# Patient Record
Sex: MALE | Race: BLACK OR AFRICAN AMERICAN | NOT HISPANIC OR LATINO | Employment: OTHER | URBAN - METROPOLITAN AREA
[De-identification: names, ages, dates, MRNs, and addresses within clinical notes are randomized per-mention and may not be internally consistent; named-entity substitution may affect disease eponyms.]

---

## 2017-04-19 ENCOUNTER — OFFICE VISIT (OUTPATIENT)
Dept: FAMILY MEDICINE | Facility: CLINIC | Age: 72
End: 2017-04-19

## 2017-04-19 VITALS
SYSTOLIC BLOOD PRESSURE: 134 MMHG | DIASTOLIC BLOOD PRESSURE: 88 MMHG | WEIGHT: 212.2 LBS | BODY MASS INDEX: 28.12 KG/M2 | HEIGHT: 73 IN | TEMPERATURE: 97.6 F | OXYGEN SATURATION: 97 % | HEART RATE: 80 BPM | RESPIRATION RATE: 20 BRPM

## 2017-04-19 DIAGNOSIS — Z86.73 HISTORY OF CVA (CEREBROVASCULAR ACCIDENT): ICD-10-CM

## 2017-04-19 DIAGNOSIS — E11.9 TYPE 2 DIABETES MELLITUS WITHOUT COMPLICATION, WITHOUT LONG-TERM CURRENT USE OF INSULIN (H): Primary | ICD-10-CM

## 2017-04-19 PROBLEM — I10 BENIGN ESSENTIAL HYPERTENSION: Status: ACTIVE | Noted: 2017-04-19

## 2017-04-19 LAB
ALBUMIN SERPL-MCNC: 4.7 MG/DL (ref 3.5–4.7)
ALP SERPL-CCNC: 90.5 U/L (ref 31.7–110.7)
ALT SERPL-CCNC: 15.6 U/L (ref 0–45)
AST SERPL-CCNC: 13.4 U/L (ref 0–55)
BILIRUB SERPL-MCNC: 0.4 MG/DL (ref 0.2–1.3)
BUN SERPL-MCNC: 22 MG/DL (ref 7–21)
CALCIUM SERPL-MCNC: 9.4 MG/DL (ref 8.5–10.1)
CHLORIDE SERPLBLD-SCNC: 104.3 MMOL/L (ref 98–110)
CHOLEST SERPL-MCNC: 96 MG/DL
CO2 SERPL-SCNC: 26.7 MMOL/L (ref 20–32)
CREAT SERPL-MCNC: 1.2 MG/DL (ref 0.7–1.3)
CREAT UR-MCNC: 86 MG/DL
GFR SERPL CREATININE-BSD FRML MDRD: 63.3 ML/MIN/1.7 M2
GLUCOSE SERPL-MCNC: 105.1 MG'DL (ref 70–99)
HBA1C MFR BLD: 5.7 % (ref 4.1–5.7)
HDLC SERPL-MCNC: 41 MG/DL
LDLC SERPL CALC-MCNC: 40 MG/DL
MICROALBUMIN UR-MCNC: <5 MG/L
MICROALBUMIN/CREAT UR: NORMAL MG/G CR (ref 0–17)
NONHDLC SERPL-MCNC: 55 MG/DL
POTASSIUM SERPL-SCNC: 3.9 MMOL/DL (ref 3.3–4.5)
PROT SERPL-MCNC: 7.8 G/DL (ref 6.8–8.8)
SODIUM SERPL-SCNC: 141.2 MMOL/L (ref 132.6–141.4)
TRIGL SERPL-MCNC: 73 MG/DL

## 2017-04-19 RX ORDER — LEVETIRACETAM 500 MG/1
500 TABLET ORAL 2 TIMES DAILY
COMMUNITY
End: 2017-07-25

## 2017-04-19 ASSESSMENT — ENCOUNTER SYMPTOMS
MYALGIAS: 0
SHORTNESS OF BREATH: 0
BLOOD IN STOOL: 0
COLOR CHANGE: 0
DIARRHEA: 0
DIFFICULTY URINATING: 0
POLYDIPSIA: 0
ABDOMINAL PAIN: 0
CHEST TIGHTNESS: 0
SLEEP DISTURBANCE: 0
WEAKNESS: 1
CONSTIPATION: 0
EYES NEGATIVE: 1
FATIGUE: 0
COUGH: 0
FEVER: 0
NUMBNESS: 0
PALPITATIONS: 0
ARTHRALGIAS: 0
DYSPHORIC MOOD: 0
ABDOMINAL DISTENTION: 0
VOMITING: 0
DYSURIA: 0
NERVOUS/ANXIOUS: 0

## 2017-04-19 NOTE — PATIENT INSTRUCTIONS
Here is the plan from today's visit    1. Type 2 diabetes mellitus without complication, without long-term current use of insulin (H)  Follow up in 1-2 weeks for a recheck  - Hemoglobin A1c (LabDAQ)  - Lipid panel reflex to direct LDL  - Albumin Random Urine Quantitative  - CBC with Diff Plt (LabDAQ); Future  - Comprehensive Metabolic Panel (LabDAQ)  - OPHTHALMOLOGY ADULT REFERRAL - INTERNAL    2. History of CVA (cerebrovascular accident)  Continue ASa      Please call or return to clinic if your symptoms don't go away.    Follow up plan  Please make a clinic appointment for follow up with me (LUCY KINCAID) in 2  weeks for recheck.    Thank you for coming to [facility] today.  Lab Testing:  **If you had lab testing today and your results are reassuring or normal they will be mailed to you or sent through "[employer] within 7 days.   **If the lab tests need quick action we will call you with the results.  The phone number we will call with results is # [phone] (home) . If this is not the best number please call our clinic and change the number.  Medication Refills:  If you need any refills please call your pharmacy and they will contact us.   If you need to  your refill at a new pharmacy, please contact the new pharmacy directly. The new pharmacy will help you get your medications transferred faster.   Scheduling:  If you have any concerns about today's visit or wish to schedule another appointment please call our office during normal business hours [phone] (8-5:00 M-F)  If a referral was made to a [facility] and you don't get a call from central scheduling please call [phone].  If a Mammogram was ordered for you at [facility] call [phone] to schedule or change your appointment.  If you had an XRay/CT/Ultrasound/MRI ordered the number is [phone] to schedule or change your radiology appointment.   Medical Concerns:  If you have urgent medical concerns  please call [phone] at any time of the day.      Ophthalmology referral  [phone]  This pt has been scheduled on 4/27 in the eye clinic.           Thanks!     Krzysztof

## 2017-04-19 NOTE — PROGRESS NOTES
HPI:       Juli Rodriguez is a 72 year old who presents for the following  Patient presents with:  Establish Care: new patient  RECHECK: f/u stroke in 2013. numbness in both hands and legs.     New patient to this practice who       A Welsh  was used for  this visit.      Problem, Medication and Allergy Lists were   reviewed and are current.     Patient Active Problem List    Diagnosis Date Noted     Type 2 diabetes mellitus without complication, without long-term current use of insulin (H) 04/19/2017     Priority: Medium     History of CVA (cerebrovascular accident) 04/19/2017     Priority: Medium     Benign essential hypertension 04/19/2017     Priority: Medium   ,     Current Outpatient Prescriptions   Medication Sig Dispense Refill     GABAPENTIN PO Take 600 mg by mouth 3 times daily       levETIRAcetam (KEPPRA) 500 MG tablet Take 500 mg by mouth 2 times daily       vitamin B complex with vitamin C (VITAMIN  B COMPLEX) TABS tablet Take 1 tablet by mouth daily       aspirin 81 MG tablet Take by mouth daily       LISINOPRIL PO Take 20 mg by mouth       CARVEDILOL PO Take 12.5 mg by mouth 2 times daily (with meals)       METFORMIN HCL PO Take 500 mg by mouth 2 times daily (with meals)       ATORVASTATIN CALCIUM PO Take 20 mg by mouth       PANTOPRAZOLE SODIUM PO Take 40 mg by mouth every morning (before breakfast)     ,     Allergies   Allergen Reactions     Eggs [Chicken-Derived Products (Egg)]      Patient is   a new patient to this clinic and so  I reviewed/updated the Past Medical History, the Family History and the Social History. ,   Past Medical History:   Diagnosis Date     Cerebral infarction (H) 08/2013    In [facility]    and   Family History        Negative family history of: DIABETES, Coronary Artery Disease, Hypertension, Hyperlipidemia, CEREBROVASCULAR DISEASE, Breast Cancer, Colon Cancer, Prostate Cancer, Other Cancer, Depression, Anxiety Disorder, MENTAL ILLNESS, Substance Abuse,  "Anesthesia Reaction, Asthma, OSTEOPOROSIS, Genetic Disorder, Thyroid Disease, Obesity               Review of Systems:   Review of Systems   Constitutional: Negative for fatigue and fever.   HENT: Negative.    Eyes: Negative.  Negative for visual disturbance.   Respiratory: Negative for cough, chest tightness and shortness of breath.    Cardiovascular: Negative for chest pain and palpitations.   Gastrointestinal: Negative for abdominal distention, abdominal pain, blood in stool, constipation, diarrhea and vomiting.   Endocrine: Negative for polydipsia and polyuria.   Genitourinary: Negative for difficulty urinating and dysuria.   Musculoskeletal: Negative for arthralgias and myalgias.   Skin: Negative for color change and rash.   Neurological: Positive for weakness (right sided). Negative for numbness.   Psychiatric/Behavioral: Negative for dysphoric mood and sleep disturbance. The patient is not nervous/anxious.              Physical Exam:   Patient Vitals for the past 24 hrs:   BP Temp Temp src Pulse Resp SpO2 Height Weight   04/19/17 1433 134/88 - - - - - - -   04/19/17 1427 (!) 160/97 97.6  F (36.4  C) Oral 80 20 97 % 6' 0.83\" (185 cm) 212 lb 3.2 oz (96.3 kg)     Body mass index is 28.12 kg/(m^2).  Vitals were reviewed and were normal     Physical Exam   Constitutional: He is oriented to person, place, and time. He appears well-developed. No distress.   HENT:   Head: Normocephalic.   Eyes: Conjunctivae are normal. No scleral icterus.   Neck: Normal range of motion. No thyromegaly present.   Cardiovascular: Normal rate, regular rhythm, normal heart sounds and intact distal pulses.    No murmur heard.  Pulmonary/Chest: Effort normal and breath sounds normal. No respiratory distress. He has no wheezes.   Abdominal: Soft. Bowel sounds are normal. He exhibits no distension. There is no splenomegaly or hepatomegaly. There is no tenderness.   Musculoskeletal: He exhibits no edema.   Lymphadenopathy:     He has no " cervical adenopathy.   Neurological: He is alert and oriented to person, place, and time. He has normal reflexes. No cranial nerve deficit or sensory deficit. GCS eye subscore is 4. GCS verbal subscore is 5. GCS motor subscore is 6.   Decreased R sided leg strength,   Mildly decreased R arm coordination.    Skin: Skin is warm and dry. He is not diaphoretic.   Psychiatric: He has a normal mood and affect. His behavior is normal. Judgment and thought content normal.   Vitals reviewed.        Results:      Results from the last 24 hours  Results for orders placed or performed in visit on 04/19/17 (from the past 24 hour(s))   Hemoglobin A1c (LabDAQ)   Result Value Ref Range    Hemoglobin A1C 5.7 4.1 - 5.7 %   Lipid panel reflex to direct LDL   Result Value Ref Range    Cholesterol 96 <200 mg/dL    Triglycerides 73 <150 mg/dL    HDL Cholesterol 41 >39 mg/dL    LDL Cholesterol Calculated 40 <100 mg/dL    Non HDL Cholesterol 55 <130 mg/dL   Comprehensive Metabolic Panel (LabDAQ)   Result Value Ref Range    Albumin 4.7 3.5 - 4.7 mg/dL    Alkaline Phosphatase 90.5 31.7 - 110.7 U/L    ALT 15.6 0.0 - 45.0 U/L    AST 13.4 0.0 - 55.0 U/L    Bilirubin Total 0.4 0.2 - 1.3 mg/dL    Urea Nitrogen 22.0 (H) 7.0 - 21.0 mg/dL    Calcium 9.4 8.5 - 10.1 mg/dL    Chloride 104.3 98.0 - 110.0 mmol/L    Carbon Dioxide 26.7 20.0 - 32.0 mmol/L    Creatinine 1.2 0.7 - 1.3 mg/dL    Glucose 105.1 (H) 70.0 - 99.0 mg'dL    Potassium 3.9 3.3 - 4.5 mmol/dL    Sodium 141.2 132.6 - 141.4 mmol/L    Protein Total 7.8 6.8 - 8.8 g/dL    GFR Estimate 63.3 >60.0 mL/min/1.7 m2    GFR Estimate If Black 76.5 >60.0 mL/min/1.7 m2   Albumin Random Urine Quantitative   Result Value Ref Range    Creatinine Urine 86 mg/dL    Albumin Urine mg/L <5 mg/L    Albumin Urine mg/g Cr Unable to calculate due to low value 0 - 17 mg/g Cr     Assessment and Plan     Patient Instructions   Here is the plan from today's visit    1. Type 2 diabetes mellitus without complication,  without long-term current use of insulin (H)  Follow up in 1-2 weeks for a recheck  - Hemoglobin A1c (LabDAQ)  - Lipid panel reflex to direct LDL  - Albumin Random Urine Quantitative  - CBC with Diff Plt (LabDAQ); Future  - Comprehensive Metabolic Panel (LabDAQ)  - OPHTHALMOLOGY ADULT REFERRAL - INTERNAL    2. History of CVA (cerebrovascular accident)  Continue ASA    Follow up in 3-4 weeks    There are no discontinued medications.  Options for treatment and follow-up care were reviewed with the patient. Juli Rodriguez  engaged in the decision making process and verbalized understanding of the options discussed and agreed with the final plan.    Ernie Arreola MD

## 2017-04-19 NOTE — MR AVS SNAPSHOT
After Visit Summary   4/19/2017    Juli Rodriguez    MRN: [id number]           Patient Information     Date Of Birth          1945        Visit Information        Provider Department      4/19/2017 2:20 PM Ernie Arreola MD [facility]        Today's Diagnoses     Type 2 diabetes mellitus without complication, without long-term current use of insulin (H)    -  1    History of CVA (cerebrovascular accident)          Care Instructions    Here is the plan from today's visit    1. Type 2 diabetes mellitus without complication, without long-term current use of insulin (H)  Follow up in 1-2 weeks for a recheck  - Hemoglobin A1c (LabDAQ)  - Lipid panel reflex to direct LDL  - Albumin Random Urine Quantitative  - CBC with Diff Plt (LabDAQ); Future  - Comprehensive Metabolic Panel (LabDAQ)  - OPHTHALMOLOGY ADULT REFERRAL - INTERNAL    2. History of CVA (cerebrovascular accident)  Continue ASa      Please call or return to clinic if your symptoms don't go away.    Follow up plan  Please make a clinic appointment for follow up with me (ERNIE ARREOLA) in 2  weeks for recheck.    Thank you for coming to [facility] today.  Lab Testing:  **If you had lab testing today and your results are reassuring or normal they will be mailed to you or sent through [employer] within 7 days.   **If the lab tests need quick action we will call you with the results.  The phone number we will call with results is # [phone] (home) . If this is not the best number please call our clinic and change the number.  Medication Refills:  If you need any refills please call your pharmacy and they will contact us.   If you need to  your refill at a new pharmacy, please contact the new pharmacy directly. The new pharmacy will help you get your medications transferred faster.   Scheduling:  If you have any concerns about today's visit or wish to schedule another appointment please call our office during  normal business hours [phone] (8-5:00 M-F)  If a referral was made to a [facility] and you don't get a call from central scheduling please call [phone].  If a Mammogram was ordered for you at [facility] call [phone] to schedule or change your appointment.  If you had an XRay/CT/Ultrasound/MRI ordered the number is [phone] to schedule or change your radiology appointment.   Medical Concerns:  If you have urgent medical concerns please call [phone] at any time of the day.          Follow-ups after your visit        Additional Services     OPHTHALMOLOGY ADULT REFERRAL - INTERNAL       Your provider has referred you to: [facility]: [facility] [place] [phone]   [email or web address]    Please be aware that coverage of these services is subject to the terms and limitations of your health insurance plan.  Call member services at your health plan with any benefit or coverage questions.      Please bring the following with you to your appointment:    (1) Any X-Rays, CTs or MRIs which have been performed.  Contact the facility where they were done to arrange for  prior to your scheduled appointment.    (2) List of current medications  (3) This referral request   (4) Any documents/labs given to you for this referral                  Follow-up notes from your care team     Return in about 1 week (around 4/26/2017).      Future tests that were ordered for you today     Open Future Orders        Priority Expected Expires Ordered    CBC with Diff Plt (LabDAQ) Routine  4/19/2018 4/19/2017            Who to contact     Please call your clinic at [phone] to:    Ask questions about your health    Make or cancel appointments    Discuss your medicines    Learn about your test results    Speak to your doctor   If you have compliments or concerns about an experience at your clinic, or if you wish to file a complaint, please contact  "[facility] Patient Relations at [phone] or email us at [email or web address]         Additional Information About Your Visit        iversityhart Information     [employer] is an electronic gateway that provides easy, online access to your medical records. With [employer], you can request a clinic appointment, read your test results, renew a prescription or communicate with your care team.     To sign up for [employer] visit the website at [email or web address]   You will be asked to enter the access code listed below, as well as some personal information. Please follow the directions to create your username and password.     Your access code is: [email or web address]  Expires: 2017  3:02 PM     Your access code will  in 90 days. If you need help or a new code, please contact your [facility] or call [phone] for assistance.        Care EveryWhere ID     This is your Care EveryWhere ID. This could be used by other organizations to access your [place] medical records  [id number]        Your Vitals Were     Pulse Temperature Respirations Height Pulse Oximetry BMI (Body Mass Index)    80 97.6  F (36.4  C) (Oral) 20 6' 0.83\" (185 cm) 97% 28.12 kg/m2       Blood Pressure from Last 3 Encounters:   17 134/88    Weight from Last 3 Encounters:   17 212 lb 3.2 oz (96.3 kg)              We Performed the Following     Albumin Random Urine Quantitative     Comprehensive Metabolic Panel (LabDAQ)     Hemoglobin A1c (LabDAQ)     Lipid panel reflex to direct LDL     OPHTHALMOLOGY ADULT REFERRAL - INTERNAL        Primary Care Provider Office Phone # Fax #    Ernie Arreola [phone]       [facility] [address] [address]        Thank you!     Thank you for choosing [facility]  for your care. Our goal is always to provide you with excellent care. Hearing back from our patients is " one way we can continue to improve our services. Please take a few minutes to complete the written survey that you may receive in the mail after your visit with us. Thank you!             Your Updated Medication List - Protect others around you: Learn how to safely use, store and throw away your medicines at www.disposemymeds.org.          This list is accurate as of: 4/19/17  3:02 PM.  Always use your most recent med list.                   Brand Name Dispense Instructions for use    aspirin 81 MG tablet      Take by mouth daily       ATORVASTATIN CALCIUM PO      Take 20 mg by mouth       CARVEDILOL PO      Take 12.5 mg by mouth 2 times daily (with meals)       GABAPENTIN PO      Take 600 mg by mouth 3 times daily       levETIRAcetam 500 MG tablet    KEPPRA     Take 500 mg by mouth 2 times daily       LISINOPRIL PO      Take 20 mg by mouth       METFORMIN HCL PO      Take 500 mg by mouth 2 times daily (with meals)       PANTOPRAZOLE SODIUM PO      Take 40 mg by mouth every morning (before breakfast)       vitamin B complex with vitamin C Tabs tablet      Take 1 tablet by mouth daily

## 2017-04-19 NOTE — LETTER
April 23, 2017      Juli Rodriguez  [address]   [place] [address]        Dear Juli,    Thank you for getting your care at [facility]. Please see below for your test results.  Your results are reassuring -continue your current medications.    Resulted Orders   Hemoglobin A1c (LabDAQ)   Result Value Ref Range    Hemoglobin A1C 5.7 4.1 - 5.7 %   Lipid panel reflex to direct LDL   Result Value Ref Range    Cholesterol 96 <200 mg/dL    Triglycerides 73 <150 mg/dL    HDL Cholesterol 41 >39 mg/dL    LDL Cholesterol Calculated 40 <100 mg/dL      Comment:      Desirable:       <100 mg/dl    Non HDL Cholesterol 55 <130 mg/dL   Albumin Random Urine Quantitative   Result Value Ref Range    Creatinine Urine 86 mg/dL    Albumin Urine mg/L <5 mg/L    Albumin Urine mg/g Cr Unable to calculate due to low value 0 - 17 mg/g Cr   Comprehensive Metabolic Panel (LabDAQ)   Result Value Ref Range    Albumin 4.7 3.5 - 4.7 mg/dL    Alkaline Phosphatase 90.5 31.7 - 110.7 U/L    ALT 15.6 0.0 - 45.0 U/L    AST 13.4 0.0 - 55.0 U/L    Bilirubin Total 0.4 0.2 - 1.3 mg/dL    Urea Nitrogen 22.0 (H) 7.0 - 21.0 mg/dL    Calcium 9.4 8.5 - 10.1 mg/dL    Chloride 104.3 98.0 - 110.0 mmol/L    Carbon Dioxide 26.7 20.0 - 32.0 mmol/L    Creatinine 1.2 0.7 - 1.3 mg/dL    Glucose 105.1 (H) 70.0 - 99.0 mg'dL    Potassium 3.9 3.3 - 4.5 mmol/dL    Sodium 141.2 132.6 - 141.4 mmol/L    Protein Total 7.8 6.8 - 8.8 g/dL    GFR Estimate 63.3 >60.0 mL/min/1.7 m2    GFR Estimate If Black 76.5 >60.0 mL/min/1.7 m2       If you have any concerns about these results please call and leave a message for me or send a MyChart message to the clinic.    Sincerely,    Ernie Arreola MD

## 2017-04-24 NOTE — PROGRESS NOTES
Letter with results sent to patient on 4/23/2017  See letters section for details. Ernie Arreola MD

## 2017-04-27 ENCOUNTER — OFFICE VISIT (OUTPATIENT)
Dept: OPHTHALMOLOGY | Facility: CLINIC | Age: 72
End: 2017-04-27

## 2017-04-27 DIAGNOSIS — E11.9 TYPE 2 DIABETES MELLITUS WITHOUT COMPLICATION, WITHOUT LONG-TERM CURRENT USE OF INSULIN (H): Primary | ICD-10-CM

## 2017-04-27 DIAGNOSIS — H52.4 PRESBYOPIA: ICD-10-CM

## 2017-04-27 ASSESSMENT — CONF VISUAL FIELD
OD_NORMAL: 1
OS_NORMAL: 1
METHOD: COUNTING FINGERS

## 2017-04-27 ASSESSMENT — VISUAL ACUITY
OS_SC: 20/20
METHOD: SNELLEN - LINEAR
OD_SC: 20/20
OD_SC+: -1

## 2017-04-27 ASSESSMENT — TONOMETRY
IOP_METHOD: ICARE
OD_IOP_MMHG: 16
OS_IOP_MMHG: 17

## 2017-04-27 ASSESSMENT — CUP TO DISC RATIO
OD_RATIO: 0.40
OS_RATIO: 0.40

## 2017-04-27 ASSESSMENT — EXTERNAL EXAM - RIGHT EYE: OD_EXAM: NORMAL, NO PTOSIS

## 2017-04-27 ASSESSMENT — REFRACTION_MANIFEST
OD_AXIS: 140
OS_ADD: +1.75
OS_CYLINDER: SPHERE
OD_CYLINDER: +0.25
OD_SPHERE: -0.25
OD_ADD: +1.75
OS_SPHERE: +0.50

## 2017-04-27 ASSESSMENT — EXTERNAL EXAM - LEFT EYE: OS_EXAM: NORMAL

## 2017-04-27 ASSESSMENT — SLIT LAMP EXAM - LIDS
COMMENTS: NORMAL
COMMENTS: NORMAL

## 2017-04-27 NOTE — NURSING NOTE
Chief Complaints and History of Present Illnesses   Patient presents with     Diabetic Eye Exam     HPI    Affected eye(s):  Both   Symptoms:     No blurred vision   No floaters   No flashes         Do you have eye pain now?:  No      Comments:    DMII last BGL: 122 2 days ago  Lab Results       Component                Value               Date                       A1C                      5.7                 04/19/2017              Patient had a stroke on the Right side, doesn't have sight in the RE 2015 per pt.  No additional concerns today per pt      Amy Pulido April 27, 2017 8:55 AM

## 2017-04-27 NOTE — MR AVS SNAPSHOT
After Visit Summary   2017    Juli Rodriguez    MRN: [id number]           Patient Information     Date Of Birth          1945        Visit Information        Provider Department      2017 8:30 AM Leilani Gresham OD; JULIANNE SANDERSON [facility]        Today's Diagnoses     Type 2 diabetes mellitus without complication, without long-term current use of insulin (H)    -  1    Presbyopia           Follow-ups after your visit        Who to contact     Please call your clinic at [phone] to:    Ask questions about your health    Make or cancel appointments    Discuss your medicines    Learn about your test results    Speak to your doctor   If you have compliments or concerns about an experience at your clinic, or if you wish to file a complaint, please contact [facility] Patient Relations at [phone] or email us at [email or web address]         Additional Information About Your Visit        MyChart Information     [employer] is an electronic gateway that provides easy, online access to your medical records. With [employer], you can request a clinic appointment, read your test results, renew a prescription or communicate with your care team.     To sign up for [employer] visit the website at [email or web address]   You will be asked to enter the access code listed below, as well as some personal information. Please follow the directions to create your username and password.     Your access code is: [email or web address]  Expires: 2017  3:02 PM     Your access code will  in 90 days. If you need help or a new code, please contact your [facility] or call [phone] for assistance.        Care EveryWhere ID     This is your Care EveryWhere ID. This could be used by other organizations to access your [place] medical records  [id number]         Blood Pressure from Last 3 Encounters:    04/19/17 134/88    Weight from Last 3 Encounters:   04/19/17 96.3 kg (212 lb 3.2 oz)              We Performed the Following     REFRACTION [[id number]]        Primary Care Provider Office Phone # Fax #    Ernie Arreola [phone]       [facility] [address] [address]        Thank you!     Thank you for choosing [facility]  for your care. Our goal is always to provide you with excellent care. Hearing back from our patients is one way we can continue to improve our services. Please take a few minutes to complete the written survey that you may receive in the mail after your visit with us. Thank you!             Your Updated Medication List - Protect others around you: Learn how to safely use, store and throw away your medicines at www.disposemymeds.org.          This list is accurate as of: 4/27/17  9:48 AM.  Always use your most recent med list.                   Brand Name Dispense Instructions for use    aspirin 81 MG tablet      Take by mouth daily       ATORVASTATIN CALCIUM PO      Take 20 mg by mouth       CARVEDILOL PO      Take 12.5 mg by mouth 2 times daily (with meals)       GABAPENTIN PO      Take 600 mg by mouth 3 times daily       levETIRAcetam 500 MG tablet    KEPPRA     Take 500 mg by mouth 2 times daily       LISINOPRIL PO      Take 20 mg by mouth       METFORMIN HCL PO      Take 500 mg by mouth 2 times daily (with meals)       PANTOPRAZOLE SODIUM PO      Take 40 mg by mouth every morning (before breakfast)       vitamin B complex with vitamin C Tabs tablet      Take 1 tablet by mouth daily

## 2017-04-27 NOTE — LETTER
April 27, 2017       DIABETIC EYE EXAMINATION REPORT:    Ernie Arreola MD  [facility]  [address] [address]   Ernie Arreola       Name: Juli Rodriguez   MRN: [id number]   Date of Service: 4/27/2017       Dear Dr. Arreola,    I had the pleasure of seeing your patient Mr. Rodriguez for his annual diabetic eye exam. He has a history of cataract surgery in both eyes, but does not recall any history of diabetic eye complications.     VISUAL ACUITY:    Visual Acuity (Snellen - Linear)      Right Left   Dist sc 20/20 -1 20/20              Detailed Retinal Examination:  250.00 NIDDM without Eye Complications - No diabetic retinopathy either eye    Follow up recommendations: in 1 year for reevaluation.    Thank you again for the kind referral.  If I can provide you with any additional information or be of further assistance in the future, please feel free to contact me at any time.    Sincerely,       [employer]

## 2017-04-27 NOTE — PROGRESS NOTES
A/P  1.) Type 2 DM without ophthalmic manifestation  -Last A1c 5.7 this month  -Reviewed effects of DM on the eyes and importance of good blood sugar control  -Monitor 1 year    2.) H/o stroke with residual right sided weakness  -No ophthalmic side effects noted today  -Pt states ocular symptoms resolved    RTC 1 year diabetic eye exam    I have confirmed the patient's CC, HPI and reviewed Past Medical History, Past Surgical History, Social History, Family History, Problem List, Medication List and agree with Tech note.     Leilani Gresham, MICHELLE DAYO

## 2017-05-17 ENCOUNTER — OFFICE VISIT (OUTPATIENT)
Dept: FAMILY MEDICINE | Facility: CLINIC | Age: 72
End: 2017-05-17

## 2017-05-17 ENCOUNTER — DOCUMENTATION ONLY (OUTPATIENT)
Dept: VASCULAR SURGERY | Facility: CLINIC | Age: 72
End: 2017-05-17

## 2017-05-17 VITALS
SYSTOLIC BLOOD PRESSURE: 138 MMHG | DIASTOLIC BLOOD PRESSURE: 88 MMHG | OXYGEN SATURATION: 100 % | TEMPERATURE: 97.7 F | BODY MASS INDEX: 28.49 KG/M2 | RESPIRATION RATE: 18 BRPM | WEIGHT: 215 LBS | HEART RATE: 77 BPM

## 2017-05-17 DIAGNOSIS — Z11.59 NEED FOR HEPATITIS C SCREENING TEST: ICD-10-CM

## 2017-05-17 DIAGNOSIS — Z00.00 PREVENTATIVE HEALTH CARE: ICD-10-CM

## 2017-05-17 DIAGNOSIS — Z86.73 HISTORY OF CVA (CEREBROVASCULAR ACCIDENT): ICD-10-CM

## 2017-05-17 DIAGNOSIS — E11.9 TYPE 2 DIABETES MELLITUS WITHOUT COMPLICATION, WITHOUT LONG-TERM CURRENT USE OF INSULIN (H): ICD-10-CM

## 2017-05-17 DIAGNOSIS — Z13.6 SCREENING FOR AAA (ABDOMINAL AORTIC ANEURYSM): ICD-10-CM

## 2017-05-17 DIAGNOSIS — J30.1 SEASONAL ALLERGIC RHINITIS DUE TO POLLEN: Primary | ICD-10-CM

## 2017-05-17 DIAGNOSIS — Z12.11 COLON CANCER SCREENING: ICD-10-CM

## 2017-05-17 LAB — TSH SERPL DL<=0.005 MIU/L-ACNC: 2.69 MU/L (ref 0.4–4)

## 2017-05-17 RX ORDER — FLUTICASONE PROPIONATE 50 MCG
1-2 SPRAY, SUSPENSION (ML) NASAL DAILY
Qty: 16 G | Refills: 6 | Status: SHIPPED | OUTPATIENT
Start: 2017-05-17 | End: 2017-10-05

## 2017-05-17 RX ORDER — LORATADINE 10 MG/1
10 TABLET ORAL DAILY
Qty: 90 TABLET | Refills: 1 | Status: SHIPPED | OUTPATIENT
Start: 2017-05-17 | End: 2017-07-25

## 2017-05-17 NOTE — PROGRESS NOTES
HPI:       Juli Rodriguez is a 72 year old who presents for the following  Patient presents with:  Allergies      Allergies?     Onset: ongoing    Description:   Nasal congestion:  YES   Sneezing:  YES   Red, itchy eyes:  YES     Progression of Symptoms:      same    Accompanying Signs & Symptoms:  Cough: No   Wheezing: No   Rash: No   Sinus/facial pain: No    History:   Is it seasonal:   in the fall and in the spring   History of asthma: No   Has allergy testing been done: No     What makes it worse?:             change in seasons, Details    What makes it better?             Nothing:       Therapies Tried and outcome: Nothing    Tried zyrtec was partially effective, and eye drops   Also requests diabetic shoes    Patient has great difficulty getting up from a chair and requires assistance once he is up he is able to walk.    Discussed immunizations and FIT with patient and he agreed to them.  A [employer]Dash  was used for  this visit.      Problem, Medication and Allergy Lists were reviewed and are current.  Patient is an established patient of this clinic.         Review of Systems:   Review of Systems   Constitutional: Negative for fatigue and fever.   HENT: Negative.    Eyes: Negative.  Negative for visual disturbance.   Respiratory: Negative for cough, chest tightness and shortness of breath.    Cardiovascular: Negative for chest pain and palpitations.   Gastrointestinal: Negative for abdominal distention, abdominal pain, blood in stool, constipation, diarrhea and vomiting.   Endocrine: Negative for polydipsia and polyuria.   Genitourinary: Negative for difficulty urinating and dysuria.   Musculoskeletal: Negative for arthralgias and myalgias.   Skin: Negative for color change and rash.   Neurological: Positive for weakness (right sided). Negative for numbness.   Psychiatric/Behavioral: Negative for dysphoric mood and sleep disturbance. The patient is not nervous/anxious.              Physical Exam:    Patient Vitals for the past 24 hrs:   BP Temp Temp src Pulse Resp SpO2 Weight   05/17/17 0945 (!) 150/101 97.7  F (36.5  C) Oral 77 18 100 % 215 lb (97.5 kg)     Body mass index is 28.49 kg/(m^2).  Vitals were reviewed and were normal     Physical Exam   Constitutional: He is oriented to person, place, and time. He appears well-developed. No distress.   HENT:   Head: Normocephalic.   Nose: Mucosal edema (and inflammation) and rhinorrhea present. No sinus tenderness or septal deviation. Right sinus exhibits no maxillary sinus tenderness and no frontal sinus tenderness. Left sinus exhibits maxillary sinus tenderness. Left sinus exhibits no frontal sinus tenderness.   Eyes: EOM are normal. Pupils are equal, round, and reactive to light. Right conjunctiva is injected (mild). Left conjunctiva is injected (mild). No scleral icterus.   Neck: Normal range of motion. No thyromegaly present.   Cardiovascular: Normal rate, regular rhythm, normal heart sounds and intact distal pulses.    No murmur heard.  Pulmonary/Chest: Effort normal and breath sounds normal. No respiratory distress. He has no wheezes.   Abdominal: Soft. Bowel sounds are normal. He exhibits no distension. There is no splenomegaly or hepatomegaly. There is no tenderness.   Musculoskeletal: He exhibits no edema.   Lymphadenopathy:     He has no cervical adenopathy.   Neurological: He is alert and oriented to person, place, and time. He has normal reflexes. No cranial nerve deficit or sensory deficit. GCS eye subscore is 4. GCS verbal subscore is 5. GCS motor subscore is 6.   Decreased R sided leg strength,   Mildly decreased R arm coordination.    Skin: Skin is warm and dry. He is not diaphoretic.   Psychiatric: He has a normal mood and affect. His behavior is normal. Judgment and thought content normal.   Vitals reviewed.        Results:      Results from the last 24 hours  Results for orders placed or performed in visit on 05/17/17 (from the past 24  hour(s))   Abdominal Aortic Aneurysm Screening/Tracking    Narrative    Pt has never smoked, no family history of AAA; does not meet criteria for screening       Assessment and Plan     Patient Instructions   Here is the plan from today's visit    1. Seasonal allergic rhinitis due to pollen  Take the medicine and follow up if needed  - loratadine (CLARITIN) 10 MG tablet; Take 1 tablet (10 mg) by mouth daily  Dispense: 90 tablet; Refill: 1  - fluticasone (FLONASE) 50 MCG/ACT spray; Spray 1-2 sprays into both nostrils daily  Dispense: 16 g; Refill: 6    2. Screening for AAA (abdominal aortic aneurysm)  Negative screening  - Abdominal Aortic Aneurysm Screening/Tracking    3. Type 2 diabetes mellitus without complication, without long-term current use of insulin (H)  Ordered shoes  - C FOOT EXAM  NO CHARGE  - TSH with free T4 reflex  - order for DME; Diabetic shoes, has some neuropathy  Dispense: 1 each; Refill: 1    4. Colon cancer screening  Mail the test back please  - Fecal colorectal cancer screen FITT; Future    5. Need for hepatitis C screening test     - Hepatitis C Screen Reflex to HCV RNA Quant and Genotype    6. History of CVA (cerebrovascular accident)  And Right sided weakness     - order for DME; Power lift chair  Dispense: 1 each; Refill: 1      Please call or return to clinic if your symptoms don't go away.    Follow up plan  Please make a clinic appointment for follow up with me (ERNIE ARREOLA) in 2  months for diabetes recheck.      There are no discontinued medications.  Options for treatment and follow-up care were reviewed with the patient. Juli Rodriguez  engaged in the decision making process and verbalized understanding of the options discussed and agreed with the final plan.    Ernie Arreola MD

## 2017-05-17 NOTE — MR AVS SNAPSHOT
After Visit Summary   5/17/2017    Juli Rodriguez    MRN: [id number]           Patient Information     Date Of Birth          1945        Visit Information        Provider Department      5/17/2017 9:40 AM Ernie Arreola MD [facility]        Today's Diagnoses     Seasonal allergic rhinitis due to pollen    -  1    Screening for AAA (abdominal aortic aneurysm)        Type 2 diabetes mellitus without complication, without long-term current use of insulin (H)        Colon cancer screening        Need for hepatitis C screening test        History of CVA (cerebrovascular accident)          Care Instructions    Here is the plan from today's visit    1. Seasonal allergic rhinitis due to pollen  Take the medicine and follow up if needed  - loratadine (CLARITIN) 10 MG tablet; Take 1 tablet (10 mg) by mouth daily  Dispense: 90 tablet; Refill: 1  - fluticasone (FLONASE) 50 MCG/ACT spray; Spray 1-2 sprays into both nostrils daily  Dispense: 16 g; Refill: 6    2. Screening for AAA (abdominal aortic aneurysm)  Negative screning  - Abdominal Aortic Aneurysm Screening/Tracking    3. Type 2 diabetes mellitus without complication, without long-term current use of insulin (H)  Ordered shoes  - C FOOT EXAM  NO CHARGE  - TSH with free T4 reflex  - order for DME; Diabetic shoes, has some neuropathy  Dispense: 1 each; Refill: 1    4. Colon cancer screening  Mail the test back  - Fecal colorectal cancer screen FITT; Future    5. Need for hepatitis C screening test     - Hepatitis C Screen Reflex to HCV RNA Quant and Genotype    6. History of CVA (cerebrovascular accident)     - order for DME; Power lift chair  Dispense: 1 each; Refill: 1      Please call or return to clinic if your symptoms don't go away.    Follow up plan  Please make a clinic appointment for follow up with me (ERNIE ARREOLA) in 2  months for diabetes recheck.    Thank you for coming to [facility] today.  Lab Testing:  **If  you had lab testing today and your results are reassuring or normal they will be mailed to you or sent through [employer] within 7 days.   **If the lab tests need quick action we will call you with the results.  The phone number we will call with results is # [phone] (home) . If this is not the best number please call our clinic and change the number.  Medication Refills:  If you need any refills please call your pharmacy and they will contact us.   If you need to  your refill at a new pharmacy, please contact the new pharmacy directly. The new pharmacy will help you get your medications transferred faster.   Scheduling:  If you have any concerns about today's visit or wish to schedule another appointment please call our office during normal business hours [phone] (8-5:00 M-F)  If a referral was made to a [facility] and you don't get a call from central scheduling please call [phone].  If a Mammogram was ordered for you at [facility] call [phone] to schedule or change your appointment.  If you had an XRay/CT/Ultrasound/MRI ordered the number is [phone] to schedule or change your radiology appointment.   Medical Concerns:  If you have urgent medical concerns please call [phone] at any time of the day.          Follow-ups after your visit        Future tests that were ordered for you today     Open Future Orders        Priority Expected Expires Ordered    Fecal colorectal cancer screen FITT Routine 6/7/2017 8/9/2017 5/17/2017            Who to contact     Please call your clinic at [phone] to:    Ask questions about your health    Make or cancel appointments    Discuss your medicines    Learn about your test results    Speak to your doctor   If you have compliments or concerns about an experience at your clinic, or if you wish to file a complaint, please contact [facility] Patient Relations at [phone] or email us at  [email or web address]         Additional Information About Your Visit        [employer] Information     [employer] is an electronic gateway that provides easy, online access to your medical records. With [employer], you can request a clinic appointment, read your test results, renew a prescription or communicate with your care team.     To sign up for [employer] visit the website at [email or web address]   You will be asked to enter the access code listed below, as well as some personal information. Please follow the directions to create your username and password.     Your access code is: [email or web address]  Expires: 2017  3:02 PM     Your access code will  in 90 days. If you need help or a new code, please contact your [facility] or call [phone] for assistance.        Care EveryWhere ID     This is your Care EveryWhere ID. This could be used by other organizations to access your [facility] medical records  [id number]        Your Vitals Were     Pulse Temperature Respirations Pulse Oximetry BMI (Body Mass Index)       77 97.7  F (36.5  C) (Oral) 18 100% 28.49 kg/m2        Blood Pressure from Last 3 Encounters:   17 138/88   17 134/88    Weight from Last 3 Encounters:   17 215 lb (97.5 kg)   17 212 lb 3.2 oz (96.3 kg)              We Performed the Following     Abdominal Aortic Aneurysm Screening/Tracking     C FOOT EXAM  NO CHARGE     Hepatitis C Screen Reflex to HCV RNA Quant and Genotype     TSH with free T4 reflex          Today's Medication Changes          These changes are accurate as of: 17 10:28 AM.  If you have any questions, ask your nurse or doctor.               Start taking these medicines.        Dose/Directions    fluticasone 50 MCG/ACT spray   Commonly known as:  FLONASE   Used for:  Seasonal allergic rhinitis due to pollen   Started by:  Ernie Arreola MD        Dose:  1-2 spray   Spray 1-2 sprays into both nostrils daily    Quantity:  16 g   Refills:  6       loratadine 10 MG tablet   Commonly known as:  CLARITIN   Used for:  Seasonal allergic rhinitis due to pollen   Started by:  Ernie Arreola MD        Dose:  10 mg   Take 1 tablet (10 mg) by mouth daily   Quantity:  90 tablet   Refills:  1       * order for DME   Used for:  Type 2 diabetes mellitus without complication, without long-term current use of insulin (H)   Started by:  Ernie Arreola MD        Diabetic shoes, has some neuropathy   Quantity:  1 each   Refills:  1       * order for DME   Used for:  History of CVA (cerebrovascular accident)   Started by:  Ernie Arreola MD        Power lift chair   Quantity:  1 each   Refills:  1       * Notice:  This list has 2 medication(s) that are the same as other medications prescribed for you. Read the directions carefully, and ask your doctor or other care provider to review them with you.         Where to get your medicines      These medications were sent to [facility]/pharmacy #[id number] - [place], [place] - [address], [place] [address]     Phone:  [phone]     fluticasone 50 MCG/ACT spray    loratadine 10 MG tablet         Some of these will need a paper prescription and others can be bought over the counter.  Ask your nurse if you have questions.     Bring a paper prescription for each of these medications     order for DME    order for DME                Primary Care Provider Office Phone # Fax #    Ernie Arreola [phone]       [facility] [address] [address]        Thank you!     Thank you for choosing \[facility]\"" [facility]  for your care. Our goal is always to provide you with excellent care. Hearing back from our patients is one way we can continue to improve our services. Please take a few minutes to complete the written survey that you may receive in the mail after your visit with us. Thank you!             Your Updated  Medication List - Protect others around you: Learn how to safely use, store and throw away your medicines at www.disposemymeds.org.          This list is accurate as of: 5/17/17 10:28 AM.  Always use your most recent med list.                   Brand Name Dispense Instructions for use    aspirin 81 MG tablet      Take by mouth daily       ATORVASTATIN CALCIUM PO      Take 20 mg by mouth       CARVEDILOL PO      Take 12.5 mg by mouth 2 times daily (with meals)       fluticasone 50 MCG/ACT spray    FLONASE    16 g    Spray 1-2 sprays into both nostrils daily       GABAPENTIN PO      Take 600 mg by mouth 3 times daily       levETIRAcetam 500 MG tablet    KEPPRA     Take 500 mg by mouth 2 times daily       LISINOPRIL PO      Take 20 mg by mouth       loratadine 10 MG tablet    CLARITIN    90 tablet    Take 1 tablet (10 mg) by mouth daily       METFORMIN HCL PO      Take 500 mg by mouth 2 times daily (with meals)       * order for DME     1 each    Diabetic shoes, has some neuropathy       * order for DME     1 each    Power lift chair       PANTOPRAZOLE SODIUM PO      Take 40 mg by mouth every morning (before breakfast)       vitamin B complex with vitamin C Tabs tablet      Take 1 tablet by mouth daily       * Notice:  This list has 2 medication(s) that are the same as other medications prescribed for you. Read the directions carefully, and ask your doctor or other care provider to review them with you.

## 2017-05-17 NOTE — PATIENT INSTRUCTIONS
Here is the plan from today's visit    1. Seasonal allergic rhinitis due to pollen  Take the medicine and follow up if needed  - loratadine (CLARITIN) 10 MG tablet; Take 1 tablet (10 mg) by mouth daily  Dispense: 90 tablet; Refill: 1  - fluticasone (FLONASE) 50 MCG/ACT spray; Spray 1-2 sprays into both nostrils daily  Dispense: 16 g; Refill: 6    2. Screening for AAA (abdominal aortic aneurysm)  Negative screning  - Abdominal Aortic Aneurysm Screening/Tracking    3. Type 2 diabetes mellitus without complication, without long-term current use of insulin (H)  Ordered shoes  - C FOOT EXAM  NO CHARGE  - TSH with free T4 reflex  - order for DME; Diabetic shoes, has some neuropathy  Dispense: 1 each; Refill: 1    4. Colon cancer screening  Mail the test back  - Fecal colorectal cancer screen FITT; Future    5. Need for hepatitis C screening test     - Hepatitis C Screen Reflex to HCV RNA Quant and Genotype    6. History of CVA (cerebrovascular accident)     - order for DME; Power lift chair  Dispense: 1 each; Refill: 1      Please call or return to clinic if your symptoms don't go away.    Follow up plan  Please make a clinic appointment for follow up with me (LUCY KINCAID) in 2  months for diabetes recheck.    Thank you for coming to [facility] today.  Lab Testing:  **If you had lab testing today and your results are reassuring or normal they will be mailed to you or sent through [email or web address] within 7 days.   **If the lab tests need quick action we will call you with the results.  The phone number we will call with results is # [phone] (home) . If this is not the best number please call our clinic and change the number.  Medication Refills:  If you need any refills please call your pharmacy and they will contact us.   If you need to  your refill at a new pharmacy, please contact the new pharmacy directly. The new pharmacy will help you get your medications transferred faster.   Scheduling:  If you have any  concerns about today's visit or wish to schedule another appointment please call our office during normal business hours [phone] (8-5:00 M-F)  If a referral was made to a [facility] and you don't get a call from central scheduling please call [phone].  If a Mammogram was ordered for you at [facility] call [phone] to schedule or change your appointment.  If you had an XRay/CT/Ultrasound/MRI ordered the number is [phone] to schedule or change your radiology appointment.   Medical Concerns:  If you have urgent medical concerns please call [phone] at any time of the day.

## 2017-05-17 NOTE — LETTER
May 18, 2017      Juli Rodriguez  [address]   [facility] [address]        Dear Juli,    Thank you for getting your care at [facility]. Please see below for your test results.  Your hepatitis C result is negative and your thyroid test is normal  Resulted Orders   Hepatitis C Screen Reflex to HCV RNA Quant and Genotype   Result Value Ref Range    Hepatitis C Antibody  NR     Nonreactive   Assay performance characteristics have not been established for newborns,   infants, and children     TSH with free T4 reflex   Result Value Ref Range    TSH 2.69 0.40 - 4.00 mU/L       If you have any concerns about these results please call and leave a message for me or send a [employer] message to the clinic.    Sincerely,    Ernie Arreola MD

## 2017-05-18 LAB — HCV AB SERPL QL IA: NORMAL

## 2017-05-18 NOTE — PROGRESS NOTES
Letter with results sent to patient on 5/18/2017  See letters section for details. Ernie Arreola MD

## 2017-05-22 ENCOUNTER — APPOINTMENT (OUTPATIENT)
Dept: LAB | Facility: CLINIC | Age: 72
End: 2017-05-22
Attending: PATHOLOGY
Payer: COMMERCIAL

## 2017-05-23 ENCOUNTER — HOSPITAL ENCOUNTER (OUTPATIENT)
Facility: CLINIC | Age: 72
Setting detail: SPECIMEN
Discharge: HOME | End: 2017-05-23
Admitting: FAMILY MEDICINE
Payer: COMMERCIAL

## 2017-05-23 PROCEDURE — 82274 ASSAY TEST FOR BLOOD FECAL: CPT | Performed by: FAMILY MEDICINE

## 2017-05-24 DIAGNOSIS — Z12.11 COLON CANCER SCREENING: ICD-10-CM

## 2017-05-24 LAB — HEMOCCULT STL QL IA: NEGATIVE

## 2017-05-25 NOTE — PROGRESS NOTES
Letter with results sent to patient on 5/24/2017  See letters section for details. Ernie Arreola MD

## 2017-06-02 ENCOUNTER — MEDICAL CORRESPONDENCE (OUTPATIENT)
Dept: HEALTH INFORMATION MANAGEMENT | Facility: CLINIC | Age: 72
End: 2017-06-02

## 2017-06-07 PROBLEM — R53.1 RIGHT SIDED WEAKNESS: Status: ACTIVE | Noted: 2017-06-07

## 2017-06-07 PROBLEM — R53.1 RIGHT SIDED WEAKNESS: Status: RESOLVED | Noted: 2017-06-07 | Resolved: 2017-06-07

## 2017-06-07 ASSESSMENT — ENCOUNTER SYMPTOMS
POLYDIPSIA: 0
ABDOMINAL PAIN: 0
FEVER: 0
VOMITING: 0
WEAKNESS: 1
EYES NEGATIVE: 1
COLOR CHANGE: 0
FATIGUE: 0
ABDOMINAL DISTENTION: 0
DIFFICULTY URINATING: 0
ARTHRALGIAS: 0
BLOOD IN STOOL: 0
PALPITATIONS: 0
SLEEP DISTURBANCE: 0
SHORTNESS OF BREATH: 0
NERVOUS/ANXIOUS: 0
CHEST TIGHTNESS: 0
COUGH: 0
DIARRHEA: 0
DYSURIA: 0
DYSPHORIC MOOD: 0
MYALGIAS: 0
NUMBNESS: 0
CONSTIPATION: 0

## 2017-06-09 ENCOUNTER — DOCUMENTATION ONLY (OUTPATIENT)
Dept: FAMILY MEDICINE | Facility: CLINIC | Age: 72
End: 2017-06-09

## 2017-06-14 ENCOUNTER — TELEPHONE (OUTPATIENT)
Dept: FAMILY MEDICINE | Facility: CLINIC | Age: 72
End: 2017-06-14

## 2017-06-14 DIAGNOSIS — I10 BENIGN ESSENTIAL HYPERTENSION: Primary | ICD-10-CM

## 2017-06-14 RX ORDER — CARVEDILOL 3.12 MG/1
12.5 TABLET ORAL 2 TIMES DAILY WITH MEALS
Qty: 60 TABLET | Refills: 3 | Status: SHIPPED | OUTPATIENT
Start: 2017-06-14 | End: 2017-07-25

## 2017-06-14 NOTE — TELEPHONE ENCOUNTER
Date of last visit at clinic: 5/17/2017    Please complete refill and CLOSE ENCOUNTER.  Closing the encounter signifies the refill is complete.

## 2017-06-14 NOTE — TELEPHONE ENCOUNTER
[facility] phone call message- patient requesting a refill:    Full Medication Name: Carvedilol    Dose: 12.5mg tablets take two by mouth daily    Pharmacy confirmed as   [id number] - [place], [place] - [address] [place] [address]  Phone: [phone] Fax: [phone]  : Yes    Additional Comments: Please refill he is out completly     OK to leave a message on voice mail? Yes    Primary language: Citizen of the [place]      needed? Yes    Call taken on June 14, 2017 at 11:45 AM by Ruth Almanza

## 2017-07-25 ENCOUNTER — OFFICE VISIT (OUTPATIENT)
Dept: FAMILY MEDICINE | Facility: CLINIC | Age: 72
End: 2017-07-25

## 2017-07-25 VITALS
OXYGEN SATURATION: 99 % | BODY MASS INDEX: 27.67 KG/M2 | SYSTOLIC BLOOD PRESSURE: 118 MMHG | HEART RATE: 73 BPM | RESPIRATION RATE: 18 BRPM | WEIGHT: 208.8 LBS | DIASTOLIC BLOOD PRESSURE: 81 MMHG | TEMPERATURE: 98 F

## 2017-07-25 DIAGNOSIS — E11.9 TYPE 2 DIABETES MELLITUS WITHOUT COMPLICATION, WITHOUT LONG-TERM CURRENT USE OF INSULIN (H): ICD-10-CM

## 2017-07-25 DIAGNOSIS — Z86.73 HISTORY OF CVA (CEREBROVASCULAR ACCIDENT): ICD-10-CM

## 2017-07-25 DIAGNOSIS — R56.9 SEIZURES (H): ICD-10-CM

## 2017-07-25 DIAGNOSIS — K21.9 GASTROESOPHAGEAL REFLUX DISEASE WITHOUT ESOPHAGITIS: ICD-10-CM

## 2017-07-25 DIAGNOSIS — I10 BENIGN ESSENTIAL HYPERTENSION: Primary | ICD-10-CM

## 2017-07-25 DIAGNOSIS — J30.1 CHRONIC SEASONAL ALLERGIC RHINITIS DUE TO POLLEN: ICD-10-CM

## 2017-07-25 LAB
BUN SERPL-MCNC: 25.1 MG/DL (ref 7–21)
CALCIUM SERPL-MCNC: 9.3 MG/DL (ref 8.5–10.1)
CHLORIDE SERPLBLD-SCNC: 103.9 MMOL/L (ref 98–110)
CO2 SERPL-SCNC: 26.9 MMOL/L (ref 20–32)
CREAT SERPL-MCNC: 1.2 MG/DL (ref 0.7–1.3)
GFR SERPL CREATININE-BSD FRML MDRD: 63.3 ML/MIN/1.7 M2
GLUCOSE SERPL-MCNC: 107.4 MG'DL (ref 70–99)
HBA1C MFR BLD: 5.7 % (ref 4.1–5.7)
POTASSIUM SERPL-SCNC: 3.9 MMOL/DL (ref 3.3–4.5)
SODIUM SERPL-SCNC: 140.9 MMOL/L (ref 132.6–141.4)

## 2017-07-25 RX ORDER — PANTOPRAZOLE SODIUM 20 MG/1
40 TABLET, DELAYED RELEASE ORAL
Qty: 30 TABLET | Refills: 1 | Status: SHIPPED | OUTPATIENT
Start: 2017-07-25 | End: 2017-10-05

## 2017-07-25 RX ORDER — CARVEDILOL 3.12 MG/1
12.5 TABLET ORAL 2 TIMES DAILY WITH MEALS
Qty: 60 TABLET | Refills: 3 | Status: SHIPPED | OUTPATIENT
Start: 2017-07-25 | End: 2017-07-25

## 2017-07-25 RX ORDER — CARVEDILOL 6.25 MG/1
6.25 TABLET ORAL 2 TIMES DAILY WITH MEALS
Qty: 60 TABLET | Refills: 3 | Status: SHIPPED | OUTPATIENT
Start: 2017-07-25 | End: 2017-09-26

## 2017-07-25 RX ORDER — LISINOPRIL 20 MG/1
20 TABLET ORAL DAILY
Qty: 60 TABLET | Refills: 3 | Status: SHIPPED | OUTPATIENT
Start: 2017-07-25 | End: 2018-07-18

## 2017-07-25 RX ORDER — LORATADINE 10 MG/1
10 TABLET ORAL DAILY
Qty: 90 TABLET | Refills: 1 | Status: SHIPPED | OUTPATIENT
Start: 2017-07-25 | End: 2018-07-19

## 2017-07-25 RX ORDER — LISINOPRIL 2.5 MG/1
20 TABLET ORAL DAILY
Qty: 30 TABLET | Refills: 3 | Status: SHIPPED | OUTPATIENT
Start: 2017-07-25 | End: 2017-07-25

## 2017-07-25 RX ORDER — GABAPENTIN 300 MG/1
600 CAPSULE ORAL 3 TIMES DAILY
Qty: 90 CAPSULE | Refills: 3 | Status: SHIPPED | OUTPATIENT
Start: 2017-07-25 | End: 2017-09-05

## 2017-07-25 RX ORDER — ATORVASTATIN CALCIUM 10 MG/1
20 TABLET, FILM COATED ORAL DAILY
Qty: 30 TABLET | Refills: 1 | Status: SHIPPED | OUTPATIENT
Start: 2017-07-25 | End: 2017-09-26

## 2017-07-25 RX ORDER — LEVETIRACETAM 500 MG/1
500 TABLET ORAL 2 TIMES DAILY
Qty: 60 TABLET | Refills: 3 | Status: SHIPPED | OUTPATIENT
Start: 2017-07-25 | End: 2019-03-29

## 2017-07-25 ASSESSMENT — ENCOUNTER SYMPTOMS
DYSPHORIC MOOD: 0
CONSTIPATION: 0
BLOOD IN STOOL: 0
EYES NEGATIVE: 1
NERVOUS/ANXIOUS: 0
ABDOMINAL PAIN: 0
DIARRHEA: 0
SLEEP DISTURBANCE: 0
CHEST TIGHTNESS: 0
MYALGIAS: 0
ARTHRALGIAS: 0
ABDOMINAL DISTENTION: 0
NUMBNESS: 0
FATIGUE: 0
COUGH: 0
PALPITATIONS: 0
DIFFICULTY URINATING: 0
SHORTNESS OF BREATH: 0
WEAKNESS: 1
DYSURIA: 0
COLOR CHANGE: 0
POLYDIPSIA: 0
FEVER: 0
VOMITING: 0

## 2017-07-25 NOTE — PATIENT INSTRUCTIONS
Here is the plan from today's visit    1. Benign essential hypertension  Decrease carvedilol as below   - Basic Metabolic Panel ([facility])  - lisinopril (PRINIVIL/ZESTRIL) 20 MG tablet; Take 1 tablet (20 mg) by mouth daily  Dispense: 60 tablet; Refill: 3  - carvedilol (COREG) 6.25 MG tablet; Take 1 tablet (6.25 mg) by mouth 2 times daily (with meals)  Dispense: 60 tablet; Refill: 3    2. Chronic seasonal allergic rhinitis due to pollen    - loratadine (CLARITIN) 10 MG tablet; Take 1 tablet (10 mg) by mouth daily  Dispense: 90 tablet; Refill: 1    3. Type 2 diabetes mellitus without complication, without long-term current use of insulin (H)    - metFORMIN (GLUCOPHAGE) 500 MG tablet; Take 1 tablet (500 mg) by mouth 2 times daily (with meals)  Dispense: 60 tablet; Refill: 3  - atorvastatin (LIPITOR) 10 MG tablet; Take 2 tablets (20 mg) by mouth daily  Dispense: 30 tablet; Refill: 1  - Hemoglobin A1c ([facility])    4. History of CVA (cerebrovascular accident)    - aspirin 81 MG tablet; Take 1 tablet (81 mg) by mouth daily  Dispense: 30 tablet; Refill: 3  - vitamin B complex with vitamin C (VITAMIN  B COMPLEX) TABS tablet; Take 1 tablet by mouth daily  Dispense: 200 tablet; Refill: 3  - gabapentin (NEURONTIN) 300 MG capsule; Take 2 capsules (600 mg) by mouth 3 times daily  Dispense: 90 capsule; Refill: 3    5. Seizures (H)    - levETIRAcetam (KEPPRA) 500 MG tablet; Take 1 tablet (500 mg) by mouth 2 times daily  Dispense: 60 tablet; Refill: 3    6. Gastroesophageal reflux disease without esophagitis  continue  - pantoprazole (PROTONIX) 20 MG EC tablet; Take 2 tablets (40 mg) by mouth every morning (before breakfast)  Dispense: 30 tablet; Refill: 1      Please call or return to clinic if your symptoms don't go away.    Follow up plan  Please make a clinic appointment for follow up with me (LUCY KINCAID) in 2-4 weeks   for  HTN recheck.    Thank you for coming to [facility] today.  Lab Testing:  **If you had lab  testing today and your results are reassuring or normal they will be mailed to you or sent through [employer] within 7 days.   **If the lab tests need quick action we will call you with the results.  The phone number we will call with results is # [phone] (home) . If this is not the best number please call our clinic and change the number.  Medication Refills:  If you need any refills please call your pharmacy and they will contact us.   If you need to  your refill at a new pharmacy, please contact the new pharmacy directly. The new pharmacy will help you get your medications transferred faster.   Scheduling:  If you have any concerns about today's visit or wish to schedule another appointment please call our office during normal business hours [phone] (8-5:00 M-F)  If a referral was made to a [facility] and you don't get a call from central scheduling please call [phone].  If a Mammogram was ordered for you at [facility] call [phone] to schedule or change your appointment.  If you had an XRay/CT/Ultrasound/MRI ordered the number is [phone] to schedule or change your radiology appointment.   Medical Concerns:  If you have urgent medical concerns please call [phone] at any time of the day.

## 2017-07-25 NOTE — PROGRESS NOTES
HPI:       Juli Rodriguez is a 72 year old who presents for the following  No chief complaint on file.        Diabetes Follow-up      Patient is checking blood sugars: not at all         -Last A1C was   Lab Results   Component Value Date    A1C 5.7 04/19/2017        Diabetic concerns: None    Chest Pain or exercise related calf pain (claudication):no     Symptoms of hypoglycemia (low blood sugar): none     Paresthesias (numbness or burning in feet) or sores: No     Diabetic eye exam within the last year?: Yes     Hyperlipidemia Follow-Up      Recommended Level of Therapy:Moderate Intensity  (atorvastatin 10-20mg, rosuvastatin 5-10mg, simvastatin 20-40mg, pravastatin 40-80mg, lovastatin 40 mg)           Rate your low fat/cholesterol diet?: good    Taking statin?  Yes, no muscle aches from statin    Other lipid medications/supplements?:  none     Hypertension Follow-up      Outpatient blood pressures are not being checked.    Low Salt Diet: no added salt    Daily NSAID Use?no     Did patient take their HTN pills today?  Yes            Adherence and Exercise  Medication side effects: yes: lightheadedness  How often is a medication missed? rarely  Exercise:walking  2-3 days/week for an average of 30-45 minutes     A [employer]  was used for  this visit.      Problem, Medication and Allergy Lists were reviewed and are current.  Patient is an established patient of this clinic.         Review of Systems:   Review of Systems   Constitutional: Negative for fatigue and fever.   HENT: Negative.    Eyes: Negative.  Negative for visual disturbance.   Respiratory: Negative for cough, chest tightness and shortness of breath.    Cardiovascular: Negative for chest pain and palpitations.   Gastrointestinal: Negative for abdominal distention, abdominal pain, blood in stool, constipation, diarrhea and vomiting.   Endocrine: Negative for polydipsia and polyuria.   Genitourinary: Negative for difficulty urinating and  dysuria.   Musculoskeletal: Negative for arthralgias and myalgias.   Skin: Negative for color change and rash.   Neurological: Positive for weakness (right sided). Negative for numbness.   Psychiatric/Behavioral: Negative for dysphoric mood and sleep disturbance. The patient is not nervous/anxious.              Physical Exam:   No data found.    There is no height or weight on file to calculate BMI.  Vitals were reviewed and were normal     Physical Exam   Constitutional: He is oriented to person, place, and time. He appears well-developed. No distress.   HENT:   Head: Normocephalic.   Eyes: Conjunctivae are normal. No scleral icterus.   Neck: Normal range of motion. No thyromegaly present.   Cardiovascular: Normal rate, regular rhythm, normal heart sounds and intact distal pulses.    No murmur heard.  Pulmonary/Chest: Effort normal and breath sounds normal. No respiratory distress. He has no wheezes.   Abdominal: Soft. Bowel sounds are normal. He exhibits no distension. There is no splenomegaly or hepatomegaly. There is no tenderness.   Musculoskeletal: He exhibits no edema.   Lymphadenopathy:     He has no cervical adenopathy.   Neurological: He is alert and oriented to person, place, and time. He has normal reflexes. No cranial nerve deficit or sensory deficit. GCS eye subscore is 4. GCS verbal subscore is 5. GCS motor subscore is 6.   Decreased R sided leg strength,   Mildly decreased R arm coordination.    Skin: Skin is warm and dry. He is not diaphoretic.   Psychiatric: He has a normal mood and affect. His behavior is normal. Judgment and thought content normal.   Vitals reviewed.        Results:      Results from the last 24 hours  Results for orders placed or performed in visit on 07/25/17 (from the past 24 hour(s))   Basic Metabolic Panel ([facility])   Result Value Ref Range    Urea Nitrogen 25.1 (H) 7.0 - 21.0 mg/dL    Calcium 9.3 8.5 - 10.1 mg/dL    Chloride 103.9 98.0 - 110.0 mmol/L    Carbon Dioxide 26.9  20.0 - 32.0 mmol/L    Creatinine 1.2 0.7 - 1.3 mg/dL    Glucose 107.4 (H) 70.0 - 99.0 mg'dL    Potassium 3.9 3.3 - 4.5 mmol/dL    Sodium 140.9 132.6 - 141.4 mmol/L    GFR Estimate 63.3 >60.0 mL/min/1.7 m2    GFR Estimate If Black 76.5 >60.0 mL/min/1.7 m2     Assessment and Plan     1. Benign essential hypertension  Decrease carvedilol as below as patient's BP is low for his age and he is having some lightheadedness  - Basic Metabolic Panel ([facility])  - lisinopril (PRINIVIL/ZESTRIL) 20 MG tablet; Take 1 tablet (20 mg) by mouth daily  Dispense: 60 tablet; Refill: 3  - carvedilol (COREG) 6.25 MG tablet; Take 1 tablet (6.25 mg) by mouth 2 times daily (with meals)  Dispense: 60 tablet; Refill: 3    2. Chronic seasonal allergic rhinitis due to pollen    - loratadine (CLARITIN) 10 MG tablet; Take 1 tablet (10 mg) by mouth daily  Dispense: 90 tablet; Refill: 1    3. Type 2 diabetes mellitus without complication, without long-term current use of insulin (H)    - metFORMIN (GLUCOPHAGE) 500 MG tablet; Take 1 tablet (500 mg) by mouth 2 times daily (with meals)  Dispense: 60 tablet; Refill: 3  - atorvastatin (LIPITOR) 10 MG tablet; Take 2 tablets (20 mg) by mouth daily  Dispense: 30 tablet; Refill: 1  - Hemoglobin A1c ([facility])    4. History of CVA (cerebrovascular accident)    - aspirin 81 MG tablet; Take 1 tablet (81 mg) by mouth daily  Dispense: 30 tablet; Refill: 3  - vitamin B complex with vitamin C (VITAMIN  B COMPLEX) TABS tablet; Take 1 tablet by mouth daily  Dispense: 200 tablet; Refill: 3  - gabapentin (NEURONTIN) 300 MG capsule; Take 2 capsules (600 mg) by mouth 3 times daily  Dispense: 90 capsule; Refill: 3    5. Seizures (H) had one during the time of his stoke has not had any mor     - levETIRAcetam (KEPPRA) 500 MG tablet; Take 1 tablet (500 mg) by mouth 2 times daily  Dispense: 60 tablet; Refill: 3    6. Gastroesophageal reflux disease without esophagitis  continue  - pantoprazole (PROTONIX) 20 MG EC tablet;  Take 2 tablets (40 mg) by mouth every morning (before breakfast)  Dispense: 30 tablet; Refill: 1      Please call or return to clinic if your symptoms don't go away.    Follow up plan  Please make a clinic appointment for follow up with me (ERNIE ARREOLA) in 2-4 weeks   for  HTN recheck.  Medications Discontinued During This Encounter   Medication Reason     order for DME      order for DME      LISINOPRIL PO Reorder     aspirin 81 MG tablet Reorder     vitamin B complex with vitamin C (VITAMIN  B COMPLEX) TABS tablet Reorder     loratadine (CLARITIN) 10 MG tablet Reorder     METFORMIN HCL PO Reorder     ATORVASTATIN CALCIUM PO Reorder     PANTOPRAZOLE SODIUM PO Reorder     GABAPENTIN PO Reorder     levETIRAcetam (KEPPRA) 500 MG tablet Reorder     carvedilol (COREG) 3.125 MG tablet Reorder     Options for treatment and follow-up care were reviewed with the patient. Juli Rodriguez  engaged in the decision making process and verbalized understanding of the options discussed and agreed with the final plan.    Ernie Arreola MD

## 2017-07-25 NOTE — MR AVS SNAPSHOT
After Visit Summary   7/25/2017    Juli Rodriguez    MRN: [id number]           Patient Information     Date Of Birth          1945        Visit Information        Provider Department      7/25/2017 3:20 PM Ernie Arreola MD [facility]        Today's Diagnoses     Benign essential hypertension    -  1    Chronic seasonal allergic rhinitis due to pollen        Type 2 diabetes mellitus without complication, without long-term current use of insulin (H)        History of CVA (cerebrovascular accident)        Seizures (H)        Gastroesophageal reflux disease without esophagitis          Care Instructions    Here is the plan from today's visit    1. Benign essential hypertension  Decrease carvedilol as below   - Basic Metabolic Panel ([facility])  - lisinopril (PRINIVIL/ZESTRIL) 20 MG tablet; Take 1 tablet (20 mg) by mouth daily  Dispense: 60 tablet; Refill: 3  - carvedilol (COREG) 6.25 MG tablet; Take 1 tablet (6.25 mg) by mouth 2 times daily (with meals)  Dispense: 60 tablet; Refill: 3    2. Chronic seasonal allergic rhinitis due to pollen    - loratadine (CLARITIN) 10 MG tablet; Take 1 tablet (10 mg) by mouth daily  Dispense: 90 tablet; Refill: 1    3. Type 2 diabetes mellitus without complication, without long-term current use of insulin (H)    - metFORMIN (GLUCOPHAGE) 500 MG tablet; Take 1 tablet (500 mg) by mouth 2 times daily (with meals)  Dispense: 60 tablet; Refill: 3  - atorvastatin (LIPITOR) 10 MG tablet; Take 2 tablets (20 mg) by mouth daily  Dispense: 30 tablet; Refill: 1  - Hemoglobin A1c ([facility])    4. History of CVA (cerebrovascular accident)    - aspirin 81 MG tablet; Take 1 tablet (81 mg) by mouth daily  Dispense: 30 tablet; Refill: 3  - vitamin B complex with vitamin C (VITAMIN  B COMPLEX) TABS tablet; Take 1 tablet by mouth daily  Dispense: 200 tablet; Refill: 3  - gabapentin (NEURONTIN) 300 MG capsule; Take 2 capsules (600 mg) by mouth 3 times daily  Dispense:  90 capsule; Refill: 3    5. Seizures (H)    - levETIRAcetam (KEPPRA) 500 MG tablet; Take 1 tablet (500 mg) by mouth 2 times daily  Dispense: 60 tablet; Refill: 3    6. Gastroesophageal reflux disease without esophagitis  continue  - pantoprazole (PROTONIX) 20 MG EC tablet; Take 2 tablets (40 mg) by mouth every morning (before breakfast)  Dispense: 30 tablet; Refill: 1      Please call or return to clinic if your symptoms don't go away.    Follow up plan  Please make a clinic appointment for follow up with me (LUCY KINCAID) in 2-4 weeks   for  HTN recheck.    Thank you for coming to [facility] today.  Lab Testing:  **If you had lab testing today and your results are reassuring or normal they will be mailed to you or sent through [employer] within 7 days.   **If the lab tests need quick action we will call you with the results.  The phone number we will call with results is # [phone] (home) . If this is not the best number please call our clinic and change the number.  Medication Refills:  If you need any refills please call your pharmacy and they will contact us.   If you need to  your refill at a new pharmacy, please contact the new pharmacy directly. The new pharmacy will help you get your medications transferred faster.   Scheduling:  If you have any concerns about today's visit or wish to schedule another appointment please call our office during normal business hours [phone] (8-5:00 M-F)  If a referral was made to a [facility] and you don't get a call from central scheduling please call [phone].  If a Mammogram was ordered for you at [facility] call [phone] to schedule or change your appointment.  If you had an XRay/CT/Ultrasound/MRI ordered the number is [phone] to schedule or change your radiology appointment.   Medical Concerns:  If you have urgent medical concerns please call [phone] at any time of the day.            Follow-ups after  your visit        Who to contact     Please call your clinic at [phone] to:    Ask questions about your health    Make or cancel appointments    Discuss your medicines    Learn about your test results    Speak to your doctor   If you have compliments or concerns about an experience at your clinic, or if you wish to file a complaint, please contact [facility] Patient Relations at [phone] or email us at [email or web address]         Additional Information About Your Visit        [employer] Information     [employer] is an electronic gateway that provides easy, online access to your medical records. With [employer] you can request a clinic appointment, read your test results, renew a prescription or communicate with your care team.     To sign up for [employer] visit the website at [email or web address]   You will be asked to enter the access code listed below, as well as some personal information. Please follow the directions to create your username and password.     Your access code is: [email or web address]  Expires: 10/23/2017  4:36 PM     Your access code will  in 90 days. If you need help or a new code, please contact your [facility] or call [phone] for assistance.        Care EveryWhere ID     This is your Care EveryWhere ID. This could be used by other organizations to access your [place] medical records  [id number]        Your Vitals Were     Pulse Temperature Respirations Pulse Oximetry BMI (Body Mass Index)       73 98  F (36.7  C) (Oral) 18 99% 27.67 kg/m2        Blood Pressure from Last 3 Encounters:   17 118/81   17 138/88   17 134/88    Weight from Last 3 Encounters:   17 208 lb 12.8 oz (94.7 kg)   17 215 lb (97.5 kg)   17 212 lb 3.2 oz (96.3 kg)              We Performed the Following     Basic Metabolic Panel ([facility])     Hemoglobin A1c ([facility])          Today's Medication Changes           These changes are accurate as of: 7/25/17  4:40 PM.  If you have any questions, ask your nurse or doctor.               Start taking these medicines.        Dose/Directions    carvedilol 6.25 MG tablet   Commonly known as:  COREG   Indication:  High Blood Pressure of Unknown Cause   Used for:  Benign essential hypertension   Started by:  Ernie Arreola MD        Dose:  6.25 mg   Take 1 tablet (6.25 mg) by mouth 2 times daily (with meals)   Quantity:  60 tablet   Refills:  3       lisinopril 20 MG tablet   Commonly known as:  PRINIVIL/ZESTRIL   Indication:  High Blood Pressure   Used for:  Benign essential hypertension   Started by:  Ernie Arreola MD        Dose:  20 mg   Take 1 tablet (20 mg) by mouth daily   Quantity:  60 tablet   Refills:  3         These medicines have changed or have updated prescriptions.        Dose/Directions    aspirin 81 MG tablet   This may have changed:  how much to take   Used for:  History of CVA (cerebrovascular accident)   Changed by:  Ernie Arreola MD        Dose:  81 mg   Take 1 tablet (81 mg) by mouth daily   Quantity:  30 tablet   Refills:  3       atorvastatin 10 MG tablet   Commonly known as:  LIPITOR   This may have changed:  when to take this   Used for:  Type 2 diabetes mellitus without complication, without long-term current use of insulin (H)   Changed by:  Ernie Arreola MD        Dose:  20 mg   Take 2 tablets (20 mg) by mouth daily   Quantity:  30 tablet   Refills:  1       gabapentin 300 MG capsule   Commonly known as:  NEURONTIN   Indication:  Neuropathic Pain   This may have changed:  medication strength   Used for:  History of CVA (cerebrovascular accident)   Changed by:  Ernie Arreola MD        Dose:  600 mg   Take 2 capsules (600 mg) by mouth 3 times daily   Quantity:  90 capsule   Refills:  3         Stop taking these medicines if you haven't already. Please contact your care team if you have questions.     order for DME   Stopped by:  Ernie Arreola MD                 Where to get your medicines      These medications were sent to [id number] - [place], [place] - [address]  [address], [place] [address]     Phone:  [phone]     aspirin 81 MG tablet    atorvastatin 10 MG tablet    carvedilol 6.25 MG tablet    gabapentin 300 MG capsule    levETIRAcetam 500 MG tablet    lisinopril 20 MG tablet    loratadine 10 MG tablet    metFORMIN 500 MG tablet    pantoprazole 20 MG EC tablet    vitamin B complex with vitamin C Tabs tablet                Primary Care Provider Office Phone # Fax #    Ernie Arreola [phone]       [facility] [address]  [facility] [address]        Equal Access to Services     MITCH WALTER : Hadii faina Acosta, wamarcia galeana, qaidata kaalmada edy, yamilex cancino. So [facility] [phone].    ATENCIÓN: Si habla español, tiene a flowers disposición servicios gratuitos de asistencia lingüística. [facility] [phone].    We comply with applicable federal civil rights laws and [place] laws. We do not discriminate on the basis of race, color, national origin, age, disability sex, sexual orientation or gender identity.            Thank you!     Thank you for choosing [facility]  for your care. Our goal is always to provide you with excellent care. Hearing back from our patients is one way we can continue to improve our services. Please take a few minutes to complete the written survey that you may receive in the mail after your visit with us. Thank you!             Your Updated Medication List - Protect others around you: Learn how to safely use, store and throw away your medicines at www.disposemymeds.org.          This list is accurate as of: 7/25/17  4:40 PM.  Always use your most recent med list.                   Brand Name Dispense Instructions for use Diagnosis    aspirin 81 MG tablet     30 tablet    Take 1 tablet (81  mg) by mouth daily    History of CVA (cerebrovascular accident)       atorvastatin 10 MG tablet    LIPITOR    30 tablet    Take 2 tablets (20 mg) by mouth daily    Type 2 diabetes mellitus without complication, without long-term current use of insulin (H)       carvedilol 6.25 MG tablet    COREG    60 tablet    Take 1 tablet (6.25 mg) by mouth 2 times daily (with meals)    Benign essential hypertension       fluticasone 50 MCG/ACT spray    FLONASE    16 g    Spray 1-2 sprays into both nostrils daily    Seasonal allergic rhinitis due to pollen       gabapentin 300 MG capsule    NEURONTIN    90 capsule    Take 2 capsules (600 mg) by mouth 3 times daily    History of CVA (cerebrovascular accident)       levETIRAcetam 500 MG tablet    KEPPRA    60 tablet    Take 1 tablet (500 mg) by mouth 2 times daily    Seizures (H)       lisinopril 20 MG tablet    PRINIVIL/ZESTRIL    60 tablet    Take 1 tablet (20 mg) by mouth daily    Benign essential hypertension       loratadine 10 MG tablet    CLARITIN    90 tablet    Take 1 tablet (10 mg) by mouth daily    Chronic seasonal allergic rhinitis due to pollen       metFORMIN 500 MG tablet    GLUCOPHAGE    60 tablet    Take 1 tablet (500 mg) by mouth 2 times daily (with meals)    Type 2 diabetes mellitus without complication, without long-term current use of insulin (H)       pantoprazole 20 MG EC tablet    PROTONIX    30 tablet    Take 2 tablets (40 mg) by mouth every morning (before breakfast)    Gastroesophageal reflux disease without esophagitis       vitamin B complex with vitamin C Tabs tablet     200 tablet    Take 1 tablet by mouth daily    History of CVA (cerebrovascular accident)

## 2017-07-25 NOTE — LETTER
July 25, 2017      Juli Rodriguez  [address]   [place] [address]        Dear Juli,    Thank you for getting your care at [facility]. Please see below for your test results.  They are normal and reassuring  Resulted Orders   Basic Metabolic Panel ([facility])   Result Value Ref Range    Urea Nitrogen 25.1 (H) 7.0 - 21.0 mg/dL    Calcium 9.3 8.5 - 10.1 mg/dL    Chloride 103.9 98.0 - 110.0 mmol/L    Carbon Dioxide 26.9 20.0 - 32.0 mmol/L    Creatinine 1.2 0.7 - 1.3 mg/dL    Glucose 107.4 (H) 70.0 - 99.0 mg'dL    Potassium 3.9 3.3 - 4.5 mmol/dL    Sodium 140.9 132.6 - 141.4 mmol/L    GFR Estimate 63.3 >60.0 mL/min/1.7 m2    GFR Estimate If Black 76.5 >60.0 mL/min/1.7 m2   Hemoglobin A1c ([facility])   Result Value Ref Range    Hemoglobin A1C 5.7 4.1 - 5.7 %       If you have any concerns about these results please call and leave a message for me or send a [employer] Pict message to the clinic.    Sincerely,    Ernie Arreola MD

## 2017-07-26 NOTE — PROGRESS NOTES
Letter with results sent to patient on 7/25/2017  See letters section for details. Ernie Arreola MD

## 2017-09-05 ENCOUNTER — OFFICE VISIT (OUTPATIENT)
Dept: FAMILY MEDICINE | Facility: CLINIC | Age: 72
End: 2017-09-05

## 2017-09-05 VITALS
TEMPERATURE: 97.8 F | RESPIRATION RATE: 18 BRPM | DIASTOLIC BLOOD PRESSURE: 78 MMHG | HEART RATE: 78 BPM | WEIGHT: 216 LBS | BODY MASS INDEX: 28.63 KG/M2 | OXYGEN SATURATION: 98 % | SYSTOLIC BLOOD PRESSURE: 120 MMHG

## 2017-09-05 DIAGNOSIS — I10 BENIGN ESSENTIAL HYPERTENSION: Primary | ICD-10-CM

## 2017-09-05 DIAGNOSIS — Z86.73 HISTORY OF CVA (CEREBROVASCULAR ACCIDENT): ICD-10-CM

## 2017-09-05 LAB
BUN SERPL-MCNC: 19.1 MG/DL (ref 7–21)
CALCIUM SERPL-MCNC: 9.1 MG/DL (ref 8.5–10.1)
CHLORIDE SERPLBLD-SCNC: 102.1 MMOL/L (ref 98–110)
CO2 SERPL-SCNC: 24 MMOL/L (ref 20–32)
CREAT SERPL-MCNC: 1.3 MG/DL (ref 0.7–1.3)
GFR SERPL CREATININE-BSD FRML MDRD: 57.7 ML/MIN/1.7 M2
GLUCOSE SERPL-MCNC: 183.8 MG'DL (ref 70–99)
POTASSIUM SERPL-SCNC: 4 MMOL/DL (ref 3.3–4.5)
SODIUM SERPL-SCNC: 135.8 MMOL/L (ref 132.6–141.4)

## 2017-09-05 RX ORDER — GABAPENTIN 300 MG/1
600 CAPSULE ORAL 3 TIMES DAILY
Qty: 180 CAPSULE | Refills: 3 | Status: SHIPPED | OUTPATIENT
Start: 2017-09-05 | End: 2019-03-29

## 2017-09-05 ASSESSMENT — ENCOUNTER SYMPTOMS
PALPITATIONS: 0
BLOOD IN STOOL: 0
DYSPHORIC MOOD: 0
ABDOMINAL DISTENTION: 0
EYES NEGATIVE: 1
DIARRHEA: 0
ABDOMINAL PAIN: 0
NUMBNESS: 1
VOMITING: 0
SLEEP DISTURBANCE: 0
POLYDIPSIA: 0
COLOR CHANGE: 0
FEVER: 0
WEAKNESS: 1
COUGH: 0
DYSURIA: 0
SHORTNESS OF BREATH: 0
MYALGIAS: 0
ARTHRALGIAS: 0
FATIGUE: 0
CHEST TIGHTNESS: 0
DIFFICULTY URINATING: 0
CONSTIPATION: 0
NERVOUS/ANXIOUS: 0

## 2017-09-05 NOTE — MR AVS SNAPSHOT
After Visit Summary   9/5/2017    Juli Rodriguez    MRN: [id number]           Patient Information     Date Of Birth          1945        Visit Information        Provider Department      9/5/2017 2:40 PM Ernie Arreola MD [facility]        Today's Diagnoses     Benign essential hypertension    -  1    History of CVA (cerebrovascular accident)          Care Instructions    Here is the plan from today's visit    1. Benign essential hypertension  Continue current medicaitons  - Basic Metabolic Panel (LabDAQ)    2. History of CVA (cerebrovascular accident)  Increase gabapentin for nerve pain   - gabapentin (NEURONTIN) 300 MG capsule; Take 2 capsules (600 mg) by mouth 3 times daily  Dispense: 180 capsule; Refill: 3      Please call or return to clinic if your symptoms don't go away.    Follow up plan  Please make a clinic appointment for follow up with me (ERNIE ARREOLA) in 1-2 months DM .    Thank you for coming to [facility] today.  Lab Testing:  **If you had lab testing today and your results are reassuring or normal they will be mailed to you or sent through [employer] within 7 days.   **If the lab tests need quick action we will call you with the results.  The phone number we will call with results is # [phone] (home) . If this is not the best number please call our clinic and change the number.  Medication Refills:  If you need any refills please call your pharmacy and they will contact us.   If you need to  your refill at a new pharmacy, please contact the new pharmacy directly. The new pharmacy will help you get your medications transferred faster.   Scheduling:  If you have any concerns about today's visit or wish to schedule another appointment please call our office during normal business hours [phone] (8-5:00 M-F)  If a referral was made to a [facility] and you don't get a call from central scheduling please call  [phone].  If a Mammogram was ordered for you at [facility] call [phone] to schedule or change your appointment.  If you had an XRay/CT/Ultrasound/MRI ordered the number is [phone] to schedule or change your radiology appointment.   Medical Concerns:  If you have urgent medical concerns please call [phone] at any time of the day.            Follow-ups after your visit        Follow-up notes from your care team     Return in about 2 months (around 2017), or if symptoms worsen or fail to improve, for Diabetes Routine Check.      Who to contact     Please call your clinic at [phone] to:    Ask questions about your health    Make or cancel appointments    Discuss your medicines    Learn about your test results    Speak to your doctor   If you have compliments or concerns about an experience at your clinic, or if you wish to file a complaint, please contact [facility] Patient Relations at [phone] or email us at [email or web address]         Additional Information About Your Visit        [employer] Information     [employer] is an electronic gateway that provides easy, online access to your medical records. With [employer], you can request a clinic appointment, read your test results, renew a prescription or communicate with your care team.     To sign up for [employer] visit the website at [email or web address]   You will be asked to enter the access code listed below, as well as some personal information. Please follow the directions to create your username and password.     Your access code is: [email or web address]  Expires: 10/23/2017  4:36 PM     Your access code will  in 90 days. If you need help or a new code, please contact your [facility] or call [phone] for assistance.        Care EveryWhere ID     This is your Care EveryWhere ID. This could be used by other organizations to access your [facility]  records  [id number]        Your Vitals Were     Pulse Temperature Respirations Pulse Oximetry BMI (Body Mass Index)       78 97.8  F (36.6  C) (Oral) 18 98% 28.63 kg/m2        Blood Pressure from Last 3 Encounters:   09/05/17 120/78   07/25/17 118/81   05/17/17 138/88    Weight from Last 3 Encounters:   09/05/17 216 lb (98 kg)   07/25/17 208 lb 12.8 oz (94.7 kg)   05/17/17 215 lb (97.5 kg)              We Performed the Following     Basic Metabolic Panel (LabDAQ)          Where to get your medicines      These medications were sent to [id number] - [place], [place] - [address]  [address], [place] [address]     Phone:  [phone]     gabapentin 300 MG capsule          Primary Care Provider Office Phone # Fax #    Ernie Arreola [phone]       [address] [address]        Equal Access to Services     ROQUE WALTER : Hadii faina ku hadasho Soomaali, waaxda luqadaha, qaybta kaalmada adeegyada, yamilex deutsch . So [facility] [phone].    ATENCIÓN: Si habla español, tiene a flowers disposición servicios gratuitos de asistencia lingüística. [facility] [phone].    We comply with applicable federal civil rights laws and [place] laws. We do not discriminate on the basis of race, color, national origin, age, disability sex, sexual orientation or gender identity.            Thank you!     Thank you for choosing [facility]  for your care. Our goal is always to provide you with excellent care. Hearing back from our patients is one way we can continue to improve our services. Please take a few minutes to complete the written survey that you may receive in the mail after your visit with us. Thank you!             Your Updated Medication List - Protect others around you: Learn how to safely use, store and throw away your medicines at www.disposemymeds.org.          This list is accurate as of: 9/5/17  3:37 PM.   Always use your most recent med list.                   Brand Name Dispense Instructions for use Diagnosis    aspirin 81 MG tablet     30 tablet    Take 1 tablet (81 mg) by mouth daily    History of CVA (cerebrovascular accident)       atorvastatin 10 MG tablet    LIPITOR    30 tablet    Take 2 tablets (20 mg) by mouth daily    Type 2 diabetes mellitus without complication, without long-term current use of insulin (H)       carvedilol 6.25 MG tablet    COREG    60 tablet    Take 1 tablet (6.25 mg) by mouth 2 times daily (with meals)    Benign essential hypertension       fluticasone 50 MCG/ACT spray    FLONASE    16 g    Spray 1-2 sprays into both nostrils daily    Seasonal allergic rhinitis due to pollen       gabapentin 300 MG capsule    NEURONTIN    180 capsule    Take 2 capsules (600 mg) by mouth 3 times daily    History of CVA (cerebrovascular accident)       levETIRAcetam 500 MG tablet    KEPPRA    60 tablet    Take 1 tablet (500 mg) by mouth 2 times daily    Seizures (H)       lisinopril 20 MG tablet    PRINIVIL/ZESTRIL    60 tablet    Take 1 tablet (20 mg) by mouth daily    Benign essential hypertension       loratadine 10 MG tablet    CLARITIN    90 tablet    Take 1 tablet (10 mg) by mouth daily    Chronic seasonal allergic rhinitis due to pollen       metFORMIN 500 MG tablet    GLUCOPHAGE    60 tablet    Take 1 tablet (500 mg) by mouth 2 times daily (with meals)    Type 2 diabetes mellitus without complication, without long-term current use of insulin (H)       pantoprazole 20 MG EC tablet    PROTONIX    30 tablet    Take 2 tablets (40 mg) by mouth every morning (before breakfast)    Gastroesophageal reflux disease without esophagitis       vitamin B complex with vitamin C Tabs tablet     200 tablet    Take 1 tablet by mouth daily    History of CVA (cerebrovascular accident)

## 2017-09-05 NOTE — PROGRESS NOTES
HPI:       Juli Rodriguez is a 72 year old who presents for the following  Patient presents with:  Diabetes    Cerebrovascular Follow-up      Patient history: ischemic cerebrovascular incident    Residual symptoms: None, Weakness in the arm on the right and Weakness in the leg on the right    Worsened or new symptoms since last visit: No     Daily aspirin use: Yes  Hypertension controlled  YES           Hypertension Follow-up      Outpatient blood pressures are being checked at home.  Results are 140/90.    Chest Pain? :No     Low Salt Diet: low salt  Daily NSAID Use? YES Aspirin         Did patient take their HTN pills today/last night as usual?  Yes        A Archive  was used for  this visit.    Diabetes Follow-up      Patient is checking blood sugars: rarely.  Results range from 135 to 150         -Last A1C was   Lab Results   Component Value Date    A1C 5.7 07/25/2017    A1C 5.7 04/19/2017        Diabetic concerns: None    Chest Pain or exercise related calf pain (claudication):no     Symptoms of hypoglycemia (low blood sugar): none     Paresthesias (numbness or burning in feet) or sores: No     Diabetic eye exam within the last year?: Yes      Adherence and Exercise  Medication side effects: no  How often is a medication missed? Less than 1 time per week  Exercise:goes to the gym  And swimming sauna,  2-3 days/week for an average of 45-60 minutes     Problem, Medication and Allergy Lists were reviewed and are current.  Patient is an established patient of this clinic.         Review of Systems:   Review of Systems   Constitutional: Negative for fatigue and fever.   HENT: Negative.    Eyes: Negative.  Negative for visual disturbance.   Respiratory: Negative for cough, chest tightness and shortness of breath.    Cardiovascular: Negative for chest pain and palpitations.   Gastrointestinal: Negative for abdominal distention, abdominal pain, blood in stool, constipation, diarrhea and vomiting.    Endocrine: Negative for polydipsia and polyuria.   Genitourinary: Negative for difficulty urinating and dysuria.   Musculoskeletal: Negative for arthralgias and myalgias.   Skin: Negative for color change and rash.   Neurological: Positive for weakness (right sided) and numbness.   Psychiatric/Behavioral: Negative for dysphoric mood and sleep disturbance. The patient is not nervous/anxious.              Physical Exam:   Patient Vitals for the past 24 hrs:   BP Temp Temp src Pulse Resp SpO2 Weight   09/05/17 1458 120/78 97.8  F (36.6  C) Oral 78 18 98 % 216 lb (98 kg)     Body mass index is 28.63 kg/(m^2).  Vitals were reviewed and were normal     Physical Exam   Constitutional: He is oriented to person, place, and time. He appears well-developed. No distress.   HENT:   Head: Normocephalic.   Eyes: Conjunctivae are normal. No scleral icterus.   Neck: Normal range of motion. No thyromegaly present.   Cardiovascular: Normal rate, regular rhythm, normal heart sounds and intact distal pulses.    No murmur heard.  Pulmonary/Chest: Effort normal and breath sounds normal. No respiratory distress. He has no wheezes.   Abdominal: Soft. Bowel sounds are normal. He exhibits no distension. There is no splenomegaly or hepatomegaly. There is no tenderness.   Musculoskeletal: He exhibits no edema.   Lymphadenopathy:     He has no cervical adenopathy.   Neurological: He is alert and oriented to person, place, and time. He has normal reflexes. No cranial nerve deficit or sensory deficit. GCS eye subscore is 4. GCS verbal subscore is 5. GCS motor subscore is 6.   Decreased R sided leg strength,   Mildly decreased R arm coordination.    Skin: Skin is warm and dry. He is not diaphoretic.   Psychiatric: He has a normal mood and affect. His behavior is normal. Judgment and thought content normal.   Vitals reviewed.        Results:      Results from the last 24 hours  Results for orders placed or performed in visit on 09/05/17 (from the  past 24 hour(s))   Basic Metabolic Panel (LabDAQ)   Result Value Ref Range    Urea Nitrogen 19.1 7.0 - 21.0 mg/dL    Calcium 9.1 8.5 - 10.1 mg/dL    Chloride 102.1 98.0 - 110.0 mmol/L    Carbon Dioxide 24.0 20.0 - 32.0 mmol/L    Creatinine 1.3 0.7 - 1.3 mg/dL    Glucose 183.8 (H) 70.0 - 99.0 mg'dL    Potassium 4.0 3.3 - 4.5 mmol/dL    Sodium 135.8 132.6 - 141.4 mmol/L    GFR Estimate 57.7 (L) >60.0 mL/min/1.7 m2    GFR Estimate If Black 69.8 >60.0 mL/min/1.7 m2     Assessment and Plan     Patient Instructions   Here is the plan from today's visit    1. Benign essential hypertension  Continue current medicaitons  - Basic Metabolic Panel (LabDAQ)    2. History of CVA (cerebrovascular accident)  Increase gabapentin for nerve pain   - gabapentin (NEURONTIN) 300 MG capsule; Take 2 capsules (600 mg) by mouth 3 times daily  Dispense: 180 capsule; Refill: 3      Please call or return to clinic if your symptoms don't go away.    Follow up plan  Please make a clinic appointment for follow up with me (ERNIE ARREOLA) in 1-2 months DM .      There are no discontinued medications.  Options for treatment and follow-up care were reviewed with the patient. Juli Rodriguez  engaged in the decision making process and verbalized understanding of the options discussed and agreed with the final plan.    Ernie Arreola MD

## 2017-09-05 NOTE — PATIENT INSTRUCTIONS
Here is the plan from today's visit    1. Benign essential hypertension  Continue current medicaitons  - Basic Metabolic Panel (LabDAQ)    2. History of CVA (cerebrovascular accident)  Increase gabapentin for nerve pain   - gabapentin (NEURONTIN) 300 MG capsule; Take 2 capsules (600 mg) by mouth 3 times daily  Dispense: 180 capsule; Refill: 3      Please call or return to clinic if your symptoms don't go away.    Follow up plan  Please make a clinic appointment for follow up with me (LUCY KINCAID) in 1-2 months DM .    Thank you for coming to [facility] today.  Lab Testing:  **If you had lab testing today and your results are reassuring or normal they will be mailed to you or sent through [employer] within 7 days.   **If the lab tests need quick action we will call you with the results.  The phone number we will call with results is # [phone] (home) . If this is not the best number please call our clinic and change the number.  Medication Refills:  If you need any refills please call your pharmacy and they will contact us.   If you need to  your refill at a new pharmacy, please contact the new pharmacy directly. The new pharmacy will help you get your medications transferred faster.   Scheduling:  If you have any concerns about today's visit or wish to schedule another appointment please call our office during normal business hours [phone] (8-5:00 M-F)  If a referral was made to a [facility] and you don't get a call from central scheduling please call [phone].  If a Mammogram was ordered for you at [facility] call [phone] to schedule or change your appointment.  If you had an XRay/CT/Ultrasound/MRI ordered the number is [phone] to schedule or change your radiology appointment.   Medical Concerns:  If you have urgent medical concerns please call [phone] at any time of the day.

## 2017-09-26 DIAGNOSIS — E11.9 TYPE 2 DIABETES MELLITUS WITHOUT COMPLICATION, WITHOUT LONG-TERM CURRENT USE OF INSULIN (H): ICD-10-CM

## 2017-09-26 DIAGNOSIS — I10 BENIGN ESSENTIAL HYPERTENSION: ICD-10-CM

## 2017-09-26 NOTE — TELEPHONE ENCOUNTER
Refill request received via fax from patient's pharmacy. Refilled per standing protocol. Sent to patient's preferred pharmacy.    Date of last office visit: 9/5/17  Last A1c value: 5.7    Ariela Warren RN

## 2017-09-27 RX ORDER — ATORVASTATIN CALCIUM 10 MG/1
20 TABLET, FILM COATED ORAL DAILY
Qty: 30 TABLET | Refills: 1 | Status: SHIPPED | OUTPATIENT
Start: 2017-09-27 | End: 2017-10-10

## 2017-09-27 RX ORDER — CARVEDILOL 6.25 MG/1
6.25 TABLET ORAL 2 TIMES DAILY WITH MEALS
Qty: 60 TABLET | Refills: 3 | Status: SHIPPED | OUTPATIENT
Start: 2017-09-27 | End: 2018-02-16

## 2017-10-05 DIAGNOSIS — J30.1 SEASONAL ALLERGIC RHINITIS DUE TO POLLEN: ICD-10-CM

## 2017-10-05 DIAGNOSIS — K21.9 GASTROESOPHAGEAL REFLUX DISEASE WITHOUT ESOPHAGITIS: ICD-10-CM

## 2017-10-05 DIAGNOSIS — J30.1 CHRONIC SEASONAL ALLERGIC RHINITIS DUE TO POLLEN: Primary | ICD-10-CM

## 2017-10-05 RX ORDER — FLUTICASONE PROPIONATE 50 MCG
1-2 SPRAY, SUSPENSION (ML) NASAL DAILY
Qty: 16 G | Refills: 6 | Status: SHIPPED | OUTPATIENT
Start: 2017-10-05 | End: 2017-10-10

## 2017-10-05 RX ORDER — PANTOPRAZOLE SODIUM 20 MG/1
40 TABLET, DELAYED RELEASE ORAL
Qty: 30 TABLET | Refills: 1 | Status: SHIPPED | OUTPATIENT
Start: 2017-10-05 | End: 2018-02-27

## 2017-10-05 NOTE — TELEPHONE ENCOUNTER
Request for medication refill:    Date of last visit at clinic: 9-5-17    Please complete refill if appropriate and CLOSE ENCOUNTER.    Closing the encounter signifies the refill is complete.    If refill has been denied, please complete the smart phrase .smirefuse and route it to the [facility] RN TRIAGE pool to inform the patient and the pharmacy.    Laila Good MA

## 2017-10-10 DIAGNOSIS — E11.9 TYPE 2 DIABETES MELLITUS WITHOUT COMPLICATION, WITHOUT LONG-TERM CURRENT USE OF INSULIN (H): ICD-10-CM

## 2017-10-10 DIAGNOSIS — J30.1 CHRONIC SEASONAL ALLERGIC RHINITIS DUE TO POLLEN: ICD-10-CM

## 2017-10-10 RX ORDER — FLUTICASONE PROPIONATE 50 MCG
1-2 SPRAY, SUSPENSION (ML) NASAL DAILY
Qty: 16 G | Refills: 6 | Status: SHIPPED | OUTPATIENT
Start: 2017-10-10 | End: 2018-02-08

## 2017-10-10 RX ORDER — ATORVASTATIN CALCIUM 10 MG/1
20 TABLET, FILM COATED ORAL DAILY
Qty: 30 TABLET | Refills: 1 | Status: SHIPPED | OUTPATIENT
Start: 2017-10-10 | End: 2018-01-29

## 2017-10-10 NOTE — TELEPHONE ENCOUNTER
Request for medication refill:    Date of last visit at clinic: 9-5-17    Please complete refill if appropriate and CLOSE ENCOUNTER.    Closing the encounter signifies the refill is complete.    If refill has been denied, please complete the smart phrase .smirefuse and route it to the [facility] RN TRIAGE pool to inform the patient and the pharmacy.    Concetta Rebolledo, CMA

## 2017-10-10 NOTE — TELEPHONE ENCOUNTER
Request for medication refill:   Fluticasone prop 50mcg spray    Date of last visit at clinic: 09/05/2017    Please complete refill if appropriate and CLOSE ENCOUNTER.    Closing the encounter signifies the refill is complete.    If refill has been denied, please complete the smart phrase .smirefuse and route it to the [facility] RN TRIAGE pool to inform the patient and the pharmacy.    Yessica Naylor, CMA

## 2017-11-09 ENCOUNTER — DOCUMENTATION ONLY (OUTPATIENT)
Dept: FAMILY MEDICINE | Facility: CLINIC | Age: 72
End: 2017-11-09

## 2017-11-09 NOTE — PROGRESS NOTES
"When opening a documentation only encounter, be sure to enter in \"Chief Complaint\" Forms and in \" Comments\" Title of form, description if needed.    Form received via: Patient Drop Off  Form now resides in: Provider Ready    Form sent out via: Patient Pick-up  Patient informed: No  Output date: November 9, 2017    Please close the encounter.    "

## 2017-12-01 ENCOUNTER — TRANSFERRED RECORDS (OUTPATIENT)
Dept: HEALTH INFORMATION MANAGEMENT | Facility: CLINIC | Age: 72
End: 2017-12-01

## 2018-01-22 ENCOUNTER — TELEPHONE (OUTPATIENT)
Dept: FAMILY MEDICINE | Facility: CLINIC | Age: 73
End: 2018-01-22

## 2018-01-22 NOTE — TELEPHONE ENCOUNTER
Prior Authorization Approval    Authorization Effective Date: 12/23/2017  Authorization Expiration Date: 1/21/2021  Medication: pantoprazole (PROTONIX) 20 MG EC tablet-Initiated-Approved-  Approved Dose/Quantity:   Reference #:     Insurance Company: [employer] [phone] Fax [phone]  Expected CoPay: $1.00     CoPay Card Available:      Foundation Assistance Needed:    Which Pharmacy is filling the prescription (Not needed for infusion/clinic administered): [id number] - [place], [place] - [address]  Pharmacy Notified: Yes  Patient Notified: Yes

## 2018-01-22 NOTE — TELEPHONE ENCOUNTER
Central Prior Authorization Team   Phone: [phone]    PA Initiation    Medication: pantoprazole (PROTONIX) 20 MG EC tablet-Initiated-  Insurance Company: [facility] - Phone [phone] Fax [phone]  Pharmacy Filling the Rx: [id number] - [place], [place] - [address]  Filling Pharmacy Phone: [phone]  Filling Pharmacy Fax:    Start Date: 1/22/2018

## 2018-01-29 DIAGNOSIS — E11.9 TYPE 2 DIABETES MELLITUS WITHOUT COMPLICATION, WITHOUT LONG-TERM CURRENT USE OF INSULIN (H): ICD-10-CM

## 2018-01-29 RX ORDER — ATORVASTATIN CALCIUM 10 MG/1
20 TABLET, FILM COATED ORAL DAILY
Qty: 90 TABLET | Refills: 3 | Status: SHIPPED | OUTPATIENT
Start: 2018-01-29 | End: 2018-02-26

## 2018-01-29 NOTE — TELEPHONE ENCOUNTER
Request for medication refill:    Date of last visit at clinic: 09/05/2017    Please complete refill if appropriate and CLOSE ENCOUNTER.    Closing the encounter signifies the refill is complete.    If refill has been denied, please complete the smart phrase .smirefuse and route it to the [facility] RN TRIAGE pool to inform the patient and the pharmacy.    Herb Pleitez, CMA

## 2018-02-01 ENCOUNTER — MEDICAL CORRESPONDENCE (OUTPATIENT)
Dept: HEALTH INFORMATION MANAGEMENT | Facility: CLINIC | Age: 73
End: 2018-02-01

## 2018-02-01 ENCOUNTER — TRANSFERRED RECORDS (OUTPATIENT)
Dept: HEALTH INFORMATION MANAGEMENT | Facility: CLINIC | Age: 73
End: 2018-02-01

## 2018-02-08 DIAGNOSIS — J30.1 CHRONIC SEASONAL ALLERGIC RHINITIS DUE TO POLLEN: ICD-10-CM

## 2018-02-08 NOTE — TELEPHONE ENCOUNTER
Request for medication refill:    Date of last visit at clinic: 09/05/2017    Please complete refill if appropriate and CLOSE ENCOUNTER.    Closing the encounter signifies the refill is complete.    If refill has been denied, please complete the smart phrase .smirefuse and route it to the [facility] RN TRIAGE pool to inform the patient and the pharmacy.    Kiana Ricketts MA

## 2018-02-09 RX ORDER — FLUTICASONE PROPIONATE 50 MCG
1-2 SPRAY, SUSPENSION (ML) NASAL DAILY
Qty: 16 G | Refills: 6 | Status: SHIPPED | OUTPATIENT
Start: 2018-02-09 | End: 2018-05-01

## 2018-02-16 DIAGNOSIS — I10 BENIGN ESSENTIAL HYPERTENSION: ICD-10-CM

## 2018-02-16 NOTE — TELEPHONE ENCOUNTER
Request for medication refill:    Date of last visit at clinic: 9-5-17    Please complete refill if appropriate and CLOSE ENCOUNTER.    Closing the encounter signifies the refill is complete.    If refill has been denied, please complete the smart phrase .smirefuse and route it to the [facility] RN TRIAGE pool to inform the patient and the pharmacy.    Concetta Rbeolledo, CMA

## 2018-02-17 RX ORDER — CARVEDILOL 6.25 MG/1
6.25 TABLET ORAL 2 TIMES DAILY WITH MEALS
Qty: 60 TABLET | Refills: 3 | Status: SHIPPED | OUTPATIENT
Start: 2018-02-17 | End: 2018-06-11

## 2018-02-26 ENCOUNTER — OFFICE VISIT (OUTPATIENT)
Dept: FAMILY MEDICINE | Facility: CLINIC | Age: 73
End: 2018-02-26
Payer: COMMERCIAL

## 2018-02-26 VITALS
DIASTOLIC BLOOD PRESSURE: 79 MMHG | OXYGEN SATURATION: 96 % | SYSTOLIC BLOOD PRESSURE: 123 MMHG | HEART RATE: 76 BPM | TEMPERATURE: 97.7 F | BODY MASS INDEX: 28.23 KG/M2 | WEIGHT: 213 LBS

## 2018-02-26 DIAGNOSIS — Z86.73 HISTORY OF CVA (CEREBROVASCULAR ACCIDENT): Primary | ICD-10-CM

## 2018-02-26 DIAGNOSIS — I10 BENIGN ESSENTIAL HYPERTENSION: ICD-10-CM

## 2018-02-26 DIAGNOSIS — E11.9 TYPE 2 DIABETES MELLITUS WITHOUT COMPLICATION, WITHOUT LONG-TERM CURRENT USE OF INSULIN (H): ICD-10-CM

## 2018-02-26 DIAGNOSIS — E78.49 OTHER HYPERLIPIDEMIA: ICD-10-CM

## 2018-02-26 DIAGNOSIS — M79.2 NEUROPATHIC PAIN: ICD-10-CM

## 2018-02-26 LAB
BUN SERPL-MCNC: 19.8 MG/DL (ref 7–21)
CALCIUM SERPL-MCNC: 9.1 MG/DL (ref 8.5–10.1)
CHLORIDE SERPLBLD-SCNC: 99.1 MMOL/L (ref 98–110)
CO2 SERPL-SCNC: 27.1 MMOL/L (ref 20–32)
CREAT SERPL-MCNC: 1.2 MG/DL (ref 0.7–1.3)
GFR SERPL CREATININE-BSD FRML MDRD: 63.1 ML/MIN/1.7 M2
GLUCOSE SERPL-MCNC: 169.4 MG'DL (ref 70–99)
HBA1C MFR BLD: 6.3 % (ref 4.1–5.7)
POTASSIUM SERPL-SCNC: 3.9 MMOL/DL (ref 3.3–4.5)
SODIUM SERPL-SCNC: 132.1 MMOL/L (ref 132.6–141.4)

## 2018-02-26 RX ORDER — ATORVASTATIN CALCIUM 40 MG/1
40 TABLET, FILM COATED ORAL DAILY
Qty: 90 TABLET | Refills: 3 | Status: SHIPPED | OUTPATIENT
Start: 2018-02-26 | End: 2019-01-16

## 2018-02-26 RX ORDER — CAPSAICIN 0.025 %
1 CREAM (GRAM) TOPICAL 3 TIMES DAILY
Qty: 45 G | Refills: 3 | Status: SHIPPED | OUTPATIENT
Start: 2018-02-26 | End: 2019-07-12

## 2018-02-26 RX ORDER — SENNOSIDES 8.6 MG
2 TABLET ORAL DAILY
COMMUNITY
End: 2019-07-12

## 2018-02-26 ASSESSMENT — ENCOUNTER SYMPTOMS
EYES NEGATIVE: 1
FATIGUE: 0
CONSTIPATION: 0
DYSPHORIC MOOD: 0
MYALGIAS: 0
WEAKNESS: 1
POLYDIPSIA: 0
NUMBNESS: 1
NERVOUS/ANXIOUS: 0
SLEEP DISTURBANCE: 0
BLOOD IN STOOL: 0
COLOR CHANGE: 0
DIARRHEA: 0
DYSURIA: 0
FEVER: 0
PALPITATIONS: 0
ABDOMINAL PAIN: 0
VOMITING: 0
DIFFICULTY URINATING: 0
ABDOMINAL DISTENTION: 0
ARTHRALGIAS: 0
CHEST TIGHTNESS: 0
COUGH: 0
SHORTNESS OF BREATH: 0

## 2018-02-26 NOTE — MR AVS SNAPSHOT
After Visit Summary   2/26/2018    Juli Rodriguez    MRN: [id number]           Patient Information     Date Of Birth          1945        Visit Information        Provider Department      2/26/2018 10:00 AM Ernie Arreola MD [facility]        Today's Diagnoses     History of CVA (cerebrovascular accident)    -  1    Type 2 diabetes mellitus without complication, without long-term current use of insulin (H)        Other hyperlipidemia        Benign essential hypertension        Neuropathic pain          Care Instructions    Here is the plan from today's visit    1. History of CVA (cerebrovascular accident)  Continue current medications  - aspirin 81 MG tablet; Take 1 tablet (81 mg) by mouth daily  Dispense: 90 tablet; Refill: 3    2. Type 2 diabetes mellitus without complication, without long-term current use of insulin (H)  Continue the metformin    - order for DME; Equipment being ordered: Diabetic Shoes  Dispense: 1 each; Refill: 1    3. Other hyperlipidemia  Increase as before  - atorvastatin (LIPITOR) 40 MG tablet; Take 1 tablet (40 mg) by mouth daily  Dispense: 90 tablet; Refill: 3    4. Benign essential hypertension  Continue carvedilol    5. Neuropathic pain  Try this for your R foot.  - capsaicin (ZOSTRIX) 0.025 % CREA cream; Apply 1 g topically 3 times daily Wash hands with soap and water after applications.  Dispense: 45 g; Refill: 3      Please call or return to clinic if your symptoms don't go away.    Follow up plan  Please make a clinic appointment for follow up with me (ERNIE ARREOLA) in 3  months for recheck of diabetes.    Thank you for coming to [facility] today.  Lab Testing:  **If you had lab testing today and your results are reassuring or normal they will be mailed to you or sent through [employer] within 7 days.   **If the lab tests need quick action we will call you with the results.  The phone number we will call with results is # [phone]  (home) . If this is not the best number please call our clinic and change the number.  Medication Refills:  If you need any refills please call your pharmacy and they will contact us.   If you need to  your refill at a new pharmacy, please contact the new pharmacy directly. The new pharmacy will help you get your medications transferred faster.   Scheduling:  If you have any concerns about today's visit or wish to schedule another appointment please call our office during normal business hours [phone] (8-5:00 M-F)  If a referral was made to a [facility] and you don't get a call from central scheduling please call [phone].  If a Mammogram was ordered for you at [facility] call [phone] to schedule or change your appointment.  If you had an XRay/CT/Ultrasound/MRI ordered the number is [phone] to schedule or change your radiology appointment.   Medical Concerns:  If you have urgent medical concerns please call [phone] at any time of the day.    Ernie Arreola MD            Follow-ups after your visit        Follow-up notes from your care team     Return in about 4 weeks (around 3/26/2018) for Diabetes Routine Check, Hypertension, High Cholesterol.      Who to contact     Please call your clinic at [phone] to:    Ask questions about your health    Make or cancel appointments    Discuss your medicines    Learn about your test results    Speak to your doctor            Additional Information About Your Visit        MyChart Information     [employer] is an electronic gateway that provides easy, online access to your medical records. With [employer], you can request a clinic appointment, read your test results, renew a prescription or communicate with your care team.     To sign up for [employer] visit the website at [email or web address]   You will be asked to enter the access code listed below, as well as some personal information. Please follow the  directions to create your username and password.     Your access code is: [email or web address]  Expires: 2018 10:37 AM     Your access code will  in 90 days. If you need help or a new code, please contact your [facility] or call [phone] for assistance.        Care EveryWhere ID     This is your Care EveryWhere ID. This could be used by other organizations to access your [facility] medical records  [id number]        Your Vitals Were     Pulse Temperature Pulse Oximetry BMI (Body Mass Index)          76 97.7  F (36.5  C) (Oral) 96% 28.23 kg/m2         Blood Pressure from Last 3 Encounters:   18 123/79   17 120/78   17 118/81    Weight from Last 3 Encounters:   18 213 lb (96.6 kg)   17 216 lb (98 kg)   17 208 lb 12.8 oz (94.7 kg)              We Performed the Following     Basic Metabolic Panel ([facility])     Hemoglobin A1c ([facility])          Today's Medication Changes          These changes are accurate as of 18 10:38 AM.  If you have any questions, ask your nurse or doctor.               Start taking these medicines.        Dose/Directions    capsaicin 0.025 % Crea cream   Commonly known as:  ZOSTRIX   Used for:  Neuropathic pain   Started by:  Ernie Arreola MD        Dose:  1 g   Apply 1 g topically 3 times daily Wash hands with soap and water after applications.   Quantity:  45 g   Refills:  3       order for DME   Used for:  Type 2 diabetes mellitus without complication, without long-term current use of insulin (H)   Started by:  Ernie Arreola MD        Equipment being ordered: Diabetic Shoes   Quantity:  1 each   Refills:  1         These medicines have changed or have updated prescriptions.        Dose/Directions    atorvastatin 40 MG tablet   Commonly known as:  LIPITOR   This may have changed:    - medication strength  - how much to take   Used for:  Type 2 diabetes mellitus without complication, without long-term current use  of insulin (H)   Changed by:  Ernie Arreola MD        Dose:  40 mg   Take 1 tablet (40 mg) by mouth daily   Quantity:  90 tablet   Refills:  3            Where to get your medicines      These medications were sent to [id number] - [place], [place] - [address]  [address], [place] [address]     Phone:  [phone]     aspirin 81 MG tablet    atorvastatin 40 MG tablet    capsaicin 0.025 % Crea cream         Some of these will need a paper prescription and others can be bought over the counter.  Ask your nurse if you have questions.     Bring a paper prescription for each of these medications     order for DME                Primary Care Provider Office Phone # Fax #    Ernie Arreola -[phone]       [address] [address]        Equal Access to Services     MITCH WALTER AH: Bonifacio gordono Sochris, waaxda luqadaha, qaybta kaalmada adeegyada, yamilex deutsch . So [facility] [phone].    ATENCIÓN: Si habla español, tiene a flowers disposición servicios gratuitos de asistencia lingüística. Ll[facility] [phone].    We comply with applicable federal civil rights laws and [place] laws. We do not discriminate on the basis of race, color, national origin, age, disability, sex, sexual orientation, or gender identity.            Thank you!     Thank you for choosing [facility]  for your care. Our goal is always to provide you with excellent care. Hearing back from our patients is one way we can continue to improve our services. Please take a few minutes to complete the written survey that you may receive in the mail after your visit with us. Thank you!             Your Updated Medication List - Protect others around you: Learn how to safely use, store and throw away your medicines at www.disposemymeds.org.          This list is accurate as of 2/26/18 10:38 AM.  Always use your most recent med list.                    Brand Name Dispense Instructions for use Diagnosis    aspirin 81 MG tablet     90 tablet    Take 1 tablet (81 mg) by mouth daily    History of CVA (cerebrovascular accident)       atorvastatin 40 MG tablet    LIPITOR    90 tablet    Take 1 tablet (40 mg) by mouth daily    Type 2 diabetes mellitus without complication, without long-term current use of insulin (H)       capsaicin 0.025 % Crea cream    ZOSTRIX    45 g    Apply 1 g topically 3 times daily Wash hands with soap and water after applications.    Neuropathic pain       carvedilol 6.25 MG tablet    COREG    60 tablet    Take 1 tablet (6.25 mg) by mouth 2 times daily (with meals)    Benign essential hypertension       fluticasone 50 MCG/ACT spray    FLONASE    16 g    Spray 1-2 sprays into both nostrils daily    Chronic seasonal allergic rhinitis due to pollen       * GABAPENTIN PO      Take 800 mg by mouth 3 times daily        * gabapentin 300 MG capsule    NEURONTIN    180 capsule    Take 2 capsules (600 mg) by mouth 3 times daily    History of CVA (cerebrovascular accident)       levETIRAcetam 500 MG tablet    KEPPRA    60 tablet    Take 1 tablet (500 mg) by mouth 2 times daily    Seizures (H)       lisinopril 20 MG tablet    PRINIVIL/ZESTRIL    60 tablet    Take 1 tablet (20 mg) by mouth daily    Benign essential hypertension       loratadine 10 MG tablet    CLARITIN    90 tablet    Take 1 tablet (10 mg) by mouth daily    Chronic seasonal allergic rhinitis due to pollen       metFORMIN 500 MG tablet    GLUCOPHAGE    180 tablet    Take 1 tablet (500 mg) by mouth 2 times daily (with meals)    Type 2 diabetes mellitus without complication, without long-term current use of insulin (H)       order for DME     1 each    Equipment being ordered: Diabetic Shoes    Type 2 diabetes mellitus without complication, without long-term current use of insulin (H)       pantoprazole 20 MG EC tablet    PROTONIX    30 tablet    Take 2 tablets (40 mg) by mouth  every morning (before breakfast)    Gastroesophageal reflux disease without esophagitis       sennosides 8.6 MG tablet    SENOKOT     Take 2 tablets by mouth daily        vitamin B complex with vitamin C Tabs tablet     200 tablet    Take 1 tablet by mouth daily    History of CVA (cerebrovascular accident)       * Notice:  This list has 2 medication(s) that are the same as other medications prescribed for you. Read the directions carefully, and ask your doctor or other care provider to review them with you.

## 2018-02-26 NOTE — PATIENT INSTRUCTIONS
Here is the plan from today's visit    1. History of CVA (cerebrovascular accident)  Continue current medications  - aspirin 81 MG tablet; Take 1 tablet (81 mg) by mouth daily  Dispense: 90 tablet; Refill: 3    2. Type 2 diabetes mellitus without complication, without long-term current use of insulin (H)  Continue the metformin    - order for DME; Equipment being ordered: Diabetic Shoes  Dispense: 1 each; Refill: 1    3. Other hyperlipidemia  Increase as before  - atorvastatin (LIPITOR) 40 MG tablet; Take 1 tablet (40 mg) by mouth daily  Dispense: 90 tablet; Refill: 3    4. Benign essential hypertension  Continue carvedilol    5. Neuropathic pain  Try this for your R foot.  - capsaicin (ZOSTRIX) 0.025 % CREA cream; Apply 1 g topically 3 times daily Wash hands with soap and water after applications.  Dispense: 45 g; Refill: 3      Please call or return to clinic if your symptoms don't go away.    Follow up plan  Please make a clinic appointment for follow up with me (LUCY KINCAID) in 3  months for recheck of diabetes.    Thank you for coming to [facility] today.  Lab Testing:  **If you had lab testing today and your results are reassuring or normal they will be mailed to you or sent through [employer] within 7 days.   **If the lab tests need quick action we will call you with the results.  The phone number we will call with results is # [phone] (home) . If this is not the best number please call our clinic and change the number.  Medication Refills:  If you need any refills please call your pharmacy and they will contact us.   If you need to  your refill at a new pharmacy, please contact the new pharmacy directly. The new pharmacy will help you get your medications transferred faster.   Scheduling:  If you have any concerns about today's visit or wish to schedule another appointment please call our office during normal business hours [phone] (8-5:00 M-F)  If a referral was made to a [facility]  [place] [facility] and you don't get a call from central scheduling please call [phone].  If a Mammogram was ordered for you at [facility] call [phone] to schedule or change your appointment.  If you had an XRay/CT/Ultrasound/MRI ordered the number is [phone] to schedule or change your radiology appointment.   Medical Concerns:  If you have urgent medical concerns please call [phone] at any time of the day.    Ernie Arreola MD

## 2018-02-26 NOTE — PROGRESS NOTES
HPI:       Juli Rodriguez is a 73 year old who presents for the following  Patient presents with:  RECHECK: f/u DM  Refill Request: Medications        Diabetes Follow-up      Patient is checking blood sugars: rarely.  Results range from 89-150s        -Last A1C was   Lab Results   Component Value Date    A1C 6.3 02/26/2018    A1C 5.7 07/25/2017    A1C 5.7 04/19/2017         Diabetic concerns: None    Chest Pain or exercise related calf pain (claudication):no- though has post stroke buringin     Symptoms of hypoglycemia (low blood sugar): none     Paresthesias (numbness or burning in feet) or sores: No     Diabetic eye exam within the last year?: No is planning to call      Hyperlipidemia Follow-Up      Recommended Level of Therapy:High Intensity Statin (atorvastatin 40*-80 mg or rosuvastatin 20-40mg)           Rate your low fat/cholesterol diet?: good    Taking statin?  Yes, no muscle aches from statin    Other lipid medications/supplements?:  none            Hypertension Follow-up  -    Outpatient blood pressures are being checked at home.  Results are 70s/ 120s-30s.    Low Salt Diet: no added salt    Daily NSAID Use?no     Did patient take their HTN pills today?  Yes      Cerebrovascular Follow-up      Patient history: ischemic cerebrovascular incident- 2013    Residual symptoms: None, Weakness in the arm on the right and Weakness in the leg on the right    Worsened or new symptoms since last visit: No     Daily aspirin use: Yes    Hypertension controlled  YES      Adherence and Exercise  Medication side effects: no  How often is a medication missed? About 1-3 times per week  Exercise:walking  2-3 days/week for an average of 30-45 minutes   A [employer]  was used for  this visit.      Goes adult  3x a week.   Problem, Medication and Allergy Lists were reviewed and are current.  Patient is an established patient of this clinic.         Review of Systems:   Review of Systems   Constitutional:  Negative for fatigue and fever.   HENT: Negative.    Eyes: Negative.  Negative for visual disturbance.   Respiratory: Negative for cough, chest tightness and shortness of breath.    Cardiovascular: Negative for chest pain and palpitations.   Gastrointestinal: Negative for abdominal distention, abdominal pain, blood in stool, constipation, diarrhea and vomiting.   Endocrine: Negative for polydipsia and polyuria.   Genitourinary: Negative for difficulty urinating and dysuria.   Musculoskeletal: Negative for arthralgias and myalgias.   Skin: Negative for color change and rash.   Neurological: Positive for weakness (right sided) and numbness.   Psychiatric/Behavioral: Negative for dysphoric mood and sleep disturbance. The patient is not nervous/anxious.              Physical Exam:   Patient Vitals for the past 24 hrs:   BP Temp Temp src Pulse SpO2 Weight   02/26/18 0955 123/79 97.7  F (36.5  C) Oral 76 96 % 213 lb (96.6 kg)     Body mass index is 28.23 kg/(m^2).  Vitals were reviewed and were normal     Physical Exam   Constitutional: He is oriented to person, place, and time. He appears well-developed. No distress.   HENT:   Head: Normocephalic.   Eyes: Conjunctivae are normal. No scleral icterus.   Neck: Normal range of motion. No thyromegaly present.   Cardiovascular: Normal rate, regular rhythm, normal heart sounds and intact distal pulses.    No murmur heard.  Pulmonary/Chest: Effort normal and breath sounds normal. No respiratory distress. He has no wheezes.   Abdominal: Soft. Bowel sounds are normal. He exhibits no distension. There is no splenomegaly or hepatomegaly. There is no tenderness.   Musculoskeletal: He exhibits no edema.   Lymphadenopathy:     He has no cervical adenopathy.   Neurological: He is alert and oriented to person, place, and time. He has normal reflexes. No cranial nerve deficit or sensory deficit. GCS eye subscore is 4. GCS verbal subscore is 5. GCS motor subscore is 6.   Decreased R sided  leg strength,   Mildly decreased R arm coordination.    Skin: Skin is warm and dry. He is not diaphoretic.   Psychiatric: He has a normal mood and affect. His behavior is normal. Judgment and thought content normal.   Vitals reviewed.        Results:      Results from the last 24 hours  Results for orders placed or performed in visit on 02/26/18 (from the past 24 hour(s))   Basic Metabolic Panel ([facility])   Result Value Ref Range    Urea Nitrogen 19.8 7.0 - 21.0 mg/dL    Calcium 9.1 8.5 - 10.1 mg/dL    Chloride 99.1 98.0 - 110.0 mmol/L    Carbon Dioxide 27.1 20.0 - 32.0 mmol/L    Creatinine 1.2 0.7 - 1.3 mg/dL    Glucose 169.4 (H) 70.0 - 99.0 mg'dL    Potassium 3.9 3.3 - 4.5 mmol/dL    Sodium 132.1 (L) 132.6 - 141.4 mmol/L    GFR Estimate 63.1 >60.0 mL/min/1.7 m2    GFR Estimate If Black 76.3 >60.0 mL/min/1.7 m2   Hemoglobin A1c ([facility])   Result Value Ref Range    Hemoglobin A1C 6.3 (H) 4.1 - 5.7 %     Assessment and Plan       1. History of CVA (cerebrovascular accident)  Continue current medications  - aspirin 81 MG tablet; Take 1 tablet (81 mg) by mouth daily  Dispense: 90 tablet; Refill: 3      2. Type 2 diabetes mellitus without complication, without long-term current use of insulin (H)  Continue the metformin  - order for DME; Equipment being ordered: Diabetic Shoes  Dispense: 1 each; Refill: 1    3. Other hyperlipidemia  Increase as before  - atorvastatin (LIPITOR) 40 MG tablet; Take 1 tablet (40 mg) by mouth daily  Dispense: 90 tablet; Refill: 3    4. Benign essential hypertension  Continue carvedilol    5. Neuropathic pain  Try this for your R foot.  - capsaicin (ZOSTRIX) 0.025 % CREA cream; Apply 1 g topically 3 times daily Wash hands with soap and water after applications.  Dispense: 45 g; Refill: 3      Please call or return to clinic if your symptoms don't go away.    Follow up plan  Please make a clinic appointment for follow up with me (LUCY KINCAID) in 3  months for recheck of  diabetes.    .    Ernie Arreola MD      There are no discontinued medications.  Options for treatment and follow-up care were reviewed with the patient. Juli Rodriguez  engaged in the decision making process and verbalized understanding of the options discussed and agreed with the final plan.    Ernie Arreola MD

## 2018-02-27 ENCOUNTER — TELEPHONE (OUTPATIENT)
Dept: FAMILY MEDICINE | Facility: CLINIC | Age: 73
End: 2018-02-27

## 2018-02-27 DIAGNOSIS — K21.9 GASTROESOPHAGEAL REFLUX DISEASE WITHOUT ESOPHAGITIS: ICD-10-CM

## 2018-02-27 RX ORDER — PANTOPRAZOLE SODIUM 20 MG/1
40 TABLET, DELAYED RELEASE ORAL
Qty: 30 TABLET | Refills: 1 | Status: SHIPPED | OUTPATIENT
Start: 2018-02-27 | End: 2018-04-24

## 2018-02-27 NOTE — TELEPHONE ENCOUNTER
Rx written yesterday 2/26 at office visit. Was local printed. [facility] didn't fax. Message routed to PCP to determine if rx was given to pt at visit    Carolann Estrada RN

## 2018-02-27 NOTE — TELEPHONE ENCOUNTER
Request for medication refill:    Date of last visit at clinic: 02/26/2018    Please complete refill if appropriate and CLOSE ENCOUNTER.    Closing the encounter signifies the refill is complete.    If refill has been denied, please complete the smart phrase .smirefuse and route it to the [facility] RN TRIAGE pool to inform the patient and the pharmacy.    Herb Pleitez, CMA

## 2018-02-27 NOTE — TELEPHONE ENCOUNTER
[facility] phone call message- patient requesting to speak with RN:    PCP: Ernie Arreola    Additional Comments: Catarino states that he faxed paperwork over for DM shoes for patient last week, he has not received anything back yet. Please contact Catarino at direct number listed in message.    Is a call back needed? Yes    Patient informed that it may take up to 2 business days to hear back from PCP:No    OK to leave a message on voice mail? Yes    Primary language: Samoan      needed? Yes    Call taken on February 27, 2018 at 1:43 PM by Nicole Becerra

## 2018-02-28 NOTE — TELEPHONE ENCOUNTER
[employer] called to check on faxed documentation. The faxed was received and in the providers folder for completion.

## 2018-03-01 ENCOUNTER — DOCUMENTATION ONLY (OUTPATIENT)
Dept: FAMILY MEDICINE | Facility: CLINIC | Age: 73
End: 2018-03-01

## 2018-03-01 NOTE — PROGRESS NOTES
"When opening a documentation only encounter, be sure to enter in \"Chief Complaint\" Forms and in \" Comments\" Title of form, description if needed.    Juli is a 73 year old  male  Form received via: Fax  Form now resides in: Provider Ready    Herb Pleitez CMA                Form has been completed by provider.     Form sent out via: Fax to [phone]  Patient informed: No, Reason:confirmed by fax confirmation  Output date: March 1, 2018    Herb Pleitez CMA      **Please close the encounter**      "

## 2018-03-05 NOTE — TELEPHONE ENCOUNTER
"3/5/18 Ad from [employer] is calling with a question regarding \"type of insert\". \"Does  want heat moldable or, custom fabric inserts\"? Please contact Ad at [phone]. Message forwarded to triage.    Nicole Becerra  Care Coordinator    "

## 2018-03-08 NOTE — TELEPHONE ENCOUNTER
3/8/18 Catarino from [employer] is calling once again to find out from PCP what type of insert is needed (see 3/5 msg below) so that he can order for patient. Please address.

## 2018-03-16 ENCOUNTER — OFFICE VISIT (OUTPATIENT)
Dept: FAMILY MEDICINE | Facility: CLINIC | Age: 73
End: 2018-03-16
Payer: COMMERCIAL

## 2018-03-16 VITALS
RESPIRATION RATE: 16 BRPM | DIASTOLIC BLOOD PRESSURE: 88 MMHG | TEMPERATURE: 98.2 F | HEART RATE: 78 BPM | WEIGHT: 219 LBS | SYSTOLIC BLOOD PRESSURE: 134 MMHG | BODY MASS INDEX: 29.02 KG/M2 | OXYGEN SATURATION: 100 %

## 2018-03-16 DIAGNOSIS — E11.9 TYPE 2 DIABETES MELLITUS WITHOUT COMPLICATION, WITHOUT LONG-TERM CURRENT USE OF INSULIN (H): ICD-10-CM

## 2018-03-16 DIAGNOSIS — I10 BENIGN ESSENTIAL HYPERTENSION: Primary | ICD-10-CM

## 2018-03-16 ASSESSMENT — ENCOUNTER SYMPTOMS
CONSTIPATION: 0
FEVER: 0
VOMITING: 0
DYSURIA: 0
ARTHRALGIAS: 0
SHORTNESS OF BREATH: 0
NERVOUS/ANXIOUS: 0
COLOR CHANGE: 0
PALPITATIONS: 0
DIFFICULTY URINATING: 0
COUGH: 0
MYALGIAS: 0
FATIGUE: 0
BLOOD IN STOOL: 0
ABDOMINAL DISTENTION: 0
EYES NEGATIVE: 1
DYSPHORIC MOOD: 0
DIARRHEA: 0
CHEST TIGHTNESS: 0
POLYDIPSIA: 0
NUMBNESS: 1
WEAKNESS: 1
ABDOMINAL PAIN: 0
SLEEP DISTURBANCE: 0

## 2018-03-16 NOTE — MR AVS SNAPSHOT
After Visit Summary   3/16/2018    Juli Rodriguez    MRN: [id number]           Patient Information     Date Of Birth          1945        Visit Information        Provider Department      3/16/2018 8:40 AM Ernie Arreola MD [facility]        Today's Diagnoses     Benign essential hypertension    -  1    Type 2 diabetes mellitus without complication, without long-term current use of insulin (H)          Care Instructions    Here is the plan from today's visit    1. Benign essential hypertension  Continue current Lisinopril    2. Type 2 diabetes mellitus without complication, without long-term current use of insulin (H)  Continue metformin  Go to Y 3 times a week.    Please call or return to clinic if your symptoms don't go away.    Follow up plan  Please make a clinic appointment for follow up with me (ERNIE ARREOLA) in 2  months for DM recheck. .    Thank you for coming to [facility] today.  Lab Testing:  **If you had lab testing today and your results are reassuring or normal they will be mailed to you or sent through [employer] within 7 days.   **If the lab tests need quick action we will call you with the results.  The phone number we will call with results is # [phone] (home) . If this is not the best number please call our clinic and change the number.  Medication Refills:  If you need any refills please call your pharmacy and they will contact us.   If you need to  your refill at a new pharmacy, please contact the new pharmacy directly. The new pharmacy will help you get your medications transferred faster.   Scheduling:  If you have any concerns about today's visit or wish to schedule another appointment please call our office during normal business hours [phone] (8-5:00 M-F)  If a referral was made to a [facility] and you don't get a call from central scheduling please call [phone].  If a Mammogram was ordered for you  at [facility] call [phone] to schedule or change your appointment.  If you had an XRay/CT/Ultrasound/MRI ordered the number is [phone] to schedule or change your radiology appointment.   Medical Concerns:  If you have urgent medical concerns please call [phone] at any time of the day.    Ernie Arreola MD            Follow-ups after your visit        Who to contact     Please call your clinic at [phone] to:    Ask questions about your health    Make or cancel appointments    Discuss your medicines    Learn about your test results    Speak to your doctor            Additional Information About Your Visit        [employer] Information     [employer] is an electronic gateway that provides easy, online access to your medical records. With [employer], you can request a clinic appointment, read your test results, renew a prescription or communicate with your care team.     To sign up for [employer] visit the website at [email or web address]   You will be asked to enter the access code listed below, as well as some personal information. Please follow the directions to create your username and password.     Your access code is: [email or web address]  Expires: 2018 11:37 AM     Your access code will  in 90 days. If you need help or a new code, please contact your [facility] or call [phone] for assistance.        Care EveryWhere ID     This is your Care EveryWhere ID. This could be used by other organizations to access your [place] medical records  [id number]        Your Vitals Were     Pulse Temperature Respirations Pulse Oximetry BMI (Body Mass Index)       78 98.2  F (36.8  C) (Oral) 16 100% 29.02 kg/m2        Blood Pressure from Last 3 Encounters:   18 134/88   18 123/79   17 120/78    Weight from Last 3 Encounters:   18 219 lb (99.3 kg)   18 213 lb (96.6 kg)   17 216 lb (98 kg)              Today, you had the following      No orders found for display       Primary Care Provider Office Phone # Fax #    Ernie Arreola [phone]       [address] [address]        Equal Access to Services     MITCH WALTER : Bonifacio rain heidio Sochris, wacandidoda luqadaha, qaybta kaalmada edy, yamilex martin renitadarci roy la nena cancino. So [facility] [phone].    ATENCIÓN: Si habla español, tiene a flowers disposición servicios gratuitos de asistencia lingüística. Llame al [phone].    We comply with applicable federal civil rights laws and [place] laws. We do not discriminate on the basis of race, color, national origin, age, disability, sex, sexual orientation, or gender identity.            Thank you!     Thank you for choosing [facility]  for your care. Our goal is always to provide you with excellent care. Hearing back from our patients is one way we can continue to improve our services. Please take a few minutes to complete the written survey that you may receive in the mail after your visit with us. Thank you!             Your Updated Medication List - Protect others around you: Learn how to safely use, store and throw away your medicines at www.disposemymeds.org.          This list is accurate as of 3/16/18  9:05 AM.  Always use your most recent med list.                   Brand Name Dispense Instructions for use Diagnosis    aspirin 81 MG tablet     90 tablet    Take 1 tablet (81 mg) by mouth daily    History of CVA (cerebrovascular accident)       atorvastatin 40 MG tablet    LIPITOR    90 tablet    Take 1 tablet (40 mg) by mouth daily    Type 2 diabetes mellitus without complication, without long-term current use of insulin (H)       capsaicin 0.025 % Crea cream    ZOSTRIX    45 g    Apply 1 g topically 3 times daily Wash hands with soap and water after applications.    Neuropathic pain       carvedilol 6.25 MG tablet    COREG    60 tablet    Take 1 tablet (6.25 mg) by mouth  2 times daily (with meals)    Benign essential hypertension       fluticasone 50 MCG/ACT spray    FLONASE    16 g    Spray 1-2 sprays into both nostrils daily    Chronic seasonal allergic rhinitis due to pollen       * GABAPENTIN PO      Take 800 mg by mouth 3 times daily        * gabapentin 300 MG capsule    NEURONTIN    180 capsule    Take 2 capsules (600 mg) by mouth 3 times daily    History of CVA (cerebrovascular accident)       levETIRAcetam 500 MG tablet    KEPPRA    60 tablet    Take 1 tablet (500 mg) by mouth 2 times daily    Seizures (H)       lisinopril 20 MG tablet    PRINIVIL/ZESTRIL    60 tablet    Take 1 tablet (20 mg) by mouth daily    Benign essential hypertension       loratadine 10 MG tablet    CLARITIN    90 tablet    Take 1 tablet (10 mg) by mouth daily    Chronic seasonal allergic rhinitis due to pollen       metFORMIN 500 MG tablet    GLUCOPHAGE    180 tablet    Take 1 tablet (500 mg) by mouth 2 times daily (with meals)    Type 2 diabetes mellitus without complication, without long-term current use of insulin (H)       order for DME     1 each    Equipment being ordered: Diabetic Shoes    Type 2 diabetes mellitus without complication, without long-term current use of insulin (H)       pantoprazole 20 MG EC tablet    PROTONIX    30 tablet    Take 2 tablets (40 mg) by mouth every morning (before breakfast)    Gastroesophageal reflux disease without esophagitis       sennosides 8.6 MG tablet    SENOKOT     Take 2 tablets by mouth daily        vitamin B complex with vitamin C Tabs tablet     200 tablet    Take 1 tablet by mouth daily    History of CVA (cerebrovascular accident)       * Notice:  This list has 2 medication(s) that are the same as other medications prescribed for you. Read the directions carefully, and ask your doctor or other care provider to review them with you.

## 2018-03-16 NOTE — PATIENT INSTRUCTIONS
Here is the plan from today's visit    1. Benign essential hypertension  Continue current Lisinopril    2. Type 2 diabetes mellitus without complication, without long-term current use of insulin (H)  Continue metformin  Go to Y 3 times a week.    Please call or return to clinic if your symptoms don't go away.    Follow up plan  Please make a clinic appointment for follow up with me (ERNIE ARREOLA) in 2  months for DM recheck. .    Thank you for coming to [facility] today.  Lab Testing:  **If you had lab testing today and your results are reassuring or normal they will be mailed to you or sent through [employer] within 7 days.   **If the lab tests need quick action we will call you with the results.  The phone number we will call with results is # [phone] (home) . If this is not the best number please call our clinic and change the number.  Medication Refills:  If you need any refills please call your pharmacy and they will contact us.   If you need to  your refill at a new pharmacy, please contact the new pharmacy directly. The new pharmacy will help you get your medications transferred faster.   Scheduling:  If you have any concerns about today's visit or wish to schedule another appointment please call our office during normal business hours [phone] (8-5:00 M-F)  If a referral was made to a [facility] and you don't get a call from central scheduling please call [phone].  If a Mammogram was ordered for you at [facility] call [phone] to schedule or change your appointment.  If you had an XRay/CT/Ultrasound/MRI ordered the number is [phone] to schedule or change your radiology appointment.   Medical Concerns:  If you have urgent medical concerns please call [phone] at any time of the day.    Ernie Arreola MD

## 2018-03-16 NOTE — PROGRESS NOTES
HPI:       Juli Rodriguez is a 73 year old who presents for the following  Patient presents with:  RECHECK: dm, bp, kidney      Diabetes Follow-up    Patient is checking blood sugars: once daily.  Results are as follows:         am - high         -Last A1C was   Lab Results   Component Value Date    A1C 6.3 02/26/2018    A1C 5.7 07/25/2017    A1C 5.7 04/19/2017        Diabetic concerns: None, just one reading that was above 200    Chest Pain or exercise related calf pain (claudication):no chest pain and Claudication yes due to stroke     Symptoms of hypoglycemia (low blood sugar): none     Paresthesias (numbness or burning in feet) or sores: Yes on right side where stroke occurred     Diabetic eye exam within the last year?: Yes      Adherence and Exercise  Medication side effects: no  How often is a medication missed? Twice in a month  Exercise:walking. Steam room. Patient goes to the . Lifting biking  A dooyoo  was used for  this visit.      Problem, Medication and Allergy Lists were reviewed and are current.  Patient is an established patient of this clinic.         Review of Systems:   Review of Systems   Constitutional: Negative for fatigue and fever.   HENT: Negative.    Eyes: Negative.  Negative for visual disturbance.   Respiratory: Negative for cough, chest tightness and shortness of breath.    Cardiovascular: Negative for chest pain and palpitations.   Gastrointestinal: Negative for abdominal distention, abdominal pain, blood in stool, constipation, diarrhea and vomiting.   Endocrine: Negative for polydipsia and polyuria.   Genitourinary: Negative for difficulty urinating and dysuria.   Musculoskeletal: Negative for arthralgias and myalgias.   Skin: Negative for color change and rash.   Neurological: Positive for weakness (right sided) and numbness.   Psychiatric/Behavioral: Negative for dysphoric mood and sleep disturbance. The patient is not nervous/anxious.              Physical Exam:    Patient Vitals for the past 24 hrs:   BP Temp Temp src Pulse Resp SpO2 Weight   03/16/18 0817 134/90 - - - - - -   03/16/18 0811 (!) 146/93 98.2  F (36.8  C) Oral 78 16 100 % 219 lb (99.3 kg)     Body mass index is 29.02 kg/(m^2).  Vitals were reviewed and were normal     Physical Exam   Constitutional: He is oriented to person, place, and time. He appears well-developed. No distress.   HENT:   Head: Normocephalic.   Eyes: Conjunctivae are normal. No scleral icterus.   Neck: Normal range of motion. No thyromegaly present.   Cardiovascular: Normal rate, regular rhythm, normal heart sounds and intact distal pulses.    No murmur heard.  Pulmonary/Chest: Effort normal and breath sounds normal. No respiratory distress. He has no wheezes.   Abdominal: Soft. Bowel sounds are normal. He exhibits no distension. There is no splenomegaly or hepatomegaly. There is no tenderness.   Musculoskeletal: He exhibits no edema.   Lymphadenopathy:     He has no cervical adenopathy.   Neurological: He is alert and oriented to person, place, and time. He has normal reflexes. No cranial nerve deficit or sensory deficit. GCS eye subscore is 4. GCS verbal subscore is 5. GCS motor subscore is 6.   Decreased R sided leg strength,   Mildly decreased R arm coordination.    Skin: Skin is warm and dry. He is not diaphoretic.   Psychiatric: He has a normal mood and affect. His behavior is normal. Judgment and thought content normal.   Vitals reviewed.        Results:     Results from last visit:  Office Visit on 02/26/2018   Component Date Value Ref Range Status     Urea Nitrogen 02/26/2018 19.8  7.0 - 21.0 mg/dL Final     Calcium 02/26/2018 9.1  8.5 - 10.1 mg/dL Final     Chloride 02/26/2018 99.1  98.0 - 110.0 mmol/L Final     Carbon Dioxide 02/26/2018 27.1  20.0 - 32.0 mmol/L Final     Creatinine 02/26/2018 1.2  0.7 - 1.3 mg/dL Final     Glucose 02/26/2018 169.4* 70.0 - 99.0 mg'dL Final     Potassium 02/26/2018 3.9  3.3 - 4.5 mmol/dL Final      Sodium 02/26/2018 132.1* 132.6 - 141.4 mmol/L Final     GFR Estimate 02/26/2018 63.1  >60.0 mL/min/1.7 m2 Final     GFR Estimate If Black 02/26/2018 76.3  >60.0 mL/min/1.7 m2 Final     Hemoglobin A1C 02/26/2018 6.3* 4.1 - 5.7 % Final     Assessment and Plan     1. Benign essential hypertension  Continue current Lisinopril    2. Type 2 diabetes mellitus without complication, without long-term current use of insulin (H)  Continue metformin  Go to Y 3 times a week.    Please call or return to clinic if your symptoms don't go away.    Follow up plan  Please make a clinic appointment for follow up with me (ERNIE ARREOLA) in 2  months for DM recheck. .      There are no discontinued medications.  Options for treatment and follow-up care were reviewed with the patient. Juli Rodriguez  engaged in the decision making process and verbalized understanding of the options discussed and agreed with the final plan.    Ernie Arreola MD

## 2018-04-06 DIAGNOSIS — E11.9 TYPE 2 DIABETES MELLITUS WITHOUT COMPLICATION, WITHOUT LONG-TERM CURRENT USE OF INSULIN (H): ICD-10-CM

## 2018-04-06 NOTE — TELEPHONE ENCOUNTER
Refill request received via fax from patient's pharmacy. Refilled per standing protocol. Sent to patient's preferred pharmacy.    Date of last office visit: 3/16/18  Last A1c value: 6.3    Medication refilled as previously written.    Carolann Estrada RN

## 2018-04-24 DIAGNOSIS — K21.9 GASTROESOPHAGEAL REFLUX DISEASE WITHOUT ESOPHAGITIS: ICD-10-CM

## 2018-04-24 NOTE — TELEPHONE ENCOUNTER
[facility] phone call message- patient requesting a refill:    Full Medication Name: protonix    Dose: 20mg tablets  Pharmacy confirmed as   [id number] - [place], [place] - [address] [place] [address]  Phone: [phone] Fax: [phone]  : Yes    Additional Comments: please refill medication    OK to leave a message on voice mail? Yes    Primary language: Citizen of [place]      needed? Yes    Call taken on April 24, 2018 at 11:43 AM by Ruth Almanza

## 2018-04-24 NOTE — TELEPHONE ENCOUNTER
Request for medication refill:    Date of last visit at clinic: 03/16/2018    Please complete refill if appropriate and CLOSE ENCOUNTER.    Closing the encounter signifies the refill is complete.    If refill has been denied, please complete the smart phrase .smirefuse and route it to the [facility] RN TRIAGE pool to inform the patient and the pharmacy.    Martha Beavers, CMA

## 2018-04-25 RX ORDER — PANTOPRAZOLE SODIUM 20 MG/1
40 TABLET, DELAYED RELEASE ORAL
Qty: 30 TABLET | Refills: 1 | Status: SHIPPED | OUTPATIENT
Start: 2018-04-25 | End: 2018-06-07

## 2018-05-01 DIAGNOSIS — J30.1 CHRONIC SEASONAL ALLERGIC RHINITIS DUE TO POLLEN: ICD-10-CM

## 2018-05-01 RX ORDER — FLUTICASONE PROPIONATE 50 MCG
1-2 SPRAY, SUSPENSION (ML) NASAL DAILY
Qty: 16 G | Refills: 6 | Status: SHIPPED | OUTPATIENT
Start: 2018-05-01 | End: 2018-07-23

## 2018-05-01 NOTE — TELEPHONE ENCOUNTER
Request for medication refill:    Date of last visit at clinic: [id number]    Please complete refill if appropriate and CLOSE ENCOUNTER.    Closing the encounter signifies the refill is complete.    If refill has been denied, please complete the smart phrase .smirefuse and route it to the [facility] RN TRIAGE pool to inform the patient and the pharmacy.    Diane Thurman, CMA

## 2018-06-07 DIAGNOSIS — K21.9 GASTROESOPHAGEAL REFLUX DISEASE WITHOUT ESOPHAGITIS: ICD-10-CM

## 2018-06-07 RX ORDER — PANTOPRAZOLE SODIUM 20 MG/1
40 TABLET, DELAYED RELEASE ORAL
Qty: 30 TABLET | Refills: 1 | Status: SHIPPED | OUTPATIENT
Start: 2018-06-07 | End: 2018-07-31

## 2018-06-07 NOTE — TELEPHONE ENCOUNTER

## 2018-06-11 DIAGNOSIS — I10 BENIGN ESSENTIAL HYPERTENSION: ICD-10-CM

## 2018-06-11 RX ORDER — CARVEDILOL 6.25 MG/1
6.25 TABLET ORAL 2 TIMES DAILY WITH MEALS
Qty: 60 TABLET | Refills: 3 | Status: SHIPPED | OUTPATIENT
Start: 2018-06-11 | End: 2018-08-01

## 2018-06-11 NOTE — TELEPHONE ENCOUNTER

## 2018-06-15 ENCOUNTER — OFFICE VISIT (OUTPATIENT)
Dept: FAMILY MEDICINE | Facility: CLINIC | Age: 73
End: 2018-06-15
Payer: COMMERCIAL

## 2018-06-15 VITALS
OXYGEN SATURATION: 98 % | HEART RATE: 75 BPM | SYSTOLIC BLOOD PRESSURE: 136 MMHG | TEMPERATURE: 98.1 F | DIASTOLIC BLOOD PRESSURE: 85 MMHG | WEIGHT: 213 LBS | BODY MASS INDEX: 28.23 KG/M2 | RESPIRATION RATE: 16 BRPM

## 2018-06-15 DIAGNOSIS — M17.5 OTHER SECONDARY OSTEOARTHRITIS OF RIGHT KNEE: Primary | ICD-10-CM

## 2018-06-15 ASSESSMENT — ENCOUNTER SYMPTOMS
FATIGUE: 0
AGITATION: 0
FEVER: 0
WEAKNESS: 1
DIAPHORESIS: 0

## 2018-06-15 NOTE — PROGRESS NOTES
"      HPI:       Juli Rodriguez is a 73 year old who presents for the following  Patient presents with:  Knee Pain: R. knee pain x's 5 wks. \"kneecap\" area per pt. No falls or injurys. Pt had a stroke many yrs ago and his knee felt numb. Now recenlty has feeling. Some numbness. Pt had imaging performed at [facility]    Right knee pain -  Pain and numbness in the right knee. Pain and numbness are worse in the morning upon waking up and can take 2-3 hours until patient can move. Patient states the pain he feels in the knee can be severe as he rates it a 9 out of 10, but the pain is intermitent. Currently not taking anything for the pain. Patient previously had imaging and was told he had osteoarthritis. Patient has used Physical therapy and injections previously, injections did not work well.  Patient had previously suffered a stoke and recently regained sensation of the right leg. Since the sensation returned approximately 5 weeks ago, he can now tell there is numbness in the right knee. Weakness is still present in the right leg.    Problem, Medication and Allergy Lists were reviewed and are current.  Patient is an established patient of this clinic.         Review of Systems:   Review of Systems   Constitutional: Negative for diaphoresis, fatigue and fever.   Cardiovascular: Negative for leg swelling.   Neurological: Positive for weakness.        History of right sided stroke   Psychiatric/Behavioral: Negative for agitation.             Physical Exam:   Patient Vitals for the past 24 hrs:   BP Temp Temp src Pulse Resp SpO2 Weight   06/15/18 1445 136/85 - - - - - -   06/15/18 1443 144/87 98.1  F (36.7  C) Oral 75 16 98 % 213 lb (96.6 kg)     Body mass index is 28.23 kg/(m^2).  Vitals were reviewed and were normal        Physical Exam   Constitutional: He appears well-developed and well-nourished. No distress.   HENT:   Head: Normocephalic.   Eyes: Conjunctivae are normal.   Musculoskeletal:        Right " knee: Medial joint line and lateral joint line tenderness noted.   Skin: Skin is warm and dry. He is not diaphoretic.   Psychiatric: He has a normal mood and affect. His behavior is normal.     Right Knee Exam   Swelling: None  Effusion: No    Tenderness   The patient is experiencing tenderness in the medial joint line, lateral joint line.    Range of Motion   Normal right knee ROM    Tests   Lachman:  Anterior - Negative      Drawer:       Posterior - Negative  Varus:  Negative  Valgus: Negative    Comments:  Right quadriceps muscle strength is 3/5        He walked with a cane due to RIGHT leg weakness. His active ROM was from about 0-125.   Main finding was decreased quad strength as described above.    Results:     No results found for this or any previous visit (from the past 24 hour(s)).  Assessment and Plan         1. Right Knee Pain likely secondary to Osteoarthritis and weakness due to past CVA.     Patient has history of osteoarthritis in Right knee with mild joint space narrowing. Discussed with patient treatment options of Knee sleeve, PT, or injections. Patient has opted for a knee sleeve and PT for rehabilitation. Discussed but deferred a repeat cortisone injection as last one was not helpful and his morning pain is likely due to weakness and stiffness. Mr Rodriguez agreed and will return as needed for follow up should there be no improvement.     Has follow up with Dr. Arreola on July 18.     There are no discontinued medications.  Options for treatment and follow-up care were reviewed with the patient. Juli Rodriguez  engaged in the decision making process and verbalized understanding of the options discussed and agreed with the final plan.    Abebe Rueda MD    This note is scribed by Maico Anderson, medical student on behalf of ABEBE RUEDA.

## 2018-06-15 NOTE — MR AVS SNAPSHOT
After Visit Summary   6/15/2018    Juli Rodriguez    MRN: [id number]           Patient Information     Date Of Birth          1945        Visit Information        Provider Department      6/15/2018 2:40 PM Abebe Rueda MD [facility]        Today's Diagnoses     Other secondary osteoarthritis of right knee    -  1      Care Instructions      1. Right Knee Pain  Patient has history of osteoarthritis in Right knee Discussed with patient Knee sleeve, PT, or injections. Patient has opted for a knee sleeve and PT for rehabilitation.    Discussed but deferred a repeat cortisone injection as last one was not helpful and his morning pain is likely due to weakness and stiffness.   Mr Rodriguez agreed and will return as needed for follow up should there be no improvement.           Follow-ups after your visit        Additional Services     [facility] PT, HAND, AND CHIROPRACTIC REFERRAL       **This order will print in the [facility] Scheduling Office**    Physical Therapy, Hand Therapy and Chiropractic Care are available through:    *[facility]  *[facility]  *[facility]    Call one number to schedule at any of the above locations: [phone].    Your provider has referred you to: Physical Therapy at [facility] or [facility]    Indication/Reason for Referral: Knee Pain  Onset of Illness: May 2018  Therapy Orders: Evaluate and Treat           Additional Comments for the Therapist or Chiropractor: Patient had right sided stroke and residual weakness.    Please be aware that coverage of these services is subject to the terms and limitations of your health insurance plan.  Call member services at your health plan with any benefit or coverage questions.      Please bring the following to your appointment:    *Your personal calendar for scheduling future appointments  *Comfortable clothing                  Your next 10 appointments already scheduled      Jul 18, 2018  2:20 PM CDT   Return Visit with Ernie Arreola MD   [facility] ([facility])    [address] [place],  [address]  [place] [address]   [phone]              Who to contact     Please call your clinic at [phone] to:    Ask questions about your health    Make or cancel appointments    Discuss your medicines    Learn about your test results    Speak to your doctor            Additional Information About Your Visit        Care EveryWhere ID     This is your Care EveryWhere ID. This could be used by other organizations to access your [facility] medical records  [id number]        Your Vitals Were     Pulse Temperature Respirations Pulse Oximetry BMI (Body Mass Index)       75 98.1  F (36.7  C) (Oral) 16 98% 28.23 kg/m2        Blood Pressure from Last 3 Encounters:   06/15/18 136/85   03/16/18 134/88   02/26/18 123/79    Weight from Last 3 Encounters:   06/15/18 213 lb (96.6 kg)   03/16/18 219 lb (99.3 kg)   02/26/18 213 lb (96.6 kg)              We Performed the Following     [facility] PT, HAND, AND CHIROPRACTIC REFERRAL        Primary Care Provider Office Phone # Fax #    Ernie Arreola [phone]       [address]  [facility] [address]        Equal Access to Services     MITCH WALTER : Hadii faina rain hadasho Soomaali, waaxda luqadaha, qaybta kaalmada adeegyada, yamilex davey hayanabel deutsch . So [facility] [phone].    ATENCIÓN: Si habla español, tiene a flowers disposición servicios gratuitos de asistencia lingüística. Llame al [phone].    We comply with applicable federal civil rights laws and [place] laws. We do not discriminate on the basis of race, color, national origin, age, disability, sex, sexual orientation, or gender identity.            Thank you!     Thank you for choosing [facility]  for your care. Our goal is always to provide you with excellent care. Hearing back from our patients is one way  we can continue to improve our services. Please take a few minutes to complete the written survey that you may receive in the mail after your visit with us. Thank you!             Your Updated Medication List - Protect others around you: Learn how to safely use, store and throw away your medicines at www.disposemymeds.org.          This list is accurate as of 6/15/18  3:53 PM.  Always use your most recent med list.                   Brand Name Dispense Instructions for use Diagnosis    aspirin 81 MG tablet     90 tablet    Take 1 tablet (81 mg) by mouth daily    History of CVA (cerebrovascular accident)       atorvastatin 40 MG tablet    LIPITOR    90 tablet    Take 1 tablet (40 mg) by mouth daily    Type 2 diabetes mellitus without complication, without long-term current use of insulin (H)       capsaicin 0.025 % Crea cream    ZOSTRIX    45 g    Apply 1 g topically 3 times daily Wash hands with soap and water after applications.    Neuropathic pain       carvedilol 6.25 MG tablet    COREG    60 tablet    Take 1 tablet (6.25 mg) by mouth 2 times daily (with meals)    Benign essential hypertension       fluticasone 50 MCG/ACT spray    FLONASE    16 g    Spray 1-2 sprays into both nostrils daily    Chronic seasonal allergic rhinitis due to pollen       * GABAPENTIN PO      Take 800 mg by mouth 3 times daily        * gabapentin 300 MG capsule    NEURONTIN    180 capsule    Take 2 capsules (600 mg) by mouth 3 times daily    History of CVA (cerebrovascular accident)       levETIRAcetam 500 MG tablet    KEPPRA    60 tablet    Take 1 tablet (500 mg) by mouth 2 times daily    Seizures (H)       lisinopril 20 MG tablet    PRINIVIL/ZESTRIL    60 tablet    Take 1 tablet (20 mg) by mouth daily    Benign essential hypertension       loratadine 10 MG tablet    CLARITIN    90 tablet    Take 1 tablet (10 mg) by mouth daily    Chronic seasonal allergic rhinitis due to pollen       LYRICA PO      Take 100 mg by mouth 3 times daily         metFORMIN 500 MG tablet    GLUCOPHAGE    180 tablet    Take 1 tablet (500 mg) by mouth 2 times daily (with meals)    Type 2 diabetes mellitus without complication, without long-term current use of insulin (H)       order for DME     1 each    Equipment being ordered: Diabetic Shoes    Type 2 diabetes mellitus without complication, without long-term current use of insulin (H)       pantoprazole 20 MG EC tablet    PROTONIX    30 tablet    Take 2 tablets (40 mg) by mouth every morning (before breakfast)    Gastroesophageal reflux disease without esophagitis       sennosides 8.6 MG tablet    SENOKOT     Take 2 tablets by mouth daily        vitamin B complex with vitamin C Tabs tablet     200 tablet    Take 1 tablet by mouth daily    History of CVA (cerebrovascular accident)       * Notice:  This list has 2 medication(s) that are the same as other medications prescribed for you. Read the directions carefully, and ask your doctor or other care provider to review them with you.

## 2018-06-15 NOTE — PATIENT INSTRUCTIONS
1. Right Knee Pain  Patient has history of osteoarthritis in Right knee Discussed with patient Knee sleeve, PT, or injections. Patient has opted for a knee sleeve and PT for rehabilitation.    Discussed but deferred a repeat cortisone injection as last one was not helpful and his morning pain is likely due to weakness and stiffness.   Mr Rodriguez agreed and will return as needed for follow up should there be no improvement.

## 2018-06-15 NOTE — NURSING NOTE
Due to patient being non-English speaking/uses sign language, an  was used for this visit. Only for face-to-face interpretation by an external agency, date and length of interpretation can be found on the scanned worksheet.     name: Buthc Romeo  Agency: [employer]  Language: Iranian   Telephone number: [phone]  Type of interpretation: Face-to-face, spoken      Concetta Rebolledo CMA

## 2018-06-19 ENCOUNTER — DOCUMENTATION ONLY (OUTPATIENT)
Dept: FAMILY MEDICINE | Facility: CLINIC | Age: 73
End: 2018-06-19

## 2018-07-04 NOTE — PROGRESS NOTES
Visit to the client's home for annual health risk assessment and PCA assessment.  An  was present.    Current situation/living environment  clt lives in apt.  At todays visit he was seated in the chair with his walker close by.  Living spaces was clean and neat.    Activities of daily living (ADL)/instrumental activities of daily living (IADL) and functional issues  Client needs help with the following ADL's: dressing, grooming, bathing, eating, transfers, walking and toileting  Client needs help with the following IADL's: shopping, cooking, housekeeping, laundry, managing finances/bills and transportation  Client states he is unable to perform the above due to R sided weakness      Health concerns for today  At todays visit clt c/o R leg pain.  He has had x rays and MRI.  He reports his pain increasing with walking.  He is taking pain meds as needed and participating in PT.  He has glasses and recently had eye exam.  In the past year he has not been in the hospital or ER  Has patient fallen 2 or more times in the last year? No  Has patient fallen with injury in the last year? No    Cognition/mental health  Pleasant and cheerful during our visit.  He is able to express his needs.    STARS/Med Adherence  Client is non-compliant with the following quality measures: none noted  Comments: education efforts    Client's Plan of Care consists of:  Adult day care (4 days per week), Life line, Personal care assistance (PCA) (4 hours per day), Specialized supplies and equipment (lift chair, a/c unit, grab bars, cane, walker, raised toilet, hh shower) and Transportation    Mo Aguilar RN PHN  [facility] Managed care coordinator  [phone]

## 2018-07-18 ENCOUNTER — OFFICE VISIT (OUTPATIENT)
Dept: FAMILY MEDICINE | Facility: CLINIC | Age: 73
End: 2018-07-18
Payer: COMMERCIAL

## 2018-07-18 VITALS
OXYGEN SATURATION: 97 % | RESPIRATION RATE: 16 BRPM | HEART RATE: 67 BPM | DIASTOLIC BLOOD PRESSURE: 71 MMHG | SYSTOLIC BLOOD PRESSURE: 108 MMHG | TEMPERATURE: 97.5 F

## 2018-07-18 DIAGNOSIS — E11.9 TYPE 2 DIABETES MELLITUS WITHOUT COMPLICATION, WITHOUT LONG-TERM CURRENT USE OF INSULIN (H): Primary | ICD-10-CM

## 2018-07-18 DIAGNOSIS — I10 BENIGN ESSENTIAL HYPERTENSION: ICD-10-CM

## 2018-07-18 DIAGNOSIS — M70.61 GREATER TROCHANTERIC BURSITIS OF RIGHT HIP: ICD-10-CM

## 2018-07-18 LAB
BUN SERPL-MCNC: 18.1 MG/DL (ref 7–21)
CALCIUM SERPL-MCNC: 9 MG/DL (ref 8.5–10.1)
CHLORIDE SERPLBLD-SCNC: 102.6 MMOL/L (ref 98–110)
CHOLEST SERPL-MCNC: 92.5 MG/DL (ref 0–200)
CHOLEST/HDLC SERPL: 2.8 {RATIO} (ref 0–5)
CO2 SERPL-SCNC: 31.2 MMOL/L (ref 20–32)
CREAT SERPL-MCNC: 1.2 MG/DL (ref 0.7–1.3)
GFR SERPL CREATININE-BSD FRML MDRD: 63.1 ML/MIN/1.7 M2
GLUCOSE SERPL-MCNC: 157.5 MG'DL (ref 70–99)
HBA1C MFR BLD: 5.7 % (ref 4.1–5.7)
HDLC SERPL-MCNC: 32.9 MG/DL
LDLC SERPL CALC-MCNC: 43 MG/DL (ref 0–129)
POTASSIUM SERPL-SCNC: 4.1 MMOL/DL (ref 3.3–4.5)
SODIUM SERPL-SCNC: 133.6 MMOL/L (ref 132.6–141.4)
TRIGL SERPL-MCNC: 83.9 MG/DL (ref 0–150)
VLDL CHOLESTEROL: 16.8 MG/DL (ref 7–32)

## 2018-07-18 RX ORDER — LISINOPRIL 10 MG/1
10 TABLET ORAL DAILY
Qty: 90 TABLET | Refills: 3 | Status: SHIPPED | OUTPATIENT
Start: 2018-07-18 | End: 2019-03-29

## 2018-07-18 RX ORDER — ACETAMINOPHEN 500 MG
1000 TABLET ORAL 3 TIMES DAILY PRN
Qty: 100 TABLET | Refills: 0 | Status: SHIPPED | OUTPATIENT
Start: 2018-07-18 | End: 2018-08-14

## 2018-07-18 ASSESSMENT — ENCOUNTER SYMPTOMS
BLOOD IN STOOL: 0
NERVOUS/ANXIOUS: 0
DIFFICULTY URINATING: 0
DYSPHORIC MOOD: 0
ABDOMINAL PAIN: 0
COUGH: 0
MYALGIAS: 1
FATIGUE: 0
SLEEP DISTURBANCE: 0
ARTHRALGIAS: 1
POLYDIPSIA: 0
COLOR CHANGE: 0
ABDOMINAL DISTENTION: 0
CONSTIPATION: 0
PALPITATIONS: 0
DIARRHEA: 0
FEVER: 0
CHEST TIGHTNESS: 0
VOMITING: 0
NUMBNESS: 0
SHORTNESS OF BREATH: 0
DYSURIA: 0
EYES NEGATIVE: 1

## 2018-07-18 NOTE — NURSING NOTE
Per pt the neurologist changed a medication but patient is unsure what it is and what it was changed too, otherwise no changes to his medication since last seen.       Due to patient being non-English speaking/uses sign language, an  was used for this visit. Only for face-to-face interpretation by an external agency, date and length of interpretation can be found on the scanned worksheet.     name: Sidney  Agency: [employer]  Language: Luxembourger   Telephone number: [phone]  Type of interpretation: Face-to-face, spoken   JAE Hernandez 3:13 PM July 18, 2018

## 2018-07-18 NOTE — PATIENT INSTRUCTIONS
Here is the plan from today's visit    1. Type 2 diabetes mellitus without complication, without long-term current use of insulin (H)  Continue metformin  - Lipid Cascade ([facility])  - Hemoglobin A1c ([facility])  - Basic Metabolic Panel ([facility])    2. Benign essential hypertension  Reduce to 10 mg a day  - lisinopril (PRINIVIL/ZESTRIL) 10 MG tablet; Take 1 tablet (10 mg) by mouth daily  Dispense: 90 tablet; Refill: 3    3. Greater trochanteric bursitis of right hip  Ice two times daily   - acetaminophen (TYLENOL) 500 MG tablet; Take 2 tablets (1,000 mg) by mouth 3 times daily as needed for mild pain  Dispense: 100 tablet; Refill: 0      Please call or return to clinic if your symptoms don't go away.    Follow up plan  Please make a clinic appointment for follow up with me (LUCY KINCAID) in 2-4  weeks for injection of hip.    Thank you for coming to [facility] today.  Lab Testing:  **If you had lab testing today and your results are reassuring or normal they will be mailed to you or sent through [employer] within 7 days.   **If the lab tests need quick action we will call you with the results.  The phone number we will call with results is # [phone] (home) . If this is not the best number please call our clinic and change the number.  Medication Refills:  If you need any refills please call your pharmacy and they will contact us.   If you need to  your refill at a new pharmacy, please contact the new pharmacy directly. The new pharmacy will help you get your medications transferred faster.   Scheduling:  If you have any concerns about today's visit or wish to schedule another appointment please call our office during normal business hours [phone] (8-5:00 M-F)  If a referral was made to a [facility] and you don't get a call from central scheduling please call [phone].  If a Mammogram was ordered for you at [facility] call [phone] to schedule or change your  appointment.  If you had an XRay/CT/Ultrasound/MRI ordered the number is [phone] to schedule or change your radiology appointment.   Medical Concerns:  If you have urgent medical concerns please call [phone] at any time of the day.    Ernie Arreola MD

## 2018-07-18 NOTE — MR AVS SNAPSHOT
After Visit Summary   7/18/2018    Juli Rodriguez    MRN: [id number]           Patient Information     Date Of Birth          1945        Visit Information        Provider Department      7/18/2018 2:20 PM Ernie Arreola MD [facility]        Today's Diagnoses     Type 2 diabetes mellitus without complication, without long-term current use of insulin (H)    -  1    Benign essential hypertension        Greater trochanteric bursitis of right hip          Care Instructions    Here is the plan from today's visit    1. Type 2 diabetes mellitus without complication, without long-term current use of insulin (H)  Continue metformin  - Lipid Cascade ([facility])  - Hemoglobin A1c ([facility])  - Basic Metabolic Panel ([facility])    2. Benign essential hypertension  Reduce to 10 mg a day  - lisinopril (PRINIVIL/ZESTRIL) 10 MG tablet; Take 1 tablet (10 mg) by mouth daily  Dispense: 90 tablet; Refill: 3    3. Greater trochanteric bursitis of right hip  Ice two times daily   - acetaminophen (TYLENOL) 500 MG tablet; Take 2 tablets (1,000 mg) by mouth 3 times daily as needed for mild pain  Dispense: 100 tablet; Refill: 0      Please call or return to clinic if your symptoms don't go away.    Follow up plan  Please make a clinic appointment for follow up with me (ERNIE ARREOLA) in 2-4  weeks for injection of hip.    Thank you for coming to [facility] today.  Lab Testing:  **If you had lab testing today and your results are reassuring or normal they will be mailed to you or sent through [employer] within 7 days.   **If the lab tests need quick action we will call you with the results.  The phone number we will call with results is # [phone] (home) . If this is not the best number please call our clinic and change the number.  Medication Refills:  If you need any refills please call your pharmacy and they will contact us.   If you need to  your refill at a new pharmacy, please contact  the new pharmacy directly. The new pharmacy will help you get your medications transferred faster.   Scheduling:  If you have any concerns about today's visit or wish to schedule another appointment please call our office during normal business hours [phone] (8-5:00 M-F)  If a referral was made to a [facility] and you don't get a call from central scheduling please call [phone].  If a Mammogram was ordered for you at [facility] call [phone] to schedule or change your appointment.  If you had an XRay/CT/Ultrasound/MRI ordered the number is [phone] to schedule or change your radiology appointment.   Medical Concerns:  If you have urgent medical concerns please call [phone] at any time of the day.    Ernie Arreola MD            Follow-ups after your visit        Follow-up notes from your care team     Return in about 2 weeks (around 8/1/2018) for GT hip injection.      Who to contact     Please call your clinic at [phone] to:    Ask questions about your health    Make or cancel appointments    Discuss your medicines    Learn about your test results    Speak to your doctor            Additional Information About Your Visit        Care EveryWhere ID     This is your Care EveryWhere ID. This could be used by other organizations to access your [facility] medical records  [id number]        Your Vitals Were     Pulse Temperature Respirations Pulse Oximetry          67 97.5  F (36.4  C) (Oral) 16 97%         Blood Pressure from Last 3 Encounters:   07/18/18 108/71   06/15/18 136/85   03/16/18 134/88    Weight from Last 3 Encounters:   06/15/18 213 lb (96.6 kg)   03/16/18 219 lb (99.3 kg)   02/26/18 213 lb (96.6 kg)              We Performed the Following     Basic Metabolic Panel ([facility])     Hemoglobin A1c ([facility])     Lipid [place] ([facility])          Today's Medication Changes          These changes are accurate as of 7/18/18  4:40 PM.  If you have any  questions, ask your nurse or doctor.               Start taking these medicines.        Dose/Directions    acetaminophen 500 MG tablet   Commonly known as:  TYLENOL   Used for:  Greater trochanteric bursitis of right hip   Started by:  Ernie Arreola MD        Dose:  1000 mg   Take 2 tablets (1,000 mg) by mouth 3 times daily as needed for mild pain   Quantity:  100 tablet   Refills:  0         These medicines have changed or have updated prescriptions.        Dose/Directions    lisinopril 10 MG tablet   Commonly known as:  PRINIVIL/ZESTRIL   Indication:  High Blood Pressure Disorder   This may have changed:    - medication strength  - how much to take   Used for:  Benign essential hypertension   Changed by:  Ernie Arreola MD        Dose:  10 mg   Take 1 tablet (10 mg) by mouth daily   Quantity:  90 tablet   Refills:  3            Where to get your medicines      These medications were sent to [facility]/pharmacy #[id number] - [place], [place] - [address], [place] [address]     Phone:  [phone]     acetaminophen 500 MG tablet    lisinopril 10 MG tablet                Primary Care Provider Office Phone # Fax #    Ernie Arreola [phone]       [address] [address]        Equal Access to Services     [facility] AH: Hadii faina rain hadasho Socarlyali, waaxda luqadaha, qaybta kaalmada adeegyada, yamilex deutsch . So [facility] [phone].    ATENCIÓN: Si habla español, tiene a flowers disposición servicios gratuitos de asistencia lingüística. [facility] [phone].    We comply with applicable federal civil rights laws and [place] laws. We do not discriminate on the basis of race, color, national origin, age, disability, sex, sexual orientation, or gender identity.            Thank you!     Thank you for choosing [facility]  for your care. Our goal is always to provide you with excellent care. Hearing back from  our patients is one way we can continue to improve our services. Please take a few minutes to complete the written survey that you may receive in the mail after your visit with us. Thank you!             Your Updated Medication List - Protect others around you: Learn how to safely use, store and throw away your medicines at www.disposemymeds.org.          This list is accurate as of 7/18/18  4:40 PM.  Always use your most recent med list.                   Brand Name Dispense Instructions for use Diagnosis    acetaminophen 500 MG tablet    TYLENOL    100 tablet    Take 2 tablets (1,000 mg) by mouth 3 times daily as needed for mild pain    Greater trochanteric bursitis of right hip       aspirin 81 MG tablet     90 tablet    Take 1 tablet (81 mg) by mouth daily    History of CVA (cerebrovascular accident)       atorvastatin 40 MG tablet    LIPITOR    90 tablet    Take 1 tablet (40 mg) by mouth daily    Type 2 diabetes mellitus without complication, without long-term current use of insulin (H)       capsaicin 0.025 % Crea cream    ZOSTRIX    45 g    Apply 1 g topically 3 times daily Wash hands with soap and water after applications.    Neuropathic pain       carvedilol 6.25 MG tablet    COREG    60 tablet    Take 1 tablet (6.25 mg) by mouth 2 times daily (with meals)    Benign essential hypertension       fluticasone 50 MCG/ACT spray    FLONASE    16 g    Spray 1-2 sprays into both nostrils daily    Chronic seasonal allergic rhinitis due to pollen       * GABAPENTIN PO      Take 800 mg by mouth 3 times daily        * gabapentin 300 MG capsule    NEURONTIN    180 capsule    Take 2 capsules (600 mg) by mouth 3 times daily    History of CVA (cerebrovascular accident)       levETIRAcetam 500 MG tablet    KEPPRA    60 tablet    Take 1 tablet (500 mg) by mouth 2 times daily    Seizures (H)       lisinopril 10 MG tablet    PRINIVIL/ZESTRIL    90 tablet    Take 1 tablet (10 mg) by mouth daily    Benign essential hypertension        loratadine 10 MG tablet    CLARITIN    90 tablet    Take 1 tablet (10 mg) by mouth daily    Chronic seasonal allergic rhinitis due to pollen       LYRICA PO      Take 100 mg by mouth 3 times daily        metFORMIN 500 MG tablet    GLUCOPHAGE    180 tablet    Take 1 tablet (500 mg) by mouth 2 times daily (with meals)    Type 2 diabetes mellitus without complication, without long-term current use of insulin (H)       order for DME     1 each    Equipment being ordered: Diabetic Shoes    Type 2 diabetes mellitus without complication, without long-term current use of insulin (H)       pantoprazole 20 MG EC tablet    PROTONIX    30 tablet    Take 2 tablets (40 mg) by mouth every morning (before breakfast)    Gastroesophageal reflux disease without esophagitis       sennosides 8.6 MG tablet    SENOKOT     Take 2 tablets by mouth daily        vitamin B complex with vitamin C Tabs tablet     200 tablet    Take 1 tablet by mouth daily    History of CVA (cerebrovascular accident)       * Notice:  This list has 2 medication(s) that are the same as other medications prescribed for you. Read the directions carefully, and ask your doctor or other care provider to review them with you.

## 2018-07-18 NOTE — PROGRESS NOTES
HPI       Juli Rodriguez is a 73 year old  who presents for   Chief Complaint   Patient presents with     Hip Pain     right hip pain ongoing but worse from falling at clinic today     Knee Pain     right knee pain is from todays fall at clinic     Diabetes     recheck     Refill Request     lancets and test strips. Glucometer not working properly would like a new one, Claritin D as well.       Diabetes Follow-up    Patient is checking blood sugars: once daily.  Results are as follows:         am - before breakfast usually per pt         -Last A1C was   Lab Results   Component Value Date    A1C 5.7 07/18/2018    A1C 6.3 02/26/2018    A1C 5.7 07/25/2017    A1C 5.7 04/19/2017        Diabetic concerns: None    Chest Pain or exercise related calf pain (claudication):no and Claudication sometimes and chest pain      Symptoms of hypoglycemia (low blood sugar): none     Paresthesias (numbness or burning in feet) or sores: Yes bilateral     Diabetic eye exam within the last year?: Yes      Adherence and Exercise  Medication side effects: no  How often is a medication missed? Never  Exercise:walking  2-3 days/week for an average of 3 hours         Bilateral Hip/knee pain  - right hip pain has increased since last visit  - he fell at the clinic today which made it worse, tripped over the rug  - hip and knee pain 8/10  - numbness and tingling of both legs  Unable to sleep on his R side    A ForeSee  was used for  this visit.        Problem, Medication and Allergy Lists were reviewed and updated if needed..    Patient is an established patient of this clinic..         Review of Systems:   Review of Systems   Constitutional: Negative for fatigue and fever.   HENT: Negative.    Eyes: Negative.  Negative for visual disturbance.   Respiratory: Negative for cough, chest tightness and shortness of breath.    Cardiovascular: Negative for chest pain and palpitations.   Gastrointestinal: Negative for abdominal  distention, abdominal pain, blood in stool, constipation, diarrhea and vomiting.   Endocrine: Negative for polydipsia and polyuria.   Genitourinary: Negative for difficulty urinating and dysuria.   Musculoskeletal: Positive for arthralgias and myalgias.   Skin: Negative for color change and rash.   Neurological: Negative for numbness.   Psychiatric/Behavioral: Negative for dysphoric mood and sleep disturbance. The patient is not nervous/anxious.             Physical Exam:     Vitals:    07/18/18 1513   BP: 108/71   BP Location: Right arm   Patient Position: Sitting   Cuff Size: Adult Large   Pulse: 67   Resp: 16   Temp: 97.5  F (36.4  C)   TempSrc: Oral   SpO2: 97%     There is no height or weight on file to calculate BMI.  Vitals were reviewed and were normal     Physical Exam   Constitutional: He is oriented to person, place, and time. He appears well-developed. No distress.   HENT:   Head: Normocephalic.   Eyes: Conjunctivae are normal. No scleral icterus.   Neck: Normal range of motion. No thyromegaly present.   Cardiovascular: Normal rate, regular rhythm, normal heart sounds and intact distal pulses.    No murmur heard.  Pulmonary/Chest: Effort normal and breath sounds normal. No respiratory distress. He has no wheezes.   Abdominal: Soft. Bowel sounds are normal. He exhibits no distension. There is no splenomegaly or hepatomegaly. There is no tenderness.   Musculoskeletal: He exhibits no edema.        Legs:  Lymphadenopathy:     He has no cervical adenopathy.   Neurological: He is alert and oriented to person, place, and time. He has normal reflexes. No cranial nerve deficit or sensory deficit. GCS eye subscore is 4. GCS verbal subscore is 5. GCS motor subscore is 6.   Decreased R sided leg strength,   Mildly decreased R arm coordination.    Skin: Skin is warm and dry. He is not diaphoretic.   Psychiatric: He has a normal mood and affect. His behavior is normal. Judgment and thought content normal.   Vitals  reviewed.        Results:      Results from this visit  Results for orders placed or performed in visit on 07/18/18   Lipid Cascade ([facility])   Result Value Ref Range    Cholesterol 92.5 0.0 - 200.0 mg/dL    Cholesterol/HDL Ratio 2.8 0.0 - 5.0    HDL Cholesterol 32.9 (L) >40.0 mg/dL    LDL Cholesterol Calculated 43 0 - 129 mg/dL    Triglycerides 83.9 0.0 - 150.0 mg/dL    VLDL Cholesterol 16.8 7.0 - 32.0 mg/dL   Hemoglobin A1c ([facility])   Result Value Ref Range    Hemoglobin A1C 5.7 4.1 - 5.7 %   Basic Metabolic Panel ([facility])   Result Value Ref Range    Urea Nitrogen 18.1 7.0 - 21.0 mg/dL    Calcium 9.0 8.5 - 10.1 mg/dL    Chloride 102.6 98.0 - 110.0 mmol/L    Carbon Dioxide 31.2 20.0 - 32.0 mmol/L    Creatinine 1.2 0.7 - 1.3 mg/dL    Glucose 157.5 (H) 70.0 - 99.0 mg'dL    Potassium 4.1 3.3 - 4.5 mmol/dL    Sodium 133.6 132.6 - 141.4 mmol/L    GFR Estimate 63.1 >60.0 mL/min/1.7 m2    GFR Estimate If Black 76.3 >60.0 mL/min/1.7 m2       Assessment and Plan        1. Type 2 diabetes mellitus without complication, without long-term current use of insulin (H)  Continue metformin  - Lipid Cascade ([facility])  - Hemoglobin A1c ([facility])  - Basic Metabolic Panel ([facility])    2. Benign essential hypertension  Reduce to 10 mg a day  - lisinopril (PRINIVIL/ZESTRIL) 10 MG tablet; Take 1 tablet (10 mg) by mouth daily  Dispense: 90 tablet; Refill: 3    3. Greater trochanteric bursitis of right hip  Ice two times daily   - acetaminophen (TYLENOL) 500 MG tablet; Take 2 tablets (1,000 mg) by mouth 3 times daily as needed for mild pain  Dispense: 100 tablet; Refill: 0      Please call or return to clinic if your symptoms don't go away.    Follow up plan  Please make a clinic appointment for follow up with me (LUCY KINCAID) in 2-4  weeks for injection of hip.         There are no discontinued medications.    Options for treatment and follow-up care were reviewed with the patient. Juli Rodriguez  engaged in the decision making  process and verbalized understanding of the options discussed and agreed with the final plan.    Ernie Arreola MD

## 2018-07-19 DIAGNOSIS — J30.1 CHRONIC SEASONAL ALLERGIC RHINITIS DUE TO POLLEN: ICD-10-CM

## 2018-07-19 NOTE — TELEPHONE ENCOUNTER

## 2018-07-20 RX ORDER — LORATADINE 10 MG/1
10 TABLET ORAL DAILY
Qty: 90 TABLET | Refills: 1 | Status: SHIPPED | OUTPATIENT
Start: 2018-07-20 | End: 2019-01-16

## 2018-07-23 DIAGNOSIS — J30.1 CHRONIC SEASONAL ALLERGIC RHINITIS DUE TO POLLEN: ICD-10-CM

## 2018-07-23 RX ORDER — FLUTICASONE PROPIONATE 50 MCG
1-2 SPRAY, SUSPENSION (ML) NASAL DAILY
Qty: 16 G | Refills: 6 | Status: SHIPPED | OUTPATIENT
Start: 2018-07-23 | End: 2019-03-29

## 2018-07-23 NOTE — TELEPHONE ENCOUNTER

## 2018-07-31 DIAGNOSIS — K21.9 GASTROESOPHAGEAL REFLUX DISEASE WITHOUT ESOPHAGITIS: ICD-10-CM

## 2018-07-31 RX ORDER — PANTOPRAZOLE SODIUM 20 MG/1
40 TABLET, DELAYED RELEASE ORAL
Qty: 30 TABLET | Refills: 1 | Status: SHIPPED | OUTPATIENT
Start: 2018-07-31 | End: 2018-08-27

## 2018-07-31 NOTE — TELEPHONE ENCOUNTER

## 2018-08-01 DIAGNOSIS — I10 BENIGN ESSENTIAL HYPERTENSION: ICD-10-CM

## 2018-08-01 RX ORDER — CARVEDILOL 6.25 MG/1
6.25 TABLET ORAL 2 TIMES DAILY WITH MEALS
Qty: 60 TABLET | Refills: 3 | Status: SHIPPED | OUTPATIENT
Start: 2018-08-01 | End: 2018-11-05

## 2018-08-01 NOTE — TELEPHONE ENCOUNTER

## 2018-08-02 ENCOUNTER — OFFICE VISIT (OUTPATIENT)
Dept: FAMILY MEDICINE | Facility: CLINIC | Age: 73
End: 2018-08-02
Payer: COMMERCIAL

## 2018-08-02 ENCOUNTER — RADIANT APPOINTMENT (OUTPATIENT)
Dept: GENERAL RADIOLOGY | Facility: CLINIC | Age: 73
End: 2018-08-02
Attending: FAMILY MEDICINE
Payer: COMMERCIAL

## 2018-08-02 VITALS
OXYGEN SATURATION: 99 % | BODY MASS INDEX: 27.94 KG/M2 | HEART RATE: 70 BPM | RESPIRATION RATE: 16 BRPM | DIASTOLIC BLOOD PRESSURE: 80 MMHG | TEMPERATURE: 98 F | SYSTOLIC BLOOD PRESSURE: 120 MMHG | WEIGHT: 210.8 LBS

## 2018-08-02 DIAGNOSIS — M25.551 HIP PAIN, RIGHT: Primary | ICD-10-CM

## 2018-08-02 DIAGNOSIS — M25.551 HIP PAIN, RIGHT: ICD-10-CM

## 2018-08-02 NOTE — NURSING NOTE
Due to patient being non-English speaking/uses sign language, an  was used for this visit. Only for face-to-face interpretation by an external agency, date and length of interpretation can be found on the scanned worksheet.     name: Butch Bsutos  Agency: [employer]  Language: Nigerian   Telephone number: [phone]  Type of interpretation: Face-to-face, spoken     Calli Hays CMA 8/2/18

## 2018-08-02 NOTE — MR AVS SNAPSHOT
After Visit Summary   8/2/2018    Juli Rodriguez    MRN: [id number]           Patient Information     Date Of Birth          1945        Visit Information        Provider Department      8/2/2018 1:20 PM Jhonathan Nelson MD \[facility]\"" [facility]        Today's Diagnoses     Hip pain, right    -  1      Care Instructions    Here is the plan from today's visit    1. Hip pain, right - likely right knee/hip contusion.   Patient recently fell in the front lobby of [facility] on his right side as he tripped over the rug however did not suffer any major injuries as he was able to walk right after the following.  This was approximately 1-1/2 weeks ago and is now presenting to clinic as his pain has not improved.  He has been treating it with temporary relief from acetaminophen as advised by her previous physician.  -No need for physical therapy at this time  -Continue Tylenol 1000 mg 3 times daily as needed  -Ice or heat area of pain as needed 10 minutes on 10 minutes of  - X-ray Pelvis w/ unilateral hip right; Future -no evidence of underlying fracture of or prosthesis.      Please call or return to clinic if your symptoms don't go away.    Follow up plan  Please make a clinic appointment for follow up with your primary physician Ernie Arreola MD in 2 weeks.    Thank you for coming to [facility] today.  Lab Testing:  **If you had lab testing today and your results are reassuring or normal they will be mailed to you or sent through [employer] within 7 days.   **If the lab tests need quick action we will call you with the results.  The phone number we will call with results is # [phone] (home) . If this is not the best number please call our clinic and change the number.  Medication Refills:  If you need any refills please call your pharmacy and they will contact us.   If you need to  your refill at a new pharmacy, please contact the new pharmacy directly. The new pharmacy  will help you get your medications transferred faster.   Scheduling:  If you have any concerns about today's visit or wish to schedule another appointment please call our office during normal business hours [phone] (8-5:00 M-F)  If a referral was made to a [facility] and you don't get a call from central scheduling please call [phone].  If a Mammogram was ordered for you at [facility] call [phone] to schedule or change your appointment.  If you had an XRay/CT/Ultrasound/MRI ordered the number is [phone] to schedule or change your radiology appointment.   Medical Concerns:  If you have urgent medical concerns please call [phone] at any time of the day.    Jhonathan Nelson MD            Follow-ups after your visit        Follow-up notes from your care team     Return in about 2 weeks (around 8/16/2018).      Who to contact     Please call your clinic at [phone] to:    Ask questions about your health    Make or cancel appointments    Discuss your medicines    Learn about your test results    Speak to your doctor            Additional Information About Your Visit        Care EveryWhere ID     This is your Care EveryWhere ID. This could be used by other organizations to access your [place] [facility] medical records  [id number]        Your Vitals Were     Pulse Temperature Respirations Pulse Oximetry BMI (Body Mass Index)       70 98  F (36.7  C) (Oral) 16 99% 27.94 kg/m2        Blood Pressure from Last 3 Encounters:   08/02/18 120/80   07/18/18 108/71   06/15/18 136/85    Weight from Last 3 Encounters:   08/02/18 210 lb 12.8 oz (95.6 kg)   06/15/18 213 lb (96.6 kg)   03/16/18 219 lb (99.3 kg)              We Performed the Following      : Sign Language or Oral - 53-67 minutes        Primary Care Provider Office Phone # Fax #    Ernie Arreola [phone] [address] [address]        Equal Access to Services      MITCH WALTER : Hadii aad ku shannan Acosta, waaxda luqadaha, qaybta kaalmada edy, yamilex petar maria teresamarielle brannonflaviojohn deutsch . So [facility] [phone].    ATENCIÓN: Si habla delores, tiene a flowers disposición servicios gratuitos de asistencia lingüística. Llame al [phone].    We comply with applicable federal civil rights laws and [place] laws. We do not discriminate on the basis of race, color, national origin, age, disability, sex, sexual orientation, or gender identity.            Thank you!     Thank you for choosing [facility]  for your care. Our goal is always to provide you with excellent care. Hearing back from our patients is one way we can continue to improve our services. Please take a few minutes to complete the written survey that you may receive in the mail after your visit with us. Thank you!             Your Updated Medication List - Protect others around you: Learn how to safely use, store and throw away your medicines at www.disposemymeds.org.          This list is accurate as of 8/2/18 11:59 PM.  Always use your most recent med list.                   Brand Name Dispense Instructions for use Diagnosis    acetaminophen 500 MG tablet    TYLENOL    100 tablet    Take 2 tablets (1,000 mg) by mouth 3 times daily as needed for mild pain    Greater trochanteric bursitis of right hip       aspirin 81 MG tablet     90 tablet    Take 1 tablet (81 mg) by mouth daily    History of CVA (cerebrovascular accident)       atorvastatin 40 MG tablet    LIPITOR    90 tablet    Take 1 tablet (40 mg) by mouth daily    Type 2 diabetes mellitus without complication, without long-term current use of insulin (H)       capsaicin 0.025 % Crea cream    ZOSTRIX    45 g    Apply 1 g topically 3 times daily Wash hands with soap and water after applications.    Neuropathic pain       carvedilol 6.25 MG tablet    COREG    60 tablet    Take 1 tablet (6.25 mg) by mouth 2 times daily (with meals)     Benign essential hypertension       fluticasone 50 MCG/ACT spray    FLONASE    16 g    Spray 1-2 sprays into both nostrils daily    Chronic seasonal allergic rhinitis due to pollen       * GABAPENTIN PO      Take 800 mg by mouth 3 times daily        * gabapentin 300 MG capsule    NEURONTIN    180 capsule    Take 2 capsules (600 mg) by mouth 3 times daily    History of CVA (cerebrovascular accident)       levETIRAcetam 500 MG tablet    KEPPRA    60 tablet    Take 1 tablet (500 mg) by mouth 2 times daily    Seizures (H)       lisinopril 10 MG tablet    PRINIVIL/ZESTRIL    90 tablet    Take 1 tablet (10 mg) by mouth daily    Benign essential hypertension       loratadine 10 MG tablet    CLARITIN    90 tablet    Take 1 tablet (10 mg) by mouth daily    Chronic seasonal allergic rhinitis due to pollen       LYRICA PO      Take 100 mg by mouth 3 times daily        metFORMIN 500 MG tablet    GLUCOPHAGE    180 tablet    Take 1 tablet (500 mg) by mouth 2 times daily (with meals)    Type 2 diabetes mellitus without complication, without long-term current use of insulin (H)       order for DME     1 each    Equipment being ordered: Diabetic Shoes    Type 2 diabetes mellitus without complication, without long-term current use of insulin (H)       pantoprazole 20 MG EC tablet    PROTONIX    30 tablet    Take 2 tablets (40 mg) by mouth every morning (before breakfast)    Gastroesophageal reflux disease without esophagitis       sennosides 8.6 MG tablet    SENOKOT     Take 2 tablets by mouth daily        vitamin B complex with vitamin C Tabs tablet     200 tablet    Take 1 tablet by mouth daily    History of CVA (cerebrovascular accident)       * Notice:  This list has 2 medication(s) that are the same as other medications prescribed for you. Read the directions carefully, and ask your doctor or other care provider to review them with you.

## 2018-08-02 NOTE — PATIENT INSTRUCTIONS
Here is the plan from today's visit    1. Hip pain, right - likely right knee/hip contusion.   Patient recently fell in the front lobby of [facility] on his right side as he tripped over the rug however did not suffer any major injuries as he was able to walk right after the following.  This was approximately 1-1/2 weeks ago and is now presenting to clinic as his pain has not improved.  He has been treating it with temporary relief from acetaminophen as advised by her previous physician.  -No need for physical therapy at this time  -Continue Tylenol 1000 mg 3 times daily as needed  -Ice or heat area of pain as needed 10 minutes on 10 minutes of  - X-ray Pelvis w/ unilateral hip right; Future -no evidence of underlying fracture of or prosthesis.      Please call or return to clinic if your symptoms don't go away.    Follow up plan  Please make a clinic appointment for follow up with your primary physician Ernie Arreola MD in 2 weeks.    Thank you for coming to [facility] today.  Lab Testing:  **If you had lab testing today and your results are reassuring or normal they will be mailed to you or sent through [employer] within 7 days.   **If the lab tests need quick action we will call you with the results.  The phone number we will call with results is # [phone] (home) . If this is not the best number please call our clinic and change the number.  Medication Refills:  If you need any refills please call your pharmacy and they will contact us.   If you need to  your refill at a new pharmacy, please contact the new pharmacy directly. The new pharmacy will help you get your medications transferred faster.   Scheduling:  If you have any concerns about today's visit or wish to schedule another appointment please call our office during normal business hours [phone] (8-5:00 M-F)  If a referral was made to a [facility] and you don't get a call from central scheduling please call  [phone].  If a Mammogram was ordered for you at [facility] call [phone] to schedule or change your appointment.  If you had an XRay/CT/Ultrasound/MRI ordered the number is [phone] to schedule or change your radiology appointment.   Medical Concerns:  If you have urgent medical concerns please call [phone] at any time of the day.    Jhonathan Nelson MD

## 2018-08-02 NOTE — PROGRESS NOTES
Preceptor Attestation:   Patient seen, evaluated and discussed with the resident. I personally reviewed the imaging and agree with the interpretation documented by the resident. I have verified the content of the note, which accurately reflects my assessment of the patient and the plan of care.   Supervising Physician:  Eduardo Davidson MD

## 2018-08-02 NOTE — PROGRESS NOTES
HPI       Juli Rodriguez is a 73 year old  who presents for   Chief Complaint   Patient presents with     Musculoskeletal Problem     R hip pain; fell about a week and half ago at our front entrance      Right hip/knee/ankle Pain  History of right total hip replacement       Onset: 1.5 weeks ago    Injury?  Yes Details: fell in lobby of [facility] while running late to a clinic appointment.     Description:   Location(s): right hip/knee/ankle  Character: Sharp    Intensity: 8/10    Progression of Symptoms: same    Accompanying Signs & Symptoms:  Other symptoms: None, has residual numbness on Right upper and lower extremity from previous stroke.     History:   Previous similar pain: no      Worsened by    Overuse?: no  Morning Stiffness?:no    Alleviating factors:  Improved by: acetaminophen  Therapies Tried and outcome: Nothing      A [facility]  was used for  this visit.    +++++++      Problem, Medication and Allergy Lists were      Patient Active Problem List    Diagnosis Date Noted     Other hyperlipidemia 02/26/2018     Priority: Medium     Seizures (H) 07/25/2017     Priority: Medium     Had one seizure around the time of his stroke -2014        Gastroesophageal reflux disease without esophagitis 07/25/2017     Priority: Medium     Right sided weakness 06/07/2017     Priority: Medium     Type 2 diabetes mellitus without complication, without long-term current use of insulin (H) 04/19/2017     Priority: Medium     History of CVA (cerebrovascular accident) 04/19/2017     Priority: Medium     Benign essential hypertension 04/19/2017     Priority: Medium   ,     Current Outpatient Prescriptions   Medication Sig Dispense Refill     acetaminophen (TYLENOL) 500 MG tablet Take 2 tablets (1,000 mg) by mouth 3 times daily as needed for mild pain 100 tablet 0     aspirin 81 MG tablet Take 1 tablet (81 mg) by mouth daily 90 tablet 3     atorvastatin (LIPITOR) 40 MG tablet Take 1 tablet (40 mg) by mouth  daily 90 tablet 3     capsaicin (ZOSTRIX) 0.025 % CREA cream Apply 1 g topically 3 times daily Wash hands with soap and water after applications. 45 g 3     carvedilol (COREG) 6.25 MG tablet Take 1 tablet (6.25 mg) by mouth 2 times daily (with meals) 60 tablet 3     fluticasone (FLONASE) 50 MCG/ACT spray Spray 1-2 sprays into both nostrils daily 16 g 6     gabapentin (NEURONTIN) 300 MG capsule Take 2 capsules (600 mg) by mouth 3 times daily (Patient not taking: Reported on 6/15/2018) 180 capsule 3     GABAPENTIN PO Take 800 mg by mouth 3 times daily       levETIRAcetam (KEPPRA) 500 MG tablet Take 1 tablet (500 mg) by mouth 2 times daily 60 tablet 3     lisinopril (PRINIVIL/ZESTRIL) 10 MG tablet Take 1 tablet (10 mg) by mouth daily 90 tablet 3     loratadine (CLARITIN) 10 MG tablet Take 1 tablet (10 mg) by mouth daily 90 tablet 1     metFORMIN (GLUCOPHAGE) 500 MG tablet Take 1 tablet (500 mg) by mouth 2 times daily (with meals) 180 tablet 1     order for DME Equipment being ordered: Diabetic Shoes 1 each 1     pantoprazole (PROTONIX) 20 MG EC tablet Take 2 tablets (40 mg) by mouth every morning (before breakfast) 30 tablet 1     Pregabalin (LYRICA PO) Take 100 mg by mouth 3 times daily       sennosides (SENOKOT) 8.6 MG tablet Take 2 tablets by mouth daily        vitamin B complex with vitamin C (VITAMIN  B COMPLEX) TABS tablet Take 1 tablet by mouth daily 200 tablet 3   ,     Allergies   Allergen Reactions     Eggs [Chicken-Derived Products (Egg)]    .    Patient is   an established patient of this clinic.  Past Medical History:   Diagnosis Date     Cerebral infarction (H) 08/2013    In [facility]     Stroke (H)      Family History   Problem Relation Age of Onset     Diabetes No family hx of      Coronary Artery Disease No family hx of      Hypertension No family hx of      Hyperlipidemia No family hx of      Cerebrovascular Disease No family hx of      Breast Cancer No family hx of      Colon Cancer No family hx of       Prostate Cancer No family hx of      Other Cancer No family hx of      Depression No family hx of      Anxiety Disorder No family hx of      Mental Illness No family hx of      Substance Abuse No family hx of      Anesthesia Reaction No family hx of      Asthma No family hx of      Osteoperosis No family hx of      Genetic Disorder No family hx of      Thyroid Disease No family hx of      Obesity No family hx of      Glaucoma No family hx of      Macular Degeneration No family hx of      Social History     Social History     Marital status:      Spouse name: N/A     Number of children: N/A     Years of education: N/A     Social History Main Topics     Smoking status: Never Smoker     Smokeless tobacco: Never Used     Alcohol use No     Drug use: No     Sexual activity: Not Asked     Other Topics Concern     None     Social History Narrative   .         Review of Systems:   Review of Systems  Skin: negative except as above  Respiratory: negative except as above  Cardiovascular: negative except as above  Gastrointestinal: negative except as above  Genitourinary: negative except as above  Musculoskeletal: negative except as above  Neurologic: negative except as above  Psychiatric: negative except as above  Hematologic/Lymphatic/Immunologic: negative except as above  Endocrine: negative except as above         Physical Exam:     Vitals:    08/02/18 1325   BP: 120/80   Pulse: 70   Resp: 16   Temp: 98  F (36.7  C)   TempSrc: Oral   SpO2: 99%   Weight: 210 lb 12.8 oz (95.6 kg)     Body mass index is 27.94 kg/(m^2).  Vitals were reviewed and were normal     Physical Exam  Constitutional: Oriented to person, place, and time. Appears well-developed and well-nourished.   Cardiovascular: Normal rate, regular rhythm, normal heart sounds and intact distal pulses.    Pulmonary/Chest: Effort normal and breath sounds normal. No respiratory distress. No wheezes.   Abdominal: Soft. Exhibits no distension. There is no  tenderness.   Musculoskeletal: Normal range of motion. TTP over lateral hip. Limited active and passive ROM. Ambulation unchanged.   Neurological: Alert and oriented to person, place, and time.   Skin: Skin is warm and dry.   Psychiatric: Has a normal mood and affect. Behavior is normal.       Results:   HIP (right) xray:   -No obvious evidence of femur fracture. Intact THR prosthesis.     Assessment and Plan        1. Hip pain, right (likely right knee/hip contusion from fall)  Patient recently fell in the front lobby of [facility] on his right side as he tripped over the rug however did not suffer any major injuries as he was able to walk right after the following.  This was approximately 1-1/2 weeks ago and is now presenting to clinic as his pain has not improved.  He has been treating it with temporary relief from acetaminophen as advised by her previous physician.  -No need for physical therapy at this time  -Continue Tylenol 1000 mg 3 times daily as needed  -Ice or heat area of pain as needed 10 minutes on 10 minutes of  - X-ray Pelvis w/ unilateral hip right; Future -no evidence of underlying fracture of or prosthesis.      Please call or return to clinic if your symptoms don't go away.    Follow up plan  Please make a clinic appointment for follow up with your primary physician Ernie Arreola MD in 2 weeks.    Thank you for coming to [facility] today.     There are no discontinued medications.    Options for treatment and follow-up care were reviewed with the patient. Juli Rodriguez  engaged in the decision making process and verbalized understanding of the options discussed and agreed with the final plan.    Jhonathan Nelson MD

## 2018-08-14 DIAGNOSIS — M70.61 GREATER TROCHANTERIC BURSITIS OF RIGHT HIP: ICD-10-CM

## 2018-08-14 RX ORDER — ACETAMINOPHEN 500 MG
1000 TABLET ORAL 3 TIMES DAILY PRN
Qty: 100 TABLET | Refills: 0 | Status: SHIPPED | OUTPATIENT
Start: 2018-08-14 | End: 2019-03-29

## 2018-08-14 NOTE — TELEPHONE ENCOUNTER

## 2018-08-27 DIAGNOSIS — K21.9 GASTROESOPHAGEAL REFLUX DISEASE WITHOUT ESOPHAGITIS: ICD-10-CM

## 2018-08-27 RX ORDER — PANTOPRAZOLE SODIUM 20 MG/1
40 TABLET, DELAYED RELEASE ORAL
Qty: 30 TABLET | Refills: 1 | Status: SHIPPED | OUTPATIENT
Start: 2018-08-27 | End: 2018-09-24

## 2018-08-27 NOTE — TELEPHONE ENCOUNTER

## 2018-09-24 DIAGNOSIS — K21.9 GASTROESOPHAGEAL REFLUX DISEASE WITHOUT ESOPHAGITIS: ICD-10-CM

## 2018-09-24 RX ORDER — PANTOPRAZOLE SODIUM 20 MG/1
40 TABLET, DELAYED RELEASE ORAL
Qty: 30 TABLET | Refills: 1 | Status: SHIPPED | OUTPATIENT
Start: 2018-09-24 | End: 2018-10-22

## 2018-09-24 NOTE — TELEPHONE ENCOUNTER

## 2018-10-05 DIAGNOSIS — E11.9 TYPE 2 DIABETES MELLITUS WITHOUT COMPLICATION, WITHOUT LONG-TERM CURRENT USE OF INSULIN (H): ICD-10-CM

## 2018-10-05 NOTE — TELEPHONE ENCOUNTER

## 2018-10-19 DIAGNOSIS — Z86.73 HISTORY OF CVA (CEREBROVASCULAR ACCIDENT): ICD-10-CM

## 2018-10-19 NOTE — TELEPHONE ENCOUNTER

## 2018-10-22 DIAGNOSIS — K21.9 GASTROESOPHAGEAL REFLUX DISEASE WITHOUT ESOPHAGITIS: ICD-10-CM

## 2018-10-22 RX ORDER — PANTOPRAZOLE SODIUM 20 MG/1
40 TABLET, DELAYED RELEASE ORAL
Qty: 30 TABLET | Refills: 1 | Status: SHIPPED | OUTPATIENT
Start: 2018-10-22 | End: 2018-10-23

## 2018-10-22 NOTE — TELEPHONE ENCOUNTER

## 2018-10-23 DIAGNOSIS — K21.9 GASTROESOPHAGEAL REFLUX DISEASE WITHOUT ESOPHAGITIS: ICD-10-CM

## 2018-10-23 RX ORDER — PANTOPRAZOLE SODIUM 20 MG/1
40 TABLET, DELAYED RELEASE ORAL
Qty: 30 TABLET | Refills: 1 | Status: SHIPPED | OUTPATIENT
Start: 2018-10-23 | End: 2018-11-23

## 2018-10-23 NOTE — TELEPHONE ENCOUNTER
"Request for medication refill: Pt requesting 90 day supply    Providers if patient needs an appointment and you are willing to give a one month supply please refill for one month and  send a letter/MyChart using \".SMILLIMITEDREFILL\" .smillimited and route chart to \"P SMI \" (Giving one month refill in non controlled medications is strongly recommended before denial)    If refill has been denied, meaning absolutely no refills without visit, please complete the smart phrase \".smirxrefuse\" and route it to the \"P SMI MED REFILLS\"  pool to inform the patient and the pharmacy.    Calli Hays CMA        "

## 2018-11-05 DIAGNOSIS — I10 BENIGN ESSENTIAL HYPERTENSION: ICD-10-CM

## 2018-11-05 RX ORDER — CARVEDILOL 6.25 MG/1
6.25 TABLET ORAL 2 TIMES DAILY WITH MEALS
Qty: 60 TABLET | Refills: 3 | Status: SHIPPED | OUTPATIENT
Start: 2018-11-05 | End: 2018-11-30

## 2018-11-05 NOTE — TELEPHONE ENCOUNTER

## 2018-11-12 DIAGNOSIS — Z86.73 HISTORY OF CVA (CEREBROVASCULAR ACCIDENT): ICD-10-CM

## 2018-11-12 NOTE — TELEPHONE ENCOUNTER

## 2018-11-23 DIAGNOSIS — K21.9 GASTROESOPHAGEAL REFLUX DISEASE WITHOUT ESOPHAGITIS: ICD-10-CM

## 2018-11-23 NOTE — TELEPHONE ENCOUNTER

## 2018-11-24 RX ORDER — PANTOPRAZOLE SODIUM 20 MG/1
40 TABLET, DELAYED RELEASE ORAL
Qty: 30 TABLET | Refills: 1 | Status: SHIPPED | OUTPATIENT
Start: 2018-11-24 | End: 2019-01-12

## 2018-11-30 ENCOUNTER — OFFICE VISIT (OUTPATIENT)
Dept: FAMILY MEDICINE | Facility: CLINIC | Age: 73
End: 2018-11-30
Payer: COMMERCIAL

## 2018-11-30 VITALS
SYSTOLIC BLOOD PRESSURE: 142 MMHG | WEIGHT: 219 LBS | DIASTOLIC BLOOD PRESSURE: 91 MMHG | HEART RATE: 83 BPM | TEMPERATURE: 97.9 F | BODY MASS INDEX: 29.02 KG/M2 | OXYGEN SATURATION: 98 %

## 2018-11-30 DIAGNOSIS — M79.2 NEUROPATHIC PAIN: ICD-10-CM

## 2018-11-30 DIAGNOSIS — I10 BENIGN ESSENTIAL HYPERTENSION: ICD-10-CM

## 2018-11-30 DIAGNOSIS — M25.551 HIP PAIN, RIGHT: ICD-10-CM

## 2018-11-30 DIAGNOSIS — E11.9 TYPE 2 DIABETES MELLITUS WITHOUT COMPLICATION, WITHOUT LONG-TERM CURRENT USE OF INSULIN (H): ICD-10-CM

## 2018-11-30 DIAGNOSIS — Z13.9 SCREENING FOR CONDITION: Primary | ICD-10-CM

## 2018-11-30 LAB — HBA1C MFR BLD: 6.5 % (ref 4.1–5.7)

## 2018-11-30 RX ORDER — GABAPENTIN 400 MG/1
800 CAPSULE ORAL 3 TIMES DAILY
Qty: 90 CAPSULE | Refills: 3 | Status: SHIPPED | OUTPATIENT
Start: 2018-11-30 | End: 2018-12-10

## 2018-11-30 RX ORDER — CARVEDILOL 12.5 MG/1
12.5 TABLET ORAL 2 TIMES DAILY WITH MEALS
Qty: 60 TABLET | Refills: 3 | Status: SHIPPED | OUTPATIENT
Start: 2018-11-30 | End: 2019-03-19

## 2018-11-30 ASSESSMENT — ENCOUNTER SYMPTOMS
SHORTNESS OF BREATH: 0
NUMBNESS: 0
COLOR CHANGE: 0
CONSTIPATION: 0
NERVOUS/ANXIOUS: 0
PALPITATIONS: 0
EYES NEGATIVE: 1
CHEST TIGHTNESS: 0
DYSURIA: 0
DYSPHORIC MOOD: 0
BLOOD IN STOOL: 0
ABDOMINAL DISTENTION: 0
ABDOMINAL PAIN: 0
FEVER: 0
ARTHRALGIAS: 1
DIFFICULTY URINATING: 0
POLYDIPSIA: 0
COUGH: 0
FATIGUE: 0
MYALGIAS: 1
DIARRHEA: 0
VOMITING: 0
SLEEP DISTURBANCE: 0

## 2018-11-30 NOTE — NURSING NOTE
Due to patient being non-English speaking/uses sign language, an  was used for this visit. Only for face-to-face interpretation by an external agency, date and length of interpretation can be found on the scanned worksheet.     name: Isra Romeo  Agency: [employer]  Language: [place]   Telephone number: [phone]  Type of interpretation: Face-to-face, spoken

## 2018-11-30 NOTE — PROGRESS NOTES
HPI       Juli Rodriguez is a 73 year old  who presents for   Chief Complaint   Patient presents with     Diabetes     f/u     Pain     right side ongoing     Right sided hip pain  Fell 12 weeks fell in the clinic   2 months ago had an xray  Has a history of hip replacement on that same side.  He reports that the pain is not improving and is impeding his activities.    Diabetes Follow-up      Patient is checking blood sugars: not at all         -Last A1C was   Lab Results   Component Value Date    A1C 6.5 11/30/2018    A1C 5.7 07/18/2018    A1C 6.3 02/26/2018    A1C 5.7 07/25/2017    A1C 5.7 04/19/2017        Diabetic concerns: None and other - Does not check     Chest Pain or exercise related calf pain (claudication):no     Symptoms of hypoglycemia (low blood sugar): none     Paresthesias (numbness or burning in feet) or sores: Yes numbness right side     Diabetic eye exam within the last year?: Yes      Adherence and Exercise  Medication side effects: no  How often is a medication missed? Never  Exercise:biking 2-3 days/week for an average of 45-60 minutes     A [employer]  was used for  this visit.    +++++++      Adherence and Exercise  Medication side effects: no  How often is a medication missed? Never  Exercise:No exercise None     Problem, Medication and Allergy Lists were reviewed and updated if needed..    Patient is an established patient of this clinic..         Review of Systems:   Review of Systems   Constitutional: Negative for fatigue and fever.   HENT: Negative.    Eyes: Negative.  Negative for visual disturbance.   Respiratory: Negative for cough, chest tightness and shortness of breath.    Cardiovascular: Negative for chest pain and palpitations.   Gastrointestinal: Negative for abdominal distention, abdominal pain, blood in stool, constipation, diarrhea and vomiting.   Endocrine: Negative for polydipsia and polyuria.   Genitourinary: Negative for difficulty urinating and dysuria.    Musculoskeletal: Positive for arthralgias and myalgias.   Skin: Negative for color change and rash.   Neurological: Negative for numbness.   Psychiatric/Behavioral: Negative for dysphoric mood and sleep disturbance. The patient is not nervous/anxious.             Physical Exam:     Vitals:    11/30/18 1518   BP: 145/88   Pulse: 83   Temp: 97.9  F (36.6  C)   TempSrc: Oral   SpO2: 98%   Weight: 219 lb (99.3 kg)     Body mass index is 29.02 kg/(m^2).  Vitals were reviewed and were normal     Physical Exam   Constitutional: He is oriented to person, place, and time. He appears well-developed. No distress.   HENT:   Head: Normocephalic.   Eyes: Conjunctivae are normal. No scleral icterus.   Neck: Normal range of motion. No thyromegaly present.   Cardiovascular: Normal rate, regular rhythm and normal heart sounds.    No murmur heard.  Pulmonary/Chest: Effort normal and breath sounds normal. No respiratory distress. He has no wheezes.   Abdominal: Soft. Bowel sounds are normal. He exhibits no distension. There is no splenomegaly or hepatomegaly. There is no tenderness.   Musculoskeletal: He exhibits no edema.        Right hip: He exhibits decreased strength. He exhibits normal range of motion, no tenderness, no bony tenderness, no swelling, no crepitus and no deformity.   Lymphadenopathy:     He has no cervical adenopathy.   Neurological: He is alert and oriented to person, place, and time.   Patient has antalgic gait and uses a cane for pain on his left side.   Skin: Skin is warm and dry. He is not diaphoretic.   Psychiatric: He has a normal mood and affect. His behavior is normal. Judgment and thought content normal.   Vitals reviewed.        Results:      Results from this visit  Results for orders placed or performed in visit on 11/30/18   Hemoglobin A1c ([facility])   Result Value Ref Range    Hemoglobin A1C 6.5 (H) 4.1 - 5.7 %       Assessment and Plan        Patient Instructions   Here is the plan from today's  visit    1. Screening for condition  Send back please  - Fecal colorectal cancer screen FITT; Future    2. Type 2 diabetes mellitus without complication, without long-term current use of insulin (H)  Monitor regular  - blood glucose monitoring (NO BRAND SPECIFIED) meter device kit; Use to test blood sugar 2-3 times daily or as directed.  Dispense: 1 kit; Refill: 0    3. Hip pain, right  Follow up with Dr Steve Carnes and with PT  - ORTHOPEDICS ADULT REFERRAL - EXTERNAL  - PHYSICAL THERAPY REFERRAL - EXTERNAL; Future    4. Benign essential hypertension  Increase to 12.5 mg by doubling the 6.25 pills.  - carvedilol (COREG) 12.5 MG tablet; Take 1 tablet (12.5 mg) by mouth 2 times daily (with meals)  Dispense: 60 tablet; Refill: 3      Please call or return to clinic if your symptoms don't go away.    Follow up plan  Please make a clinic appointment for follow up with me (ERNIE ARREOLA) in one month  For DM and HTN.       There are no discontinued medications.    Options for treatment and follow-up care were reviewed with the patient. Juli Rodriguez  engaged in the decision making process and verbalized understanding of the options discussed and agreed with the final plan.    Ernie Arreola MD

## 2018-11-30 NOTE — MR AVS SNAPSHOT
After Visit Summary   11/30/2018    Juli Rodriguez    MRN: [id number]           Patient Information     Date Of Birth          1945        Visit Information        Provider Department      11/30/2018 2:20 PM Ernie Arreola MD [facility]        Today's Diagnoses     Screening for condition    -  1    Type 2 diabetes mellitus without complication, without long-term current use of insulin (H)        Hip pain, right        Benign essential hypertension          Care Instructions    Here is the plan from today's visit    1. Screening for condition  Send back please  - Fecal colorectal cancer screen FITT; Future    2. Type 2 diabetes mellitus without complication, without long-term current use of insulin (H)  Monitor regular  - blood glucose monitoring (NO BRAND SPECIFIED) meter device kit; Use to test blood sugar 2-3 times daily or as directed.  Dispense: 1 kit; Refill: 0    3. Hip pain, right  Follow up with Dr Steve Carnes and with PT  - ORTHOPEDICS ADULT REFERRAL - EXTERNAL  - PHYSICAL THERAPY REFERRAL - EXTERNAL; Future    4. Benign essential hypertension  Increase to 12.5 mg   - carvedilol (COREG) 12.5 MG tablet; Take 1 tablet (12.5 mg) by mouth 2 times daily (with meals)  Dispense: 60 tablet; Refill: 3      Please call or return to clinic if your symptoms don't go away.    Follow up plan  Please make a clinic appointment for follow up with me (ERNIE ARREOLA) in one month  For DM and HTN.    Thank you for coming to [facility] today.  Lab Testing:  **If you had lab testing today and your results are reassuring or normal they will be mailed to you or sent through [employer] within 7 days.   **If the lab tests need quick action we will call you with the results.  The phone number we will call with results is # [phone] (home) . If this is not the best number please call our clinic and change the number.  Medication Refills:  If you need any refills please call your pharmacy  and they will contact us.   If you need to  your refill at a new pharmacy, please contact the new pharmacy directly. The new pharmacy will help you get your medications transferred faster.   Scheduling:  If you have any concerns about today's visit or wish to schedule another appointment please call our office during normal business hours [phone] (8-5:00 M-F)  If a referral was made to a [facility] and you don't get a call from central scheduling please call [phone].  If a Mammogram was ordered for you at [facility] call [phone] to schedule or change your appointment.  If you had an XRay/CT/Ultrasound/MRI ordered the number is [phone] to schedule or change your radiology appointment.   Medical Concerns:  If you have urgent medical concerns please call [phone] at any time of the day.    Ernie Arreola MD            Follow-ups after your visit        Additional Services     ORTHOPEDICS ADULT REFERRAL - EXTERNAL       Dr Stvee Carnes   [address] [place] [address]    Patient requests assistance from the Care Coordinator to schedule this appointment    Referral to Dr. Steve Carnes MD Orthopedics    At this location [facility]            PHYSICAL THERAPY REFERRAL - EXTERNAL       [facility]  [address]  Patient requests assistance from the Care Coordinator to schedule this appointment       At this location [facility]  Evaluate and treat.                  Future tests that were ordered for you today     Open Future Orders        Priority Expected Expires Ordered    PHYSICAL THERAPY REFERRAL - EXTERNAL Routine  11/30/2019 11/30/2018    Fecal colorectal cancer screen FITT Routine 12/21/2018 2/22/2019 11/30/2018            Who to contact     Please call your clinic at [phone] to:    Ask questions about your health    Make or cancel appointments    Discuss your medicines    Learn about your test results    Speak to your doctor             Additional Information About Your Visit        [employer] Information     [employer] is an electronic gateway that provides easy, online access to your medical records. With [employer], you can request a clinic appointment, read your test results, renew a prescription or communicate with your care team.     To sign up for [employer] visit the website at [email or web address]   You will be asked to enter the access code listed below, as well as some personal information. Please follow the directions to create your username and password.     Your access code is: [email or web address]  Expires: 2019  3:59 PM     Your access code will  in 90 days. If you need help or a new code, please contact your [facility] or call [phone] for assistance.        Care EveryWhere ID     This is your Care EveryWhere ID. This could be used by other organizations to access your [place] medical records  [id number]        Your Vitals Were     Pulse Temperature Pulse Oximetry BMI (Body Mass Index)          83 97.9  F (36.6  C) (Oral) 98% 29.02 kg/m2         Blood Pressure from Last 3 Encounters:   18 (!) 142/91   18 120/80   18 108/71    Weight from Last 3 Encounters:   18 219 lb (99.3 kg)   18 210 lb 12.8 oz (95.6 kg)   06/15/18 213 lb (96.6 kg)              We Performed the Following     Hemoglobin A1c ([facility])     ORTHOPEDICS ADULT REFERRAL - EXTERNAL          Today's Medication Changes          These changes are accurate as of 18  3:59 PM.  If you have any questions, ask your nurse or doctor.               Start taking these medicines.        Dose/Directions    blood glucose monitoring meter device kit   Commonly known as:  NO BRAND SPECIFIED   Used for:  Type 2 diabetes mellitus without complication, without long-term current use of insulin (H)   Started by:  Ernie Arreola MD        Use to test blood sugar 2-3 times daily or as directed.   Quantity:  1 kit    Refills:  0         These medicines have changed or have updated prescriptions.        Dose/Directions    carvedilol 12.5 MG tablet   Commonly known as:  COREG   Indication:  High Blood Pressure of Unknown Cause   This may have changed:    - medication strength  - how much to take   Used for:  Benign essential hypertension   Changed by:  Ernie Arreola MD        Dose:  12.5 mg   Take 1 tablet (12.5 mg) by mouth 2 times daily (with meals)   Quantity:  60 tablet   Refills:  3            Where to get your medicines      These medications were sent to [facility]/pharmacy #[id number] - [place], [place] - [address]  [address], [place] [address]     Phone:  [phone]     blood glucose monitoring meter device kit    carvedilol 12.5 MG tablet                Primary Care Provider Office Phone # Fax #    Ernie Arreola [phone]       [address] [address]        Equal Access to Services     [facility]: Hadii faina rain hadasho Soomaali, waaxda luqadaha, qaybta kaalmada adeegyada, yamilex deutsch . So [facility] [phone].    ATENCIÓN: Si habla español, tiene a flowers disposición servicios gratuitos de asistencia lingüística. Castro al [phone].    We comply with applicable federal civil rights laws and [place] laws. We do not discriminate on the basis of race, color, national origin, age, disability, sex, sexual orientation, or gender identity.            Thank you!     Thank you for choosing [facility]  for your care. Our goal is always to provide you with excellent care. Hearing back from our patients is one way we can continue to improve our services. Please take a few minutes to complete the written survey that you may receive in the mail after your visit with us. Thank you!             Your Updated Medication List - Protect others around you: Learn how to safely use, store and throw away your medicines at  www.disposemymeds.org.          This list is accurate as of 11/30/18  3:59 PM.  Always use your most recent med list.                   Brand Name Dispense Instructions for use Diagnosis    acetaminophen 500 MG tablet    TYLENOL    100 tablet    Take 2 tablets (1,000 mg) by mouth 3 times daily as needed for mild pain    Greater trochanteric bursitis of right hip       aspirin 81 MG tablet    ASA    90 tablet    Take 1 tablet (81 mg) by mouth daily    History of CVA (cerebrovascular accident)       atorvastatin 40 MG tablet    LIPITOR    90 tablet    Take 1 tablet (40 mg) by mouth daily    Type 2 diabetes mellitus without complication, without long-term current use of insulin (H)       blood glucose monitoring meter device kit    NO BRAND SPECIFIED    1 kit    Use to test blood sugar 2-3 times daily or as directed.    Type 2 diabetes mellitus without complication, without long-term current use of insulin (H)       capsaicin 0.025 % external cream    ZOSTRIX    45 g    Apply 1 g topically 3 times daily Wash hands with soap and water after applications.    Neuropathic pain       carvedilol 12.5 MG tablet    COREG    60 tablet    Take 1 tablet (12.5 mg) by mouth 2 times daily (with meals)    Benign essential hypertension       fluticasone 50 MCG/ACT nasal spray    FLONASE    16 g    Spray 1-2 sprays into both nostrils daily    Chronic seasonal allergic rhinitis due to pollen       * GABAPENTIN PO      Take 800 mg by mouth 3 times daily        * gabapentin 300 MG capsule    NEURONTIN    180 capsule    Take 2 capsules (600 mg) by mouth 3 times daily    History of CVA (cerebrovascular accident)       levETIRAcetam 500 MG tablet    KEPPRA    60 tablet    Take 1 tablet (500 mg) by mouth 2 times daily    Seizures (H)       lisinopril 10 MG tablet    PRINIVIL/ZESTRIL    90 tablet    Take 1 tablet (10 mg) by mouth daily    Benign essential hypertension       loratadine 10 MG tablet    CLARITIN    90 tablet    Take 1 tablet (10  mg) by mouth daily    Chronic seasonal allergic rhinitis due to pollen       LYRICA PO      Take 100 mg by mouth 3 times daily        metFORMIN 500 MG tablet    GLUCOPHAGE    180 tablet    Take 1 tablet (500 mg) by mouth 2 times daily (with meals)    Type 2 diabetes mellitus without complication, without long-term current use of insulin (H)       order for DME     1 each    Equipment being ordered: Diabetic Shoes    Type 2 diabetes mellitus without complication, without long-term current use of insulin (H)       pantoprazole 20 MG EC tablet    PROTONIX    30 tablet    Take 2 tablets (40 mg) by mouth every morning (before breakfast)    Gastroesophageal reflux disease without esophagitis       sennosides 8.6 MG tablet    SENOKOT     Take 2 tablets by mouth daily        vitamin B complex with vitamin C tablet     200 tablet    Take 1 tablet by mouth daily    History of CVA (cerebrovascular accident)       * Notice:  This list has 2 medication(s) that are the same as other medications prescribed for you. Read the directions carefully, and ask your doctor or other care provider to review them with you.

## 2018-11-30 NOTE — PATIENT INSTRUCTIONS
Here is the plan from today's visit    1. Screening for condition  Send back please  - Fecal colorectal cancer screen FITT; Future    2. Type 2 diabetes mellitus without complication, without long-term current use of insulin (H)  Monitor regular  - blood glucose monitoring (NO BRAND SPECIFIED) meter device kit; Use to test blood sugar 2-3 times daily or as directed.  Dispense: 1 kit; Refill: 0    3. Hip pain, right  Follow up with Dr Steve Carnes and with PT  - ORTHOPEDICS ADULT REFERRAL - EXTERNAL  - PHYSICAL THERAPY REFERRAL - EXTERNAL; Future    4. Benign essential hypertension  Increase to 12.5 mg   - carvedilol (COREG) 12.5 MG tablet; Take 1 tablet (12.5 mg) by mouth 2 times daily (with meals)  Dispense: 60 tablet; Refill: 3      Please call or return to clinic if your symptoms don't go away.    Follow up plan  Please make a clinic appointment for follow up with me (LUCY KINCAID) in one month  For DM and HTN.    Thank you for coming to [facility] today.  Lab Testing:  **If you had lab testing today and your results are reassuring or normal they will be mailed to you or sent through [employer] within 7 days.   **If the lab tests need quick action we will call you with the results.  The phone number we will call with results is # [phone] (home) . If this is not the best number please call our clinic and change the number.  Medication Refills:  If you need any refills please call your pharmacy and they will contact us.   If you need to  your refill at a new pharmacy, please contact the new pharmacy directly. The new pharmacy will help you get your medications transferred faster.   Scheduling:  If you have any concerns about today's visit or wish to schedule another appointment please call our office during normal business hours [phone] (8-5:00 M-F)  If a referral was made to a [facility] and you don't get a call from central scheduling please call [phone].  If a  Mammogram was ordered for you at [facility] call [phone] to schedule or change your appointment.  If you had an XRay/CT/Ultrasound/MRI ordered the number is [phone] to schedule or change your radiology appointment.   Medical Concerns:  If you have urgent medical concerns please call [phone] at any time of the day.    Ernie Arreola MD

## 2018-12-03 ENCOUNTER — TELEPHONE (OUTPATIENT)
Dept: FAMILY MEDICINE | Facility: CLINIC | Age: 73
End: 2018-12-03

## 2018-12-03 DIAGNOSIS — E11.9 TYPE 2 DIABETES MELLITUS WITHOUT COMPLICATION, WITHOUT LONG-TERM CURRENT USE OF INSULIN (H): Primary | ICD-10-CM

## 2018-12-03 NOTE — TELEPHONE ENCOUNTER
"Request for medication refill:  Requesting one touch verio test strips, not on current med list    Providers if patient needs an appointment and you are willing to give a one month supply please refill for one month and  send a letter/MyChart using \".SMILLIMITEDREFILL\" .smillimited and route chart to \"P [facility] \" (Giving one month refill in non controlled medications is strongly recommended before denial)    If refill has been denied, meaning absolutely no refills without visit, please complete the smart phrase \".smirxrefuse\" and route it to the \"P SMI MED REFILLS\"  pool to inform the patient and the pharmacy.    Luci Goodwin, [facility]        "

## 2018-12-07 DIAGNOSIS — Z13.9 SCREENING FOR CONDITION: ICD-10-CM

## 2018-12-07 PROCEDURE — 82274 ASSAY TEST FOR BLOOD FECAL: CPT | Performed by: FAMILY MEDICINE

## 2018-12-10 DIAGNOSIS — M79.2 NEUROPATHIC PAIN: ICD-10-CM

## 2018-12-10 LAB — HEMOCCULT STL QL IA: NEGATIVE

## 2018-12-10 RX ORDER — GABAPENTIN 400 MG/1
800 CAPSULE ORAL 3 TIMES DAILY
Qty: 90 CAPSULE | Refills: 3 | Status: SHIPPED | OUTPATIENT
Start: 2018-12-10 | End: 2019-06-26

## 2019-01-11 DIAGNOSIS — K21.9 GASTROESOPHAGEAL REFLUX DISEASE WITHOUT ESOPHAGITIS: ICD-10-CM

## 2019-01-11 NOTE — TELEPHONE ENCOUNTER

## 2019-01-12 RX ORDER — PANTOPRAZOLE SODIUM 20 MG/1
40 TABLET, DELAYED RELEASE ORAL
Qty: 30 TABLET | Refills: 1 | Status: SHIPPED | OUTPATIENT
Start: 2019-01-12 | End: 2019-03-18

## 2019-01-13 NOTE — PROGRESS NOTES
Juli is a 74 year old  who presents for   Patient presents with:  Musculoskeletal Problem: right  Back Pain      Assessment and Plan         1. Benign essential hypertension    - Basic Metabolic Panel (LabDAQ)    2. Type 2 diabetes mellitus without complication, without long-term current use of insulin (H)  continue  - Albumin Random Urine Quantitative with Creat Ratio    3. Greater trochanteric bursitis of right hip  Ice two times daily it soul get better   - naproxen (NAPROSYN) 500 MG tablet; Take 1 tablet (500 mg) by mouth 2 times daily (with meals)  Dispense: 180 tablet; Refill: 3  - PHYSICAL THERAPY REFERRAL - EXTERNAL; Future    4. Acute bilateral low back pain without sciatica  Naprosyn   - PHYSICAL THERAPY REFERRAL - EXTERNAL; Future      Please call or return to clinic if your symptoms don't go away.    Follow up plan  Please make a clinic appointment for follow up with me (ERNIE ARREOLA) in 2  months for DM recheck .         There are no discontinued medications.    Options for treatment and follow-up care were reviewed with the patient. Juli Rodriguez  engaged in the decision making process and verbalized understanding of the options discussed and agreed with the final plan.    Ernie Arreola MD         HPI       Juli is a 74 year old  who presents for   Patient presents with:  Musculoskeletal Problem: right  Back Pain      Diabetes Follow-up      Patient is checking blood sugars: rarely.  Results range from          -Last A1C was   Lab Results   Component Value Date    A1C 6.5 11/30/2018    A1C 5.7 07/18/2018    A1C 6.3 02/26/2018    A1C 5.7 07/25/2017    A1C 5.7 04/19/2017         Diabetic concerns: None    Chest Pain or exercise related calf pain (claudication):no     Symptoms of hypoglycemia (low blood sugar): none     Paresthesias (numbness or burning in feet) or sores: No     Diabetic eye exam within the last year?: Yes      Adherence and Exercise  Medication side effects: no  How  often is a medication missed? Less than 1 time per week  Exercise:walking  1 day/week for an average of 15-30 minutes     A [employer]  was used for  this visit.    +++++++  Back Pain      Duration: 3 weeks        Specific cause: none    Description:   Location of pain: low back bilateral  Character of pain: dull ache and stabbing  Pain radiation:none  New numbness or weakness in legs, not attributed to pain:  No   Any new bowel or bladder incontinence?No     Intensity: Currently 5/10, moderate    History:   Pain interferes with job/home/school:  YES stopped walking and praying  History of back problems: recurrent self limited episodes of low back pain in the past  Any previous MRI or X-rays: None  Sees a specialist for back pain:  Yes   Therapies tried without relief: hot water and acupuncture    Alleviating factors:   Improved by: hot water, acupuncture and heat      Precipitating factors:  Worsened by: Sitting and Lying Flat    Accompanying Signs & Symptoms:  Risk of Fracture: No   Risk of Cauda Equina:No   Risk of Infection:  No   Risk of Cancer:  Low    Problem, Medication and Allergy Lists were reviewed and updated if needed..    Patient is an established patient of this clinic..    Health Maintenance  Health Maintenance Due   Topic Date Due     WELLNESS VISIT Q1 YR  1945     ZOSTER IMMUNIZATION (1 of 2) 01/01/1995     ADVANCE DIRECTIVE PLANNING Q5 YRS  01/01/2000     MICROALBUMIN Q1 YEAR  04/19/2018     EYE EXAM Q1 YEAR  04/27/2018     FOOT EXAM Q1 YEAR  05/17/2018     PNEUMOVAX IMMUNIZATION 65+ LOW/MEDIUM RISK (2 of 2 - PPSV23) 05/17/2018     INFLUENZA VACCINE (1) 09/01/2018     BMP Q6 MOS  01/18/2019            Review of Systems:   Review of Systems   Constitutional: Negative for fatigue and fever.   HENT: Negative.    Eyes: Negative.  Negative for visual disturbance.   Respiratory: Negative for cough, chest tightness and shortness of breath.    Cardiovascular: Negative for chest pain and  palpitations.   Gastrointestinal: Negative for abdominal distention, abdominal pain, blood in stool, constipation, diarrhea and vomiting.   Endocrine: Negative for polydipsia and polyuria.   Genitourinary: Negative for difficulty urinating and dysuria.   Musculoskeletal: Positive for arthralgias and myalgias.   Skin: Negative for color change and rash.   Neurological: Negative for numbness.   Psychiatric/Behavioral: Negative for dysphoric mood and sleep disturbance. The patient is not nervous/anxious.             Physical Exam:     Vitals:    01/14/19 1458   BP: 129/87   Pulse: 77   Temp: 97.8  F (36.6  C)   SpO2: 98%   Weight: 99.3 kg (219 lb)     Body mass index is 29.02 kg/m .  Vitals were reviewed and were normal     Physical Exam   Constitutional: He is oriented to person, place, and time. He appears well-developed. No distress.   HENT:   Head: Normocephalic.   Eyes: Conjunctivae are normal. No scleral icterus.   Neck: Normal range of motion. No thyromegaly present.   Cardiovascular: Normal rate, regular rhythm and normal heart sounds.   No murmur heard.  Pulmonary/Chest: Effort normal and breath sounds normal. No respiratory distress. He has no wheezes.   Abdominal: Soft. Bowel sounds are normal. He exhibits no distension. There is no splenomegaly or hepatomegaly. There is no tenderness.   Musculoskeletal: He exhibits no edema.        Right hip: He exhibits decreased strength and tenderness (Tenderness over right greater trochanter.). He exhibits normal range of motion, no bony tenderness, no swelling, no crepitus and no deformity.        Lumbar back: He exhibits tenderness and spasm.        Back:         Legs:  Lymphadenopathy:     He has no cervical adenopathy.   Neurological: He is alert and oriented to person, place, and time.   Patient has antalgic gait and uses a cane for pain on his left side.   Skin: Skin is warm and dry. He is not diaphoretic.   Psychiatric: He has a normal mood and affect. His  behavior is normal. Judgment and thought content normal.   Vitals reviewed.        Results:      Results from this visit  Results for orders placed or performed in visit on 12/07/18   Fecal colorectal cancer screen FITT   Result Value Ref Range    Occult Blood Scn FIT Negative NEG^Negative

## 2019-01-14 ENCOUNTER — OFFICE VISIT (OUTPATIENT)
Dept: FAMILY MEDICINE | Facility: CLINIC | Age: 74
End: 2019-01-14
Payer: COMMERCIAL

## 2019-01-14 VITALS
HEART RATE: 77 BPM | OXYGEN SATURATION: 98 % | SYSTOLIC BLOOD PRESSURE: 129 MMHG | DIASTOLIC BLOOD PRESSURE: 87 MMHG | WEIGHT: 219 LBS | BODY MASS INDEX: 29.02 KG/M2 | TEMPERATURE: 97.8 F

## 2019-01-14 DIAGNOSIS — I10 BENIGN ESSENTIAL HYPERTENSION: Primary | ICD-10-CM

## 2019-01-14 DIAGNOSIS — E11.9 TYPE 2 DIABETES MELLITUS WITHOUT COMPLICATION, WITHOUT LONG-TERM CURRENT USE OF INSULIN (H): ICD-10-CM

## 2019-01-14 DIAGNOSIS — M70.61 GREATER TROCHANTERIC BURSITIS OF RIGHT HIP: ICD-10-CM

## 2019-01-14 DIAGNOSIS — M54.50 ACUTE BILATERAL LOW BACK PAIN WITHOUT SCIATICA: ICD-10-CM

## 2019-01-14 LAB
BUN SERPL-MCNC: 12.8 MG/DL (ref 7–21)
CALCIUM SERPL-MCNC: 8.8 MG/DL (ref 8.5–10.1)
CHLORIDE SERPLBLD-SCNC: 104.6 MMOL/L (ref 98–110)
CO2 SERPL-SCNC: 25.3 MMOL/L (ref 20–32)
CREAT SERPL-MCNC: 1.2 MG/DL (ref 0.7–1.3)
CREAT UR-MCNC: 129 MG/DL
GFR SERPL CREATININE-BSD FRML MDRD: 62.9 ML/MIN/1.7 M2
GLUCOSE SERPL-MCNC: 105 MG'DL (ref 70–99)
MICROALBUMIN UR-MCNC: 13 MG/L
MICROALBUMIN/CREAT UR: 9.92 MG/G CR (ref 0–17)
POTASSIUM SERPL-SCNC: 4.1 MMOL/DL (ref 3.3–4.5)
SODIUM SERPL-SCNC: 137.1 MMOL/L (ref 132.6–141.4)

## 2019-01-14 RX ORDER — TRIAMCINOLONE ACETONIDE 40 MG/ML
40 INJECTION, SUSPENSION INTRA-ARTICULAR; INTRAMUSCULAR ONCE
Status: COMPLETED | OUTPATIENT
Start: 2019-01-14 | End: 2019-01-14

## 2019-01-14 RX ORDER — TRIAMCINOLONE ACETONIDE 40 MG/ML
40 INJECTION, SUSPENSION INTRA-ARTICULAR; INTRAMUSCULAR ONCE
Status: DISCONTINUED | OUTPATIENT
Start: 2019-01-14 | End: 2019-01-14 | Stop reason: CLARIF

## 2019-01-14 RX ORDER — NAPROXEN 500 MG/1
500 TABLET ORAL 2 TIMES DAILY WITH MEALS
Qty: 180 TABLET | Refills: 3 | Status: SHIPPED | OUTPATIENT
Start: 2019-01-14 | End: 2020-04-10

## 2019-01-14 RX ADMIN — TRIAMCINOLONE ACETONIDE 40 MG: 40 INJECTION, SUSPENSION INTRA-ARTICULAR; INTRAMUSCULAR at 18:29

## 2019-01-14 ASSESSMENT — ENCOUNTER SYMPTOMS
DIFFICULTY URINATING: 0
NERVOUS/ANXIOUS: 0
DIARRHEA: 0
NUMBNESS: 0
POLYDIPSIA: 0
VOMITING: 0
ARTHRALGIAS: 1
COLOR CHANGE: 0
MYALGIAS: 1
DYSURIA: 0
ABDOMINAL DISTENTION: 0
BLOOD IN STOOL: 0
DYSPHORIC MOOD: 0
COUGH: 0
PALPITATIONS: 0
SHORTNESS OF BREATH: 0
FATIGUE: 0
FEVER: 0
CONSTIPATION: 0
SLEEP DISTURBANCE: 0
ABDOMINAL PAIN: 0
CHEST TIGHTNESS: 0
EYES NEGATIVE: 1

## 2019-01-14 NOTE — NURSING NOTE
Due to patient being non-English speaking/uses sign language, an  was used for this visit. Only for face-to-face interpretation by an external agency, date and length of interpretation can be found on the scanned worksheet.     name: young mar  Agency: [employer]  Language: Bahraini   Telephone number: [phone]    Type of interpretation: face to face      Diabetic Foot Screen:  Any complaints of increased pain or numbness ?  YES right foot ,burning sensation  Is there a foot ulcer now or a history of foot ulcer? No   Does the foot have an abnormal shape? No   Are the nails thick, too long or ingrown? No   Are there any redness or open areas? No            Sensation Testing done at all points on the diagram with monofilament     Right Foot: Sensation Normal at all points  Left Foot: Sensation Normal at all points     Risk Category: 0- No loss of protective sensation  Performed by Oscar Briceño CMA

## 2019-01-14 NOTE — PATIENT INSTRUCTIONS
Here is the plan from today's visit    1. Benign essential hypertension    - Basic Metabolic Panel (LabDAQ)    2. Type 2 diabetes mellitus without complication, without long-term current use of insulin (H)  continue  - Albumin Random Urine Quantitative with Creat Ratio    3. Greater trochanteric bursitis of right hip  Ice two times daily it soul get better   - naproxen (NAPROSYN) 500 MG tablet; Take 1 tablet (500 mg) by mouth 2 times daily (with meals)  Dispense: 180 tablet; Refill: 3  - PHYSICAL THERAPY REFERRAL - EXTERNAL; Future    4. Acute bilateral low back pain without sciatica  Naprosyn   - PHYSICAL THERAPY REFERRAL - EXTERNAL; Future      Please call or return to clinic if your symptoms don't go away.    Follow up plan  Please make a clinic appointment for follow up with me (LUCY KINCAID) in 2  months for DM recheck .    Thank you for coming to [facility] today.  Lab Testing:  **If you had lab testing today and your results are reassuring or normal they will be mailed to you or sent through [employer] within 7 days.   **If the lab tests need quick action we will call you with the results.  The phone number we will call with results is # [phone] (home) . If this is not the best number please call our clinic and change the number.  Medication Refills:  If you need any refills please call your pharmacy and they will contact us.   If you need to  your refill at a new pharmacy, please contact the new pharmacy directly. The new pharmacy will help you get your medications transferred faster.   Scheduling:  If you have any concerns about today's visit or wish to schedule another appointment please call our office during normal business hours [phone] (8-5:00 M-F)  If a referral was made to a [facility] and you don't get a call from central scheduling please call [phone].  If a Mammogram was ordered for you at [facility] call [phone] to schedule or change  your appointment.  If you had an XRay/CT/Ultrasound/MRI ordered the number is [phone] to schedule or change your radiology appointment.   Medical Concerns:  If you have urgent medical concerns please call [phone] at any time of the day.    Ernie Arreola MD

## 2019-01-14 NOTE — LETTER
January 14, 2019 January 14, 2019    [id number]    Juli Rodriguez  [address]   [facility] [id number]    Dear Juli Rodriguez.      Please see below for your results from your recent testing.  Your results are reassuring.If you have questions please contact the clinic to make an appointment with  me or your primary doctor to discuss the results. Jamaican translation:evyaan portia kuu gu xaqiijinay jawaabtaadii hadaad suaal qabto fadlan desiree wac dhaqtarka kuu qaaska ah oo ka qabso aman si uu kuugu sharxo jawaabtaada.    Resulted Orders   Basic Metabolic Panel ([facility])   Result Value Ref Range    Urea Nitrogen 12.8 7.0 - 21.0 mg/dL    Calcium 8.8 8.5 - 10.1 mg/dL    Chloride 104.6 98.0 - 110.0 mmol/L    Carbon Dioxide 25.3 20.0 - 32.0 mmol/L    Creatinine 1.2 0.7 - 1.3 mg/dL    Glucose 105.0 (H) 70.0 - 99.0 mg'dL    Potassium 4.1 3.3 - 4.5 mmol/dL    Sodium 137.1 132.6 - 141.4 mmol/L    GFR Estimate 62.9 >60.0 mL/min/1.7 m2    GFR Estimate If Black 76.1 >60.0 mL/min/1.7 m2           Sincerely   Ernie Arreola MD  .

## 2019-01-15 NOTE — PROCEDURES
[facility]   Injection Note    Juli Rodriguez is a patient of Ernie Carey here for injection for greater trochanteric bursitis of the right hip.    Consent: Affirmation of informed consent was signed and scanned into the medical record. Risks, benefits and alternatives were discussed. Patient's questions were elicited and answered.   Procedure safety checklist was completed:  Yes  Time Out (Pause for the Cause) completed: Yes    Preoperative Diagnosis: Greater trochanteric bursitis  Postoperative Diagnosis: same       The left area over the greater trochanter was prepped  in the usual sterile fashion INJECTION:  Using 3mL of 1% lidocaine mixed with 40 mg of kenalog, the   was successfully injected without complication.  Patient did experience some pain relief following injection.    Technique:   Skin prep Technicare  Anesthesia 1% lidocaine  Complications:  No  Tolerance:  Pt tolerated procedure well and was in stable condition.     Follow up: Patient was instructed to use ice on the affected area tonight for at least 30 minutes and  that there will be a return to pain in a couple hours followed by relief in the next several days to a week. Patient was advised to call if increasing redness and swelling.     Follow up only if unimproved.    Faculty: Ernie Arreola MD

## 2019-01-16 DIAGNOSIS — J30.1 CHRONIC SEASONAL ALLERGIC RHINITIS DUE TO POLLEN: ICD-10-CM

## 2019-01-16 DIAGNOSIS — E11.9 TYPE 2 DIABETES MELLITUS WITHOUT COMPLICATION, WITHOUT LONG-TERM CURRENT USE OF INSULIN (H): ICD-10-CM

## 2019-01-16 RX ORDER — ATORVASTATIN CALCIUM 40 MG/1
40 TABLET, FILM COATED ORAL DAILY
Qty: 90 TABLET | Refills: 3 | Status: SHIPPED | OUTPATIENT
Start: 2019-01-16 | End: 2020-01-06

## 2019-01-16 RX ORDER — LORATADINE 10 MG/1
10 TABLET ORAL DAILY
Qty: 90 TABLET | Refills: 1 | Status: SHIPPED | OUTPATIENT
Start: 2019-01-16 | End: 2019-07-10

## 2019-01-16 NOTE — TELEPHONE ENCOUNTER

## 2019-03-04 ENCOUNTER — TRANSFERRED RECORDS (OUTPATIENT)
Dept: HEALTH INFORMATION MANAGEMENT | Facility: CLINIC | Age: 74
End: 2019-03-04

## 2019-03-18 DIAGNOSIS — K21.9 GASTROESOPHAGEAL REFLUX DISEASE WITHOUT ESOPHAGITIS: ICD-10-CM

## 2019-03-18 RX ORDER — PANTOPRAZOLE SODIUM 20 MG/1
40 TABLET, DELAYED RELEASE ORAL
Qty: 30 TABLET | Refills: 1 | Status: SHIPPED | OUTPATIENT
Start: 2019-03-18 | End: 2019-03-29

## 2019-03-19 DIAGNOSIS — I10 BENIGN ESSENTIAL HYPERTENSION: ICD-10-CM

## 2019-03-19 RX ORDER — CARVEDILOL 12.5 MG/1
12.5 TABLET ORAL 2 TIMES DAILY WITH MEALS
Qty: 60 TABLET | Refills: 3 | Status: SHIPPED | OUTPATIENT
Start: 2019-03-19 | End: 2019-06-13

## 2019-03-29 ENCOUNTER — OFFICE VISIT (OUTPATIENT)
Dept: FAMILY MEDICINE | Facility: CLINIC | Age: 74
End: 2019-03-29
Payer: COMMERCIAL

## 2019-03-29 VITALS
SYSTOLIC BLOOD PRESSURE: 131 MMHG | OXYGEN SATURATION: 98 % | WEIGHT: 221.8 LBS | RESPIRATION RATE: 16 BRPM | BODY MASS INDEX: 29.4 KG/M2 | TEMPERATURE: 98 F | HEART RATE: 79 BPM | DIASTOLIC BLOOD PRESSURE: 83 MMHG

## 2019-03-29 DIAGNOSIS — M70.61 GREATER TROCHANTERIC BURSITIS OF RIGHT HIP: ICD-10-CM

## 2019-03-29 DIAGNOSIS — E11.9 TYPE 2 DIABETES MELLITUS WITHOUT COMPLICATION, WITHOUT LONG-TERM CURRENT USE OF INSULIN (H): Primary | ICD-10-CM

## 2019-03-29 DIAGNOSIS — R56.9 SEIZURES (H): ICD-10-CM

## 2019-03-29 DIAGNOSIS — J30.1 CHRONIC SEASONAL ALLERGIC RHINITIS DUE TO POLLEN: ICD-10-CM

## 2019-03-29 DIAGNOSIS — Z86.73 HISTORY OF CVA (CEREBROVASCULAR ACCIDENT): ICD-10-CM

## 2019-03-29 DIAGNOSIS — I10 BENIGN ESSENTIAL HYPERTENSION: ICD-10-CM

## 2019-03-29 DIAGNOSIS — K21.9 GASTROESOPHAGEAL REFLUX DISEASE WITHOUT ESOPHAGITIS: ICD-10-CM

## 2019-03-29 LAB — HBA1C MFR BLD: 6.4 % (ref 4.1–5.7)

## 2019-03-29 RX ORDER — LEVETIRACETAM 500 MG/1
500 TABLET ORAL 2 TIMES DAILY
Qty: 60 TABLET | Refills: 3 | Status: SHIPPED | OUTPATIENT
Start: 2019-03-29 | End: 2019-05-22

## 2019-03-29 RX ORDER — LISINOPRIL 10 MG/1
10 TABLET ORAL DAILY
Qty: 90 TABLET | Refills: 3 | Status: SHIPPED | OUTPATIENT
Start: 2019-03-29 | End: 2019-07-12

## 2019-03-29 RX ORDER — ACETAMINOPHEN 500 MG
1000 TABLET ORAL 3 TIMES DAILY PRN
Qty: 200 TABLET | Refills: 3 | Status: SHIPPED | OUTPATIENT
Start: 2019-03-29 | End: 2019-07-12

## 2019-03-29 RX ORDER — FLUTICASONE PROPIONATE 50 MCG
1-2 SPRAY, SUSPENSION (ML) NASAL DAILY
Qty: 16 G | Refills: 6 | Status: SHIPPED | OUTPATIENT
Start: 2019-03-29 | End: 2019-11-06

## 2019-03-29 RX ORDER — PANTOPRAZOLE SODIUM 20 MG/1
40 TABLET, DELAYED RELEASE ORAL
Qty: 90 TABLET | Refills: 3 | Status: SHIPPED | OUTPATIENT
Start: 2019-03-29 | End: 2019-09-19

## 2019-03-29 ASSESSMENT — ENCOUNTER SYMPTOMS
NUMBNESS: 0
COLOR CHANGE: 0
POLYDIPSIA: 0
DYSPHORIC MOOD: 0
BLOOD IN STOOL: 0
EYES NEGATIVE: 1
SHORTNESS OF BREATH: 0
DYSURIA: 0
COUGH: 0
CHEST TIGHTNESS: 0
PALPITATIONS: 0
ARTHRALGIAS: 1
FEVER: 0
NERVOUS/ANXIOUS: 0
VOMITING: 0
ABDOMINAL PAIN: 0
MYALGIAS: 1
ABDOMINAL DISTENTION: 0
DIARRHEA: 0
CONSTIPATION: 0
DIFFICULTY URINATING: 0
FATIGUE: 0
SLEEP DISTURBANCE: 0

## 2019-03-29 NOTE — PATIENT INSTRUCTIONS
Here is the plan from today's visit    1. Type 2 diabetes mellitus without complication, without long-term current use of insulin (H)  Continue Metformin   - Hemoglobin A1c ([place])    2. History of CVA (cerebrovascular accident)  Continue aspirin    3. Seizures (H)     - levETIRAcetam (KEPPRA) 500 MG tablet; Take 1 tablet (500 mg) by mouth 2 times daily  Dispense: 60 tablet; Refill: 3    4. Chronic seasonal allergic rhinitis due to pollen     - fluticasone (FLONASE) 50 MCG/ACT nasal spray; Spray 1-2 sprays into both nostrils daily  Dispense: 16 g; Refill: 6    5. Benign essential hypertension     - lisinopril (PRINIVIL/ZESTRIL) 10 MG tablet; Take 1 tablet (10 mg) by mouth daily  Dispense: 90 tablet; Refill: 3    6. Gastroesophageal reflux disease without esophagitis     - pantoprazole (PROTONIX) 20 MG EC tablet; Take 2 tablets (40 mg) by mouth every morning (before breakfast)  Dispense: 90 tablet; Refill: 3    7. Greater trochanteric bursitis of right hip      - acetaminophen (TYLENOL) 500 MG tablet; Take 2 tablets (1,000 mg) by mouth 3 times daily as needed for mild pain  Dispense: 200 tablet; Refill: 3      Please call or return to clinic if your symptoms don't go away.    Follow up plan  Please make a clinic appointment for follow up with me (LUCY KINCAID) in 3  months for diabetes check.    Thank you for coming to [facility] today.  Lab Testing:  **If you had lab testing today and your results are reassuring or normal they will be mailed to you or sent through [facility] within 7 days.   **If the lab tests need quick action we will call you with the results.  The phone number we will call with results is # [phone] (home) . If this is not the best number please call our clinic and change the number.  Medication Refills:  If you need any refills please call your pharmacy and they will contact us.   If you need to  your refill at a new pharmacy, please contact the new pharmacy directly. The new  pharmacy will help you get your medications transferred faster.   Scheduling:  If you have any concerns about today's visit or wish to schedule another appointment please call our office during normal business hours [phone] (8-5:00 M-F)  If a referral was made to a [facility] and you don't get a call from central scheduling please call [phone].  If a Mammogram was ordered for you at [facility] call [phone] to schedule or change your appointment.  If you had an XRay/CT/Ultrasound/MRI ordered the number is [phone] to schedule or change your radiology appointment.   Medical Concerns:  If you have urgent medical concerns please call [phone] at any time of the day.    Ernie Arreola MD

## 2019-03-29 NOTE — PROGRESS NOTES
Juli is a 74 year old  who presents for   Patient presents with:  Forms  Diabetes: follow up      Assessment and Plan        Patient Instructions   Here is the plan from today's visit    1. Type 2 diabetes mellitus without complication, without long-term current use of insulin (H)  Continue Metformin   - Hemoglobin A1c ([place]'s)    2. History of CVA (cerebrovascular accident)  Continue aspirin    3. Seizures (H)     - levETIRAcetam (KEPPRA) 500 MG tablet; Take 1 tablet (500 mg) by mouth 2 times daily  Dispense: 60 tablet; Refill: 3    4. Chronic seasonal allergic rhinitis due to pollen     - fluticasone (FLONASE) 50 MCG/ACT nasal spray; Spray 1-2 sprays into both nostrils daily  Dispense: 16 g; Refill: 6    5. Benign essential hypertension     - lisinopril (PRINIVIL/ZESTRIL) 10 MG tablet; Take 1 tablet (10 mg) by mouth daily  Dispense: 90 tablet; Refill: 3    6. Gastroesophageal reflux disease without esophagitis     - pantoprazole (PROTONIX) 20 MG EC tablet; Take 2 tablets (40 mg) by mouth every morning (before breakfast)  Dispense: 90 tablet; Refill: 3    7. Greater trochanteric bursitis of right hip      - acetaminophen (TYLENOL) 500 MG tablet; Take 2 tablets (1,000 mg) by mouth 3 times daily as needed for mild pain  Dispense: 200 tablet; Refill: 3      Please call or return to clinic if your symptoms don't go away.    Follow up plan  Please make a clinic appointment for follow up with me (ERNIE ARREOLA) in 3  months for diabetes check.         Return in about 3 months (around 6/29/2019).    Medications Discontinued During This Encounter   Medication Reason     gabapentin (NEURONTIN) 300 MG capsule Medication Reconciliation Clean Up     levETIRAcetam (KEPPRA) 500 MG tablet Reorder     fluticasone (FLONASE) 50 MCG/ACT spray Reorder     lisinopril (PRINIVIL/ZESTRIL) 10 MG tablet Reorder     pantoprazole (PROTONIX) 20 MG EC tablet Reorder     acetaminophen (TYLENOL) 500 MG tablet          Ernie Arreola MD          PHILLIP Bustos is a 74 year old  who presents for   Patient presents with:  Forms  Diabetes: follow up        Diabetes Follow-up  <7.0    Patient is checking blood sugars: not at all         -Last A1C was   Lab Results   Component Value Date    A1C 6.5 11/30/2018    A1C 5.7 07/18/2018    A1C 6.3 02/26/2018    A1C 5.7 07/25/2017    A1C 5.7 04/19/2017        Diabetic concerns: None    Chest Pain or exercise related calf pain (claudication):no     Symptoms of hypoglycemia (low blood sugar): none     Paresthesias (numbness or burning in feet) or sores: No     Diabetic eye exam within the last year?: No    Hyperlipidemia Follow-Up      Recommended Level of Therapy:High Intensity Statin (atorvastatin 40*-80 mg or rosuvastatin 20-40mg)    Rate your low fat/cholesterol diet?: good    Taking statin?  Yes, no muscle aches from statin    Other lipid medications/supplements?:  none  Lab Results   Component Value Date    CHOL 92.5 07/18/2018     Lab Results   Component Value Date    HDL 32.9 07/18/2018     Lab Results   Component Value Date    LDL 43 07/18/2018     Lab Results   Component Value Date    TRIG 83.9 07/18/2018     Lab Results   Component Value Date    CHOLHDLRATIO 2.8 07/18/2018       Hypertension Follow-up     <140/90    Outpatient blood pressures are not being checked.    Low Salt Diet: no added salt    Daily NSAID Use?no     Did patient take their HTN pills today?  Yes  Last Basic Metabolic Panel:  Lab Results   Component Value Date    .1 01/14/2019      Lab Results   Component Value Date    POTASSIUM 4.1 01/14/2019     Lab Results   Component Value Date    CHLORIDE 104.6 01/14/2019     Lab Results   Component Value Date    WILIAM 8.8 01/14/2019     Lab Results   Component Value Date    CO2 25.3 01/14/2019     Lab Results   Component Value Date    BUN 12.8 01/14/2019     Lab Results   Component Value Date    CR 1.2 01/14/2019     Lab Results   Component Value Date    .0 01/14/2019          Adherence and Exercise  Medication side effects: no  How often is a medication missed? Less than 1 time per week  Exercise:walking  2-3 days/week for an average of 15-30 minutes A [employer]  was used for  this visit. Problem, Medication and Allergy Lists were reviewed and updated if needed..  Patient is an established patient of this clinic..  Health Maintenance Due   Topic Date Due     ZOSTER IMMUNIZATION (1 of 2) 01/01/1995     ADVANCE DIRECTIVE PLANNING Q5 YRS  01/01/2000     MEDICARE ANNUAL WELLNESS VISIT  01/01/2010     EYE EXAM Q1 YEAR  04/27/2018     FOOT EXAM Q1 YEAR  05/17/2018     PNEUMOCOCCAL IMMUNIZATION 65+ LOW/MEDIUM RISK (2 of 2 - PPSV23) 05/17/2018     INFLUENZA VACCINE (1) 09/01/2018     FALL RISK ASSESSMENT  02/26/2019     A1C Q3 MO  02/28/2019          Review of Systems:   Review of Systems   Constitutional: Negative for fatigue and fever.   HENT: Negative.    Eyes: Negative.  Negative for visual disturbance.   Respiratory: Negative for cough, chest tightness and shortness of breath.    Cardiovascular: Negative for chest pain and palpitations.   Gastrointestinal: Negative for abdominal distention, abdominal pain, blood in stool, constipation, diarrhea and vomiting.   Endocrine: Negative for polydipsia and polyuria.   Genitourinary: Negative for difficulty urinating and dysuria.   Musculoskeletal: Positive for arthralgias and myalgias.   Skin: Negative for color change and rash.   Neurological: Negative for numbness.   Psychiatric/Behavioral: Negative for dysphoric mood and sleep disturbance. The patient is not nervous/anxious.             Physical Exam:     Vitals:    03/29/19 1544   BP: 131/83   BP Location: Left arm   Patient Position: Sitting   Cuff Size: Adult Large   Pulse: 79   Resp: 16   Temp: 98  F (36.7  C)   TempSrc: Oral   SpO2: 98%   Weight: 100.6 kg (221 lb 12.8 oz)     Body mass index is 29.4 kg/m .  Vitals were reviewed and were normal     Physical Exam   Constitutional:  He is oriented to person, place, and time. He appears well-developed. No distress.   HENT:   Head: Normocephalic.   Eyes: Conjunctivae are normal. No scleral icterus.   Neck: Normal range of motion. No thyromegaly present.   Cardiovascular: Normal rate, regular rhythm and normal heart sounds.   No murmur heard.  Pulmonary/Chest: Effort normal and breath sounds normal. No respiratory distress. He has no wheezes.   Abdominal: Soft. Bowel sounds are normal. He exhibits no distension. There is no splenomegaly or hepatomegaly. There is no tenderness.   Musculoskeletal: He exhibits no edema.        Right hip: He exhibits decreased strength and tenderness (Tenderness over right greater trochanter.). He exhibits normal range of motion, no bony tenderness, no swelling, no crepitus and no deformity.        Lumbar back: He exhibits tenderness and spasm.        Back:         Legs:  Lymphadenopathy:     He has no cervical adenopathy.   Neurological: He is alert and oriented to person, place, and time.   Patient has antalgic gait and uses a cane for pain on his left side.   Skin: Skin is warm and dry. He is not diaphoretic.   Psychiatric: He has a normal mood and affect. His behavior is normal. Judgment and thought content normal.   Vitals reviewed.        Results:      Results from this visit  Results for orders placed or performed in visit on 03/29/19   Hemoglobin A1c ([facility])   Result Value Ref Range    Hemoglobin A1C 6.4 (H) 4.1 - 5.7 %       Options for treatment and follow-up care were reviewed with the patient. Juli Rodriguez  engaged in the decision making process and verbalized understanding of the options discussed and agreed with the final plan.  MD [facility]

## 2019-04-09 DIAGNOSIS — E11.9 TYPE 2 DIABETES MELLITUS WITHOUT COMPLICATION, WITHOUT LONG-TERM CURRENT USE OF INSULIN (H): ICD-10-CM

## 2019-05-22 DIAGNOSIS — R56.9 SEIZURES (H): ICD-10-CM

## 2019-05-22 RX ORDER — LEVETIRACETAM 500 MG/1
500 TABLET ORAL 2 TIMES DAILY
Qty: 60 TABLET | Refills: 1 | Status: SHIPPED | OUTPATIENT
Start: 2019-05-22 | End: 2020-11-06

## 2019-05-22 NOTE — TELEPHONE ENCOUNTER
"Request for medication refill: Levetiracetam 500 mg tablets    Providers if patient needs an appointment and you are willing to give a one month supply please refill for one month and  send a letter/MyChart using \".SMILLIMITEDREFILL\" .smillimited and route chart to \"P [facility] \" (Giving one month refill in non controlled medications is strongly recommended before denial)    If refill has been denied, meaning absolutely no refills without visit, please complete the smart phrase \".smirxrefuse\" and route it to the \"P [facility] MED REFILLS\"  pool to inform the patient and the pharmacy.    Ashley Crenshaw MA        "

## 2019-06-11 ENCOUNTER — CARE COORDINATION (OUTPATIENT)
Dept: FAMILY MEDICINE | Facility: CLINIC | Age: 74
End: 2019-06-11

## 2019-06-11 NOTE — PROGRESS NOTES
Juli Rodriguez is a complex care patient and was reached out to by the CHW to recruit him for the CDSMP class that is scheduled to begin on 6/12/2019 from 3-5:00 PM. The patient was given a brief description of what to expect from the class, and some patient education about diabetes and why this class would be relevant to him. The patient agreed to come to the class and seemed excited to learn more.    Marlys ROWELL, June 11, 2019 at 10:51 AM

## 2019-06-12 DIAGNOSIS — I10 BENIGN ESSENTIAL HYPERTENSION: ICD-10-CM

## 2019-06-12 NOTE — TELEPHONE ENCOUNTER
"Request for medication refill:  Carvedilol    Providers if patient needs an appointment and you are willing to give a one month supply please refill for one month and  send a letter/MyChart using \".SMILLIMITEDREFILL\" .smillimited and route chart to \"P [facility] \" (Giving one month refill in non controlled medications is strongly recommended before denial)    If refill has been denied, meaning absolutely no refills without visit, please complete the smart phrase \".smirxrefuse\" and route it to the \"P [facility] MED REFILLS\"  pool to inform the patient and the pharmacy.    Lotus Cook [facility]        "

## 2019-06-13 RX ORDER — CARVEDILOL 12.5 MG/1
12.5 TABLET ORAL 2 TIMES DAILY WITH MEALS
Qty: 60 TABLET | Refills: 3 | Status: SHIPPED | OUTPATIENT
Start: 2019-06-13 | End: 2019-09-09

## 2019-06-18 ENCOUNTER — DOCUMENTATION ONLY (OUTPATIENT)
Dept: FAMILY MEDICINE | Facility: CLINIC | Age: 74
End: 2019-06-18

## 2019-06-26 ENCOUNTER — OFFICE VISIT (OUTPATIENT)
Dept: FAMILY MEDICINE | Facility: CLINIC | Age: 74
End: 2019-06-26
Payer: COMMERCIAL

## 2019-06-26 VITALS
HEART RATE: 79 BPM | OXYGEN SATURATION: 98 % | HEIGHT: 73 IN | RESPIRATION RATE: 16 BRPM | WEIGHT: 208 LBS | SYSTOLIC BLOOD PRESSURE: 132 MMHG | BODY MASS INDEX: 27.57 KG/M2 | DIASTOLIC BLOOD PRESSURE: 89 MMHG | TEMPERATURE: 97.7 F

## 2019-06-26 DIAGNOSIS — M79.2 NEUROPATHIC PAIN: ICD-10-CM

## 2019-06-26 DIAGNOSIS — Z00.00 ROUTINE GENERAL MEDICAL EXAMINATION AT A HEALTH CARE FACILITY: Primary | ICD-10-CM

## 2019-06-26 DIAGNOSIS — Z86.73 HISTORY OF CVA (CEREBROVASCULAR ACCIDENT): ICD-10-CM

## 2019-06-26 RX ORDER — GABAPENTIN 300 MG/1
300 CAPSULE ORAL 3 TIMES DAILY
Qty: 90 CAPSULE | Refills: 0 | Status: SHIPPED | OUTPATIENT
Start: 2019-06-26 | End: 2019-07-12

## 2019-06-26 ASSESSMENT — PAIN SCALES - GENERAL: PAINLEVEL: EXTREME PAIN (8)

## 2019-06-26 ASSESSMENT — MIFFLIN-ST. JEOR: SCORE: 1731.61

## 2019-06-26 NOTE — PROGRESS NOTES
Preceptor Attestation:   Patient seen, evaluated and discussed with the resident. I have verified the content of the note, which accurately reflects my assessment of the patient and the plan of care.   Supervising Physician:  Carla Chatman MD

## 2019-06-26 NOTE — PROGRESS NOTES
HPI       Juli Rodriguez is a 74 year old  who presents for   Chief Complaint   Patient presents with     Musculoskeletal Problem     patient is complaining of pain on right side of his body and left side abodominal pain      Post stroke neuropathy:  Patient is a 74-year-old male with a history of right-sided stroke 2013 who was previously followed up by physical therapy and has transitioned to continuing exercise at home independently.  He is still continuing to follow-up with occupational therapy and last seen on day of clinic visit continues to undergo acupuncture treatment.  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  He is now requesting a refill gabapentin as this did seem to control his neuropathic pain.  Upon review of chart he was last seen by his PCP and it appears he was not due for repeat at that time therefore was not refilled however does not have any reasons at this time but did refuse refill.    Left flank pain x 3 days:  Patient's left-sided abdominal pain is very vague and nonspecific.  He states his mild pain had started about 3 days ago and is not related to any new activity or diet.  He is unsure whether this is GI related or renal related however physical exam was otherwise unremarkable and he denied having any significant pain during the clinic visit.  It was very difficult to elicit the same pain that he has been having.  He denies any fevers or chills, nausea or vomiting, constipation or diarrhea at this time.    A Dasher  was used for  this visit.    +++++++    Problem, Medication and Allergy Lists were      Patient Active Problem List    Diagnosis Date Noted     Other hyperlipidemia 02/26/2018     Priority: Medium     Seizures (H) 07/25/2017     Priority: Medium     Had one seizure around the time of his stroke -2014        Gastroesophageal reflux disease without esophagitis 07/25/2017      Priority: Medium     Right sided weakness 06/07/2017     Priority: Medium     Type 2 diabetes mellitus without complication, without long-term current use of insulin (H) 04/19/2017     Priority: Medium     History of CVA (cerebrovascular accident) 04/19/2017     Priority: Medium     Benign essential hypertension 04/19/2017     Priority: Medium   ,     Current Outpatient Medications   Medication Sig Dispense Refill     acetaminophen (TYLENOL) 500 MG tablet Take 2 tablets (1,000 mg) by mouth 3 times daily as needed for mild pain 200 tablet 3     aspirin 81 MG tablet Take 1 tablet (81 mg) by mouth daily 90 tablet 3     atorvastatin (LIPITOR) 40 MG tablet Take 1 tablet (40 mg) by mouth daily 90 tablet 3     blood glucose monitoring (NO BRAND SPECIFIED) meter device kit Use to test blood sugar 2-3 times daily or as directed. 1 kit 0     blood glucose monitoring (NO BRAND SPECIFIED) test strip Use to test blood sugars 2 times daily or as directed 100 strip 11     capsaicin (ZOSTRIX) 0.025 % CREA cream Apply 1 g topically 3 times daily Wash hands with soap and water after applications. 45 g 3     carvedilol (COREG) 12.5 MG tablet Take 1 tablet (12.5 mg) by mouth 2 times daily (with meals) 60 tablet 3     fluticasone (FLONASE) 50 MCG/ACT nasal spray Spray 1-2 sprays into both nostrils daily 16 g 6     gabapentin (NEURONTIN) 300 MG capsule Take 1 capsule (300 mg) by mouth 3 times daily 90 capsule 0     levETIRAcetam (KEPPRA) 500 MG tablet Take 1 tablet (500 mg) by mouth 2 times daily 60 tablet 1     lisinopril (PRINIVIL/ZESTRIL) 10 MG tablet Take 1 tablet (10 mg) by mouth daily 90 tablet 3     loratadine (CLARITIN) 10 MG tablet Take 1 tablet (10 mg) by mouth daily 90 tablet 1     metFORMIN (GLUCOPHAGE) 500 MG tablet Take 1 tablet (500 mg) by mouth 2 times daily (with meals) 180 tablet 1     naproxen (NAPROSYN) 500 MG tablet Take 1 tablet (500 mg) by mouth 2 times daily (with meals) 180 tablet 3     order for DME Equipment  being ordered: Diabetic Shoes 1 each 1     pantoprazole (PROTONIX) 20 MG EC tablet Take 2 tablets (40 mg) by mouth every morning (before breakfast) 90 tablet 3     Pregabalin (LYRICA PO) Take 100 mg by mouth 3 times daily       sennosides (SENOKOT) 8.6 MG tablet Take 2 tablets by mouth daily        vitamin B complex with vitamin C (VITAMIN  B COMPLEX) TABS tablet Take 1 tablet by mouth daily 200 tablet 3   ,     Allergies   Allergen Reactions     Eggs [Chicken-Derived Products (Egg)]    .    Patient is   an established patient of this clinic.  Past Medical History:   Diagnosis Date     Cerebral infarction (H) 08/2013    In [facility]     Diabetes mellitus, type 2 (H)      Stroke (H)      Family History   Problem Relation Age of Onset     Diabetes No family hx of      Coronary Artery Disease No family hx of      Hypertension No family hx of      Hyperlipidemia No family hx of      Cerebrovascular Disease No family hx of      Breast Cancer No family hx of      Colon Cancer No family hx of      Prostate Cancer No family hx of      Other Cancer No family hx of      Depression No family hx of      Anxiety Disorder No family hx of      Mental Illness No family hx of      Substance Abuse No family hx of      Anesthesia Reaction No family hx of      Asthma No family hx of      Osteoporosis No family hx of      Genetic Disorder No family hx of      Thyroid Disease No family hx of      Obesity No family hx of      Glaucoma No family hx of      Macular Degeneration No family hx of      Social History     Socioeconomic History     Marital status:      Spouse name: None     Number of children: None     Years of education: None     Highest education level: None   Occupational History     None   Social Needs     Financial resource strain: None     Food insecurity:     Worry: None     Inability: None     Transportation needs:     Medical: None     Non-medical: None   Tobacco Use     Smoking status: Never Smoker     Smokeless  "tobacco: Never Used   Substance and Sexual Activity     Alcohol use: No     Drug use: No     Sexual activity: Not Currently   Lifestyle     Physical activity:     Days per week: None     Minutes per session: None     Stress: None   Relationships     Social connections:     Talks on phone: None     Gets together: None     Attends Latter day service: None     Active member of club or organization: None     Attends meetings of clubs or organizations: None     Relationship status: None     Intimate partner violence:     Fear of current or ex partner: None     Emotionally abused: None     Physically abused: None     Forced sexual activity: None   Other Topics Concern     None   Social History Narrative     None   .         Review of Systems:   Review of Systems  Skin: negative except as above  Respiratory: negative except as above  Cardiovascular: negative except as above  Gastrointestinal: negative except as above  Genitourinary: negative except as above  Musculoskeletal: negative except as above  Neurologic: negative except as above  Psychiatric: negative except as above  Hematologic/Lymphatic/Immunologic: negative except as above  Endocrine: negative except as above         Physical Exam:     Vitals:    06/26/19 1406 06/26/19 1419   BP: 149/87 132/89   Pulse: 79    Resp: 16    Temp: 97.7  F (36.5  C)    TempSrc: Oral    SpO2: 98%    Weight: 94.3 kg (208 lb)    Height: 1.845 m (6' 0.64\")      Body mass index is 27.72 kg/m .  Vitals were reviewed and were normal     Physical Exam  Constitutional: Oriented to person, place, and time. Appears well-developed and well-nourished.   HENT:   Head: Normocephalic and atraumatic.   Eyes: Conjunctivae are normal.   Neck: Normal range of motion.   Cardiovascular: Normal rate, regular rhythm, normal heart sounds and intact distal pulses.    Pulmonary/Chest: Effort normal and breath sounds normal. No respiratory distress. No wheezes.   Abdominal: Soft. Exhibits no distension.  Minimal " tenderness to palpation over the left abdomen. No guarding or rebound noted.  Musculoskeletal: Normal range of motion.   Neurological: Alert and oriented to person, place, and time.   Skin: Skin is warm and dry.   Psychiatric: Has a normal mood and affect. Behavior is normal.       Results:   No testing ordered today    Assessment and Plan        1. Neuropathic pain  Due to previously controlled neuropathic pain with gabapentin at this time will restart however since he has been off for nearly 2 months we will start on a lower dose at 300 mg 3 times a day and monitor for symptom control.  If not controlled would increase very slowly controllable dose.  At this time there is no evidence of potential for use or diversion.  - gabapentin (NEURONTIN) 300 MG capsule; Take 1 capsule (300 mg) by mouth 3 times daily  Dispense: 90 capsule; Refill: 0    2. Routine general medical examination at a [facility] care facility  Patient is never had a colonoscopy however has had negative tests in the past.  At this time due to his symptoms being very vague and nonspecific would recommend a colonoscopy at this time.  - GASTROENTEROLOGY ADULT REF PROCEDURE ONLY    3. History of CVA (cerebrovascular accident)  He continues to work on exercises that were recommended by his physical therapist however has not been seen in more than a year and could benefit from revisiting the physical therapist here.  He continues to work with occupational therapist and undergoes acupuncture treatment.  - [facility] CHIROPRACTIC REFERRAL - [facility]; Future    Please call or return to clinic if your symptoms don't go away.    Follow up plan  Please make a clinic appointment for follow up with your primary physician Ernie Arreola MD as needed.     Thank you for coming to [facility] today.       Medications Discontinued During This Encounter   Medication Reason     gabapentin (NEURONTIN) 400 MG capsule Reorder       Options for treatment and follow-up  care were reviewed with the patient. Juli Rodriguez  engaged in the decision making process and verbalized understanding of the options discussed and agreed with the final plan.    Jhonathan Nelson MD

## 2019-06-26 NOTE — PATIENT INSTRUCTIONS
Here is the plan from today's visit    1. Neuropathic pain  - gabapentin (NEURONTIN) 300 MG capsule; Take 1 capsule (300 mg) by mouth 3 times daily  Dispense: 90 capsule; Refill: 0    2. Routine general medical examination at a health care facility  - GASTROENTEROLOGY ADULT REF PROCEDURE ONLY    3. History of CVA (cerebrovascular accident)  - [facility] PT, HAND AND CHIROPRACTIC REFERRAL - [facility]; Future    Please call or return to clinic if your symptoms don't go away.    Follow up plan  Please make a clinic appointment for follow up with your primary physician Ernie Arreola MD in 30 days.     Thank you for coming to [facility] today.  Lab Testing:  **If you had lab testing today and your results are reassuring or normal they will be mailed to you or sent through [employer] within 7 days.   **If the lab tests need quick action we will call you with the results.  The phone number we will call with results is # [phone] (home) . If this is not the best number please call our clinic and change the number.  Medication Refills:  If you need any refills please call your pharmacy and they will contact us.   If you need to  your refill at a new pharmacy, please contact the new pharmacy directly. The new pharmacy will help you get your medications transferred faster.   Scheduling:  If you have any concerns about today's visit or wish to schedule another appointment please call our office during normal business hours [phone] (8-5:00 M-F)  If a referral was made to a [facility] and you don't get a call from central scheduling please call [phone].  If a Mammogram was ordered for you at [facility] call [phone] to schedule or change your appointment.  If you had an XRay/CT/Ultrasound/MRI ordered the number is [phone] to schedule or change your radiology appointment.   Medical Concerns:  If you have urgent medical concerns please call [phone] at any time of the  jenifer.    Jhonathan Nelson MD

## 2019-06-26 NOTE — NURSING NOTE
Due to patient being non-English speaking/uses sign language, an  was used for this visit. Only for face-to-face interpretation by an external agency, date and length of interpretation can be found on the scanned worksheet.     name: Butch Romeo  Language: Chadian   Agency: [employer]   Phone number: [phone]  Type of interpretation: Face-to-face, spoken      Marlin Dejesus CMA

## 2019-06-27 ENCOUNTER — TELEPHONE (OUTPATIENT)
Dept: GASTROENTEROLOGY | Facility: CLINIC | Age: 74
End: 2019-06-27

## 2019-07-01 ENCOUNTER — TELEPHONE (OUTPATIENT)
Dept: GASTROENTEROLOGY | Facility: CLINIC | Age: 74
End: 2019-07-01

## 2019-07-02 ENCOUNTER — TELEPHONE (OUTPATIENT)
Dept: GASTROENTEROLOGY | Facility: CLINIC | Age: 74
End: 2019-07-02

## 2019-07-10 DIAGNOSIS — J30.1 CHRONIC SEASONAL ALLERGIC RHINITIS DUE TO POLLEN: ICD-10-CM

## 2019-07-10 RX ORDER — LORATADINE 10 MG/1
10 TABLET ORAL DAILY
Qty: 90 TABLET | Refills: 1 | Status: SHIPPED | OUTPATIENT
Start: 2019-07-10 | End: 2019-12-24

## 2019-07-10 NOTE — TELEPHONE ENCOUNTER
"Request for medication refill:loratadine (CLARITIN) 10 MG tablet    Providers if patient needs an appointment and you are willing to give a one month supply please refill for one month and  send a letter/MyChart using \".SMILLIMITEDREFILL\" .smillimited and route chart to \"[facility] \" (Giving one month refill in non controlled medications is strongly recommended before denial)    If refill has been denied, meaning absolutely no refills without visit, please complete the smart phrase \".smirxrefuse\" and route it to the \"[facility] MED REFILLS\"  pool to inform the patient and the pharmacy.    Law Na, MA        "

## 2019-07-12 ENCOUNTER — OFFICE VISIT (OUTPATIENT)
Dept: FAMILY MEDICINE | Facility: CLINIC | Age: 74
End: 2019-07-12
Payer: COMMERCIAL

## 2019-07-12 VITALS
SYSTOLIC BLOOD PRESSURE: 119 MMHG | HEIGHT: 72 IN | DIASTOLIC BLOOD PRESSURE: 76 MMHG | WEIGHT: 208 LBS | HEART RATE: 64 BPM | TEMPERATURE: 97.7 F | BODY MASS INDEX: 28.17 KG/M2 | OXYGEN SATURATION: 99 % | RESPIRATION RATE: 18 BRPM

## 2019-07-12 DIAGNOSIS — M70.61 GREATER TROCHANTERIC BURSITIS OF RIGHT HIP: ICD-10-CM

## 2019-07-12 DIAGNOSIS — I10 BENIGN ESSENTIAL HYPERTENSION: ICD-10-CM

## 2019-07-12 DIAGNOSIS — Z71.89 ADVANCED DIRECTIVES, COUNSELING/DISCUSSION: ICD-10-CM

## 2019-07-12 DIAGNOSIS — Z00.00 HEALTH CARE MAINTENANCE: ICD-10-CM

## 2019-07-12 DIAGNOSIS — M79.2 NEUROPATHIC PAIN: ICD-10-CM

## 2019-07-12 DIAGNOSIS — E11.9 TYPE 2 DIABETES MELLITUS WITHOUT COMPLICATION, WITHOUT LONG-TERM CURRENT USE OF INSULIN (H): Primary | ICD-10-CM

## 2019-07-12 DIAGNOSIS — K59.01 SLOW TRANSIT CONSTIPATION: ICD-10-CM

## 2019-07-12 LAB
% GRANULOCYTES: 55.2 %G (ref 40–75)
ALBUMIN SERPL-MCNC: 4.4 MG/DL (ref 3.5–4.7)
ALP SERPL-CCNC: 73.5 U/L (ref 31.7–110.7)
ALT SERPL-CCNC: 12 U/L (ref 0–45)
AST SERPL-CCNC: 14.4 U/L (ref 0–55)
BILIRUB SERPL-MCNC: 0.6 MG/DL (ref 0.2–1.3)
BUN SERPL-MCNC: 13.5 MG/DL (ref 7–21)
CALCIUM SERPL-MCNC: 9.2 MG/DL (ref 8.5–10.1)
CHLORIDE SERPLBLD-SCNC: 102.9 MMOL/L (ref 98–110)
CHOLEST SERPL-MCNC: 74.8 MG/DL (ref 0–200)
CHOLEST/HDLC SERPL: 2.3 {RATIO} (ref 0–5)
CO2 SERPL-SCNC: 27.2 MMOL/L (ref 20–32)
CREAT SERPL-MCNC: 1.2 MG/DL (ref 0.7–1.3)
GFR SERPL CREATININE-BSD FRML MDRD: 62.9 ML/MIN/1.7 M2
GLUCOSE SERPL-MCNC: 142.1 MG'DL (ref 70–99)
GRANULOCYTES #: 2.9 K/UL (ref 1.6–8.3)
HBA1C MFR BLD: 5.8 % (ref 4.1–5.7)
HCT VFR BLD AUTO: 45.4 % (ref 40–53)
HDLC SERPL-MCNC: 32.4 MG/DL
HEMOGLOBIN: 13.5 G/DL (ref 13.3–17.7)
LDLC SERPL CALC-MCNC: 26 MG/DL (ref 0–129)
LYMPHOCYTES # BLD AUTO: 1.8 K/UL (ref 0.8–5.3)
LYMPHOCYTES NFR BLD AUTO: 34.6 %L (ref 20–48)
MCH RBC QN AUTO: 31.6 PG (ref 26.5–35)
MCHC RBC AUTO-ENTMCNC: 29.7 G/DL (ref 32–36)
MCV RBC AUTO: 106.3 FL (ref 78–100)
MID #: 0.5 K/UL (ref 0–2.2)
MID %: 10.2 %M (ref 0–20)
PLATELET # BLD AUTO: 146 K/UL (ref 150–450)
POTASSIUM SERPL-SCNC: 4.4 MMOL/DL (ref 3.3–4.5)
PROT SERPL-MCNC: 6.8 G/DL (ref 6.8–8.8)
RBC # BLD AUTO: 4.27 M/UL (ref 4.4–5.9)
SODIUM SERPL-SCNC: 140 MMOL/L (ref 132.6–141.4)
TRIGL SERPL-MCNC: 84.1 MG/DL (ref 0–150)
TSH SERPL DL<=0.005 MIU/L-ACNC: 2.59 MU/L (ref 0.4–4)
VLDL CHOLESTEROL: 16.8 MG/DL (ref 7–32)
WBC # BLD AUTO: 5.3 K/UL (ref 4–11)

## 2019-07-12 RX ORDER — ACETAMINOPHEN 500 MG
1000 TABLET ORAL 3 TIMES DAILY PRN
Qty: 200 TABLET | Refills: 3 | Status: SHIPPED | OUTPATIENT
Start: 2019-07-12 | End: 2019-09-06

## 2019-07-12 RX ORDER — GABAPENTIN 300 MG/1
300 CAPSULE ORAL 4 TIMES DAILY
Qty: 120 CAPSULE | Refills: 1 | Status: SHIPPED | OUTPATIENT
Start: 2019-07-12 | End: 2019-07-18

## 2019-07-12 RX ORDER — SENNOSIDES 8.6 MG
2 TABLET ORAL DAILY
Qty: 60 TABLET | Refills: 3 | Status: SHIPPED | OUTPATIENT
Start: 2019-07-12 | End: 2021-09-24

## 2019-07-12 RX ORDER — LISINOPRIL 5 MG/1
5 TABLET ORAL DAILY
Qty: 90 TABLET | Refills: 3 | Status: SHIPPED | OUTPATIENT
Start: 2019-07-12 | End: 2020-06-12

## 2019-07-12 ASSESSMENT — MIFFLIN-ST. JEOR: SCORE: 1721.48

## 2019-07-12 NOTE — PROGRESS NOTES
>65 year old Wellness Visit ( Medicare etc)         HPI       This 74 year old male presents as an established patient  Ernie Arreola who presents for a  Annual Wellness Exam.    Other issues patient wants to address today:    yes, Right Hip pain   -I have a pain and numbness, cramping, laying on it makes it worse, walking also makes it worse, gabapentin helps taking take three times daily,  Pain is stable, been present for almost 2 years. Has more weakness on that side form a aCVA   Diabetes Follow-up    Patient is checking blood sugars: once daily.  Results are as follows:         am - 125-130         -Last A1C was   Lab Results   Component Value Date    A1C 6.4 03/29/2019    A1C 6.5 11/30/2018    A1C 5.7 07/18/2018    A1C 6.3 02/26/2018    A1C 5.7 07/25/2017        Diabetic concerns: None    Chest Pain or exercise related calf pain (claudication):no     Symptoms of hypoglycemia (low blood sugar): none     Paresthesias (numbness or burning in feet) or sores: yes both     Diabetic eye exam within the last year?: Has been three times      Adherence and Exercise  Medication side effects: yes: lightheadedness  How often is a medication missed? About 1-3 times per week  Exercise:walking  4-5 days/week for an average of 30-45 minutes     A [employer]  was used for  this visit.   Visual Acuity: :  Testing not done; patient has seen eye doctor in the past 12 months.    Patient Active Problem List   Diagnosis     Type 2 diabetes mellitus without complication, without long-term current use of insulin (H)     History of CVA (cerebrovascular accident)     Benign essential hypertension     Right sided weakness     Seizures (H)     Gastroesophageal reflux disease without esophagitis     Other hyperlipidemia       Past Medical History:   Diagnosis Date     Cerebral infarction (H) 08/2013    In [facility]     Diabetes mellitus, type 2 (H)      Stroke (H)         Family History   Problem Relation Age of Onset     Diabetes No  family hx of      Coronary Artery Disease No family hx of      Hypertension No family hx of      Hyperlipidemia No family hx of      Cerebrovascular Disease No family hx of      Breast Cancer No family hx of      Colon Cancer No family hx of      Prostate Cancer No family hx of      Other Cancer No family hx of      Depression No family hx of      Anxiety Disorder No family hx of      Mental Illness No family hx of      Substance Abuse No family hx of      Anesthesia Reaction No family hx of      Asthma No family hx of      Osteoporosis No family hx of      Genetic Disorder No family hx of      Thyroid Disease No family hx of      Obesity No family hx of      Glaucoma No family hx of      Macular Degeneration No family hx of          Problem List, Family History and past Medical History reviewed and unchanged/updated.       Health risk Assessment/ Review of Systems:         Constitutional:   Fevers or night sweats?  NO      Eyes:   Vision problems?  NO            Hearing:  Do you feel you have hearing loss?  NO  Cardiovascular:  Chest pain, palpitations, or pain with walking?  NO        Respiratory:   Breathing problems or cough?  NO    :   Difficulty controlling urination?  NO        Musculoskeletal:   Stiffness or sore joints?  Yes           Skin:   concerning lesions or moles?  NO           Nervous System:   loss of strength or sensation,  numbness or tingling,  tremor,  dizziness,  headache?  Yes Numbness        Mental Health:   depression or anxiety,  sleep problems?  NO   Cognition:  Memory problems?  NO       Weight Loss: Have you lost 10 or more pounds unintentionally in the previous year?  NO  Energy: How much of the time during the past 3 weeks did you feel tired?   (check one of the following):   X  All of the time  ? Most of the time  ? Some of the time  ? A little of the time  ? None of the Time    PHQ-2 Score:   PHQ-2 ( 1999 Pf[employer]) 7/12/2019 6/26/2019   Q1: Little interest or pleasure in doing  things 0 0   Q2: Feeling down, depressed or hopeless 0 0   PHQ-2 Score 0 0       PHQ-9 Score:   No flowsheet data found.  Medical Care:     What other specialists or organizations are involved in your medical care?  NO   Patient Care Team       Relationship Specialty Notifications Start End    Ernie Arreola MD PCP - General Family Practice  4/19/17     Phone: [phone] Fax: [phone]         [address] [facility] [address]    Leilani Gresham OD  Optometry  4/25/17     Phone: [phone] Fax: [phone]         [address] [facility] [address] [facility] [address]    Mo Aguilar, RN Clinic Care Coordinator  Admissions 7/4/18     [phone]     [facility] [id number]    Lily Alaniz    1/4/19           Do you have an advanced directive? no      Social History / Home Safety     Social History     Tobacco Use     Smoking status: Never Smoker     Smokeless tobacco: Never Used   Substance Use Topics     Alcohol use: No     Marital Status:  Who lives in your household? Self  Does your home have any of the following safety concerns? Loose rugs in the hallway, no grab bars in the bathroom, no handrails on the stairs or have poorly lit areas?  No   Do you feel threatened or controlled by a partner, ex-partner or anyone in your life? {Yes No   Has anyone hurt you physically, for example by pushing, hitting, slapping or kicking you   or forcing you to have sex? No       Functional Status     Do you need help with dressing yourself, bathing, or walking?No   Do you need help with the phone, transportation, shopping, preparing meals, housework, laundry, medications or managing money?No   By yourself and not using aids, do you have any difficulty walking up 10 steps  without resting?  YES  Uses a cane  By yourself and not using aids, do you have any difficulty walking several hundred yards?  YES               Risk Behaviors and Healthy Habits     History   Smoking Status      Never Smoker   Smokeless Tobacco     Never Used     How many servings of fruits and vegetables do you eat a day? 2  Exercise:walking  6-7 days/week for an average of 15-30 minutes  Do you frequently drive without a seatbelt? No   History   Smoking Status     Never Smoker   Smokeless Tobacco     Never Used     Do you use any other drugs? No       Do you use alcohol?No      Functional Ability   Was the patient's timed Up & Go test (Get up from chair walk, 10 feet turn, return to chair and sit down) unsteady or longer than 10 seconds?  YES 15    Fall risk:     1. Have you fallen two or more times in the past year?  YES 3 times  2. Have you fallen and had an injury in the past year?  No         Evaulation of Cognitive Function     Family member/caregiver input: Normal    1) Repeat 3 items (Leader, Season, Table)    2) Clock draw: ABNORMAL Could not write the time  3) 3 item recall: \Recalls NO objects   Results: 0 items recalled: PROBABLE COGNITIVE IMPAIRMENT, **INFORM PROVIDER**    Mini-CogTM Copyright SURY Arriaga. Licensed by the author for use in [facility]; reprinted with permission (sekou@[email or web address]). All rights reserved.            Other Assessments:     CV Risk based on Pooled Cohort Risk (consider assessing every 4-6 years; consider statin in patients with 10-yr risk > 7.5%):   The ASCVD Risk score ([place] DC Jr., et al., 2013) failed to calculate for the following reasons:    The patient has a prior MI or stroke diagnosis    Advance Directives: Discussed with patient and family as appropriate.  Has patient completed advance directives? No: Advance care planning reviewed with patient; information given to patient to review.              Immunization History   Administered Date(s) Administered     Pneumo Conj 13-V (2010&after) 05/17/2017     TDAP Vaccine (Boostrix) 05/17/2017     Reviewed Immunization Record Today    Physical Exam     Vitals: /76   Pulse 64   Temp 97.7  F (36.5  C) (Oral)   Resp 18    Ht 1.829 m (6')   Wt 94.3 kg (208 lb)   SpO2 99%   BMI 28.21 kg/m    BMI= Body mass index is 28.21 kg/m .  GENERAL APPEARANCE: alert and no distress  HENT: ear canals patent and TM's normal; mouth without ulcers or lesions; and oral mucous membranes moist  Hearing loss screen:   Normal   DENTITION:  Good repair?  yes  RESP: lungs clear to auscultation - no rales, rhonchi or wheezes  CV: regular rates and rhythm, no murmur, click or rub, no peripheral edema and peripheral pulses strong  SKIN: no suspicious lesions or rashes  NEURO: Normal strength and tone  MENTAL STATUS EXAM  MSK: Significant right-sided weakness.  This is stable secondary to previous CVA.  Patient was acutely tender over the right greater trochanter.  Appearance: appropriate  Attitude: cooperative  Behavior: normal  Eye Contact: normal  Speech: normal  Orientation: oreinted to person , place, time and situation  Mood:  good  Affect: Mood Congruent  Thought Process: clear        Assessment and Plan     Welcome to Medicare Preventive Visit / Initial Medicare Annual Wellness Exam - reviewed Preventive Services and Plan form with patient as specified in Patient Instructions.    1. Type 2 diabetes mellitus without complication, without long-term current use of insulin (H)  Diabetes is controlled no concerns at this point will continue current dosages of metformin and Bydureon.  - CBC with Diff Plt ([facility])  - C FOOT EXAM  NO CHARGE  - TSH with free T4 reflex  - Hemoglobin A1c ([facility])  - Comprehensive Metabolic Panel ([facility])  - Lipid Cascade ([facility])  - blood glucose (NO BRAND SPECIFIED) test strip; Use to test blood sugar 2 times daily or as directed. (Ultra 2 Strips)  Dispense: 100 strip; Refill: 3    2. Slow transit constipation  Refill Senokot  - sennosides (SENOKOT) 8.6 MG tablet; Take 2 tablets by mouth daily  Dispense: 60 tablet; Refill: 3    3. Benign essential hypertension  Patient reporting some lightheadedness with getting up also  and notes that his blood pressure is on the low side today.  Will continue lisinopril but reduced to 5 mg.  - lisinopril (PRINIVIL/ZESTRIL) 5 MG tablet; Take 1 tablet (5 mg) by mouth daily  Dispense: 90 tablet; Refill: 3    4. Neuropathic pain  Patient reports that this medication is is helpful that is not sufficient and requested an increase.  Will increase nighttime dose of gabapentin.  - gabapentin (NEURONTIN) 300 MG capsule; Take 1 capsule (300 mg) by mouth 4 times daily (Take 2 at night)  Dispense: 120 capsule; Refill: 1       5. Greater trochanteric bursitis of right hip  Encourage patient to use ice on this recommended that he come return for any injection.  - acetaminophen (TYLENOL) 500 MG tablet; Take 2 tablets (1,000 mg) by mouth 3 times daily as needed for mild pain  Dispense: 200 tablet; Refill: 3    7. Health care maintenance  Patient needs a 23 Pneumovax vaccine  - ADMIN VACCINE, INITIAL      Options for treatment and follow-up care were reviewed with the Juli Carsonin and/or guardian engaged in the decision making process and verbalized understanding of the options discussed and agreed with the final plan.    Ernie Arreola MD

## 2019-07-12 NOTE — NURSING NOTE
Due to patient being non-English speaking/uses sign language, an  was used for this visit. Only for face-to-face interpretation by an external agency, date and length of interpretation can be found on the scanned worksheet.     name: Butch Romeo  Language: South Korean   Agency: [employer]   Phone number: [phone]  Type of interpretation: Face-to-face, spoken

## 2019-07-12 NOTE — PATIENT INSTRUCTIONS
Scheduling:  If you had an XRay/CT/Ultrasound/MRI ordered the number is [phone] to schedule or change your radiology appointment.   [place] MEDICARE PERSONAL PREVENTIVE SERVICES PLAN - SERVICES  For Men   Review these tests with your doctor then decide which ones you want and take this page home for your reference     Immunization History   Administered Date(s) Administered     Pneumo Conj 13-V (2010&after) 05/17/2017     TDAP Vaccine (Boostrix) 05/17/2017                                                       IMMUNIZATIONS Description Recommend today?     Influenza (flu shot) Helps to prevent flu; you should get it every year No: is not indicated today.   PCV 13 Prevents pneumonia; given once No; is up to date.   PPSV 23 Prevents pneumonia; given once Yes; Recommended and ordered.   Tetanus Prevents tetanus; need every 10 years No: is not indicated today.   Herpes Zoster (shingles) Prevents or lessens symptoms from shingles; given once.  Recommeded and patient declined.   If you want this vaccine please go to a pharmacy to receive Shinrix   Hepatitis B  If you have any of the following risk factors you should be immunized for hepatitis B: severe kidney disease, people who live in the same house as a carrier of Hepatitis B virus, people who live in  institutions (e.g. nursing homes or group homes), homosexual men, patients with hemophilia who received Factor VIII or IX concentrates, abusers of illicit injectable drugs No: is not indicated today.     Health Maintenance   Topic Date Due     CBC W/DIFFERENTIAL  1945     ADVANCE CARE PLANNING  1945     ZOSTER IMMUNIZATION (1 of 2) 01/01/1995     MEDICARE ANNUAL WELLNESS VISIT  01/01/2010     EYE EXAM  04/27/2018     DIABETIC FOOT EXAM  05/17/2018     PNEUMOCOCCAL IMMUNIZATION 65+ LOW/MEDIUM RISK (2 of 2 - PPSV23) 05/17/2018     FALL RISK ASSESSMENT  02/26/2019     TSH W/FREE T4 REFLEX  05/17/2019     A1C  06/29/2019     BMP  07/14/2019     LIPID   07/18/2019     INFLUENZA VACCINE (1) 09/01/2019     FIT  12/07/2019     MICROALBUMIN  01/14/2020     DTAP/TDAP/TD IMMUNIZATION (2 - Td) 05/17/2027     HEPATITIS C SCREENING  Completed     PHQ-2  Completed     AORTIC ANEURYSM SCREENING (SYSTEM ASSIGNED)  Completed     IPV IMMUNIZATION  Aged Out     MENINGITIS IMMUNIZATION  Aged Out         SCREENING TESTS     Description   Year of Last Screening   Recommended Today?   Heart disease screening blood tests    Cholesterol level Reducing cholesterol can reduce your risk of heart attacks by 25%.  Screening is recommended yearly if you are at risk of heart disease otherwise every 4-5 years  No; is up to date.     Diabetes screening tests    Hemoglobin A1c blood test   Finding and treating diabetes early can reduce complications.  Screening recommended/covered yearly if you have high blood pressure, high cholesterol, obesity (BMI >30), or a history of high blood glucose tests; or 2 of the following: family history of diabetes, overweight (BMI >25 but <30), age 65 years or older, and a history of diabetes of pregnancy or gave birth to baby weighing more than 9 lbs.    No: is not indicated today.   Hepatitis B screening Finding hepatitis B early can reduce complications.  Screening is recommended for persons with selected risk factors.  No: is not indicated today.   Hepatitis C screening Finding hepatitis C early can reduce complications.  Screening is recommended for all persons born from 1945 through 1965 and for those with selected other risk factors.   No: is not indicated today.   HIV screening Finding HIV early can reduce complications.  Screening is recommended for persons with risk factors for HIV infection.  No: is not indicated today.   Glaucoma screening Early detection of glaucoma can prevent blindness.   Please talk to your eye doctor about this.       SCREENING TESTS     Description   Year of Last Screening   Recommended Today?   Colorectal cancer  screening    Fecal occult blood test     Screening colonoscopy Screening for colon cancer has been shown to reduce death from colon cancer by 25-30%. Screening recommended to start at 50 years and continuing until age 75 years.    No: is not indicated today.   Screening for Lung Cancer     Low-dose CT scanning Screening can reduce mortality in persons aged 55-80 who have smoked at least 30 pack-years and who are either still smoking or have quit in the past 15 years.  No: is not indicated today.   Abdominal Aortic Aneurysm (AAA) screening    Ultrasound (US)   An aneurysm treated before rupture is very safe -a ruptured aneurysm can be fatal.  Screening  by US for AAA is limited to patients who meet one of the following criteria:    Men who are 65-75 years old and have smoked more than 100 cigarettes in their lifetime    Anyone with a family history of abdominal aortic aneurysm  No: is not indicated today.     MEDICARE WELLNESS EXAM PATIENT INSTRUCTIONS    Yearly exam:     See your health care provider every year in order to review changes in your health, review medicines that you take, and discuss preventive care needs such as immunizations and cancer screening.    Get a flu shot each year.     Advance Directive:    If you have not done so, you are encouraged to complete an advance directive. If you would like support with this, please contact the clinic  through the main clinic line. More information about advance directives can be found at:     [email or web address]    Nutrition:     Eat at least 5 servings of fruits and vegetables each day.     Eat whole-grain bread, whole-wheat pasta and brown rice instead of white grains and rice.     Talk to your doctor about Calcium and Vitamin D.     Lifestyle:    Exercise for at least 150 minutes a week (30 minutes a day, 5 days a week). This will help you control your weight and prevent disease.     Limit alcohol to  one drink per day.     If you smoke, try to quit - your doctor will be happy to help.     Wear sunscreen to prevent skin cancer.     See your dentist every six months for an exam and cleaning.     See your eye doctor every 1 to 2 years to screen for conditions such as glaucoma, macular degeneration and cataracts.    1. Type 2 diabetes mellitus without complication, without long-term current use of insulin (H)   controled  - CBC with Diff Plt ([facility])  - C FOOT EXAM  NO CHARGE  - TSH with free T4 reflex  - Hemoglobin A1c ([facility])  - Comprehensive Metabolic Panel ([facility])  - Lipid Cascade ([facility])  - blood glucose (NO BRAND SPECIFIED) test strip; Use to test blood sugar 2 times daily or as directed. (Ultra 2 Strips)  Dispense: 100 strip; Refill: 3    2. Slow transit constipation     - sennosides (SENOKOT) 8.6 MG tablet; Take 2 tablets by mouth daily  Dispense: 60 tablet; Refill: 3    3. Benign essential hypertension  Decrease dose  - lisinopril (PRINIVIL/ZESTRIL) 5 MG tablet; Take 1 tablet (5 mg) by mouth daily  Dispense: 90 tablet; Refill: 3    4. Neuropathic pain  Increase   - gabapentin (NEURONTIN) 300 MG capsule; Take 1 capsule (300 mg) by mouth 4 times daily (Take 2 at night)  Dispense: 120 capsule; Refill: 1    5. Trochanteric bursitis of right hip  Follow up for injection to hip    6. Greater trochanteric bursitis of right hip    - acetaminophen (TYLENOL) 500 MG tablet; Take 2 tablets (1,000 mg) by mouth 3 times daily as needed for mild pain  Dispense: 200 tablet; Refill: 3

## 2019-07-12 NOTE — LETTER
July 12, 2019    [id number]    Juli Rodriguez  [address]   [facility] [address]    Dear Juli Rodriguez.      Please see below for your results from your recent testing.  As we discussed at our last visit, please follow up with a clinic appointment to review these results further.Nigerian Translation:Mauroa aan uga wada hadalnay booqashadayadii ugu dambeysay, fadlan qabso ballan kale si aad portia ugu eegto natiijooyinkaas.    Resulted Orders   CBC with Diff Plt ([facility])   Result Value Ref Range    WBC 5.3 4.0 - 11.0 K/uL    Lymphocytes # 1.8 0.8 - 5.3 K/uL    % Lymphocytes 34.6 20.0 - 48.0 %L    Mid # 0.5 0.0 - 2.2 K/uL    Mid % 10.2 0.0 - 20.0 %M    GRANULOCYTES # 2.9 1.6 - 8.3 K/uL    % Granulocytes 55.2 40.0 - 75.0 %G    RBC 4.27 (L) 4.40 - 5.90 M/uL    Hemoglobin 13.5 13.3 - 17.7 g/dL    Hematocrit 45.4 40.0 - 53.0 %    .3 (H) 78.0 - 100.0 fL    MCH 31.6 26.5 - 35.0 pg    MCHC 29.7 (L) 32.0 - 36.0 g/dL    Platelets 146.0 (L) 150.0 - 450.0 K/uL   TSH with free T4 reflex   Result Value Ref Range    TSH 2.59 0.40 - 4.00 mU/L   Hemoglobin A1c ([facility])   Result Value Ref Range    Hemoglobin A1C 5.8 (H) 4.1 - 5.7 %   Comprehensive Metabolic Panel ([facility])   Result Value Ref Range    Albumin 4.4 3.5 - 4.7 mg/dL    Alkaline Phosphatase 73.5 31.7 - 110.7 U/L    ALT 12.0 0.0 - 45.0 U/L    AST 14.4 0.0 - 55.0 U/L    Bilirubin Total 0.6 0.2 - 1.3 mg/dL    Urea Nitrogen 13.5 7.0 - 21.0 mg/dL    Calcium 9.2 8.5 - 10.1 mg/dL    Chloride 102.9 98.0 - 110.0 mmol/L    Carbon Dioxide 27.2 20.0 - 32.0 mmol/L    Creatinine 1.2 0.7 - 1.3 mg/dL    Glucose 142.1 (H) 70.0 - 99.0 mg'dL    Potassium 4.4 3.3 - 4.5 mmol/dL    Sodium 140.0 132.6 - 141.4 mmol/L    Protein Total 6.8 6.8 - 8.8 g/dL    GFR Estimate 62.9 >60.0 mL/min/1.7 m2    GFR Estimate If Black 76.1 >60.0 mL/min/1.7 m2   Lipid Cascade ([facility])   Result Value Ref Range    Cholesterol 74.8 0.0 - 200.0 mg/dL    Cholesterol/HDL Ratio 2.3 0.0 - 5.0    HDL  Cholesterol 32.4 (L) >40.0 mg/dL    LDL Cholesterol Calculated 26 0 - 129 mg/dL    Triglycerides 84.1 0.0 - 150.0 mg/dL    VLDL Cholesterol 16.8 7.0 - 32.0 mg/dL           Sincerely   Enrie Arreola MD  .

## 2019-07-14 NOTE — PROGRESS NOTES
Visit to the client's home for annual health risk assessment and PCA assessment.  An  was present.    Current situation/living environment  Cleddie lives alone in one bed room apt.  At today's visit he was fully dressed and well groomed.  Living spaces was clean and neat.    Activities of daily living (ADL)/instrumental activities of daily living (IADL) and functional issues  Client needs help with the following ADL's: dressing, grooming, bathing, mobility and transfers  Client needs help with the following IADL's: shopping, cooking, housekeeping, laundry, managing finances/bills and transportation  Client states he is unable to perform the above due to dizziness when up and r hip pains.  At last assessment clt stated he is not incontinent but needed assistance with toileting but today he said he is now able to manage this by himself, this is a change from previous assessment.      Health concerns for today  Clt has issues as listed in [facility].  He c/o r hip pains and he has been having PT.  He also c/o some dizziness when up.  He recent had eye exam but said he misplaces his new glasses.  He has upcoming dental exam.  He also had FIT test 12/2018 and it was negative  Has patient fallen 2 or more times in the last year? No  Has patient fallen with injury in the last year? No    Cognition/mental health  Alert and oriented.  Is able to make his needs known    STARS/Med Adherence  Client is non-compliant with the following quality measures: none noted  Comments: n/a    Client's Plan of Care consists of:  Adult day care (2 days per week), Homemaker services (5 hours per week), Life line, Personal care assistance (PCA) (3.5 hours per day), OT/PT (1 visits per week), Specialized supplies and equipment (lift chair, a/c unit, BR grab bars, cane, walker, shower chair, raised toilet seat, hh shower, orthotic shoes, reacher, leg brace) and Transportation

## 2019-07-18 DIAGNOSIS — M79.2 NEUROPATHIC PAIN: ICD-10-CM

## 2019-07-18 RX ORDER — GABAPENTIN 300 MG/1
300 CAPSULE ORAL 4 TIMES DAILY
Qty: 120 CAPSULE | Refills: 1 | Status: SHIPPED | OUTPATIENT
Start: 2019-07-18 | End: 2019-08-05

## 2019-07-18 NOTE — TELEPHONE ENCOUNTER
"Request for medication refill: Gabapentin 300mg    Providers if patient needs an appointment and you are willing to give a one month supply please refill for one month and  send a letter/MyChart using \".SMILLIMITEDREFILL\" .smillimited and route chart to \"P SMI \" (Giving one month refill in non controlled medications is strongly recommended before denial)    If refill has been denied, meaning absolutely no refills without visit, please complete the smart phrase \".smirxrefuse\" and route it to the \"P SMI MED REFILLS\"  pool to inform the patient and the pharmacy.    Yessica Naylor, CMA        "

## 2019-08-02 ENCOUNTER — OFFICE VISIT (OUTPATIENT)
Dept: FAMILY MEDICINE | Facility: CLINIC | Age: 74
End: 2019-08-02
Payer: COMMERCIAL

## 2019-08-02 VITALS
DIASTOLIC BLOOD PRESSURE: 76 MMHG | OXYGEN SATURATION: 97 % | BODY MASS INDEX: 27.83 KG/M2 | SYSTOLIC BLOOD PRESSURE: 120 MMHG | WEIGHT: 205.2 LBS | HEART RATE: 68 BPM | RESPIRATION RATE: 16 BRPM | TEMPERATURE: 98.2 F

## 2019-08-02 DIAGNOSIS — M70.61 GREATER TROCHANTERIC BURSITIS OF RIGHT HIP: Primary | ICD-10-CM

## 2019-08-02 DIAGNOSIS — E11.9 TYPE 2 DIABETES MELLITUS WITHOUT COMPLICATION, WITHOUT LONG-TERM CURRENT USE OF INSULIN (H): ICD-10-CM

## 2019-08-02 RX ADMIN — TRIAMCINOLONE ACETONIDE 40 MG: 40 INJECTION, SUSPENSION INTRA-ARTICULAR; INTRAMUSCULAR at 16:49

## 2019-08-02 NOTE — NURSING NOTE
Due to patient being non-English speaking/uses sign language, an  was used for this visit. Only for face-to-face interpretation by an external agency, date and length of interpretation can be found on the scanned worksheet.     name: Warda Muhumed  Language: Singaporean  Agency: [employer]  Phone number: [phone]  Type of interpretation: Face-to-face, spoken

## 2019-08-03 RX ORDER — TRIAMCINOLONE ACETONIDE 40 MG/ML
40 INJECTION, SUSPENSION INTRA-ARTICULAR; INTRAMUSCULAR ONCE
Status: COMPLETED | OUTPATIENT
Start: 2019-08-02 | End: 2019-08-02

## 2019-08-03 NOTE — PROGRESS NOTES
Patient was seen for greater trochanteric bursitis of his right hip this was diagnosed previously and so this was done as a procedure.

## 2019-08-03 NOTE — PROCEDURES
[facility]   Injection Note    Juli Rodriguez is a patient of Ernie Carey here for injection for greater trochanteric bursitis  of the right GT bursa.    Consent: Affirmation of informed consent was signed and scanned into the medical record. Risks, benefits and alternatives were discussed. Patient's questions were elicited and answered.   Procedure safety checklist was completed:  Yes  Time Out (Pause for the Cause) completed: Yes    Preoperative Diagnosis: Greater trochanteric bursitis of right hip  Postoperative Diagnosis: same       The right area over GT was prepped  in the usual sterile fashion INJECTION:  Using 4 mL of 1% lidocaine mixed with 40 mg of kenalog, the   was successfully injected without complication.  Patient did experience some pain relief following injection.    Technique:   Skin prep Technicare  Anesthesia 1% lidocaine  Complications:  No  Tolerance:  Pt tolerated procedure well and was in stable condition.     Follow up: Patient was instructed to use ice on the affected area tonight for at least 30 minutes and  that there will be a return to pain in a couple hours followed by relief in the next several days to a week. Patient was advised to call if increasing redness and swelling.     Follow up in 2-4 weeks.    Faculty: Ernie Arreola MD present for and supervised this entire procedure.

## 2019-08-05 DIAGNOSIS — M79.2 NEUROPATHIC PAIN: ICD-10-CM

## 2019-08-05 RX ORDER — GABAPENTIN 300 MG/1
300 CAPSULE ORAL 4 TIMES DAILY
Qty: 120 CAPSULE | Refills: 1 | Status: SHIPPED | OUTPATIENT
Start: 2019-08-05 | End: 2019-08-30

## 2019-08-05 NOTE — TELEPHONE ENCOUNTER
"Request for medication refill: Gabapentin 300 mg caps    Providers if patient needs an appointment and you are willing to give a one month supply please refill for one month and  send a letter/MyChart using \".SMILLIMITEDREFILL\" .smillimited and route chart to \"P SMI \" (Giving one month refill in non controlled medications is strongly recommended before denial)    If refill has been denied, meaning absolutely no refills without visit, please complete the smart phrase \".smirxrefuse\" and route it to the \"P [facility] MED REFILLS\"  pool to inform the patient and the pharmacy.    Ashley Crenshaw CMA        "

## 2019-08-07 ENCOUNTER — THERAPY VISIT (OUTPATIENT)
Dept: PHYSICAL THERAPY | Facility: CLINIC | Age: 74
End: 2019-08-07
Attending: FAMILY MEDICINE
Payer: COMMERCIAL

## 2019-08-07 DIAGNOSIS — M54.50 ACUTE BILATERAL LOW BACK PAIN WITHOUT SCIATICA: ICD-10-CM

## 2019-08-07 DIAGNOSIS — M70.61 GREATER TROCHANTERIC BURSITIS OF RIGHT HIP: ICD-10-CM

## 2019-08-19 ENCOUNTER — CARE COORDINATION (OUTPATIENT)
Dept: FAMILY MEDICINE | Facility: CLINIC | Age: 74
End: 2019-08-19

## 2019-08-19 NOTE — PROGRESS NOTES
Juli Rodriguez is a 74 year old who was reached out to by the CHW regarding advanced care directive forms that were given to him, but the pt was unable to be reached. CHW left pt a detailed VM letting the pt know if he needed any assistance filling out the forms or understanding what they were for that the CHW in clinic would assist him further. The VM left also requested a call back from the pt.      Marlys Bustos, August 19, 2019 at 2:12 PM

## 2019-08-30 DIAGNOSIS — M79.2 NEUROPATHIC PAIN: ICD-10-CM

## 2019-08-30 RX ORDER — GABAPENTIN 300 MG/1
300 CAPSULE ORAL 4 TIMES DAILY
Qty: 120 CAPSULE | Refills: 1 | Status: SHIPPED | OUTPATIENT
Start: 2019-08-30 | End: 2019-09-26

## 2019-08-30 NOTE — TELEPHONE ENCOUNTER

## 2019-09-04 ENCOUNTER — THERAPY VISIT (OUTPATIENT)
Dept: PHYSICAL THERAPY | Facility: CLINIC | Age: 74
End: 2019-09-04
Payer: COMMERCIAL

## 2019-09-04 DIAGNOSIS — M70.61 GREATER TROCHANTERIC BURSITIS OF RIGHT HIP: Primary | ICD-10-CM

## 2019-09-04 DIAGNOSIS — Z86.73 HISTORY OF CVA (CEREBROVASCULAR ACCIDENT): ICD-10-CM

## 2019-09-06 ENCOUNTER — OFFICE VISIT (OUTPATIENT)
Dept: FAMILY MEDICINE | Facility: CLINIC | Age: 74
End: 2019-09-06
Payer: COMMERCIAL

## 2019-09-06 VITALS
HEIGHT: 72 IN | HEART RATE: 69 BPM | DIASTOLIC BLOOD PRESSURE: 82 MMHG | TEMPERATURE: 98 F | WEIGHT: 204 LBS | BODY MASS INDEX: 27.63 KG/M2 | SYSTOLIC BLOOD PRESSURE: 125 MMHG | OXYGEN SATURATION: 98 %

## 2019-09-06 DIAGNOSIS — M25.551 HIP PAIN, RIGHT: Primary | ICD-10-CM

## 2019-09-06 DIAGNOSIS — M70.61 GREATER TROCHANTERIC BURSITIS OF RIGHT HIP: ICD-10-CM

## 2019-09-06 DIAGNOSIS — N18.2 CKD (CHRONIC KIDNEY DISEASE) STAGE 2, GFR 60-89 ML/MIN: ICD-10-CM

## 2019-09-06 RX ORDER — ACETAMINOPHEN 500 MG
1000 TABLET ORAL 3 TIMES DAILY PRN
Qty: 200 TABLET | Refills: 3 | Status: SHIPPED | OUTPATIENT
Start: 2019-09-06 | End: 2020-09-18

## 2019-09-06 ASSESSMENT — ENCOUNTER SYMPTOMS
NUMBNESS: 1
SORE THROAT: 0
DIZZINESS: 0
LIGHT-HEADEDNESS: 0
ABDOMINAL PAIN: 0
RHINORRHEA: 0
SHORTNESS OF BREATH: 0
ARTHRALGIAS: 1
FEVER: 0
JOINT SWELLING: 0
COLOR CHANGE: 0
WEAKNESS: 1
CHILLS: 0

## 2019-09-06 ASSESSMENT — MIFFLIN-ST. JEOR: SCORE: 1703.34

## 2019-09-06 NOTE — PROGRESS NOTES
Preceptor Attestation:   Patient seen and discussed with the resident. Assessment and plan reviewed with resident and agreed upon.   Supervising Physician:  DO [facility]

## 2019-09-06 NOTE — NURSING NOTE
Due to patient being non-English speaking/uses sign language, an  was used for this visit. Only for face-to-face interpretation by an external agency, date and length of interpretation can be found on the scanned worksheet.     name: Butch Romeo  Language: Indian   Agency: [employer]   Phone number: [phone]  Type of interpretation: Face-to-face, spoken        Sudha Nettles MA

## 2019-09-06 NOTE — PROGRESS NOTES
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 Patient has chronic right hip pain that radiates into his thigh to to his knee. He feels this pain has been worsening recently in severity, currently rated at 8/10. He denies any new injuries or falls. He received a greater trochanteric injection on 8/2/2019 by Dr. Arreola and has had minimal improvement in symptoms. He has been taking 500 mg Tylenol TID without much pain relief. He has also tried heat and cold packs without improvement in symptoms. He has been to PT twice, and states he does not feel he is getting much out of therapy. Patient states he is doing the exercises at home, and also goes to the gym 4 times per week. He notes some increased numbness down his entire right side of his body since the stroke, but has worsened in the last 6 months. He is currently taking 1200 mg gabapentin daily.     A [employer]  was used for  this visit.    +++++++    Problem, Medication and Allergy Lists were   reviewed and updated if needed.     Patient Active Problem List    Diagnosis Date Noted     Advanced directives, counseling/discussion 07/12/2019     Priority: Medium     7/12/2019 Discussed ACD with patient, he reported that his son would be his decision maker. He wanted to review the ACD documents with his son       Other hyperlipidemia 02/26/2018     Priority: Medium     Seizures (H) 07/25/2017     Priority: Medium     Had one seizure around the time of his stroke -2014        Gastroesophageal reflux disease without esophagitis 07/25/2017     Priority: Medium     Right sided weakness 06/07/2017     Priority: Medium     Type 2 diabetes mellitus without complication, without long-term current use of insulin (H) 04/19/2017     Priority: Medium      History of CVA (cerebrovascular accident) 04/19/2017     Priority: Medium     Benign essential hypertension 04/19/2017     Priority: Medium         Current Outpatient Medications   Medication Sig Dispense Refill     acetaminophen (TYLENOL) 500 MG tablet Take 2 tablets (1,000 mg) by mouth 3 times daily as needed for mild pain 200 tablet 3     aspirin 81 MG tablet Take 1 tablet (81 mg) by mouth daily 90 tablet 3     atorvastatin (LIPITOR) 40 MG tablet Take 1 tablet (40 mg) by mouth daily 90 tablet 3     blood glucose (NO BRAND SPECIFIED) test strip Use to test blood sugars 2 times daily or as directed 100 strip 11     blood glucose (NO BRAND SPECIFIED) test strip Use to test blood sugar 2 times daily or as directed. (Ultra 2 Strips) 100 strip 3     blood glucose monitoring (NO BRAND SPECIFIED) meter device kit Use to test blood sugar 2-3 times daily or as directed. 1 kit 0     carvedilol (COREG) 12.5 MG tablet Take 1 tablet (12.5 mg) by mouth 2 times daily (with meals) 60 tablet 3     diclofenac (VOLTAREN) 1 % topical gel Place 4 g onto the skin 4 times daily as needed for moderate pain 100 g 0     fluticasone (FLONASE) 50 MCG/ACT nasal spray Spray 1-2 sprays into both nostrils daily 16 g 6     gabapentin (NEURONTIN) 300 MG capsule Take 1 capsule (300 mg) by mouth 4 times daily (Take 2 at night) 120 capsule 1     levETIRAcetam (KEPPRA) 500 MG tablet Take 1 tablet (500 mg) by mouth 2 times daily 60 tablet 1     lisinopril (PRINIVIL/ZESTRIL) 5 MG tablet Take 1 tablet (5 mg) by mouth daily 90 tablet 3     loratadine (CLARITIN) 10 MG tablet Take 1 tablet (10 mg) by mouth daily 90 tablet 1     metFORMIN (GLUCOPHAGE) 500 MG tablet Take 1 tablet (500 mg) by mouth 2 times daily (with meals) 180 tablet 1     naproxen (NAPROSYN) 500 MG tablet Take 1 tablet (500 mg) by mouth 2 times daily (with meals) 180 tablet 3     order for DME Equipment being ordered: Diabetic Shoes 1 each 1     pantoprazole (PROTONIX) 20 MG EC tablet Take  2 tablets (40 mg) by mouth every morning (before breakfast) 90 tablet 3     sennosides (SENOKOT) 8.6 MG tablet Take 2 tablets by mouth daily 60 tablet 3         Allergies   Allergen Reactions     Eggs [Chicken-Derived Products (Egg)]    .    Patient is an established patient of this clinic..         Review of Systems:   Review of Systems   Constitutional: Negative for chills and fever.   HENT: Negative for congestion, rhinorrhea and sore throat.    Respiratory: Negative for shortness of breath.    Cardiovascular: Negative for chest pain.   Gastrointestinal: Negative for abdominal pain.   Musculoskeletal: Positive for arthralgias (right hip pain) and gait problem (ambulates with cane). Negative for joint swelling.   Skin: Negative for color change.   Neurological: Positive for weakness (right side of body since stroke) and numbness (right side of body since stroke, worsening recently). Negative for dizziness and light-headedness.            Physical Exam:     Vitals:    09/06/19 1506   BP: 125/82   Pulse: 69   Temp: 98  F (36.7  C)   TempSrc: Oral   SpO2: 98%   Weight: 92.5 kg (204 lb)   Height: 1.829 m (6')     Body mass index is 27.67 kg/m .  Vitals were reviewed and were normal     Physical Exam   Constitutional: He is oriented to person, place, and time. He appears well-developed and well-nourished. No distress.   HENT:   Head: Normocephalic and atraumatic.   Eyes: Conjunctivae and EOM are normal.   Neck: Normal range of motion. Neck supple.   Cardiovascular: Normal rate, regular rhythm and normal heart sounds. Exam reveals no gallop and no friction rub.   No murmur heard.  Pulmonary/Chest: Effort normal and breath sounds normal. No respiratory distress. He has no wheezes.   Musculoskeletal: Normal range of motion. He exhibits no edema or tenderness.   4/5 strength with dorsiflexion and plantarflexion of right foot, 4+/5 strength with right hip flexion, 4-/5 strength with right hip abduction. 5/5 strength right  knee flexion and extension. 5/5 strength left dorsiflexion and plantarfelxion of foot, hip flexion, and knee extension and flexion.     Neurological: He is alert and oriented to person, place, and time. He displays normal reflexes. No cranial nerve deficit or sensory deficit.         Results:   No testing ordered today    Assessment and Plan        Juli was seen today for pain.    Diagnoses and all orders for this visit:    Hip pain, right  Greater trochanteric bursitis of right hip  CKD (chronic kidney disease) stage 2, GFR 60-89 ml/min  I discussed with patient that he can go up to 1,000 mg tylenol TID for pain control. We discussed that I would prefer to avoid stronger pain medications with him due to his risk of falls. We will also attempt to obtain diclofenac gel for him for topical pain relief. Patient has CKD stage 2 and a history of GERD, so we will avoid oral NSAIDs for now. I also suggested patient seek a new physical therapist or PT location, and gave him a handout with other [facility] locations. He will call if a new referral is required to switch PT providers.  -     diclofenac (VOLTAREN) 1 % topical gel; Place 4 g onto the skin 4 times daily as needed for moderate pain  -     acetaminophen (TYLENOL) 500 MG tablet; Take 2 tablets (1,000 mg) by mouth 3 times daily as needed for mild pain    Options for treatment and follow-up care were reviewed with the patient. Juli Rodriguez  engaged in the decision making process and verbalized understanding of the options discussed and agreed with the final plan.    Kelly Starkey MD

## 2019-09-09 DIAGNOSIS — I10 BENIGN ESSENTIAL HYPERTENSION: ICD-10-CM

## 2019-09-09 RX ORDER — CARVEDILOL 12.5 MG/1
12.5 TABLET ORAL 2 TIMES DAILY WITH MEALS
Qty: 60 TABLET | Refills: 3 | Status: SHIPPED | OUTPATIENT
Start: 2019-09-09 | End: 2020-05-18

## 2019-09-09 NOTE — TELEPHONE ENCOUNTER

## 2019-09-10 ENCOUNTER — CARE COORDINATION (OUTPATIENT)
Dept: FAMILY MEDICINE | Facility: CLINIC | Age: 74
End: 2019-09-10

## 2019-09-10 NOTE — PROGRESS NOTES
Juli Rodriguez is a 74 year old male who was reached out to by the CHW for a follow-up regarding Advanced Care Directive forms the patient received before. The pt stated that he doesn't remember being given any forms and didn't understand the use of ACD. CHW went over what the forms were about and the pt requested that the form be sent to him by mail since he wasn't sure when he would be in clinic next. CHW told the pt he could stop by [facility] in clinic if he needs further assistance filling out the form.    Marlys Bustos, September 10, 2019 at 11:12 AM

## 2019-09-13 ENCOUNTER — TELEPHONE (OUTPATIENT)
Dept: FAMILY MEDICINE | Facility: CLINIC | Age: 74
End: 2019-09-13

## 2019-09-13 NOTE — TELEPHONE ENCOUNTER
RN contacted PT to find out the name and fax number, it is [facility] and the fax is [phone].  RN printed and faxed it over. Mistakenly called Son's phone number, when meaning to call PT. If he happens to call back, please let him know the referral has been sent.  Malika Humphrey RN

## 2019-09-13 NOTE — TELEPHONE ENCOUNTER
[facility] phone call message- general phone call:    Reason for call: Patient's son requesting a change of place for physical therapy. The address is [address] [place] [address]. Phone number: [phone]. Son's number is [phone] and son's name is raiza.     Action Desired: Call back or change of place for physical therapy.    Return call needed: Yes    OK to leave a message on voice mail? Yes    Advised patient response may take up to 2 business days: Yes    Primary language: Zimbabwean      needed? Yes    Call taken on September 13, 2019 at 3:29 PM by Michelle Givens to [facility] TRIAGE

## 2019-09-19 DIAGNOSIS — K21.9 GASTROESOPHAGEAL REFLUX DISEASE WITHOUT ESOPHAGITIS: ICD-10-CM

## 2019-09-19 RX ORDER — PANTOPRAZOLE SODIUM 20 MG/1
40 TABLET, DELAYED RELEASE ORAL
Qty: 90 TABLET | Refills: 3 | Status: SHIPPED | OUTPATIENT
Start: 2019-09-19 | End: 2019-10-09

## 2019-09-26 DIAGNOSIS — M79.2 NEUROPATHIC PAIN: ICD-10-CM

## 2019-09-26 RX ORDER — GABAPENTIN 300 MG/1
300 CAPSULE ORAL 4 TIMES DAILY
Qty: 120 CAPSULE | Refills: 1 | Status: SHIPPED | OUTPATIENT
Start: 2019-09-26 | End: 2019-11-06

## 2019-09-26 NOTE — TELEPHONE ENCOUNTER
"Request for medication refill: Gabapentin 300mg caps    Providers if patient needs an appointment and you are willing to give a one month supply please refill for one month and  send a letter/MyChart using \".SMILLIMITEDREFILL\" .smillimited and route chart to \"P SMI \" (Giving one month refill in non controlled medications is strongly recommended before denial)    If refill has been denied, meaning absolutely no refills without visit, please complete the smart phrase \".smirxrefuse\" and route it to the \"P [facility]MI MED REFILLS\"  pool to inform the patient and the pharmacy.    Ashley Crenshaw CMA        "

## 2019-10-03 DIAGNOSIS — E11.9 TYPE 2 DIABETES MELLITUS WITHOUT COMPLICATION, WITHOUT LONG-TERM CURRENT USE OF INSULIN (H): ICD-10-CM

## 2019-10-03 NOTE — TELEPHONE ENCOUNTER

## 2019-10-09 ENCOUNTER — TELEPHONE (OUTPATIENT)
Dept: FAMILY MEDICINE | Facility: CLINIC | Age: 74
End: 2019-10-09

## 2019-10-09 DIAGNOSIS — K21.9 GASTROESOPHAGEAL REFLUX DISEASE WITHOUT ESOPHAGITIS: ICD-10-CM

## 2019-10-09 RX ORDER — PANTOPRAZOLE SODIUM 20 MG/1
40 TABLET, DELAYED RELEASE ORAL
Qty: 180 TABLET | Refills: 3 | Status: SHIPPED | OUTPATIENT
Start: 2019-10-09 | End: 2020-07-19

## 2019-10-09 NOTE — TELEPHONE ENCOUNTER
RN spoke with provider regarding quantity (it was ordered as 45 day supply) and obtained a verbal to change it to 90 day supply with 3 refills. Re-sent medication to pharmacy for patient.  Malika Humphrey RN

## 2019-10-09 NOTE — TELEPHONE ENCOUNTER
Verify that the refill encounter hasn't been started Yes    [facility] phone call message- patient requesting a refill:    Full Medication Name: pantoprazole (PROTONIX) 20 MG EC tablet    Dose: Take 2 tablets (40 mg) by mouth every morning (before breakfast) - Oral     Pharmacy confirmed as   [id number] - [place], [place] - [address] [place] [address]  Phone: [phone] Fax: [phone]  : Yes    Medication tab checked to see if medication has been sent  Yes    Additional Comments: Patient is out of medication and called pharmacy but no refills on file.     OK to leave a message on voice mail? Yes    Advised patient refill may take up to 2 business days? Yes    Primary language: Samoan      needed? Yes    Call taken on October 9, 2019 at 2:58 PM by Candi Givens to  [facility] MED [facility]

## 2019-11-06 ENCOUNTER — OFFICE VISIT (OUTPATIENT)
Dept: FAMILY MEDICINE | Facility: CLINIC | Age: 74
End: 2019-11-06
Payer: COMMERCIAL

## 2019-11-06 VITALS
BODY MASS INDEX: 27.48 KG/M2 | TEMPERATURE: 98.2 F | HEART RATE: 75 BPM | DIASTOLIC BLOOD PRESSURE: 84 MMHG | RESPIRATION RATE: 16 BRPM | WEIGHT: 202.6 LBS | SYSTOLIC BLOOD PRESSURE: 134 MMHG | OXYGEN SATURATION: 96 %

## 2019-11-06 DIAGNOSIS — Z02.9 ADMINISTRATIVE ENCOUNTER: ICD-10-CM

## 2019-11-06 DIAGNOSIS — E11.9 TYPE 2 DIABETES MELLITUS WITHOUT COMPLICATION, WITHOUT LONG-TERM CURRENT USE OF INSULIN (H): Primary | ICD-10-CM

## 2019-11-06 DIAGNOSIS — M79.2 NEUROPATHIC PAIN: ICD-10-CM

## 2019-11-06 DIAGNOSIS — Z23 NEED FOR PROPHYLACTIC VACCINATION AND INOCULATION AGAINST INFLUENZA: ICD-10-CM

## 2019-11-06 LAB — HBA1C MFR BLD: 5.5 % (ref 4.1–5.7)

## 2019-11-06 RX ORDER — GABAPENTIN 400 MG/1
400 CAPSULE ORAL 4 TIMES DAILY
Qty: 120 CAPSULE | Refills: 1 | Status: SHIPPED | OUTPATIENT
Start: 2019-11-06 | End: 2020-01-03

## 2019-11-06 ASSESSMENT — ENCOUNTER SYMPTOMS
SHORTNESS OF BREATH: 0
WEAKNESS: 0
COUGH: 0
CHEST TIGHTNESS: 0

## 2019-11-06 NOTE — NURSING NOTE
Due to patient being non-English speaking/uses sign language, an  was used for this visit. Only for face-to-face interpretation by an external agency, date and length of interpretation can be found on the scanned worksheet.     name: Butch Romeo  Language: Bhutanese   Agency: [employer]   Phone number: [phone]  Type of interpretation: Face-to-face, spoken

## 2019-11-06 NOTE — PATIENT INSTRUCTIONS
Nerve pain:  - Increase gabapentin to 400 mg, 4 pills total each day (1 in morning, 1 in evening, 2 at bedtime)    Forms:  - Adult  form filled today  - We will mail you the completed portion of [employer] mobility form, and you will mail in both parts of the application together.    My diabetes goals:  Hemoglobin A1c %   7%   Fasting blood glucose   Between  mg/dL   Blood glucose 1-2 hours after meals Less than 180 mg/dL     Get Prompt Medical Attention   if any of the following occur:     HIGH BLOOD SUGAR: frequent urination, dizziness, drowsiness, thirst, headache, nausea or vomiting, abdominal pain, vision changes, fast breathing, confusion or loss of consciousness     LOW BLOOD SUGAR: fatigue, headache, shakes, excess sweating, hunger, feeling anxious or restless, vision changes, drowsiness, weakness, confusion, or loss of consciousness    Hemoglobin A1C Test  Using your meter helps you track your blood sugar every day. But your glucose meter tells you the value at the time of testing only. You also need to know if your treatment plan is keeping you healthy over time. The hemoglobin A1C (or glycated hemoglobin) test can help. This test measures your average blood sugar level over a few months. Test is done every 3 months. Your body makes new blood every 3 months and this test is able to use a very small amount of blood to get an average sugar level from this time.  What do the numbers mean?  A1C %      Average Blood Sugar    12                       298  11                       269  10                       240    9                       212    8                       183    7                       154    6                       126  A higher A1C result means that you have a higher risk of developing complications.

## 2019-11-06 NOTE — PROGRESS NOTES
HPI       Juli Rodriguez is a 74 year old  who presents for   Chief Complaint   Patient presents with     Diabetes     and fill out forms     Imm/Inj     Flu Shot     DM f/u    Forms   - Adult    - [employer] [facility]    Transfer meds to new pharmacy  - updated in our system  - he already had meds transferred from prior pharmacy    Diabetes Follow-up    Patient is checking blood sugars: once daily.  Results are as follows:       am - fasting - 124, 130, 115, 140    Stopped eating carbs, pasta, rice, no sugar         -Last A1C was   Lab Results   Component Value Date    A1C 5.8 07/12/2019    A1C 6.4 03/29/2019    A1C 6.5 11/30/2018    A1C 5.7 07/18/2018    A1C 6.3 02/26/2018        Diabetic concerns: None    Metformin only 1g daily    Chest Pain or exercise related calf pain (claudication):no     Symptoms of hypoglycemia (low blood sugar): none     Paresthesias (numbness or burning in feet) or sores: Yes chronic, unchanged 1 year. Before numb on whole side due to stroke, but now burning. Missed 2 days of gabapentin.     Diabetic eye exam within the last year?: Yes - January.      Adherence and Exercise  Medication side effects: no  How often is a medication missed? Never  Exercise: gym, [employer] - lift weights, sauna, walking on track. Goes 4 times a week.    A [employer]  was used for  this visit.    +++++++    Problem, Medication and Allergy Lists were reviewed and updated if needed..    Patient is an established patient of this clinic..         Review of Systems:   Review of Systems   Respiratory: Negative for cough, chest tightness and shortness of breath.    Cardiovascular: Negative for chest pain.   Musculoskeletal: Positive for gait problem.   Neurological: Negative for weakness.            Physical Exam:     Vitals:    11/06/19 1511   BP: 134/84   Pulse: 75   Resp: 16   Temp: 98.2  F (36.8  C)   TempSrc: Oral   SpO2: 96%   Weight: 91.9 kg (202 lb 9.6 oz)     Body mass index is 27.48  kg/m .  Vitals were reviewed and were normal     Physical Exam  Constitutional:       General: He is not in acute distress.     Appearance: He is well-developed. He is not diaphoretic.   HENT:      Head: Normocephalic and atraumatic.   Eyes:      Conjunctiva/sclera: Conjunctivae normal.   Neck:      Musculoskeletal: Normal range of motion.   Cardiovascular:      Rate and Rhythm: Normal rate.   Pulmonary:      Effort: Pulmonary effort is normal. No respiratory distress.   Musculoskeletal: Normal range of motion.   Neurological:      Mental Status: He is alert and oriented to person, place, and time.   Psychiatric:         Mood and Affect: Mood normal.         Behavior: Behavior normal.         Thought Content: Thought content normal.         Judgement: Judgment normal.           Results:   Results are ordered and pending    Assessment and Plan        Juli was seen today for diabetes and imm/inj.    Diagnoses and all orders for this visit:    Type 2 diabetes mellitus without complication, without long-term current use of insulin (H)  Patient managing beautifully. A1c today 5.5%  Continue good dietary practices  Continue keeping physically active  Continue metformin 500 mg BID  -     Hemoglobin A1c (Newton's)    Neuropathic pain  Not optimally controlled. Today gabapentin increased from 300 total 4 tabs a day, to 400 mg.   -     gabapentin (NEURONTIN) 400 MG capsule; Take 1 capsule (400 mg) by mouth 4 times daily 1 capsule in AM, 1 in PM, 2 before sleep    Need for prophylactic vaccination and inoculation against influenza  -     Fluzone high dose, 65+ years [24209]    Administrative encounter  Forms completed for [facility] mobility and adult day care.         Medications Discontinued During This Encounter   Medication Reason     fluticasone (FLONASE) 50 MCG/ACT nasal spray      gabapentin (NEURONTIN) 300 MG capsule Reorder       Options for treatment and follow-up care were reviewed with the patient. Juli Rodriguez   engaged in the decision making process and verbalized understanding of the options discussed and agreed with the final plan.    Kiley Haines MD

## 2019-12-23 DIAGNOSIS — J30.1 CHRONIC SEASONAL ALLERGIC RHINITIS DUE TO POLLEN: ICD-10-CM

## 2019-12-23 NOTE — TELEPHONE ENCOUNTER
"Request for medication refill: Loratadine 10mg    Providers if patient needs an appointment and you are willing to give a one month supply please refill for one month and  send a letter/MyChart using \".SMILLIMITEDREFILL\" .smillimited and route chart to \"P SMI \" (Giving one month refill in non controlled medications is strongly recommended before denial)    If refill has been denied, meaning absolutely no refills without visit, please complete the smart phrase \".smirxrefuse\" and route it to the \"P [facility] MED REFILLS\"  pool to inform the patient and the pharmacy.    Yessica Naylor, [facility]        "

## 2019-12-24 RX ORDER — LORATADINE 10 MG/1
10 TABLET ORAL DAILY
Qty: 90 TABLET | Refills: 1 | Status: SHIPPED | OUTPATIENT
Start: 2019-12-24 | End: 2020-10-21

## 2020-01-02 DIAGNOSIS — M79.2 NEUROPATHIC PAIN: ICD-10-CM

## 2020-01-03 RX ORDER — GABAPENTIN 400 MG/1
400 CAPSULE ORAL 4 TIMES DAILY
Qty: 120 CAPSULE | Refills: 1 | Status: SHIPPED | OUTPATIENT
Start: 2020-01-03 | End: 2020-03-12

## 2020-01-06 ENCOUNTER — TRANSFERRED RECORDS (OUTPATIENT)
Dept: HEALTH INFORMATION MANAGEMENT | Facility: CLINIC | Age: 75
End: 2020-01-06

## 2020-01-06 DIAGNOSIS — E11.9 TYPE 2 DIABETES MELLITUS WITHOUT COMPLICATION, WITHOUT LONG-TERM CURRENT USE OF INSULIN (H): ICD-10-CM

## 2020-01-06 RX ORDER — ATORVASTATIN CALCIUM 40 MG/1
40 TABLET, FILM COATED ORAL DAILY
Qty: 90 TABLET | Refills: 3 | Status: SHIPPED | OUTPATIENT
Start: 2020-01-06 | End: 2020-09-18

## 2020-01-06 NOTE — TELEPHONE ENCOUNTER

## 2020-02-04 ENCOUNTER — OFFICE VISIT (OUTPATIENT)
Dept: FAMILY MEDICINE | Facility: CLINIC | Age: 75
End: 2020-02-04
Payer: COMMERCIAL

## 2020-02-04 DIAGNOSIS — M70.61 TROCHANTERIC BURSITIS OF RIGHT HIP: ICD-10-CM

## 2020-02-04 DIAGNOSIS — R56.9 SEIZURES (H): ICD-10-CM

## 2020-02-04 DIAGNOSIS — Z86.73 HISTORY OF CVA (CEREBROVASCULAR ACCIDENT): ICD-10-CM

## 2020-02-04 DIAGNOSIS — E11.9 TYPE 2 DIABETES MELLITUS WITHOUT COMPLICATION, WITHOUT LONG-TERM CURRENT USE OF INSULIN (H): Primary | ICD-10-CM

## 2020-02-04 DIAGNOSIS — Z12.11 SPECIAL SCREENING FOR MALIGNANT NEOPLASMS, COLON: ICD-10-CM

## 2020-02-04 DIAGNOSIS — I10 BENIGN ESSENTIAL HYPERTENSION: ICD-10-CM

## 2020-02-04 DIAGNOSIS — N18.2 CKD (CHRONIC KIDNEY DISEASE) STAGE 2, GFR 60-89 ML/MIN: ICD-10-CM

## 2020-02-04 LAB
% GRANULOCYTES: 58.8 %G (ref 40–75)
BUN SERPL-MCNC: 20.2 MG/DL (ref 7–21)
CALCIUM SERPL-MCNC: 9.3 MG/DL (ref 8.5–10.1)
CHLORIDE SERPLBLD-SCNC: 98.4 MMOL/L (ref 98–110)
CO2 SERPL-SCNC: 29.2 MMOL/L (ref 20–32)
CREAT SERPL-MCNC: 1.2 MG/DL (ref 0.7–1.3)
CREAT UR-MCNC: 129 MG/DL
GFR SERPL CREATININE-BSD FRML MDRD: 63.6 ML/MIN/1.7 M2
GLUCOSE SERPL-MCNC: 170.7 MG'DL (ref 70–99)
GRANULOCYTES #: 3.2 K/UL (ref 1.6–8.3)
HBA1C MFR BLD: 5.5 % (ref 4.1–5.7)
HCT VFR BLD AUTO: 46.7 % (ref 40–53)
HEMOGLOBIN: 13.6 G/DL (ref 13.3–17.7)
LYMPHOCYTES # BLD AUTO: 1.8 K/UL (ref 0.8–5.3)
LYMPHOCYTES NFR BLD AUTO: 33.4 %L (ref 20–48)
MCH RBC QN AUTO: 32.1 PG (ref 26.5–35)
MCHC RBC AUTO-ENTMCNC: 29.1 G/DL (ref 32–36)
MCV RBC AUTO: 110.2 FL (ref 78–100)
MICROALBUMIN UR-MCNC: 5 MG/L
MICROALBUMIN/CREAT UR: 3.98 MG/G CR (ref 0–17)
MID #: 0.4 K/UL (ref 0–2.2)
MID %: 7.8 %M (ref 0–20)
PLATELET # BLD AUTO: 150 K/UL (ref 150–450)
POTASSIUM SERPL-SCNC: 4.2 MMOL/L (ref 3.3–4.5)
RBC # BLD AUTO: 4.24 M/UL (ref 4.4–5.9)
SODIUM SERPL-SCNC: 135.5 MMOL/L (ref 132.6–141.4)
WBC # BLD AUTO: 5.4 K/UL (ref 4–11)

## 2020-02-04 ASSESSMENT — PAIN SCALES - GENERAL: PAINLEVEL: SEVERE PAIN (7)

## 2020-02-04 ASSESSMENT — MIFFLIN-ST. JEOR: SCORE: 1728.02

## 2020-02-04 NOTE — PROGRESS NOTES
Juli is a 75 year old  who presents for   Patient presents with:  Blood Draw: Patient is here for lab work and complaining of left side pain for a long time but it get worse know       Assessment and Plan      1. Type 2 diabetes mellitus without complication, without long-term current use of insulin (H)  Diabetes is well controlled on current metformin he is overall doing well does not need any further changes in this plan to continue current dosages and follow-up in 3 months  We will also do a diabetic eye referral and will address diabetic shoes at next visit.  - Hemoglobin A1c ([facility])  - Basic Metabolic Panel ([facility])  - CBC with Diff Plt ([facility])  - Albumin Random Urine Quantitative with Creat Ratio  - OPHTHALMOLOGY ADULT REFERRAL    2. Special screening for malignant neoplasms, colon  Discussed FIT screening and advised patient to do this this was given to the patient will follow-up.  - Fecal colorectal cancer screen FITT; Future    3. History of CVA (cerebrovascular accident)  Plan to continue aspirin control of lipids and blood pressure    4. Benign essential hypertension  Blood pressure is controlled today continue current regimen    5. CKD (chronic kidney disease) stage 2, GFR 60-89 ml/min  Stage II CKD is stable advised avoidance of nonsteroidals and avoidance of dehydration.    6. Seizures (H)  Currently controlled on Keppra no symptoms.    7. Trochanteric bursitis of right hip  This trochanteric bursitis has been present for some time patient is now open to having an injection for this encouraged him to keep continue icing it and to follow-up for an injection.       Return in about 4 weeks (around 3/3/2020) for injection of trochanteric bursa, 40 minute visit.    There are no discontinued medications.      Ernie Arreola MD         HPI       Juli is a 75 year old  who presents for   Patient presents with:  Blood Draw: Patient is here for lab work and complaining of left side pain for a long  time but it get worse know       Visit [place]  1. Right sided Hip pain  Can't sleep on Right side. Present for 1 year.  Has trochanteric bursitis -would like an injection  2. DM2  Going well , is checking blood sugars on occasion they have been in the 140s to 150s  3. Eye watery and he reports he has no pain though he would like to follow-up with an eye doctor and he is due for 1 to  No vision loss or pain no discharge  4. Seizure disorder   No seizures taking Keppra.  5. Dizziness in the am  Sits for awhile then it resolves   Fell three times because of the dizziness.  Diabetes Follow-up  <8.0  Patient is checking blood sugars: once daily.  Results are as follows:         130-150s         -Last A1C was   Lab Results   Component Value Date    A1C 5.5 11/06/2019    A1C 5.8 07/12/2019    A1C 6.4 03/29/2019    A1C 6.5 11/30/2018    A1C 5.7 07/18/2018        Diabetic concerns: None    Chest Pain or exercise related calf pain (claudication):no     Symptoms of hypoglycemia (low blood sugar): none     Paresthesias (numbness or burning in feet) or sores: No     Diabetic eye exam within the last year?: No    Hyperlipidemia Follow-Up      Recommended Level of Therapy:Moderate Intensity  (atorvastatin 10-20mg, rosuvastatin 5-10mg, simvastatin 20-40mg, pravastatin 40-80mg, lovastatin 40 mg)    Rate your low fat/cholesterol diet?: good    Taking statin?  Yes, no muscle aches from statin    Other lipid medications/supplements?:  none  Lab Results   Component Value Date    CHOL 74.8 07/12/2019     Lab Results   Component Value Date    HDL 32.4 07/12/2019     Lab Results   Component Value Date    LDL 26 07/12/2019     Lab Results   Component Value Date    TRIG 84.1 07/12/2019     Lab Results   Component Value Date    CHOLHDLRATIO 2.3 07/12/2019       Hypertension Follow-up     <140/90    Outpatient blood pressures are not being checked.    Low Salt Diet: no added salt    Daily NSAID Use?no     Did patient take their HTN pills  "today?  Yes  Last Basic Metabolic Panel:  Lab Results   Component Value Date    .0 07/12/2019      Lab Results   Component Value Date    POTASSIUM 4.4 07/12/2019     Lab Results   Component Value Date    CHLORIDE 102.9 07/12/2019     Lab Results   Component Value Date    WILIAM 9.2 07/12/2019     Lab Results   Component Value Date    CO2 27.2 07/12/2019     Lab Results   Component Value Date    BUN 13.5 07/12/2019     Lab Results   Component Value Date    CR 1.2 07/12/2019     Lab Results   Component Value Date    .1 07/12/2019     .bmp    Adherence and Exercise  Medication side effects: no  How often is a medication missed? Less than 1 time per week  Exercise:walking  None A [employer]  was used for  this visit.   Problem, Medication and Allergy Lists were reviewed and updated if needed..  Patient is an established patient of this clinic.  Reviewed and updated as needed this visit by clinical staff  Tobacco  Allergies  Meds  Problems  Med Hx  Surg Hx  Fam Hx  Soc Hx        Reviewed and updated as needed this visit by Provider  Tobacco  Allergies  Meds  Problems  Med Hx  Surg Hx  Fam Hx       Health Maintenance Due   Topic Date Due     ZOSTER IMMUNIZATION (1 of 2) 01/01/1995     EYE EXAM  04/27/2018     FIT  12/07/2019     PHQ-2  01/01/2020     BMP  01/12/2020     CBC W/DIFFERENTIAL  01/12/2020     MICROALBUMIN  01/14/2020     A1C  02/06/2020          Review of Systems:   Review of Systems  7 point review of symptoms was otherwise negative except as noted in HPI         Physical Exam:     Vitals:    02/04/20 1415   BP: 139/85   Pulse: 73   Resp: 16   Temp: 97.4  F (36.3  C)   TempSrc: Oral   SpO2: 99%   Weight: 94.8 kg (209 lb)   Height: 1.84 m (6' 0.44\")     Body mass index is 28 kg/m .  Vitals were reviewed and were normal     Physical Exam  Vitals signs reviewed.   Constitutional:       General: He is not in acute distress.     Appearance: He is well-developed. He is not " diaphoretic.   HENT:      Head: Normocephalic.      Comments: Provocative testing for vertigo was done and was negative.  Eyes:      General: No scleral icterus.     Conjunctiva/sclera: Conjunctivae normal.   Neck:      Musculoskeletal: Normal range of motion.      Thyroid: No thyromegaly.   Cardiovascular:      Rate and Rhythm: Normal rate and regular rhythm.      Heart sounds: Normal heart sounds. No murmur.   Pulmonary:      Effort: Pulmonary effort is normal. No respiratory distress.      Breath sounds: Normal breath sounds. No wheezing.   Abdominal:      General: Bowel sounds are normal. There is no distension.      Palpations: Abdomen is soft. There is no hepatomegaly or splenomegaly.      Tenderness: There is no abdominal tenderness.   Lymphadenopathy:      Cervical: No cervical adenopathy.   Skin:     General: Skin is warm and dry.   Neurological:      Mental Status: He is alert and oriented to person, place, and time.   Psychiatric:         Behavior: Behavior normal.         Thought Content: Thought content normal.         Judgment: Judgment normal.           Results:      Results from this visit  Results for orders placed or performed in visit on 02/04/20   Hemoglobin A1c ([facility])     Status: None   Result Value Ref Range    Hemoglobin A1C 5.5 4.1 - 5.7 %   Basic Metabolic Panel ([facility])     Status: Abnormal   Result Value Ref Range    Calcium 9.3 8.5 - 10.1 mg/dL    Chloride 98.4 98.0 - 110.0 mmol/L    Carbon Dioxide 29.2 20.0 - 32.0 mmol/L    Creatinine 1.2 0.7 - 1.3 mg/dL    Glucose 170.7 (H) 70.0 - 99.0 mg'dL    Potassium 4.2 3.3 - 4.5 mmol/L    Sodium 135.5 132.6 - 141.4 mmol/L    GFR Estimate 63.6 >60.0 mL/min/1.7 m2    GFR Estimate If Black 76.9 >60.0 mL/min/1.7 m2    Urea Nitrogen 20.2 7.0 - 21.0 mg/dL   CBC with Diff Plt ([facility])     Status: Abnormal   Result Value Ref Range    WBC 5.4 4.0 - 11.0 K/uL    Lymphocytes # 1.8 0.8 - 5.3 K/uL    % Lymphocytes 33.4 20.0 - 48.0 %L    Mid # 0.4 0.0 -  2.2 K/uL    Mid % 7.8 0.0 - 20.0 %M    GRANULOCYTES # 3.2 1.6 - 8.3 K/uL    % Granulocytes 58.8 40.0 - 75.0 %G    RBC 4.24 (L) 4.40 - 5.90 M/uL    Hemoglobin 13.6 13.3 - 17.7 g/dL    Hematocrit 46.7 40.0 - 53.0 %    .2 (H) 78.0 - 100.0 fL    MCH 32.1 26.5 - 35.0 pg    MCHC 29.1 (L) 32.0 - 36.0 g/dL    Platelets 150.0 150.0 - 450.0 K/uL   Albumin Random Urine Quantitative with Creat Ratio     Status: None   Result Value Ref Range    Creatinine Urine 129 mg/dL    Albumin Urine mg/L 5 mg/L    Albumin Urine mg/g Cr 3.98 0 - 17 mg/g Cr       Options for treatment and follow-up care were reviewed with the patient. Juli Rodriguez  engaged in the decision making process and verbalized understanding of the options discussed and agreed with the final plan.  Ernie Arreola MD  [facility] MEDICINE [facility]

## 2020-02-04 NOTE — PATIENT INSTRUCTIONS
Here is the plan from today's visit    1. Type 2 diabetes mellitus without complication, without long-term current use of insulin (H)  Well controlled  - Hemoglobin A1c ([facility])  - Basic Metabolic Panel ([facility])  - CBC with Diff Plt ([facility])  - Albumin Random Urine Quantitative with Creat Ratio  - OPHTHALMOLOGY ADULT REFERRAL    2. Special screening for malignant neoplasms, colon    - Fecal colorectal cancer screen FITT; Future    3. History of CVA (cerebrovascular accident)  Continue aspirin    4. Benign essential hypertension  controlled    5. CKD (chronic kidney disease) stage 2, GFR 60-89 ml/min  controlled    6. Seizures (H)   continue keppra    7. Trochanteric bursitis of right hip  Follow up for injection      Please call or return to clinic if your symptoms don't go away.    Follow up plan  Return in about 4 weeks (around 3/3/2020) for injection of trochanteric bursa.     Thank you for coming to [facility] today.  Lab Testing:  **If you had lab testing today and your results are reassuring or normal they will be mailed to you or sent through [employer] within 7 days.   **If the lab tests need quick action we will call you with the results.  The phone number we will call with results is # [phone] (home) . If this is not the best number please call our clinic and change the number.  Medication Refills:  If you need any refills please call your pharmacy and they will contact us.   If you need to  your refill at a new pharmacy, please contact the new pharmacy directly. The new pharmacy will help you get your medications transferred faster.   Scheduling:  If you have any concerns about today's visit or wish to schedule another appointment please call our office during normal business hours [phone] (8-5:00 M-F)   eferrals to [facility] please call [phone].   Mammogram Scheduling [phone]     XRay/CT/Ultrasound/MRI Scheduling [phone]    Medical  Concerns:  If you have urgent medical concerns please call [phone] at any time of the day.    Ernie Arreola MD

## 2020-02-04 NOTE — NURSING NOTE
Due to patient being non-English speaking/uses sign language, an  was used for this visit. Only for face-to-face interpretation by an external agency, date and length of interpretation can be found on the scanned worksheet.     name: Wyatt Hobson  Agency: [employer]  Language: South African   Telephone number: [phone]  Type of interpretation: Face-to-face, spoken    Marlin Dejesus CMA

## 2020-02-05 VITALS
TEMPERATURE: 97.4 F | RESPIRATION RATE: 16 BRPM | HEIGHT: 72 IN | OXYGEN SATURATION: 99 % | HEART RATE: 73 BPM | WEIGHT: 209 LBS | DIASTOLIC BLOOD PRESSURE: 85 MMHG | SYSTOLIC BLOOD PRESSURE: 139 MMHG | BODY MASS INDEX: 28.31 KG/M2

## 2020-02-07 ENCOUNTER — APPOINTMENT (OUTPATIENT)
Dept: INTERPRETER SERVICES | Facility: CLINIC | Age: 75
End: 2020-02-07
Payer: COMMERCIAL

## 2020-03-06 ENCOUNTER — OFFICE VISIT (OUTPATIENT)
Dept: FAMILY MEDICINE | Facility: CLINIC | Age: 75
End: 2020-03-06
Payer: COMMERCIAL

## 2020-03-06 VITALS
RESPIRATION RATE: 18 BRPM | HEIGHT: 73 IN | OXYGEN SATURATION: 96 % | TEMPERATURE: 97.9 F | WEIGHT: 210.9 LBS | SYSTOLIC BLOOD PRESSURE: 134 MMHG | DIASTOLIC BLOOD PRESSURE: 85 MMHG | BODY MASS INDEX: 27.95 KG/M2 | HEART RATE: 64 BPM

## 2020-03-06 DIAGNOSIS — M70.61 TROCHANTERIC BURSITIS OF RIGHT HIP: ICD-10-CM

## 2020-03-06 DIAGNOSIS — E11.9 TYPE 2 DIABETES MELLITUS WITHOUT COMPLICATION, WITHOUT LONG-TERM CURRENT USE OF INSULIN (H): Primary | ICD-10-CM

## 2020-03-06 RX ORDER — TRIAMCINOLONE ACETONIDE 40 MG/ML
40 INJECTION, SUSPENSION INTRA-ARTICULAR; INTRAMUSCULAR ONCE
Status: COMPLETED | OUTPATIENT
Start: 2020-03-06 | End: 2020-03-06

## 2020-03-06 RX ADMIN — TRIAMCINOLONE ACETONIDE 40 MG: 40 INJECTION, SUSPENSION INTRA-ARTICULAR; INTRAMUSCULAR at 15:56

## 2020-03-06 ASSESSMENT — MIFFLIN-ST. JEOR: SCORE: 1745.52

## 2020-03-06 ASSESSMENT — PAIN SCALES - GENERAL: PAINLEVEL: SEVERE PAIN (7)

## 2020-03-06 NOTE — PROCEDURES
[facility]   Injection Note    Juli Rodriguez is a patient of Ernie Carey here for injection for trochanteric bursitis of the right greater trochanter.    Consent: Affirmation of informed consent was signed and scanned into the medical record. Risks, benefits and alternatives were discussed. Patient's questions were elicited and answered.   Procedure safety checklist was completed:  Yes  Time Out (Pause for the Cause) completed: Yes    Preoperative Diagnosis: Right trochanteric bursitis  Postoperative Diagnosis: same       The right skin over the right greater trochanter was prepped  in the usual sterile fashion INJECTION:  Using 4 mL of 1% lidocaine mixed with 40 mg of kenalog, the   was successfully injected without complication.  Patient did experience some pain relief following injection.    Technique:   Skin prep Technicare  Anesthesia 1% lidocaine  Complications:  No  Tolerance:  Pt tolerated procedure well and was in stable condition.     Follow up: Patient was instructed to use ice on the affected area tonight for at least 30 minutes and  that there will be a return to pain in a couple hours followed by relief in the next several days to a week. Patient was advised to call if increasing redness and swelling.     Follow up in 2-3 months.    Faculty: Ernie Arreola MD present for and supervised this entire procedure.

## 2020-03-06 NOTE — PROGRESS NOTES
Patient was mainly here for injection of the right greater trochanter though he also requested diabetic shoe prescription this was provided to him as he does have diabetes

## 2020-03-06 NOTE — NURSING NOTE
Due to patient being non-English speaking/uses sign language, an  was used for this visit. Only for face-to-face interpretation by an external agency, date and length of interpretation can be found on the scanned worksheet.     name: Butch Romeo  Language: Finnish   Agency: [employer]   Phone number: [phone]  Type of interpretation: Face-to-face, spoken        Marlin Dejesus CMA

## 2020-03-10 DIAGNOSIS — M79.2 NEUROPATHIC PAIN: ICD-10-CM

## 2020-03-10 NOTE — TELEPHONE ENCOUNTER
"Request for medication refill: gabapentin (NEURONTIN) 400 MG capsule     Providers if patient needs an appointment and you are willing to give a one month supply please refill for one month and  send a letter/MyChart using \".SMILLIMITEDREFILL\" .smillimited and route chart to \"P SMI \" (Giving one month refill in non controlled medications is strongly recommended before denial)    If refill has been denied, meaning absolutely no refills without visit, please complete the smart phrase \".smirxrefuse\" and route it to the \"P SMI MED REFILLS\"  pool to inform the patient and the pharmacy.    Alexandria Brand, [facility]        "

## 2020-03-12 RX ORDER — GABAPENTIN 400 MG/1
400 CAPSULE ORAL 4 TIMES DAILY
Qty: 120 CAPSULE | Refills: 0 | Status: SHIPPED | OUTPATIENT
Start: 2020-03-12 | End: 2020-04-07

## 2020-03-29 ENCOUNTER — TELEPHONE (OUTPATIENT)
Dept: FAMILY MEDICINE | Facility: CLINIC | Age: 75
End: 2020-03-29

## 2020-03-29 DIAGNOSIS — E11.9 TYPE 2 DIABETES MELLITUS WITHOUT COMPLICATION, WITHOUT LONG-TERM CURRENT USE OF INSULIN (H): ICD-10-CM

## 2020-03-29 NOTE — TELEPHONE ENCOUNTER
Message Return    3/29/2020  4:25 PM    Message returned by Jillian Diaz MD    Patient: Juli Rodriguez   Phone number-  [phone] (home)       return their call  Phone conversation with: Patient    Situation: Juli Rodriguez  Is a 75 year old  male who is calling because of:  needs refill, metformin. 500 mg twice a day.   Background : last DM II visit, 2/4/20. At that time DM well controlled, no changes made, plan to follow-up in 3 months.   Lab Results   Component Value Date    A1C 5.5 02/04/2020    A1C 5.5 11/06/2019    A1C 5.8 07/12/2019    A1C 6.4 03/29/2019    A1C 6.5 11/30/2018       Assessment: well controlled DMII  Recommendation/Plan: refilled metfomin without adjustment  Advised caller to  medication at pharmacy.    Anh Diaz MD

## 2020-03-30 ENCOUNTER — TRANSFERRED RECORDS (OUTPATIENT)
Dept: HEALTH INFORMATION MANAGEMENT | Facility: CLINIC | Age: 75
End: 2020-03-30

## 2020-03-30 LAB — RETINOPATHY: NEGATIVE

## 2020-04-07 ENCOUNTER — TELEPHONE (OUTPATIENT)
Dept: FAMILY MEDICINE | Facility: CLINIC | Age: 75
End: 2020-04-07

## 2020-04-07 DIAGNOSIS — M79.2 NEUROPATHIC PAIN: ICD-10-CM

## 2020-04-07 RX ORDER — GABAPENTIN 400 MG/1
400 CAPSULE ORAL 4 TIMES DAILY
Qty: 120 CAPSULE | Refills: 0 | Status: SHIPPED | OUTPATIENT
Start: 2020-04-07 | End: 2020-04-09

## 2020-04-09 ENCOUNTER — TELEPHONE (OUTPATIENT)
Dept: FAMILY MEDICINE | Facility: CLINIC | Age: 75
End: 2020-04-09

## 2020-04-09 DIAGNOSIS — M79.2 NEUROPATHIC PAIN: ICD-10-CM

## 2020-04-09 RX ORDER — GABAPENTIN 400 MG/1
400 CAPSULE ORAL 4 TIMES DAILY
Qty: 120 CAPSULE | Refills: 0 | Status: SHIPPED | OUTPATIENT
Start: 2020-04-09 | End: 2020-04-10

## 2020-04-09 NOTE — TELEPHONE ENCOUNTER
Message Return  4/9/2020  5:51 PM    Message returned by Willie Guzman MD  Patient: Juli Rodriguez   Phone number-  [phone] (home)       return their call  Phone conversation with: Patient. Needs .   number: [phone]    Situation: Juli Rodriguez  Is a 75 year old male who is calling because of  Refill request of gabapentin.    Refilled medicine to [facility] on [address].     Willie Guzman MD  [facility] PGY-2

## 2020-04-10 ENCOUNTER — TELEPHONE (OUTPATIENT)
Dept: FAMILY MEDICINE | Facility: CLINIC | Age: 75
End: 2020-04-10

## 2020-04-10 ENCOUNTER — VIRTUAL VISIT (OUTPATIENT)
Dept: FAMILY MEDICINE | Facility: CLINIC | Age: 75
End: 2020-04-10
Payer: COMMERCIAL

## 2020-04-10 DIAGNOSIS — M79.2 NEUROPATHIC PAIN: ICD-10-CM

## 2020-04-10 DIAGNOSIS — M70.61 GREATER TROCHANTERIC BURSITIS OF RIGHT HIP: ICD-10-CM

## 2020-04-10 RX ORDER — GABAPENTIN 400 MG/1
800 CAPSULE ORAL 3 TIMES DAILY
Qty: 540 CAPSULE | Refills: 0 | Status: SHIPPED | OUTPATIENT
Start: 2020-04-10 | End: 2020-09-18

## 2020-04-10 RX ORDER — NAPROXEN 500 MG/1
500 TABLET ORAL 2 TIMES DAILY WITH MEALS
Qty: 180 TABLET | Refills: 3 | Status: SHIPPED | OUTPATIENT
Start: 2020-04-10 | End: 2020-11-06

## 2020-04-10 RX ORDER — GABAPENTIN 800 MG/1
800 TABLET ORAL 3 TIMES DAILY
Qty: 270 TABLET | Refills: 1 | Status: SHIPPED | OUTPATIENT
Start: 2020-04-10 | End: 2020-09-18

## 2020-04-10 ASSESSMENT — PAIN SCALES - GENERAL: PAINLEVEL: EXTREME PAIN (8)

## 2020-04-10 NOTE — PATIENT INSTRUCTIONS
Here is the plan from today's visit    1. Neuropathic pain  Increase the medication as below  - gabapentin (NEURONTIN) 400 MG capsule; Take 2 capsules (800 mg) by mouth 3 times daily 1 capsule in AM, 1 in PM, 2 before sleep  Dispense: 540 capsule; Refill: 0    2. Greater trochanteric bursitis of right hip  Add this new medication  - naproxen (NAPROSYN) 500 MG tablet; Take 1 tablet (500 mg) by mouth 2 times daily (with meals)  Dispense: 180 tablet; Refill: 3      Please call or return to clinic if your symptoms don't go away.    Follow up plan  Return in about 3 months (around 7/10/2020), or if symptoms worsen or fail to improve, for Diabetes Routine Check.   If you do not improve follow up for a in clinic visit  Thank you for coming to [facility] today.  Lab Testing:  **If you had lab testing today and your results are reassuring or normal they will be mailed to you or sent through [employer] within 7 days.   **If the lab tests need quick action we will call you with the results.  The phone number we will call with results is # [phone] (home) . If this is not the best number please call our clinic and change the number.  Medication Refills:  If you need any refills please call your pharmacy and they will contact us.   If you need to  your refill at a new pharmacy, please contact the new pharmacy directly. The new pharmacy will help you get your medications transferred faster.   Scheduling:  If you have any concerns about today's visit or wish to schedule another appointment please call our office during normal business hours [phone] (8-5:00 M-F)   eferrals to [facility] please call [phone].   Mammogram Scheduling [phone]     XRay/CT/Ultrasound/MRI Scheduling [phone]    Medical Concerns:  If you have urgent medical concerns please call [phone] at any time of the day.    Ernie Arreola MD

## 2020-04-10 NOTE — TELEPHONE ENCOUNTER
[facility] phone call message- medication clarification/question:    Full Medication Name: gabapentin (NEURONTIN) 400 MG capsule        Dose: Route: Take 2 capsules (800 mg) by mouth 3 times daily 1 capsule in AM, 1 in PM, 2 before sleep - Oral    Question/Clarification needed: Ange, Pharmacist states medication is conflicting, needs clarification if pt needs 400mg or 800mg tablets.     Pharmacy confirmed as   [facility][id number] [address] AT [address] [address]  Phone: [phone] Fax: [phone]      : Yes    Please leave ONLY preferred pharmacy    OK to leave a message on voice mail? Yes    Advised patient that RN would call back within 3 hours, unless emergent.    Primary language: Spanish      needed? Yes    Call taken on April 10, 2020 at 11:22 AM by Jessica Leon    Route to [facility]

## 2020-04-10 NOTE — PROGRESS NOTES
"Family Medicine Telephone Visit Note               Telephone Visit Consent   Patient was verbally read the following and verbal consent was obtained.  \"I understand that I may revoke this request for a phone visit at any time.  This consent will automatically  3 months from the signed date and time.\"    Name person giving consent:  Patient   Date verbal consent given:  4/10/2020  Time verbal consent given:  10:23 AM    Chief Complaint   Patient presents with     Musculoskeletal Problem     right side hip pain x's 1 mo. Had steriod injection 1 mo ago with no relief. Pain has increased per pt and tylenol not helping.      Refill Request     gabapentin,tylenol,Senokot Protonix        Due to patient being non-English speaking/uses sign language, an  was used for this visit. Only for face-to-face interpretation by an external agency, date and length of interpretation can be found on the scanned worksheet.     name: ID # [id number]  Agency: [employer] - telephone  Language: Shoopi   Telephone number: [phone]  Type of interpretation: Telephone, spoken         HPI   Patients name: Juli  Appointment start time:  10:35 AM    Right Greater Trochanteric Bursitis  No benefit with the injection of steroids a month ago. Has been taking increased amounts of the gabapentin 6-7 tablets a day this has improved his pain though it is not completely resolved.     Diabetes   He has been continuing his medications and   Lab Results   Component Value Date    A1C 5.5 2020    A1C 5.5 2019    A1C 5.8 2019    A1C 6.4 2019    A1C 6.5 2018     ROS  Feeling some numbness on the right side since CVA no new symptoms 7 systems were negative      Current Outpatient Medications   Medication Sig Dispense Refill     acetaminophen (TYLENOL) 500 MG tablet Take 2 tablets (1,000 mg) by mouth 3 times daily as needed for mild pain 200 tablet 3     aspirin 81 MG tablet Take 1 tablet (81 mg) " by mouth daily 90 tablet 3     atorvastatin (LIPITOR) 40 MG tablet Take 1 tablet (40 mg) by mouth daily 90 tablet 3     blood glucose (NO BRAND SPECIFIED) test strip Use to test blood sugars 2 times daily or as directed 100 strip 11     blood glucose (NO BRAND SPECIFIED) test strip Use to test blood sugar 2 times daily or as directed. (Ultra 2 Strips) 100 strip 3     blood glucose monitoring (NO BRAND SPECIFIED) meter device kit Use to test blood sugar 2-3 times daily or as directed. 1 kit 0     carvedilol (COREG) 12.5 MG tablet Take 1 tablet (12.5 mg) by mouth 2 times daily (with meals) 60 tablet 3     gabapentin (NEURONTIN) 400 MG capsule Take 2 capsules (800 mg) by mouth 3 times daily 1 capsule in AM, 1 in PM, 2 before sleep 540 capsule 0     levETIRAcetam (KEPPRA) 500 MG tablet Take 1 tablet (500 mg) by mouth 2 times daily (Patient taking differently: Take 500 mg by mouth 2 times daily ) 60 tablet 1     lisinopril (PRINIVIL/ZESTRIL) 5 MG tablet Take 1 tablet (5 mg) by mouth daily 90 tablet 3     loratadine (CLARITIN) 10 MG tablet Take 1 tablet (10 mg) by mouth daily 90 tablet 1     metFORMIN (GLUCOPHAGE) 500 MG tablet Take 1 tablet (500 mg) by mouth 2 times daily (with meals) 180 tablet 1     naproxen (NAPROSYN) 500 MG tablet Take 1 tablet (500 mg) by mouth 2 times daily (with meals) 180 tablet 3     order for DME Diabetic shoes One Pair 1 each 0     order for DME Equipment being ordered: Diabetic Shoes 1 each 1     pantoprazole (PROTONIX) 20 MG EC tablet Take 2 tablets (40 mg) by mouth every morning (before breakfast) 180 tablet 3     sennosides (SENOKOT) 8.6 MG tablet Take 2 tablets by mouth daily 60 tablet 3     Allergies   Allergen Reactions     Eggs [Chicken-Derived Products (Egg)]               Review of Systems:     ROS COMP: Constitutional, HEENT, cardiovascular, pulmonary, gi and gu systems are negative, except as otherwise noted.         Physical Exam:     There were no vitals taken for this  "visit.  Estimated body mass index is 27.82 kg/m  as calculated from the following:    Height as of 3/6/20: 1.854 m (6' 1\").    Weight as of 3/6/20: 95.7 kg (210 lb 14.4 oz).             Assessment and Plan   1. Neuropathic pain  Will increase gabapentin as below as it has been helping.   - gabapentin (NEURONTIN) 400 MG capsule; Take 2 capsules (800 mg) by mouth 3 times daily 1 capsule in AM, 1 in PM, 2 before sleep  Dispense: 540 capsule; Refill: 0    2. Greater trochanteric bursitis of right hip  Continue to use ice daily will add an NSAID for increased pain relief.   - naproxen (NAPROSYN) 500 MG tablet; Take 1 tablet (500 mg) by mouth 2 times daily (with meals)  Dispense: 180 tablet; Refill: 3    Refilled medications that would be required in the next 3 months.     After Visit Information:  Will print and mail AVS     Return in about 3 months (around 7/10/2020), or if symptoms worsen or fail to improve, for Diabetes Routine Check.  If not improved follow up for an inperson visit  Appointment end time: 10:52 AM  This is a telephone visit that took 17 minutes.      Clinician location:  [facility]    Ernie Arreola MD                 "

## 2020-04-10 NOTE — TELEPHONE ENCOUNTER
Message Return    4/9/2020  7:35 PM    Message returned by Kelly Starkey MD    Patient: Juli Rodriguez   Phone number-  [phone] (home)       return their call  Phone conversation with: Patient    Situation: Juli Rodriguez  Is a 75 year old  male who is calling because of pain and running out of gabapentin  Background : patient has been having bilateral hip pain, previously diagnosed as trochanteric bursitis for which he takes gabapentin. He states pain has been worsening over the last month and has used the gabapentin more than prescribed.     Assessment:   Likely worsening of trochanteric bursitis    Recommendation/Plan  Advised caller to  call clinic in AM for same day appointment, as I am not able to prescribe anything additional tonight. Also encouraged patient to use a homemade heat pack or cold pack to help with pain tonight.     For clinic visit, consider gluteal tendon tear as a side effect of steroid injection of trochanteric bursa. Consider MRI. At this time, not clear that this is the etiology.    Kelly Starkey MD PGY-1  [facility]      Brief addendum:  Initially tried calling patient and left a message using [employer]  #[id number]    On second attempt when I was able to talk with patient, Codewise  #[id number] was used.

## 2020-05-15 DIAGNOSIS — I10 BENIGN ESSENTIAL HYPERTENSION: ICD-10-CM

## 2020-05-15 NOTE — TELEPHONE ENCOUNTER

## 2020-05-18 RX ORDER — CARVEDILOL 12.5 MG/1
12.5 TABLET ORAL 2 TIMES DAILY WITH MEALS
Qty: 60 TABLET | Refills: 3 | Status: SHIPPED | OUTPATIENT
Start: 2020-05-18 | End: 2020-09-14

## 2020-06-12 DIAGNOSIS — I10 BENIGN ESSENTIAL HYPERTENSION: ICD-10-CM

## 2020-06-12 RX ORDER — LISINOPRIL 5 MG/1
5 TABLET ORAL DAILY
Qty: 90 TABLET | Refills: 3 | Status: SHIPPED | OUTPATIENT
Start: 2020-06-12 | End: 2020-10-21

## 2020-06-12 NOTE — TELEPHONE ENCOUNTER

## 2020-06-16 ENCOUNTER — DOCUMENTATION ONLY (OUTPATIENT)
Dept: FAMILY MEDICINE | Facility: CLINIC | Age: 75
End: 2020-06-16

## 2020-06-26 NOTE — PROGRESS NOTES
Telephone call to the client's home for annual health risk assessment and PCA assessment.  An  was present.    Current situation/living environment  Alan lives in [facility].  Telephone assessment completed d/t covid-19 outbreak.    Activities of daily living (ADL)/instrumental activities of daily living (IADL) and functional issues  Client needs help with the following ADL's: dressing, grooming, bathing, transfers, walking and toileting  Client needs help with the following IADL's: shopping, cooking, housekeeping, laundry, managing finances/bills and transportation  Client states he is unable to perform the above due to poor balance, R sided pain, language barrier.      Health concerns for today  Cleddie has issues as listed in Epic.  He c/o pain to his R side and pain increases when he stands for too long.  It also affects his sleep and he has difficulty sleeping on one side for long.  He was given FIT kit 2/2020 but it has not been returned yet.  Has patient fallen 2 or more times in the last year? No  Has patient fallen with injury in the last year? No    Cognition/mental health  Alert and oriented and is able to make his needs known    STARS/Med Adherence  Client is non-compliant with the following quality measures: Colon cancer screening  Comments: FIT kit given    Client's Plan of Care consists of:  Adult day care (2 days per week), currently on hold d/t covid-19 outbreak, Homemaker services (5 hours per week), Personal care assistance (PCA) (3.5 hours per day), Specialized supplies and equipment (lift chair, a/c unit, bathroom grab bars, cane, walker, shower chair, raised toilet seat, hh shower, orthotic shoes, reacher, leg brace) and Transportation

## 2020-07-03 ENCOUNTER — TELEPHONE (OUTPATIENT)
Dept: FAMILY MEDICINE | Facility: CLINIC | Age: 75
End: 2020-07-03

## 2020-07-03 ENCOUNTER — DOCUMENTATION ONLY (OUTPATIENT)
Dept: FAMILY MEDICINE | Facility: CLINIC | Age: 75
End: 2020-07-03

## 2020-07-03 DIAGNOSIS — E11.9 TYPE 2 DIABETES MELLITUS WITHOUT COMPLICATION, WITHOUT LONG-TERM CURRENT USE OF INSULIN (H): Primary | ICD-10-CM

## 2020-07-03 DIAGNOSIS — M79.2 NEUROPATHIC PAIN: ICD-10-CM

## 2020-07-03 NOTE — TELEPHONE ENCOUNTER
[facility] phone call message- general phone call:    Reason for call: Patient calling to speak with provider about shoes.    Action Desired: Please return call.    Return call needed: Yes    OK to leave a message on voice mail? Yes    Advised patient response may take up to 2 business days: Yes    Primary language: Czech      needed? Yes    Call taken on July 3, 2020 at 8:57 AM by Carmela Root    Route to [facility] TRIAGE

## 2020-07-03 NOTE — TELEPHONE ENCOUNTER
RN contacted patient via language line [phone] and he states he is trying to get his diabetic shoes and the company needs a new order. RN asked him what the company name is that he is using and he did not know, but he provided me their phone number ([phone]). RN created new order, called their office and could not reach anyone. Left message requesting that they call me back to provide the fax number and their company name as I could not understand what they said in the voicemail (therefore could not research online).    When company calls back, please obtain the name of company and fax number to send diabetic shoe order, and route to RN.  Malika Humphrey RN

## 2020-07-03 NOTE — PROGRESS NOTES
"When opening a documentation only encounter, be sure to enter in \"Chief Complaint\" Forms and in \" Comments\" Title of form, description if needed.    Juli is a 75 year old  male  Form received via: Fax  Form now resides in: Provider JAE Akins 9:08 AM July 3, 2020                    "

## 2020-07-06 ENCOUNTER — MEDICAL CORRESPONDENCE (OUTPATIENT)
Dept: HEALTH INFORMATION MANAGEMENT | Facility: CLINIC | Age: 75
End: 2020-07-06

## 2020-07-06 NOTE — PROGRESS NOTES
Form has been completed by provider.     Form sent out via: Fax to [employer] at Fax Number: [phone]  Patient informed: N/A  Output date: July 6, 2020    Alexandria Brand CMA      **Please close the encounter**

## 2020-07-10 NOTE — TELEPHONE ENCOUNTER
Patient calling to check status of shoes. Advised that order was sent 7/9/20. Patient expressed understanding.

## 2020-07-17 DIAGNOSIS — K21.9 GASTROESOPHAGEAL REFLUX DISEASE WITHOUT ESOPHAGITIS: ICD-10-CM

## 2020-07-17 NOTE — TELEPHONE ENCOUNTER
"Request for medication refill: Pantoprazole 20mg    Providers if patient needs an appointment and you are willing to give a one month supply please refill for one month and  send a letter/MyChart using \".SMILLIMITEDREFILL\" .smillimited and route chart to \"P SMI \" (Giving one month refill in non controlled medications is strongly recommended before denial)    If refill has been denied, meaning absolutely no refills without visit, please complete the smart phrase \".smirxrefuse\" and route it to the \"P SMI MED REFILLS\"  pool to inform the patient and the pharmacy.    Yessica Naylor, CMA        "

## 2020-07-19 RX ORDER — PANTOPRAZOLE SODIUM 20 MG/1
40 TABLET, DELAYED RELEASE ORAL
Qty: 180 TABLET | Refills: 3 | Status: SHIPPED | OUTPATIENT
Start: 2020-07-19 | End: 2021-03-16

## 2020-07-23 ENCOUNTER — DOCUMENTATION ONLY (OUTPATIENT)
Dept: FAMILY MEDICINE | Facility: CLINIC | Age: 75
End: 2020-07-23

## 2020-07-23 NOTE — PROGRESS NOTES
"When opening a documentation only encounter, be sure to enter in \"Chief Complaint\" Forms and in \" Comments\" Title of form, description if needed.    Juli is a 75 year old  male  Form received via: Patient Drop Off  Form now resides in: Provider Ready    Alexandria Brand CMA                .Form has been completed by provider.     Form sent out via: Fax to [facility] at Fax Number: [phone] ( Fax number does not work, attempted multiple times. Contacted [facility] they stated the fax number is correct and is the only fax number they have. They stated a staff member would com pick the form up A copy of the form was made and the original place at .)  Patient informed: N/A  Output date: July 23, 2020    Alexandria Brand CMA      **Please close the encounter**      "

## 2020-07-30 ENCOUNTER — OFFICE VISIT (OUTPATIENT)
Dept: FAMILY MEDICINE | Facility: CLINIC | Age: 75
End: 2020-07-30
Payer: COMMERCIAL

## 2020-07-30 VITALS
SYSTOLIC BLOOD PRESSURE: 125 MMHG | WEIGHT: 210 LBS | HEART RATE: 74 BPM | BODY MASS INDEX: 27.71 KG/M2 | DIASTOLIC BLOOD PRESSURE: 76 MMHG | OXYGEN SATURATION: 97 %

## 2020-07-30 DIAGNOSIS — E11.9 TYPE 2 DIABETES MELLITUS WITHOUT COMPLICATION, WITHOUT LONG-TERM CURRENT USE OF INSULIN (H): Primary | ICD-10-CM

## 2020-07-30 DIAGNOSIS — Z00.00 MEDICARE ANNUAL WELLNESS VISIT, INITIAL: ICD-10-CM

## 2020-07-30 LAB
CHOLEST SERPL-MCNC: 83.4 MG/DL (ref 0–200)
CHOLEST/HDLC SERPL: 2.8 {RATIO} (ref 0–5)
HBA1C MFR BLD: 6 % (ref 4.1–5.7)
HDLC SERPL-MCNC: 30.1 MG/DL
LDLC SERPL CALC-MCNC: 41 MG/DL (ref 0–129)
TRIGL SERPL-MCNC: 61.2 MG/DL (ref 0–150)
VLDL CHOLESTEROL: 12.2 MG/DL (ref 7–32)

## 2020-07-30 NOTE — PATIENT INSTRUCTIONS
Scheduling:  If you had an XRay/CT/Ultrasound/MRI ordered the number is [phone] to schedule or change your radiology appointment.   [place] MEDICARE PERSONAL PREVENTIVE SERVICES PLAN - SERVICES  For Men   Review these tests with your doctor then decide which ones you want and take this page home for your reference     Immunization History   Administered Date(s) Administered     Influenza (High Dose) 3 valent vaccine 11/06/2019     Influenza Vaccine IM > 6 months Valent IIV4 11/13/2016     Pneumo Conj 13-V (2010&after) 05/17/2017     Pneumococcal 23 valent 07/12/2019     TDAP Vaccine (Boostrix) 05/17/2017                                                       IMMUNIZATIONS Description Recommend today?     Influenza (flu shot) Helps to prevent flu; you should get it every year No; is up to date.   PCV 13 Prevents pneumonia; given once No; is up to date.   PPSV 23 Prevents pneumonia; given once No; is up to date.   Tetanus Prevents tetanus; need every 10 years No; is up to date.   Herpes Zoster (shingles) Prevents or lessens symptoms from shingles; given once.  Recommeded and patient declined.   If you want this vaccine please go to a pharmacy to receive Shinrix   Hepatitis B  If you have any of the following risk factors you should be immunized for hepatitis B: severe kidney disease, people who live in the same house as a carrier of Hepatitis B virus, people who live in  institutions (e.g. nursing homes or group homes), homosexual men, patients with hemophilia who received Factor VIII or IX concentrates, abusers of illicit injectable drugs No; is up to date.     Health Maintenance   Topic Date Due     HEPATITIS B IMMUNIZATION (1 of 3 - Risk 3-dose series) 01/01/1964     ZOSTER IMMUNIZATION (1 of 2) 01/01/1995     COLORECTAL CANCER SCREENING  12/07/2019     LIPID  07/12/2020     DIABETIC FOOT EXAM  07/12/2020     FALL RISK ASSESSMENT  07/12/2020     MEDICARE ANNUAL WELLNESS VISIT  07/12/2020     BMP  08/04/2020      CBC W/DIFFERENTIAL  08/04/2020     INFLUENZA VACCINE (1) 09/01/2020     A1C  10/30/2020     MICROALBUMIN  02/04/2021     EYE EXAM  03/30/2021     ADVANCE CARE PLANNING  07/12/2024     DTAP/TDAP/TD IMMUNIZATION (2 - Td) 05/17/2027     HEPATITIS C SCREENING  Completed     PHQ-2  Completed     PNEUMOCOCCAL IMMUNIZATION 65+ LOW/MEDIUM RISK  Completed     AORTIC ANEURYSM SCREENING (SYSTEM ASSIGNED)  Completed     IPV IMMUNIZATION  Aged Out     MENINGITIS IMMUNIZATION  Aged Out         SCREENING TESTS     Description   Year of Last Screening   Recommended Today?   Heart disease screening blood tests    Cholesterol level Reducing cholesterol can reduce your risk of heart attacks by 25%.  Screening is recommended yearly if you are at risk of heart disease otherwise every 4-5 years  No: is not indicated today.     Diabetes screening tests    Hemoglobin A1c blood test   Finding and treating diabetes early can reduce complications.  Screening recommended/covered yearly if you have high blood pressure, high cholesterol, obesity (BMI >30), or a history of high blood glucose tests; or 2 of the following: family history of diabetes, overweight (BMI >25 but <30), age 65 years or older, and a history of diabetes of pregnancy or gave birth to baby weighing more than 9 lbs.    Yes; Recommended and ordered.   Hepatitis B screening Finding hepatitis B early can reduce complications.  Screening is recommended for persons with selected risk factors.  No: is not indicated today.   Hepatitis C screening Finding hepatitis C early can reduce complications.  Screening is recommended for all persons born from 1945 through 1965 and for those with selected other risk factors.   No: is not indicated today.   HIV screening Finding HIV early can reduce complications.  Screening is recommended for persons with risk factors for HIV infection.  No: is not indicated today.   Glaucoma screening Early detection of glaucoma can prevent blindness.    Please talk to your eye doctor about this.       SCREENING TESTS     Description   Year of Last Screening   Recommended Today?   Colorectal cancer screening    Fecal occult blood test     Screening colonoscopy Screening for colon cancer has been shown to reduce death from colon cancer by 25-30%. Screening recommended to start at 50 years and continuing until age 75 years.    Yes; Recommended and ordered.   Screening for Lung Cancer     Low-dose CT scanning Screening can reduce mortality in persons aged 55-80 who have smoked at least 30 pack-years and who are either still smoking or have quit in the past 15 years.  No: is not indicated today.   Abdominal Aortic Aneurysm (AAA) screening    Ultrasound (US)   An aneurysm treated before rupture is very safe -a ruptured aneurysm can be fatal.  Screening  by US for AAA is limited to patients who meet one of the following criteria:    Men who are 65-75 years old and have smoked more than 100 cigarettes in their lifetime    Anyone with a family history of abdominal aortic aneurysm  No: is not indicated today.     MEDICARE WELLNESS EXAM PATIENT INSTRUCTIONS    Yearly exam:     See your health care provider every year in order to review changes in your health, review medicines that you take, and discuss preventive care needs such as immunizations and cancer screening.    Get a flu shot each year.     Advance Directive:    If you have not done so, you are encouraged to complete an advance directive. If you would like support with this, please contact the clinic  through the main clinic line. More information about advance directives can be found at:     [email or web address]    Nutrition:     Eat at least 5 servings of fruits and vegetables each day.     Eat whole-grain bread, whole-wheat pasta and brown rice instead of white grains and rice.     Talk to your doctor about Calcium and Vitamin D.     Lifestyle:    Exercise  for at least 150 minutes a week (30 minutes a day, 5 days a week). This will help you control your weight and prevent disease.     Limit alcohol to one drink per day.     If you smoke, try to quit - your doctor will be happy to help.     Wear sunscreen to prevent skin cancer.     See your dentist every six months for an exam and cleaning.     See your eye doctor every 1 to 2 years to screen for conditions such as glaucoma, macular degeneration and cataracts.

## 2020-07-30 NOTE — NURSING NOTE
Due to patient being non-English speaking/uses sign language, an  was used for this visit. Only for face-to-face interpretation by an external agency, date and length of interpretation can be found on the scanned worksheet.     name: Whitney  Agency: [employer] - telephone  Language: Nigerien   Telephone number: N/A  ID:  [id number]  Type of interpretation: Telephone, spoken     Alexandria Brand CMA on 7/30/2020 at 11:52 AM

## 2020-07-30 NOTE — PROGRESS NOTES
>65 year old Wellness Visit ( Medicare etc)         HPI       This 75 year old male presents as an established patient  Ernie Arreola who presents for a  Annual Wellness Exam.    Other issues patient wants to address today:    none      A [employer]  was used for  this visit.   Visual Acuity: :  Testing not done; patient has seen eye doctor in the past 12 months.    Patient Active Problem List   Diagnosis     Type 2 diabetes mellitus without complication, without long-term current use of insulin (H)     History of CVA (cerebrovascular accident)     Benign essential hypertension     Right sided weakness     Seizures (H)     Gastroesophageal reflux disease without esophagitis     Other hyperlipidemia     Advanced directives, counseling/discussion     CKD (chronic kidney disease) stage 2, GFR 60-89 ml/min       Past Medical History:   Diagnosis Date     Cerebral infarction (H) 08/2013    In [facility]     Diabetes mellitus, type 2 (H)      Stroke (H)         Family History   Problem Relation Age of Onset     Diabetes No family hx of      Coronary Artery Disease No family hx of      Hypertension No family hx of      Hyperlipidemia No family hx of      Cerebrovascular Disease No family hx of      Breast Cancer No family hx of      Colon Cancer No family hx of      Prostate Cancer No family hx of      Other Cancer No family hx of      Depression No family hx of      Anxiety Disorder No family hx of      Mental Illness No family hx of      Substance Abuse No family hx of      Anesthesia Reaction No family hx of      Asthma No family hx of      Osteoporosis No family hx of      Genetic Disorder No family hx of      Thyroid Disease No family hx of      Obesity No family hx of      Glaucoma No family hx of      Macular Degeneration No family hx of          Problem List, Family History and past Medical History reviewed and unchanged/updated.       Health risk Assessment/ Review of Systems:          Constitutional:    Fevers or night sweats?  NO      Eyes:   Vision problems?  NO            Hearing:  Do you feel you have hearing loss?  NO  Cardiovascular:  Chest pain, palpitations, or pain with walking?  NO        Respiratory:   Breathing problems or cough?  NO    :   Difficulty controlling urination?  Some leakage  Happens at night in bed, having to get up 4-5 times at night,   Musculoskeletal:   Stiffness or sore joints?  NO            Skin:   concerning lesions or moles?  NO           Nervous System:   loss of strength or sensation,  numbness or tingling,  tremor,  dizziness,  headache?  NO         Mental Health:   depression or anxiety,  sleep problems?  NO   Cognition:  Memory problems?  NO       Weight Loss: Have you lost 10 or more pounds unintentionally in the previous year?  NO  Energy: How much of the time during the past 3 weeks did you feel tired?   (check one of the following):   ?  All of the time  ? Most of the time  ? Some of the time  ? A little of the time  ? None of the Time    PHQ-2 Score:   PHQ-2 ( 1999 Pfizer) 7/30/2020 4/10/2020   Q1: Little interest or pleasure in doing things 0 0   Q2: Feeling down, depressed or hopeless 0 0   PHQ-2 Score 0 0     Results for orders placed or performed in visit on 07/30/20 (from the past 24 hour(s))   Hemoglobin A1c ([facility])   Result Value Ref Range    Hemoglobin A1C 6.0 (H) 4.1 - 5.7 %     Hemoglobin A1C   Date Value Ref Range Status   07/30/2020 6.0 (H) 4.1 - 5.7 % Final   02/04/2020 5.5 4.1 - 5.7 % Final   11/06/2019 5.5 4.1 - 5.7 % Final     PHQ-9 Score:   No flowsheet data found.  Medical Care:     What other specialists or organizations are involved in your medical care?    Patient Care Team       Relationship Specialty Notifications Start End    Ernie Arreola MD PCP - General Family Practice  4/19/17     Phone: [phone] Fax: [phone]         [address] [address]    Leilani Gresham OD  Optometry  4/25/17     Phone:  [phone] Fax: [phone]         [address] [facility] [address] [facility] [address]    Mo Aguilar RN Clinic Care Coordinator  Admissions 7/4/18     [phone]     [facility] [id number]    Ernie Arreola MD Assigned PCP   5/21/20     Phone: [phone] Fax: [phone]         [address] [facility] [address] [facility] [address]          Do you have an advanced directive? Yes      Social History / Home Safety     Social History     Tobacco Use     Smoking status: Never Smoker     Smokeless tobacco: Never Used   Substance Use Topics     Alcohol use: No     Marital Status:Single  Who lives in your household? Self  Does your home have any of the following safety concerns? Loose rugs in the hallway, no grab bars in the bathroom, no handrails on the stairs or have poorly lit areas?  No   Do you feel threatened or controlled by a partner, ex-partner or anyone in your life? {Yes No   Has anyone hurt you physically, for example by pushing, hitting, slapping or kicking you   or forcing you to have sex? No       Functional Status     Do you need help with dressing yourself, bathing, or walking? YES Dressing  Do you need help with the phone, transportation, shopping, preparing meals, housework, laundry, medications or managing money? YES   By yourself and not using aids, do you have any difficulty walking up 10 steps  without resting?  YES   By yourself and not using aids, do you have any difficulty walking several hundred yards?  YES              Risk Behaviors and Healthy Habits     History   Smoking Status     Never Smoker   Smokeless Tobacco     Never Used     How many servings of fruits and vegetables do you eat a day? 1 Each  Exercise:No exercise None  Do you frequently drive without a seatbelt? No   History   Smoking Status     Never Smoker   Smokeless Tobacco     Never Used     Do you use any other drugs? No       Do you use alcohol?No      Functional Ability   Was the patient's timed Up & Go test (Get up from  chair walk, 10 feet turn, return to chair and sit down) unsteady or longer than 10 seconds? No     Fall risk:     1. Have you fallen two or more times in the past year? No   2. Have you fallen and had an injury in the past year?  No       Evaulation of Cognitive Function     Family member/caregiver input: No family member available to get information.    1) Repeat 3 items (Leader, Season, Table)    2) Clock draw: ABNORMAL Wrote the time instead of drawing   3) 3 item recall: Recalls 3 objects  Results: ABNORMAL clock, 1-2 items recalled: PROBABLE COGNITIVE IMPAIRMENT, **INFORM PROVIDER**    Mini-CogTM Copyright S Corina. Licensed by the author for use in [facility]; reprinted with permission (sekou@[email or web address]). All rights reserved.            Other Assessments:     CV Risk based on Pooled Cohort Risk (consider assessing every 4-6 years; consider statin in patients with 10-yr risk > 7.5%):    The ASCVD Risk score (Tampasalas ROWE Jr., et al., 2013) failed to calculate for the following reasons:    The patient has a prior MI or stroke diagnosis    Advance Directives: Discussed with patient and family as appropriate.  Has patient completed advance directives? No, advance care planning information given to patient to review.  Patient plans to discuss their wishes with loved ones or provider.      Immunization History   Administered Date(s) Administered     Influenza (High Dose) 3 valent vaccine 11/06/2019     Influenza Vaccine IM > 6 months Valent IIV4 11/13/2016     Pneumo Conj 13-V (2010&after) 05/17/2017     Pneumococcal 23 valent 07/12/2019     TDAP Vaccine (Boostrix) 05/17/2017     Reviewed Immunization Record Today    Physical Exam     Vitals: /76 (BP Location: Left arm, Patient Position: Sitting, Cuff Size: Adult Large)   Pulse 74   Wt 95.3 kg (210 lb)   SpO2 97%   BMI 27.71 kg/m    BMI= Body mass index is 27.71 kg/m .  GENERAL APPEARANCE: alert and no distress  HENT: ear canals patent and TM's normal;  mouth without ulcers or lesions; and oral mucous membranes moist  Hearing loss screen:   Normal   DENTITION:  Good repair?  yes  RESP: lungs clear to auscultation - no rales, rhonchi or wheezes  CV: regular rates and rhythm, no murmur, click or rub, no peripheral edema and peripheral pulses strong  SKIN: no suspicious lesions or rashes  NEURO: Normal strength and tone  MENTAL STATUS EXAM  Appearance: appropriate  Attitude: cooperative  Behavior: normal  Eye Contact: normal  Speech: normal  Orientation: oreinted to person , place, time and situation  Mood:  euthymic  Affect: Mood Congruent  Thought Process: clear        Assessment and Plan         Welcome to Medicare Preventive Visit / Initial Medicare Annual Wellness Exam - reviewed Preventive Services and Plan form with patient as specified in Patient Instructions.    1. Type 2 diabetes mellitus without complication, without long-term current use of insulin (H)  Diabetes is well controlled.  - Hemoglobin A1c ([facility])  - Lipid Cascade ([facility])  - C FOOT EXAM  NO CHARGE    2. Medicare annual wellness visit, initial  Medicare wellness visit done.      Options for treatment and follow-up care were reviewed with the Juli Carosnin and/or guardian engaged in the decision making process and verbalized understanding of the options discussed and agreed with the final plan.    Ernie Arreola MD

## 2020-09-14 DIAGNOSIS — I10 BENIGN ESSENTIAL HYPERTENSION: ICD-10-CM

## 2020-09-14 RX ORDER — CARVEDILOL 12.5 MG/1
12.5 TABLET ORAL 2 TIMES DAILY WITH MEALS
Qty: 60 TABLET | Refills: 3 | Status: SHIPPED | OUTPATIENT
Start: 2020-09-14 | End: 2020-09-18

## 2020-09-14 NOTE — TELEPHONE ENCOUNTER
"Request for medication refill:  CARVEDILOL 12.5MG  Providers if patient needs an appointment and you are willing to give a one month supply please refill for one month and  send a letter/MyChart using \".SMILLIMITEDREFILL\" .smillimited and route chart to \"P SMI \" (Giving one month refill in non controlled medications is strongly recommended before denial)    If refill has been denied, meaning absolutely no refills without visit, please complete the smart phrase \".smirxrefuse\" and route it to the \"P SMI MED REFILLS\"  pool to inform the patient and the pharmacy.    Yessica Naylor, [facility]        "

## 2020-09-18 ENCOUNTER — OFFICE VISIT (OUTPATIENT)
Dept: FAMILY MEDICINE | Facility: CLINIC | Age: 75
End: 2020-09-18
Payer: COMMERCIAL

## 2020-09-18 VITALS
HEIGHT: 72 IN | OXYGEN SATURATION: 99 % | TEMPERATURE: 98.1 F | DIASTOLIC BLOOD PRESSURE: 87 MMHG | HEART RATE: 107 BPM | BODY MASS INDEX: 27.82 KG/M2 | RESPIRATION RATE: 18 BRPM | WEIGHT: 205.4 LBS | SYSTOLIC BLOOD PRESSURE: 129 MMHG

## 2020-09-18 DIAGNOSIS — M79.2 NEUROPATHIC PAIN: ICD-10-CM

## 2020-09-18 DIAGNOSIS — M70.61 GREATER TROCHANTERIC BURSITIS OF RIGHT HIP: ICD-10-CM

## 2020-09-18 DIAGNOSIS — M71.9 DISORDER OF BURSAE AND TENDONS IN SHOULDER REGION: ICD-10-CM

## 2020-09-18 DIAGNOSIS — E11.9 TYPE 2 DIABETES MELLITUS WITHOUT COMPLICATION, WITHOUT LONG-TERM CURRENT USE OF INSULIN (H): ICD-10-CM

## 2020-09-18 DIAGNOSIS — G89.29 CHRONIC RIGHT SHOULDER PAIN: ICD-10-CM

## 2020-09-18 DIAGNOSIS — M67.919 DISORDER OF BURSAE AND TENDONS IN SHOULDER REGION: ICD-10-CM

## 2020-09-18 DIAGNOSIS — I10 BENIGN ESSENTIAL HYPERTENSION: Primary | ICD-10-CM

## 2020-09-18 DIAGNOSIS — Z86.73 HISTORY OF CVA (CEREBROVASCULAR ACCIDENT): ICD-10-CM

## 2020-09-18 DIAGNOSIS — M25.511 CHRONIC RIGHT SHOULDER PAIN: ICD-10-CM

## 2020-09-18 LAB
% GRANULOCYTES: 70.8 %G (ref 40–75)
BUN SERPL-MCNC: 34.5 MG/DL (ref 7–21)
CALCIUM SERPL-MCNC: 9.4 MG/DL (ref 8.5–10.1)
CHLORIDE SERPLBLD-SCNC: 99.8 MMOL/L (ref 98–110)
CO2 SERPL-SCNC: 21.5 MMOL/L (ref 20–32)
CREAT SERPL-MCNC: 1.5 MG/DL (ref 0.7–1.3)
GFR SERPL CREATININE-BSD FRML MDRD: 49.8 ML/MIN/1.7 M2
GLUCOSE SERPL-MCNC: 169.5 MG'DL (ref 70–99)
GRANULOCYTES #: 7 K/UL (ref 1.6–8.3)
HCT VFR BLD AUTO: 49.5 % (ref 40–53)
HEMOGLOBIN: 14.2 G/DL (ref 13.3–17.7)
LYMPHOCYTES # BLD AUTO: 1.9 K/UL (ref 0.8–5.3)
LYMPHOCYTES NFR BLD AUTO: 19 %L (ref 20–48)
MCH RBC QN AUTO: 31.6 PG (ref 26.5–35)
MCHC RBC AUTO-ENTMCNC: 28.7 G/DL (ref 32–36)
MCV RBC AUTO: 110.2 FL (ref 78–100)
MID #: 1 K/UL (ref 0–2.2)
MID %: 10.2 %M (ref 0–20)
PLATELET # BLD AUTO: 150 K/UL (ref 150–450)
POTASSIUM SERPL-SCNC: 3.6 MMOL/L (ref 3.3–4.5)
RBC # BLD AUTO: 4.49 M/UL (ref 4.4–5.9)
SODIUM SERPL-SCNC: 136.8 MMOL/L (ref 132.6–141.4)
WBC # BLD AUTO: 9.9 K/UL (ref 4–11)

## 2020-09-18 RX ORDER — ACETAMINOPHEN 500 MG
1000 TABLET ORAL 3 TIMES DAILY PRN
Qty: 200 TABLET | Refills: 3 | Status: SHIPPED | OUTPATIENT
Start: 2020-09-18 | End: 2020-10-21

## 2020-09-18 RX ORDER — TRIAMCINOLONE ACETONIDE 40 MG/ML
40 INJECTION, SUSPENSION INTRA-ARTICULAR; INTRAMUSCULAR ONCE
Status: COMPLETED | OUTPATIENT
Start: 2020-09-18 | End: 2020-09-18

## 2020-09-18 RX ORDER — CARVEDILOL 12.5 MG/1
12.5 TABLET ORAL 2 TIMES DAILY WITH MEALS
Qty: 60 TABLET | Refills: 3 | Status: SHIPPED | OUTPATIENT
Start: 2020-09-18 | End: 2021-01-18

## 2020-09-18 RX ORDER — GABAPENTIN 800 MG/1
800 TABLET ORAL 3 TIMES DAILY
Qty: 270 TABLET | Refills: 1 | Status: SHIPPED | OUTPATIENT
Start: 2020-09-18 | End: 2021-02-05

## 2020-09-18 RX ORDER — ATORVASTATIN CALCIUM 40 MG/1
40 TABLET, FILM COATED ORAL DAILY
Qty: 90 TABLET | Refills: 3 | Status: SHIPPED | OUTPATIENT
Start: 2020-09-18 | End: 2021-09-24

## 2020-09-18 RX ADMIN — TRIAMCINOLONE ACETONIDE 40 MG: 40 INJECTION, SUSPENSION INTRA-ARTICULAR; INTRAMUSCULAR at 14:05

## 2020-09-18 RX ADMIN — TRIAMCINOLONE ACETONIDE 40 MG: 40 INJECTION, SUSPENSION INTRA-ARTICULAR; INTRAMUSCULAR at 14:00

## 2020-09-18 ASSESSMENT — MIFFLIN-ST. JEOR: SCORE: 1699.2

## 2020-09-18 NOTE — LETTER
September 20, 2020      Juli Rodriguez  [address]   [facility] [address]    Dear Juli Rodriguez.      Please see below for your results from your recent testing.  Your results need further followup. Please call the clinic to make an appointment to discuss your results. Liberian translation :abbie-desiree arredondoo aman adlero marciano uu leah houseo jawaabtaada.    Resulted Orders   Basic Metabolic Panel ([facility]DA[facility])   Result Value Ref Range    Calcium 9.4 8.5 - 10.1 mg/dL    Chloride 99.8 98.0 - 110.0 mmol/L    Carbon Dioxide 21.5 20.0 - 32.0 mmol/L    Creatinine 1.5 (H) 0.7 - 1.3 mg/dL    Glucose 169.5 (H) 70.0 - 99.0 mg'dL    Potassium 3.6 3.3 - 4.5 mmol/L    Sodium 136.8 132.6 - 141.4 mmol/L    GFR Estimate 49.8 (L) >60.0 mL/min/1.7 m2    GFR Estimate If Black 60.3 >60.0 mL/min/1.7 m2    Urea Nitrogen 34.5 (H) 7.0 - 21.0 mg/dL   CBC with Diff Plt ([facility])   Result Value Ref Range    WBC 9.9 4.0 - 11.0 K/uL    Lymphocytes # 1.9 0.8 - 5.3 K/uL    % Lymphocytes 19.0 (L) 20.0 - 48.0 %L    Mid # 1.0 0.0 - 2.2 K/uL    Mid % 10.2 0.0 - 20.0 %M    GRANULOCYTES # 7.0 1.6 - 8.3 K/uL    % Granulocytes 70.8 40.0 - 75.0 %G    RBC 4.49 4.40 - 5.90 M/uL    Hemoglobin 14.2 13.3 - 17.7 g/dL    Hematocrit 49.5 40.0 - 53.0 %    .2 (H) 78.0 - 100.0 fL    MCH 31.6 26.5 - 35.0 pg    MCHC 28.7 (L) 32.0 - 36.0 g/dL    Platelets 150.0 150.0 - 450.0 K/uL           Sincerely   Ernie Arreola MD  .

## 2020-09-18 NOTE — NURSING NOTE
Due to patient being non-English speaking/uses sign language, an  was used for this visit. Only for face-to-face interpretation by an external agency, date and length of interpretation can be found on the scanned worksheet.     name: Joanne Martines  Language: Taiwanese  Agency: [employer]  Phone number:   Type of interpretation: Telephone, spoken

## 2020-09-18 NOTE — PROGRESS NOTES
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      Injury, accidents, falls recently?    "

## 2020-09-18 NOTE — PATIENT INSTRUCTIONS
Here is the plan from today's visit    1. Benign essential hypertension  I refilled your medications  - Basic Metabolic Panel ([facility])  - CBC with Diff Plt ([place])  - carvedilol (COREG) 12.5 MG tablet; Take 1 tablet (12.5 mg) by mouth 2 times daily (with meals)  Dispense: 60 tablet; Refill: 3    2. Type 2 diabetes mellitus without complication, without long-term current use of insulin (H)     - blood glucose monitoring (NO BRAND SPECIFIED) meter device kit; Use to test blood sugar 2-3 times daily or as directed.  Dispense: 1 kit; Refill: 0  - blood glucose (NO BRAND SPECIFIED) test strip; Use to test blood sugars 2 times daily or as directed  Dispense: 100 strip; Refill: 11  - atorvastatin (LIPITOR) 40 MG tablet; Take 1 tablet (40 mg) by mouth daily  Dispense: 90 tablet; Refill: 3    3. Greater trochanteric bursitis of right hip   folloow up with Dr Gilliam if not improved.  - acetaminophen (TYLENOL) 500 MG tablet; Take 2 tablets (1,000 mg) by mouth 3 times daily as needed for mild pain  Dispense: 200 tablet; Refill: 3    4. Chronic right shoulder pain  Injection done use ice two times daily   - [facility] INTERNAL REFERRAL    5. Neuropathic pain    - gabapentin (NEURONTIN) 800 MG tablet; Take 1 tablet (800 mg) by mouth 3 times daily  Dispense: 270 tablet; Refill: 1    6. History of CVA (cerebrovascular accident)     - aspirin (ASA) 81 MG EC tablet; Take 1 tablet (81 mg) by mouth daily  Dispense: 100 tablet; Refill: 1      Please call or return to clinic if your symptoms don't go away.    Follow up plan  Return in about 4 weeks (around 10/16/2020) for Diabetes Routine Check.     Thank you for coming to [facility] today.  Lab Testing:  **If you had lab testing today and your results are reassuring or normal they will be mailed to you or sent through [email or web address] within 7 days.   **If the lab tests need quick action we will call you with the results.  The phone number we will call with results  is # [phone] (home) . If this is not the best number please call our clinic and change the number.  Medication Refills:  If you need any refills please call your pharmacy and they will contact us.   If you need to  your refill at a new pharmacy, please contact the new pharmacy directly. The new pharmacy will help you get your medications transferred faster.   Scheduling:  If you have any concerns about today's visit or wish to schedule another appointment please call our office during normal business hours [phone] (8-5:00 M-F)   eferrals to [facility] please call [phone].   Mammogram Scheduling [phone]     XRay/CT/Ultrasound/MRI Scheduling [phone]    Medical Concerns:  If you have urgent medical concerns please call [phone] at any time of the day.    Ernie Arreola MD

## 2020-09-19 NOTE — PROCEDURES
[facility]   Injection Note    Juli Rodriguez is a patient of Ernie Carey here for injection for GT bursitis of the right .    Consent: Affirmation of informed consent was signed and scanned into the medical record. Risks, benefits and alternatives were discussed. Patient's questions were elicited and answered.   Procedure safety checklist was completed:  Yes  Time Out (Pause for the Cause) completed: Yes    Preoperative Diagnosis:Greater Trochanteric bursitis  Postoperative Diagnosis: same       The right Hip was prepped  in the usual sterile fashion INJECTION:  Using 4 mL of 1% lidocaine mixed with 40 mg of kenalog, the   was successfully injected without complication.  Patient did experience some pain relief following injection.    Technique:   Skin prep [employer]are  Anesthesia 1% lidocaine  Complications:  No  Tolerance:  Pt tolerated procedure well and was in stable condition.     Follow up: Patient was instructed to use ice on the affected area tonight for at least 30 minutes and  that there will be a return to pain in a couple hours followed by relief in the next several days to a week. Patient was advised to call if increasing redness and swelling.     Follow up in 2-4 weeks.    Faculty: Ernie Arreola MD present for and supervised this entire procedure.  [facility]   Subacromial Injection Note    Juli Rodriguez is a patient of Ernie Carey here for Rotator cuff tendonitis  Pt opts for shoulder subacromial injection.    Informed Consent:  Affirmation of informed consent was signed and scanned into the medical record. Risks, benefits and alternatives were discussed. Patient's questions were elicited and answered.   Procedure safety checklist was completed:  Yes  Time Out (Pause for the Cause) completed: Yes    Technique:  right shoulder was prepped in the usual sterile fashion.    INJECTION:  Using 9 cc of 1% lidocaine mixed with 40 mg of kenalog, the subacromial  space was successfully injected without complication.  Patient did experience some pain relief following injection.    Follow up: Pt was instructed that there will be a return to pain in a few hours, followed by some relief over the course of days to a week.    Follow up in 2-4 weeks.        Faculty: Ernie Arreola MD

## 2020-09-19 NOTE — PROGRESS NOTES
Juli is a 75 year old  who presents for   Patient presents with:  Pain: Right side of body, pt states to be a chronic issue and that the pain increases over time. pain causes him to be unable to move, not eating, vommiting, sharp pain      Assessment and Plan      1. Benign essential hypertension  Controlled and stable -continue current medications  - Basic Metabolic Panel ([facility]DAQ)  - CBC with Diff Plt ([place])  - carvedilol (COREG) 12.5 MG tablet; Take 1 tablet (12.5 mg) by mouth 2 times daily (with meals)  Dispense: 60 tablet; Refill: 3    2. Type 2 diabetes mellitus without complication, without long-term current use of insulin (H)  Continue current medications follow up in one month  - blood glucose monitoring (NO BRAND SPECIFIED) meter device kit; Use to test blood sugar 2-3 times daily or as directed.  Dispense: 1 kit; Refill: 0  - blood glucose (NO BRAND SPECIFIED) test strip; Use to test blood sugars 2 times daily or as directed  Dispense: 100 strip; Refill: 11  - atorvastatin (LIPITOR) 40 MG tablet; Take 1 tablet (40 mg) by mouth daily  Dispense: 90 tablet; Refill: 3    3. Greater trochanteric bursitis of right hip  Injection of Right GT done.  - acetaminophen (TYLENOL) 500 MG tablet; Take 2 tablets (1,000 mg) by mouth 3 times daily as needed for mild pain  Dispense: 200 tablet; Refill: 3  - DRAIN/INJECT LARGE JOINT/BURSA  - triamcinolone (KENALOG-40) injection 40 mg    4. Chronic right shoulder pain  To follow up if not improved with injection  - [facility] INTERNAL REFERRAL    5. Neuropathic pain  Continue current dose  - gabapentin (NEURONTIN) 800 MG tablet; Take 1 tablet (800 mg) by mouth 3 times daily  Dispense: 270 tablet; Refill: 1    6. History of CVA (cerebrovascular accident)  continue  - aspirin (ASA) 81 MG EC tablet; Take 1 tablet (81 mg) by mouth daily  Dispense: 100 tablet; Refill: 1    7. Disorder of bursae and tendons in shoulder region  To use ice two times daily  "and stetching exercises.  - DRAIN/INJECT LARGE JOINT/BURSA  - triamcinolone (KENALOG-40) injection 40 mg     Return in about 4 weeks (around 10/16/2020) for Diabetes Routine Check.    Medications Discontinued During This Encounter   Medication Reason     gabapentin (NEURONTIN) 400 MG capsule      gabapentin (NEURONTIN) 800 MG tablet Reorder     carvedilol (COREG) 12.5 MG tablet Reorder     blood glucose monitoring (NO BRAND SPECIFIED) meter device kit Reorder     blood glucose (NO BRAND SPECIFIED) test strip Reorder     atorvastatin (LIPITOR) 40 MG tablet Reorder     acetaminophen (TYLENOL) 500 MG tablet Reorder         Ernie Arreola MD         HPI       Juli is a 75 year old  who presents for   Patient presents with:  Pain: Right side of body, pt states to be a chronic issue and that the pain increases over time. pain causes him to be unable to move, not eating, vommiting, sharp pain        Visit [place]  1. Pain right side  - very severe   - right head, shoulder, hip  - constant, sharp  - denies burning or tingling  - has been taking gabapentin and naproxen, but not helping  - has been like this for \"a long time,\" he thinks about a year   - getting worse  - no change with position  - keeps him from sleep  - appetite loss  - previously had chronic right sided numbness after stroke  - almost out of all meds  Presents today to have an injection in hiws R hip area as well as shoulder area for pain.   We discussed risks and benefits and patient elected to go with the injections        A [employer]  was used for  this visit.   Problem, Medication and Allergy Lists were reviewed and updated if needed..  Patient is an established patient of this clinic.         Review of Systems:   Review of Systems  7 point review of symptoms was otherwise negative except as noted in HPI         Physical Exam:     Vitals:    09/18/20 1440   BP: 129/87   Pulse: 107   Resp: 18   Temp: 98.1  F (36.7  C)   TempSrc: Oral   SpO2: 99% " "  Weight: 93.2 kg (205 lb 6.4 oz)   Height: 1.82 m (5' 11.65\")     Body mass index is 28.13 kg/m .  Vitals were reviewed and were normal     Physical Exam  Vitals signs reviewed.   Constitutional:       General: He is not in acute distress.     Appearance: He is well-developed. He is not diaphoretic.   HENT:      Head: Normocephalic.   Eyes:      General: No scleral icterus.     Conjunctiva/sclera: Conjunctivae normal.   Neck:      Musculoskeletal: Normal range of motion.      Thyroid: No thyromegaly.   Cardiovascular:      Rate and Rhythm: Normal rate and regular rhythm.      Heart sounds: Normal heart sounds. No murmur.   Pulmonary:      Effort: Pulmonary effort is normal. No respiratory distress.      Breath sounds: Normal breath sounds. No wheezing.   Abdominal:      General: Bowel sounds are normal. There is no distension.      Palpations: Abdomen is soft. There is no hepatomegaly or splenomegaly.      Tenderness: There is no abdominal tenderness.   Musculoskeletal:      Right shoulder: He exhibits decreased range of motion, tenderness and pain. He exhibits no swelling, no effusion, no crepitus, normal pulse and normal strength.      Left shoulder: Normal.      Right hip: He exhibits decreased range of motion and tenderness ( over R Greater trochanter). He exhibits no bony tenderness.        Legs:    Lymphadenopathy:      Cervical: No cervical adenopathy.   Skin:     General: Skin is warm and dry.   Neurological:      Mental Status: He is alert and oriented to person, place, and time.   Psychiatric:         Behavior: Behavior normal.         Thought Content: Thought content normal.         Judgment: Judgment normal.           Results:      Results from this visit  Results for orders placed or performed in visit on 09/18/20   Basic Metabolic Panel (LabDAQ)     Status: Abnormal   Result Value Ref Range    Calcium 9.4 8.5 - 10.1 mg/dL    Chloride 99.8 98.0 - 110.0 mmol/L    Carbon Dioxide 21.5 20.0 - 32.0 mmol/L "    Creatinine 1.5 (H) 0.7 - 1.3 mg/dL    Glucose 169.5 (H) 70.0 - 99.0 mg'dL    Potassium 3.6 3.3 - 4.5 mmol/L    Sodium 136.8 132.6 - 141.4 mmol/L    GFR Estimate 49.8 (L) >60.0 mL/min/1.7 m2    GFR Estimate If Black 60.3 >60.0 mL/min/1.7 m2    Urea Nitrogen 34.5 (H) 7.0 - 21.0 mg/dL   CBC with Diff Plt ([facility])     Status: Abnormal   Result Value Ref Range    WBC 9.9 4.0 - 11.0 K/uL    Lymphocytes # 1.9 0.8 - 5.3 K/uL    % Lymphocytes 19.0 (L) 20.0 - 48.0 %L    Mid # 1.0 0.0 - 2.2 K/uL    Mid % 10.2 0.0 - 20.0 %M    GRANULOCYTES # 7.0 1.6 - 8.3 K/uL    % Granulocytes 70.8 40.0 - 75.0 %G    RBC 4.49 4.40 - 5.90 M/uL    Hemoglobin 14.2 13.3 - 17.7 g/dL    Hematocrit 49.5 40.0 - 53.0 %    .2 (H) 78.0 - 100.0 fL    MCH 31.6 26.5 - 35.0 pg    MCHC 28.7 (L) 32.0 - 36.0 g/dL    Platelets 150.0 150.0 - 450.0 K/uL       Options for treatment and follow-up care were reviewed with the patient. Juli Rodriguez  engaged in the decision making process and verbalized understanding of the options discussed and agreed with the final plan.  Ernie Arreola MD  [facility]

## 2020-09-21 DIAGNOSIS — E11.9 TYPE 2 DIABETES MELLITUS WITHOUT COMPLICATION, WITHOUT LONG-TERM CURRENT USE OF INSULIN (H): ICD-10-CM

## 2020-09-21 NOTE — TELEPHONE ENCOUNTER
"Request for medication refill:  metFORMIN (GLUCOPHAGE) 500 MG tablet     Providers if patient needs an appointment and you are willing to give a one month supply please refill for one month and  send a letter/MyChart using \".SMILLIMITEDREFILL\" .smillimited and route chart to \"P SMI \" (Giving one month refill in non controlled medications is strongly recommended before denial)    If refill has been denied, meaning absolutely no refills without visit, please complete the smart phrase \".smirxrefuse\" and route it to the \"P SMI MED REFILLS\"  pool to inform the patient and the pharmacy.    Naomi Watts MA        "

## 2020-09-30 ENCOUNTER — TELEPHONE (OUTPATIENT)
Dept: FAMILY MEDICINE | Facility: CLINIC | Age: 75
End: 2020-09-30

## 2020-09-30 NOTE — LETTER
September 30, 2020    Juli Rodriguez  [address]   [facility] [address]      Dear: Juli Rodriguez    You were recently referred for a Sports Medicine appointment by your primary care provider at [facility].  We have called twice to schedule this appointment, but have been unable to reach you.  If you are interested in receiving this service, please call [facility] at [phone].  We would be happy to schedule this appointment for you.      Thank you.      Sincerely,    Patient Representative    [facility]  Hours:  Monday-Friday 8:00 am - 5:00 pm   Phone Number: [phone]

## 2020-09-30 NOTE — TELEPHONE ENCOUNTER
Called patient using [employer] (ID # [id number]) to schedule sports med appointment. No answer, left voicemail. Sending letter.    Reason: Right Shoulder pain and Right hip pain (failed one GT injection)   Urgency of Appointment: Next Available   Length of Problem: Sub-Acute (3-12 weeks since onset).   What are you requesting be done? Diagnosis and Management Recommendations

## 2020-10-16 ENCOUNTER — OFFICE VISIT (OUTPATIENT)
Dept: FAMILY MEDICINE | Facility: CLINIC | Age: 75
End: 2020-10-16
Payer: COMMERCIAL

## 2020-10-16 VITALS
TEMPERATURE: 98.2 F | DIASTOLIC BLOOD PRESSURE: 83 MMHG | WEIGHT: 213 LBS | BODY MASS INDEX: 28.85 KG/M2 | HEIGHT: 72 IN | SYSTOLIC BLOOD PRESSURE: 137 MMHG | OXYGEN SATURATION: 96 % | HEART RATE: 75 BPM

## 2020-10-16 DIAGNOSIS — Z00.00 HEALTH CARE MAINTENANCE: ICD-10-CM

## 2020-10-16 DIAGNOSIS — M25.551 HIP PAIN, RIGHT: ICD-10-CM

## 2020-10-16 DIAGNOSIS — E11.9 TYPE 2 DIABETES MELLITUS WITHOUT COMPLICATION, WITHOUT LONG-TERM CURRENT USE OF INSULIN (H): Primary | ICD-10-CM

## 2020-10-16 DIAGNOSIS — Z23 NEED FOR PROPHYLACTIC VACCINATION AND INOCULATION AGAINST INFLUENZA: ICD-10-CM

## 2020-10-16 LAB
ALBUMIN SERPL-MCNC: 4 G/DL (ref 3.4–5)
ALP SERPL-CCNC: 81 U/L (ref 40–150)
ALT SERPL W P-5'-P-CCNC: 19 U/L (ref 0–70)
ANION GAP SERPL CALCULATED.3IONS-SCNC: 6 MMOL/L (ref 3–14)
AST SERPL W P-5'-P-CCNC: 13 U/L (ref 0–45)
BILIRUB SERPL-MCNC: 0.8 MG/DL (ref 0.2–1.3)
BUN SERPL-MCNC: 22 MG/DL (ref 7–30)
CALCIUM SERPL-MCNC: 9.3 MG/DL (ref 8.5–10.1)
CHLORIDE SERPL-SCNC: 105 MMOL/L (ref 94–109)
CO2 SERPL-SCNC: 26 MMOL/L (ref 20–32)
CREAT SERPL-MCNC: 1.45 MG/DL (ref 0.66–1.25)
GFR SERPL CREATININE-BSD FRML MDRD: 47 ML/MIN/{1.73_M2}
GLUCOSE SERPL-MCNC: 115 MG/DL (ref 70–99)
HBA1C MFR BLD: 5.6 % (ref 0–5.6)
POTASSIUM SERPL-SCNC: 4.3 MMOL/L (ref 3.4–5.3)
PROT SERPL-MCNC: 7.5 G/DL (ref 6.8–8.8)
SODIUM SERPL-SCNC: 136 MMOL/L (ref 133–144)

## 2020-10-16 PROCEDURE — 90472 IMMUNIZATION ADMIN EACH ADD: CPT | Performed by: FAMILY MEDICINE

## 2020-10-16 PROCEDURE — 80053 COMPREHEN METABOLIC PANEL: CPT | Performed by: FAMILY MEDICINE

## 2020-10-16 PROCEDURE — 36415 COLL VENOUS BLD VENIPUNCTURE: CPT | Performed by: FAMILY MEDICINE

## 2020-10-16 PROCEDURE — 90471 IMMUNIZATION ADMIN: CPT | Performed by: FAMILY MEDICINE

## 2020-10-16 PROCEDURE — 90686 IIV4 VACC NO PRSV 0.5 ML IM: CPT | Performed by: FAMILY MEDICINE

## 2020-10-16 PROCEDURE — 83036 HEMOGLOBIN GLYCOSYLATED A1C: CPT | Performed by: FAMILY MEDICINE

## 2020-10-16 PROCEDURE — 99214 OFFICE O/P EST MOD 30 MIN: CPT | Mod: 25 | Performed by: FAMILY MEDICINE

## 2020-10-16 PROCEDURE — 90746 HEPB VACCINE 3 DOSE ADULT IM: CPT | Performed by: FAMILY MEDICINE

## 2020-10-16 ASSESSMENT — MIFFLIN-ST. JEOR: SCORE: 1731.22

## 2020-10-16 NOTE — PROGRESS NOTES
Juli is a 75 year old  who presents for   Patient presents with:  Diabetes: Test strip refills  Imm/Inj: Flu Shot      Assessment and Plan      1. Type 2 diabetes mellitus without complication, without long-term current use of insulin (H)  Unclear if this is controlled as patient unable to get correct test streips for his machine  -will bring in his machine to the pharmacy if no strips available will make an appointment with pharmacy to go over his   - blood glucose (NO BRAND SPECIFIED) test strip; Use to test blood sugars 2 times daily or as directed  Dispense: 100 strip; Refill: 11  [facility]  - MED THERAPY MANAGEMENT REFERRAL  - metFORMIN (GLUCOPHAGE) 500 MG tablet; Take 2 tablets (1,000 mg) by mouth 2 times daily (with meals)  Dispense: 180 tablet; Refill: 1    2. Hip pain, right  Was treated with GT steroid injection and had minimal relief, [facility] for further eval and suggestions for treament and dx.  - Sports [facility] INTERNAL REFERRAL     Return in about 4 weeks (around 11/13/2020) for Diabetes Routine Check, pharmacy and Sports med sooner .    Medications Discontinued During This Encounter   Medication Reason     aspirin 81 MG tablet      blood glucose (NO BRAND SPECIFIED) test strip Reorder     metFORMIN (GLUCOPHAGE) 500 MG tablet          Ernie Arreola MD         HPI       Juli is a 75 year old  who presents for   Patient presents with:  Diabetes: Test strip refills  Imm/Inj: Flu Shot        Visit [place]  1. Hip Pain -Greater trochanteric bursitis  A little better though still painful   2. DM2   Hyperglycemia has not been able to check        Problem, Medication and Allergy Lists were reviewed and updated if needed..  Patient is an established patient of this clinic.         Review of Systems:   Review of Systems  7 point review of symptoms was otherwise negative except as noted in HPI         Physical Exam:     Vitals:     "10/16/20 1408 10/16/20 1409   BP: (!) 148/83 137/83   Pulse: 75    Temp: 98.2  F (36.8  C)    TempSrc: Oral    SpO2: 96%    Weight: 96.6 kg (213 lb)    Height: 1.816 m (5' 11.5\")      Body mass index is 29.29 kg/m .  Vitals were reviewed and were normal     Physical Exam  Vitals signs reviewed.   Constitutional:       General: He is not in acute distress.     Appearance: He is well-developed. He is not diaphoretic.   HENT:      Head: Normocephalic.   Eyes:      General: No scleral icterus.     Conjunctiva/sclera: Conjunctivae normal.   Neck:      Musculoskeletal: Normal range of motion.      Thyroid: No thyromegaly.   Cardiovascular:      Rate and Rhythm: Normal rate and regular rhythm.      Heart sounds: Normal heart sounds. No murmur.   Pulmonary:      Effort: Pulmonary effort is normal. No respiratory distress.      Breath sounds: Normal breath sounds. No wheezing.   Abdominal:      General: Bowel sounds are normal. There is no distension.      Palpations: Abdomen is soft. There is no hepatomegaly or splenomegaly.      Tenderness: There is no abdominal tenderness.   Lymphadenopathy:      Cervical: No cervical adenopathy.   Skin:     General: Skin is warm and dry.   Neurological:      Mental Status: He is alert and oriented to person, place, and time.   Psychiatric:         Behavior: Behavior normal.         Thought Content: Thought content normal.         Judgment: Judgment normal.           Results:    Results from this visitNo results found for any visits on 10/16/20.    Options for treatment and follow-up care were reviewed with the patient. Juli Rodriguez  engaged in the decision making process and verbalized understanding of the options discussed and agreed with the final plan.  Ernie Arreola MD  [facility]          "

## 2020-10-16 NOTE — PATIENT INSTRUCTIONS
Here is the plan from today's visit    1. Type 2 diabetes mellitus without complication, without long-term current use of insulin (H)  Follow up for visit with pharmacy   Increase metformin to 1000 mg two times daily   - blood glucose (NO BRAND SPECIFIED) test strip; Use to test blood sugars 2 times daily or as directed  Dispense: 100 strip; Refill: 11  - Hemoglobin A1c ([facility])  - Comprehensive Metabolic Panel ([facility]DAQ)  - [facility] INTERNAL REFERRAL  - MED THERAPY MANAGEMENT REFERRAL  - metFORMIN (GLUCOPHAGE) 500 MG tablet; Take 2 tablets (1,000 mg) by mouth 2 times daily (with meals)  Dispense: 180 tablet; Refill: 1    2. Hip pain, right  Call for an appointment   - [facility] INTERNAL REFERRAL      Please call or return to clinic if your symptoms don't go away.    Follow up plan  Return in about 4 weeks (around 11/13/2020) for Diabetes Routine Check, pharmacy and Sports med sooner .     Thank you for coming to [facility] today.  Lab Testing:  **If you had lab testing today and your results are reassuring or normal they will be mailed to you or sent through [employer] within 7 days.   **If the lab tests need quick action we will call you with the results.  The phone number we will call with results is # [phone] (home) . If this is not the best number please call our clinic and change the number.  Medication Refills:  If you need any refills please call your pharmacy and they will contact us.   If you need to  your refill at a new pharmacy, please contact the new pharmacy directly. The new pharmacy will help you get your medications transferred faster.   Scheduling:  If you have any concerns about today's visit or wish to schedule another appointment please call our office during normal business hours [phone] (8-5:00 M-F)   eferrals to [facility] please call [phone].   Mammogram Scheduling [phone]      XRay/CT/Ultrasound/MRI Scheduling [phone]    Medical Concerns:  If you have urgent medical concerns please call [phone] at any time of the day.    Ernie Arreola MD

## 2020-10-16 NOTE — LETTER
October 18, 2020      Juli Carsonin  [address]   [facility] [address]        Dear José Miguelreji,     October 18, 2020    [id number]    Juli Rodriguez  [address]   [facility] [address]    Dear Juli Rodriguez.      Please see below for your results from your recent testing.  As we discussed at our last visit, please follow up with a clinic appointment to review these results further.Singaporean Translation:Mauroa aan uga wada hadalnay booqashadayadii ugu dambeysay, familia qahaoo ballan kale si aad portia ugu eegto natiijooyinkaas.    Resulted Orders   Comprehensive metabolic panel   Result Value Ref Range    Sodium 136 133 - 144 mmol/L    Potassium 4.3 3.4 - 5.3 mmol/L    Chloride 105 94 - 109 mmol/L    Carbon Dioxide 26 20 - 32 mmol/L    Anion Gap 6 3 - 14 mmol/L    Glucose 115 (H) 70 - 99 mg/dL    Urea Nitrogen 22 7 - 30 mg/dL    Creatinine 1.45 (H) 0.66 - 1.25 mg/dL    GFR Estimate 47 (L) >60 mL/min/[1.73_m2]      Comment:      Non  GFR Calc  Starting 12/18/2018, serum creatinine based estimated GFR (eGFR) will be   calculated using the Chronic Kidney Disease Epidemiology Collaboration   (CKD-EPI) equation.      GFR Estimate If Black 54 (L) >60 mL/min/[1.73_m2]      Comment:       GFR Calc  Starting 12/18/2018, serum creatinine based estimated GFR (eGFR) will be   calculated using the Chronic Kidney Disease Epidemiology Collaboration   (CKD-EPI) equation.      Calcium 9.3 8.5 - 10.1 mg/dL    Bilirubin Total 0.8 0.2 - 1.3 mg/dL    Albumin 4.0 3.4 - 5.0 g/dL    Protein Total 7.5 6.8 - 8.8 g/dL    Alkaline Phosphatase 81 40 - 150 U/L    ALT 19 0 - 70 U/L    AST 13 0 - 45 U/L   Hemoglobin A1c   Result Value Ref Range    Hemoglobin A1C 5.6 0 - 5.6 %      Comment:      Normal <5.7% Prediabetes 5.7-6.4%  Diabetes 6.5% or higher - adopted from ADA   consensus guidelines.             Sincerely   Ernie Arreola MD

## 2020-10-16 NOTE — NURSING NOTE
Due to patient being non-English speaking/uses sign language, an  was used for this visit. Only for face-to-face interpretation by an external agency, date and length of interpretation can be found on the scanned worksheet.     name: Heather Porras  Agency: [employer]  Language: Chadian   Telephone number: [phone]  Type of interpretation: Face-to-face, spoken

## 2020-10-21 ENCOUNTER — OFFICE VISIT (OUTPATIENT)
Dept: PHARMACY | Facility: CLINIC | Age: 75
End: 2020-10-21
Payer: COMMERCIAL

## 2020-10-21 VITALS
TEMPERATURE: 98 F | RESPIRATION RATE: 16 BRPM | WEIGHT: 207 LBS | DIASTOLIC BLOOD PRESSURE: 90 MMHG | OXYGEN SATURATION: 98 % | BODY MASS INDEX: 28.47 KG/M2 | SYSTOLIC BLOOD PRESSURE: 131 MMHG | HEART RATE: 72 BPM

## 2020-10-21 DIAGNOSIS — M70.61 GREATER TROCHANTERIC BURSITIS OF RIGHT HIP: ICD-10-CM

## 2020-10-21 DIAGNOSIS — J30.1 CHRONIC SEASONAL ALLERGIC RHINITIS DUE TO POLLEN: ICD-10-CM

## 2020-10-21 DIAGNOSIS — J30.2 SEASONAL ALLERGIC RHINITIS: Primary | ICD-10-CM

## 2020-10-21 DIAGNOSIS — E11.9 TYPE 2 DIABETES MELLITUS WITHOUT COMPLICATION, WITHOUT LONG-TERM CURRENT USE OF INSULIN (H): ICD-10-CM

## 2020-10-21 PROCEDURE — 99607 MTMS BY PHARM ADDL 15 MIN: CPT | Performed by: PHARMACIST

## 2020-10-21 PROCEDURE — 99605 MTMS BY PHARM NP 15 MIN: CPT | Performed by: PHARMACIST

## 2020-10-21 RX ORDER — ACETAMINOPHEN 500 MG
1000 TABLET ORAL 3 TIMES DAILY PRN
Qty: 200 TABLET | Refills: 3 | Status: SHIPPED | OUTPATIENT
Start: 2020-10-21 | End: 2021-03-16

## 2020-10-21 RX ORDER — LORATADINE 10 MG/1
10 TABLET ORAL DAILY
Qty: 90 TABLET | Refills: 1 | Status: SHIPPED | OUTPATIENT
Start: 2020-10-21 | End: 2021-04-21

## 2020-10-21 RX ORDER — LISINOPRIL 10 MG/1
10 TABLET ORAL DAILY
COMMUNITY
End: 2021-06-07

## 2020-10-21 NOTE — PATIENT INSTRUCTIONS
Recommendations from today's MTM visit:                                                    MTM (medication therapy management) is a service provided by a clinical pharmacist designed to help you get the most of out of your medicines.       1) Start taking gabapentin 800 mg at night only.   2) Stop Naproxen 500 mg.  3) Start acetaminophen 500 mg two tablets three times daily  4) Refill sent for loratadine  5) Continue taking blood pressures at home and bring cuff and meter to next visit  6) Start taking blood sugars every morning.    It was great to speak with you today.  I value your experience and would be very thankful for your time with providing feedback on our clinic survey. You may receive a survey via email or text message in the next few days.     Next MTM visit: in 2 weeks with Josh Lawler    To schedule another MTM appointment, please call the clinic directly or you may call the MTM scheduling line at [phone] or toll-free at [phone].     My Clinical Pharmacist's contact information:                                                      It was a pleasure talking with you today!  Please feel free to contact me with any questions or concerns you have.         Nuris Fuentes Pharm D.

## 2020-10-21 NOTE — Clinical Note
Sree Gilliam,  I saw this patient for MTM this week and I see now he's set to see you on 10/27. I wanted to let you know we switched from naproxen to acetaminophen scheduled due to his rising Scr and concern for kidney function. I also had him take his gabapentin at bedtime only due to reported sedation.  Just FYI ahead of your visit,  Nuris Fuentes, Josh D.

## 2020-10-21 NOTE — NURSING NOTE
Due to patient being non-English speaking/uses sign language, an  was used for this visit. Only for face-to-face interpretation by an external agency, date and length of interpretation can be found on the scanned worksheet.     name: Joanne Martines  Language: Angolan  Agency: [employer]  Phone number:   Type of interpretation: Telephone, spoken

## 2020-10-21 NOTE — PROGRESS NOTES
MTM ENCOUNTER  SUBJECTIVE/OBJECTIVE:                           Juli Rodriguez is a 75 year old male coming in for an initial visit. He was referred to me from Ernie Arreola MD .  was present during today's visit.    Chief Complaint:  Diabetes, neuropathy, hip & ernesto pain, HTN. Juli also brought a letter he received with his lab results from September which he wanted to review. Explained that they showed we need to protect kidneys and things that we can do with pharmacy include control blood pressure and DM.      Allergies/ADRs: Eggs and chicken allergy and beef per patient - sneezesTobacco: He reports that he has never smoked. He has never used smokeless tobacco.  Alcohol: none  Caffeine: no caffeine (no tea, coffee, soda, energy drinks)  Activity: [employer] previously but with pandemic, not able to. Walks some around neighborhood when weather was nice. But when walks, pain in hip and knee, is limiting.  Past Medical History: Reviewed in chart      Medication Adherence/Access: no issues reported        Pain: Juli takes naproxen BID for pain but doesn't take anything additional for pain (like after walks) such as acetaminophen.  Upcoming visit with Dr. Gilliam to further assess. Has some numbness/tingling in arm for which he takes gabapentin 800 mg TID and reports it has some efficacy but notes it makes him tired.  No chest pain or dypsnea with this tingling in arm feeling.    Type 2 Diabetes:  Currently taking Metformin 1000 mg BID. Patient is not experiencing side effects.  Blood sugar monitoring: He is not currently checking his blood sugars because he is out of strips. He prefers to keep his current meter One Touch Ultra 2 and refill strips and meter to getting a new kit.  Symptoms of low blood sugar? Juli is not aware of hypoglycemic events  Symptoms of high blood sugar? none  Eye, foot exam: not assessed   Aspirin: Taking 81mg daily and denies side effects  Statin: Yes: Atorvastatin 40 mg  daily and denies side effects   ACEi/ARB: Yes: Lisinopril 10 mg daily and denies side effects     Urine Albumin:   Lab Results   Component Value Date    UMALCR 3.98 02/04/2020      Lab Results   Component Value Date    A1C 5.6 10/16/2020    A1C 6.0 07/30/2020    A1C 5.5 02/04/2020    A1C 5.5 11/06/2019    A1C 5.8 07/12/2019         Hypertension/CVA: Current medications include lisinopril 10 mg QD and carvedilol 12.5mg BID .  Patient does self-monitor blood pressure. Home BP monitoring in range of 150-135's systolic over 80-85's diastolic.  Patient reports no current medication side effects. Juli believes Keppra works well to control seizures.   BP Readings from Last 3 Encounters:   10/21/20 (!) 131/90   10/16/20 137/83   09/18/20 129/87       Allergies: Juli is requesting a refill of loratadine today as he finds it helpful for seasonal allergies. His most troublesome symptom is sneezing.      Today's Vitals: BP (!) 131/90   Pulse 72   Temp 98  F (36.7  C) (Oral)   Resp 16   Wt 93.9 kg (207 lb)   SpO2 98%   BMI 28.47 kg/m        ASSESSMENT:                              Medication Adherence: No issues identified    Pain: Not at goal - Safety  Juli reports that gabapentin is causing drowsiness and he's not sure that it is effective for him. It was recommended today to try taking only one tablet at bedtime to see if this is enough to control his tingling in his arm and not cause drowsiness during the day.     Juli's kidney function has been declining in recent months (elevated SCr and decreased eGFR). Naproxen causes constriction of afferent arteriole of the kidney and may be contributing to kidney decline and possible blood pressure elevation. Scheduled acetaminophen is recommended to replace naproxen for pain control.     Diabetes: At Goal - needs additional monitoring  A1c of 5.6% is at goal of <8% for Juli. Metformin is not likely to cause hypoglycemia, but with an A1C of 5.6, it would be  beneficial for Juli to check blood sugars at least once daily (fasting preferably) to monitor for any hypoglycemic episodes. Juli's kidney function should continue to be monitored as if eGFR becomes <45 metformin may need to be reduced and it should be stopped if eGFR <30. He is on a high intensity statin. At future visits, annual foot, eye, and dental exams should be assessed.     HTN/CVA: Not at goal - Needs additional monitoring  Blood pressure control not optimized. It was unclear prior to today's visit what dose and duration of current dose of lisinopril. Today with discontinuation of naproxen, it is reasonable that Juli may see some improvement in his blood pressure readings. It is recommended to continue current 10 mg dose of lisinopril to evaluate efficacy of this dose with stopping naproxen. He is also on carvedilol 12.5mg BID which should be continued, though he does not have a hard indication for a beta-blocker. Blood pressure control after stroke is important and lisinopril should be prioritized for it's kidney protective effects and has better evidence post-CVA.    Allergies: At goal.  Loratadine works well for Juli to control seasonal allergies and will be refilled.      PLAN:                            1) Start taking gabapentin 800 mg at night only.   2) Stop Naproxen 500 mg.  3) Start acetaminophen 500 mg two tablets three times daily  4) Refill sent for loratadine  5) Continue taking blood pressures at home and bring cuff and meter to next visit  6) Start taking blood sugars every morning.      I spent 45 minutes with this patient today. All changes were made via collaborative practice agreement with Dr. Dumont. A copy of the visit note was provided to the patient's primary care provider.    Will follow up in 1 week via phone call to evaluate gabapentin safety and efficacy and 2 weeks in clinic to review blood pressure and blood sugar readings.    The patient was given a summary of  these recommendations as an AVS.    Nuris Fuentes, Pharm D.

## 2020-10-21 NOTE — Clinical Note
Hi Dr. Arreola,  I met with Mr. Rodriguez for an MTM visit. We are still working on getting his glucometer the right strips to have him test blood sugars. We reviewed his labs because he brought the letter he was sent in Sept. With his lab values and we talked about the importance of managing his blood pressure and diabetes to help his kidneys. I also had him stop the naproxen for pain and switch to scheduled acetaminophen to try to help his kidneys and blood pressure. Finally, he reported that the gabapentin makes him tired and he wasn't sure whether it was working for the tingling in his arm, so we are going to try 1 week of just taking his bedtime dose to see if it is working or not.    Thank you for the opportunity to meet this patient. I'll be seeing him again in 2 weeks to see those blood sugars and blood pressures.    Nuris Fuentes, Pharm D.

## 2020-10-27 ENCOUNTER — DOCUMENTATION ONLY (OUTPATIENT)
Dept: FAMILY MEDICINE | Facility: CLINIC | Age: 75
End: 2020-10-27

## 2020-10-27 ENCOUNTER — OFFICE VISIT (OUTPATIENT)
Dept: FAMILY MEDICINE | Facility: CLINIC | Age: 75
End: 2020-10-27
Payer: COMMERCIAL

## 2020-10-27 VITALS
DIASTOLIC BLOOD PRESSURE: 85 MMHG | HEIGHT: 71 IN | WEIGHT: 205.8 LBS | TEMPERATURE: 98.6 F | SYSTOLIC BLOOD PRESSURE: 128 MMHG | RESPIRATION RATE: 16 BRPM | HEART RATE: 83 BPM | BODY MASS INDEX: 28.81 KG/M2 | OXYGEN SATURATION: 99 %

## 2020-10-27 DIAGNOSIS — M25.551 HIP PAIN, RIGHT: ICD-10-CM

## 2020-10-27 DIAGNOSIS — E11.9 TYPE 2 DIABETES MELLITUS WITHOUT COMPLICATION, WITHOUT LONG-TERM CURRENT USE OF INSULIN (H): Primary | ICD-10-CM

## 2020-10-27 PROCEDURE — 99213 OFFICE O/P EST LOW 20 MIN: CPT | Performed by: FAMILY MEDICINE

## 2020-10-27 ASSESSMENT — MIFFLIN-ST. JEOR: SCORE: 1697.88

## 2020-10-27 NOTE — PROGRESS NOTES
"When opening a documentation only encounter, be sure to enter in \"Chief Complaint\" Forms and in \" Comments\" Title of form, description if needed.    Juli is a 75 year old  male  Form received via: Appointment, placed in Dr. Arreola's folder. The patient would like to be called when the form is ready for .   Form now resides in: Provider Ready    Concetta Rebolledo CMA                "

## 2020-10-27 NOTE — PROGRESS NOTES
"SUBJECTIVE: Juli Rodriguez is a 75 year old male who reports pain in right hip.  Has had hip replacement.  Pt in groin, lateral hip.  Walking with a cane.  Hx of stroke, DM Type 2.    PAST MEDICAL, SOCIAL, SURGICAL AND FAMILY HISTORY: He  has a past medical history of Cerebral infarction (H) (08/2013), Diabetes mellitus, type 2 (H), and Stroke (H).  He  has a past surgical history that includes hernia repair; joint replacement, hip rt/lt (Right); cataract iol, rt/lt (Bilateral, 2007 and 2015); and Inject Major Joint / Bursa (9/18/2020).  His family history is not on file.  He reports that he has never smoked. He has never used smokeless tobacco. He reports that he does not drink alcohol or use drugs.      ALLERGIES: He is allergic to beef-derived products and eggs [chicken-derived products (egg)].    CURRENT MEDICATIONS: He has a current medication list which includes the following prescription(s): acetaminophen, aspirin, atorvastatin, blood glucose, blood glucose, blood glucose monitoring, carvedilol, gabapentin, levetiracetam, lisinopril, loratadine, metformin, order for dme, order for dme, pantoprazole, sennosides, blood glucose, and naproxen.     REVIEW OF SYSTEMS: 10 point review of systems is negative except as noted above.    EXAM:  /85   Pulse 83   Temp 98.6  F (37  C) (Oral)   Resp 16   Ht 1.815 m (5' 11.46\")   Wt 93.4 kg (205 lb 12.8 oz)   SpO2 99%   BMI 28.34 kg/m    CONSTITUTIONIAL: healthy, alert, no distress and cooperative  HEAD: Normocephalic. No masses, lesions, tenderness or abnormalities  ENT: ENT exam normal, no neck nodes or sinus tenderness  SKIN: no suspicious lesions or rashes  GAIT: antalgic and cane  Stance: normal  NEUROLOGIC: Normal muscle tone and strength, reflexes normal, sensation grossly normal.  PSYCHIATRIC: affect normal/bright and mentation appears normal.    MUSCULOSKELETAL: right hip pain  Palpation: Tender:   Right lateral hip  Non-tender:  left ASIS, right ASIS, " left iliac crest, right iliac crest  Range of Motion:  Right Hip  external rotation  decreased, internal rotation  decreased  Strength: Patient with decreased strength   special tests:  logrolling positive, JOHN positive        ASSESSMENT/PLAN:  Patient is a 75-year-old [place] male with past medical history of seizures, type 2 diabetes, stroke, reflux presenting with right hip pain  1.  Right hip pain-this is already undergone total hip arthroplasty  Patient has not had imaging in a couple years we can recheck his components  Strongly encouraged the patient to engage in physical therapy to try to strengthen the musculature around the hip- [place] requested  Patient will continue to use his cane  Tylenol, icing  Return to clinic 6 weeks  X-RAY INTERPRETATION:   pending

## 2020-10-27 NOTE — PROGRESS NOTES
I have verified the content of the note, which accurately reflects my assessment of the patient and the plan of care.   Khoi Garcia, [facility], PharmD

## 2020-10-27 NOTE — PATIENT INSTRUCTIONS
X-ray of right hip/pelvis    PT at [facility] for PT, will have external referral    Referral to [facility] faxed to [phone].

## 2020-10-27 NOTE — NURSING NOTE
Due to patient being non-English speaking/uses sign language, an  was used for this visit. Only for face-to-face interpretation by an external agency, date and length of interpretation can be found on the scanned worksheet.     name: Butch Romeo  Language: German   Agency: [employer]   Phone number: [phone]  Type of interpretation: Telephone, spoken        Concetta Rebolledo CMA

## 2020-10-28 ENCOUNTER — TRANSFERRED RECORDS (OUTPATIENT)
Dept: HEALTH INFORMATION MANAGEMENT | Facility: CLINIC | Age: 75
End: 2020-10-28

## 2020-10-30 ENCOUNTER — TELEPHONE (OUTPATIENT)
Dept: PHARMACY | Facility: CLINIC | Age: 75
End: 2020-10-30

## 2020-10-30 NOTE — TELEPHONE ENCOUNTER
Called Juli using Cambodian  to discuss efficacy and safety of decreased gabapentin dose. There was no answer. A message was left and I will try back early next week.    Nuris Fuentes, Pharm D.

## 2020-11-03 ENCOUNTER — TELEPHONE (OUTPATIENT)
Dept: PHARMACY | Facility: CLINIC | Age: 75
End: 2020-11-03

## 2020-11-03 NOTE — TELEPHONE ENCOUNTER
Called Juli using Danish  to discuss efficacy and safety of decreased gabapentin dose from PharmD visit 10/21. There was no answer. A message was left to call back clinic with any questions or concerns. Scheduled for appointment on Friday with PharmD.    Josh Turpin D.

## 2020-11-06 ENCOUNTER — OFFICE VISIT (OUTPATIENT)
Dept: PHARMACY | Facility: CLINIC | Age: 75
End: 2020-11-06
Payer: COMMERCIAL

## 2020-11-06 VITALS
HEART RATE: 85 BPM | DIASTOLIC BLOOD PRESSURE: 84 MMHG | SYSTOLIC BLOOD PRESSURE: 128 MMHG | WEIGHT: 204.6 LBS | OXYGEN SATURATION: 96 % | BODY MASS INDEX: 27.71 KG/M2 | HEIGHT: 72 IN | RESPIRATION RATE: 18 BRPM | TEMPERATURE: 98.2 F

## 2020-11-06 DIAGNOSIS — E11.9 TYPE 2 DIABETES MELLITUS WITHOUT COMPLICATION, WITHOUT LONG-TERM CURRENT USE OF INSULIN (H): ICD-10-CM

## 2020-11-06 DIAGNOSIS — I10 BENIGN ESSENTIAL HYPERTENSION: Primary | ICD-10-CM

## 2020-11-06 DIAGNOSIS — Z11.1 SCREENING FOR TUBERCULOSIS: Primary | ICD-10-CM

## 2020-11-06 DIAGNOSIS — R56.9 SEIZURES (H): ICD-10-CM

## 2020-11-06 PROCEDURE — 99207 PR NO CHARGE LOS: CPT | Performed by: PHARMACIST

## 2020-11-06 RX ORDER — LEVETIRACETAM 500 MG/1
500 TABLET ORAL 2 TIMES DAILY
Qty: 60 TABLET | Refills: 1 | Status: SHIPPED | OUTPATIENT
Start: 2020-11-06 | End: 2020-12-13

## 2020-11-06 ASSESSMENT — MIFFLIN-ST. JEOR: SCORE: 1701.06

## 2020-11-06 NOTE — Clinical Note
Juli Carey was seen by pharmacy and while reviewing his DM and blood pressure we realized that he was not taking his keppra. He also endorsed some blackouts.  We precepted with Dr. Nguyen and the recommendation was to refill the med and recommend that he follow up with his neurologist.    Pharmacy will follow up in a month, but I wasn't sure if he needed other follow up.  hKoi Garcia, Pharm.D.

## 2020-11-06 NOTE — PATIENT INSTRUCTIONS
Recommendations from today's MTM visit:                                                      1. Begin taking your Lisinopril at night  2. Metformin 500 mg AM and 500 mg in the evening.  3. Continue taking Acetaminophen for pain 1000 mg Three times a day  4. Continue taking Gabapentin 800 mg Three times a day    5. Refill your Keppra medication today    Follow up with your neurologist     It was great to speak with you today.  I value your experience and would be very thankful for your time with providing feedback on our clinic survey. You may receive a survey via email or text message in the next few days.     Next PCP visit in 1 month    To schedule another MTM appointment, please call the clinic directly or you may call the MTM scheduling line at [phone] or toll-free at [phone].     My Clinical Pharmacist's contact information:                                                      It was a pleasure talking with you today!  Please feel free to contact me with any questions or concerns you have.      Khoi Garcia, Pharm.D.  [facility]  [phone] Monday 10-12 noon, Tues: 8-12 noon, Wed 8-5 PM, Friday 8-5 PM  Contact me via [employer]

## 2020-11-06 NOTE — PROGRESS NOTES
MTM ENCOUNTER  SUBJECTIVE/OBJECTIVE:                           Juli Rodriguez is a 75 year old male coming in for a follow-up visit. He was referred to me from Ernie Arreola MD .   was present during today's visit. Today's visit is a follow-up MTM visit from 10/21/20     Reason for visit: Blood pressure follow up.      Allergies/ADRs: Reviewed in chart  Tobacco: He reports that he has never smoked. He has never used smokeless tobacco.  Alcohol: none  Caffeine: NA  Activity: Moderate activity   Past Medical History: Reviewed in chart      Medication Adherence/Access: no issues reported      Pain :   Continues to take pain medication and still feels his pain is present.    He decreased the gabapentin medication but he pain returned.  He was only taking 800 mg once a day, but then after the pain was bad he returned to the dose after one day.    Has been taking APAP since the last visit and stopped NSAID.    He feels the pain is addressed and satisfied with current therapy.       HTN: currently takes:  Carvedilol taking AM and PM  Lisinopril Taking in the afternoon   Denies any adverse effects from the medication.        Home blood pressure readings.   Measures twice every .  This AM:   138/88 average    138/87  145/86    128/80  127/84    140/89  134/85    128/92  137/87    133/81  130/83    137/86  133/86    144/85      Type 2 Diabetes:  Currently taking    MT Medication List: Diabetes Medications   Diabetes Medications     Biguanides Refills Start End     metFORMIN (GLUCOPHAGE) 500 MG tablet    1 2020     Sig - Route: Take 1 tablet (500 mg) by mouth 2 times daily (with meals) - Oral    Class: E-Prescribe        . Patient is not experiencing side effects.  Blood sugar monitorin time daily. Ranges (based on glucometer readings): See below  Symptoms of low blood sugar? none  Symptoms of high blood sugar? none  Eye exam: 3/30/20  Foot exam: 20  Diet/Exercise: not discussed   Aspirin:  Taking 81mg daily and denies side effects  Statin: Yes:    ACEi/ARB: Yes: .   Urine Albumin:   Lab Results   Component Value Date    UMALCR 3.98 02/04/2020      Lab Results   Component Value Date    A1C 5.6 10/16/2020    A1C 6.0 07/30/2020    A1C 5.5 02/04/2020    A1C 5.5 11/06/2019    A1C 5.8 07/12/2019           Record of home blood sugars      Date Fasting AM Before lunch After lunch Before Dinner After Dinner Before Bedtime Late night   11/6 119         11/1  157        10/30 116         10/28 118         10/27 136         08/26      177    05/17    128                            Lab Results   Component Value Date    A1C 5.6 10/16/2020    A1C 6.0 07/30/2020    A1C 5.5 02/04/2020    A1C 5.5 11/06/2019    A1C 5.8 07/12/2019       Seizure medication.   Keppra was started in 2017 s/p stroke history.  He has been out of Keppra for the past 2 weeks.  He has had HA after stopping the medication.  No sudden HA, but instead  HA symptoms that come and go.  Describes these HA as pain 8/10.    He reports some recent falls and possible black out spells.  He is unclear how many times this may have happened in the past two weeks, but states these spells happened more than one time.  No injuries when he has fallen.        Today's Vitals: /84   Pulse 85   Temp 98.2  F (36.8  C) (Oral)   Resp 18   Ht 6' (1.829 m)   Wt 204 lb 9.6 oz (92.8 kg)   SpO2 96%   BMI 27.75 kg/m      Creatinine   Date Value Ref Range Status   10/16/2020 1.45 (H) 0.66 - 1.25 mg/dL Final     GFR Estimate   Date Value Ref Range Status   10/16/2020 47 (L) >60 mL/min/[1.73_m2] Final     ASSESSMENT:                              Medication Adherence: See below for considerations    HTN:   Well controlled on current therapy. Home BP readings are largely within goal.  Today's reading in clinic is within goal.  No advese effects from the medication.   Recommend to continue on current therapy.  Pt is advised to begin taking his lisinopril in the PM to  reduce potential adverse effects and improve AM blood pressures.       Pain:   Stable on current therapy. Will continue taking Gabapentin and APAP.  Pt has stopped NSAID and controlled on APAP.      DM: controlled  Both A1c and home BS are with in goal.  Patent is tolerating therapy but GFR is below 50 ml/min .Will reduce metformin today and continue to monitor home BS values.      Seizure prophylaxis:  Keppra therapy started in 2017.  Pt has been treated since that time but in the past few months he has stopped taking this medication.  He describes some possible unconscious episodes, but does not provide further detail. He also describes some increased episodes of headaches.  Today I have refilled the missing dose of Keppra and instructed the patient to follow up with his neurologist.  Pt agrees to follow up in the next week.     Juli is instructed to follow up with the ED if these Sx continue or worsen.     PLAN:                            1. Begin taking your Lisinopril at night  2. Metformin 500 mg AM and 500 mg in the evening.  3. Continue taking Acetaminophen for pain 1000 mg Three times a day  4. Continue taking Gabapentin 800 mg Three times a day    5. Refill your Keppra medication today    I spent 30 minutes with this patient today. Dr. Ernie Arreola MD  was provided the recommendations above  via routed note and Dr. Nguyen  is the authorizing prescriber for this visit through the pharmacist collaborative practice agreement.. A copy of the visit note was provided to the patient's primary care provider.    Will follow up in 4 weeks.    The patient was given a summary of these recommendations.     Jackie De La TorreD.

## 2020-11-10 DIAGNOSIS — Z11.1 SCREENING FOR TUBERCULOSIS: ICD-10-CM

## 2020-11-10 PROCEDURE — 86481 TB AG RESPONSE T-CELL SUSP: CPT | Performed by: FAMILY MEDICINE

## 2020-11-10 PROCEDURE — 36415 COLL VENOUS BLD VENIPUNCTURE: CPT | Performed by: FAMILY MEDICINE

## 2020-11-12 LAB
GAMMA INTERFERON BACKGROUND BLD IA-ACNC: 0.05 IU/ML
M TB IFN-G CD4+ BCKGRND COR BLD-ACNC: 9.95 IU/ML
M TB TUBERC IFN-G BLD QL: NEGATIVE
MITOGEN IGNF BCKGRD COR BLD-ACNC: 0.03 IU/ML
MITOGEN IGNF BCKGRD COR BLD-ACNC: 0.05 IU/ML

## 2020-11-13 ENCOUNTER — TELEPHONE (OUTPATIENT)
Dept: FAMILY MEDICINE | Facility: CLINIC | Age: 75
End: 2020-11-13

## 2020-11-13 NOTE — PROGRESS NOTES
Form has been completed by provider.     Form sent out via: Placed at  for patient ; copy taken and placed for scanning  Patient informed: Yes  Output date: November 13, 2020    Naomi Watts MA      **Please close the encounter**

## 2020-11-17 ENCOUNTER — ANCILLARY PROCEDURE (OUTPATIENT)
Dept: GENERAL RADIOLOGY | Facility: CLINIC | Age: 75
End: 2020-11-17
Attending: FAMILY MEDICINE
Payer: COMMERCIAL

## 2020-11-17 ENCOUNTER — OFFICE VISIT (OUTPATIENT)
Dept: FAMILY MEDICINE | Facility: CLINIC | Age: 75
End: 2020-11-17
Payer: COMMERCIAL

## 2020-11-17 VITALS
RESPIRATION RATE: 16 BRPM | DIASTOLIC BLOOD PRESSURE: 91 MMHG | HEART RATE: 78 BPM | BODY MASS INDEX: 27.26 KG/M2 | SYSTOLIC BLOOD PRESSURE: 145 MMHG | TEMPERATURE: 98 F | WEIGHT: 201 LBS | OXYGEN SATURATION: 98 %

## 2020-11-17 DIAGNOSIS — M25.551 HIP PAIN, RIGHT: ICD-10-CM

## 2020-11-17 DIAGNOSIS — M25.551 HIP PAIN, RIGHT: Primary | ICD-10-CM

## 2020-11-17 PROCEDURE — 99213 OFFICE O/P EST LOW 20 MIN: CPT | Performed by: FAMILY MEDICINE

## 2020-11-17 PROCEDURE — 73502 X-RAY EXAM HIP UNI 2-3 VIEWS: CPT | Mod: FY | Performed by: FAMILY MEDICINE

## 2020-11-17 NOTE — NURSING NOTE
Due to patient being non-English speaking/uses sign language, an  was used for this visit. Only for face-to-face interpretation by an external agency, date and length of interpretation can be found on the scanned worksheet.     name: Teena Mirza  Agency: [employer]  Language: Citizen of [place]   Telephone number: [phone]  Type of interpretation:Telephone, spoken

## 2020-11-17 NOTE — PROGRESS NOTES
Juli is a 75 year old  who presents for   Patient presents with:  RECHECK      Assessment and Plan      1. Hip pain, right  Hip pain seems to be responding to physical therapy encourage patient to continue with physical therapy.  - X-ray Pelvis w/ unilateral hip right; Future  - X-ray Pelvis w/ unilateral hip right     Return in about 3 months (around 2/17/2021) for Diabetes Routine Check.    There are no discontinued medications.      Ernie Arreola MD         [facility]       Juli is a 75 year old  who presents for   Patient presents with:  RECHECK        Visit [place]  1. Hip Pain   Continued lots of pain, came in for Xray today -patient was seen by Dr. Gilliam and x-ray to confirm intact hip replacement.  She had recommended that he go to physical therapy and he reports that he is in that it is helping though he wishes he could go more often  2. Follow up for BP  Presents for follow-up of blood pressure he denies any symptoms or complaints reports his blood pressure is controlled at home.  3.  He also came about administrative form  Form was located and given to the patient was a  form         A my[employer]x  was used for  this visit.   Problem, Medication and Allergy Lists were reviewed and updated if needed..  Patient is an established patient of this clinic.         Review of Systems:   Review of Systems  7 point review of symptoms was otherwise negative except as noted in HPI         Physical Exam:     Vitals:    11/17/20 1421 11/17/20 1422   BP: (!) 149/88 (!) 145/91   Pulse: 78    Resp: 16    Temp: 98  F (36.7  C)    TempSrc: Oral    SpO2: 98%    Weight: 91.2 kg (201 lb)      Body mass index is 27.26 kg/m .  Vital signs normal except blood pressure which is a mildly elevated but normal for age.     Physical Exam  Vitals signs and nursing note reviewed.   Constitutional:       General: He is not in acute distress.     Appearance: He is well-developed. He is not diaphoretic.   Cardiovascular:       Rate and Rhythm: Normal rate.   Skin:     General: Skin is warm and dry.   Neurological:      Mental Status: He is alert and oriented to person, place, and time.   Psychiatric:         Behavior: Behavior normal.         Thought Content: Thought content normal.           Results:      Results from this visit I reviewed these x-rays prior to them being read and agree with the interpretation below  Results for orders placed or performed in visit on 11/17/20   X-ray Pelvis w/ unilateral hip right     Status: None    Narrative    Exam: 2 views of the right hip dated 11/17/2020.    COMPARISON: 8/2/2018.    CLINICAL HISTORY: Hip pain.    FINDINGS: 2 views of the right hip were obtained. Post surgical  changes of a right hip hemiarthroplasty. The hardware appears intact.  The distal femoral stem is not completely evaluated on the images  obtained. Mild degenerative changes in the left hip joint.      Impression    IMPRESSION: Postsurgical changes of a right hip hemiarthroplasty, the  distal femoral stem is not completely included on the images obtained.    GHULAM LOPEZ MD       Options for treatment and follow-up care were reviewed with the patient. Juli Rodriguez  engaged in the decision making process and verbalized understanding of the options discussed and agreed with the final plan.  Ernie Arreola MD  [facility]

## 2020-12-11 DIAGNOSIS — R56.9 SEIZURES (H): ICD-10-CM

## 2020-12-11 NOTE — TELEPHONE ENCOUNTER
"Request for medication refill:  levETIRAcetam (KEPPRA) 500 MG tablet    Providers if patient needs an appointment and you are willing to give a one month supply please refill for one month and  send a letter/MyChart using \".SMILLIMITEDREFILL\" .smillimited and route chart to \"P SMI \" (Giving one month refill in non controlled medications is strongly recommended before denial)    If refill has been denied, meaning absolutely no refills without visit, please complete the smart phrase \".smirxrefuse\" and route it to the \"P SMI MED REFILLS\"  pool to inform the patient and the pharmacy.    Naomi Watts MA        "

## 2020-12-13 RX ORDER — LEVETIRACETAM 500 MG/1
500 TABLET ORAL 2 TIMES DAILY
Qty: 60 TABLET | Refills: 1 | Status: SHIPPED | OUTPATIENT
Start: 2020-12-13 | End: 2021-03-24

## 2021-01-08 ENCOUNTER — TELEPHONE (OUTPATIENT)
Dept: FAMILY MEDICINE | Facility: CLINIC | Age: 76
End: 2021-01-08

## 2021-01-11 NOTE — TELEPHONE ENCOUNTER
Central Prior Authorization Team  Phone: [phone]    PA Initiation    Medication: pantoprazole (PROTONIX) 20 MG EC tablet  Insurance Company: [employer] - Phone [phone] Fax [phone]  Pharmacy Filling the Rx: [facility] - [place], [place] - [address]  Filling Pharmacy Phone: [phone]  Filling Pharmacy Fax:    Start Date: 1/11/2021    Waiting for question set.

## 2021-01-11 NOTE — TELEPHONE ENCOUNTER
No pa needed- per call to insurance, the pt's plan extended the current pa to be  2021. No additional pa needed at this time.

## 2021-01-18 DIAGNOSIS — I10 BENIGN ESSENTIAL HYPERTENSION: ICD-10-CM

## 2021-01-18 RX ORDER — CARVEDILOL 12.5 MG/1
12.5 TABLET ORAL 2 TIMES DAILY WITH MEALS
Qty: 60 TABLET | Refills: 3 | Status: SHIPPED | OUTPATIENT
Start: 2021-01-18 | End: 2021-06-07

## 2021-01-18 NOTE — TELEPHONE ENCOUNTER

## 2021-02-05 DIAGNOSIS — M79.2 NEUROPATHIC PAIN: ICD-10-CM

## 2021-02-05 RX ORDER — GABAPENTIN 800 MG/1
800 TABLET ORAL 3 TIMES DAILY
Qty: 270 TABLET | Refills: 0 | Status: SHIPPED | OUTPATIENT
Start: 2021-02-05 | End: 2021-05-19

## 2021-02-05 NOTE — TELEPHONE ENCOUNTER
"Request for medication refill:  gabapentin (NEURONTIN) 800 MG tablet    Providers if patient needs an appointment and you are willing to give a one month supply please refill for one month and  send a letter/MyChart using \".SMILLIMITEDREFILL\" .smillimited and route chart to \"P SMI \" (Giving one month refill in non controlled medications is strongly recommended before denial)    If refill has been denied, meaning absolutely no refills without visit, please complete the smart phrase \".smirxrefuse\" and route it to the \"P SMI MED REFILLS\"  pool to inform the patient and the pharmacy.    Naomi Watts MA        "

## 2021-02-16 ENCOUNTER — TELEPHONE (OUTPATIENT)
Dept: FAMILY MEDICINE | Facility: CLINIC | Age: 76
End: 2021-02-16

## 2021-02-16 NOTE — TELEPHONE ENCOUNTER
RN called pt to offer covid vaccine in [place]-only openings. Pt refused to go there would rather get closer to home    Carolann Estrada RN

## 2021-03-08 ENCOUNTER — TELEPHONE (OUTPATIENT)
Dept: FAMILY MEDICINE | Facility: CLINIC | Age: 76
End: 2021-03-08

## 2021-03-08 NOTE — TELEPHONE ENCOUNTER
SW called pt to offer covid vaccine at [facility] on 3/13. Left VM with name and callback number. Please schedule pt when they call back if availability     Rhianna Hyman

## 2021-03-13 ENCOUNTER — IMMUNIZATION (OUTPATIENT)
Dept: FAMILY MEDICINE | Facility: CLINIC | Age: 76
End: 2021-03-13
Payer: COMMERCIAL

## 2021-03-13 PROCEDURE — 0001A PR COVID VAC PFIZER DIL RECON 30 MCG/0.3 ML IM: CPT

## 2021-03-13 PROCEDURE — 91300 PR COVID VAC PFIZER DIL RECON 30 MCG/0.3 ML IM: CPT

## 2021-03-15 DIAGNOSIS — K21.9 GASTROESOPHAGEAL REFLUX DISEASE WITHOUT ESOPHAGITIS: ICD-10-CM

## 2021-03-15 DIAGNOSIS — M70.61 GREATER TROCHANTERIC BURSITIS OF RIGHT HIP: ICD-10-CM

## 2021-03-15 NOTE — TELEPHONE ENCOUNTER
RN MELVINA for patient to contact clinic, please transfer to any available RN.     Patient requesting refill of acetaminophen and pill for gas- need to know which medication this is. Could be Protonix if patient means heart burn or if he is requesting a new medication for gas like simethicone?    Last office visit 11/17/20 with Dr. Arreola. Per office visit notes, patient due for DM follow up appointment.   Erna Hooks RN

## 2021-03-15 NOTE — TELEPHONE ENCOUNTER
Verify that the refill encounter hasn't been started Yes    [facility] phone call message- patient requesting a refill:    Full Medication Name:   1. acetaminophen (TYLENOL) 500 MG tablet  2. Med for gas (patient did not know name)    Dose: see chart     Pharmacy confirmed as   [facility] - [place], [place] - [address] [place] [address]  : Yes    Medication tab checked to see if medication has been sent  Yes    Additional Comments: Patient advised that per pharmacy, no refills available. Patient is out of med.      OK to leave a message on voice mail? Yes    Advised patient refill may take up to 2 business days? Yes    Primary language: English      needed? No    Call taken on March 15, 2021 at 11:55 AM by Carmela Givens to P SMI MED REFILL

## 2021-03-16 RX ORDER — ACETAMINOPHEN 500 MG
1000 TABLET ORAL 3 TIMES DAILY PRN
Qty: 200 TABLET | Refills: 3 | Status: SHIPPED | OUTPATIENT
Start: 2021-03-16 | End: 2021-06-07

## 2021-03-16 RX ORDER — PANTOPRAZOLE SODIUM 20 MG/1
40 TABLET, DELAYED RELEASE ORAL
Qty: 180 TABLET | Refills: 0 | Status: SHIPPED | OUTPATIENT
Start: 2021-03-16 | End: 2021-05-13

## 2021-03-16 NOTE — TELEPHONE ENCOUNTER
RN attempted to reach patient, LM to contact clinic. Please transfer to any available RN.     Will route to PCP to approve order if appropriate and update with details when patient calls back.   Erna Hooks, RN

## 2021-03-24 DIAGNOSIS — R56.9 SEIZURES (H): ICD-10-CM

## 2021-03-24 RX ORDER — LEVETIRACETAM 500 MG/1
500 TABLET ORAL 2 TIMES DAILY
Qty: 60 TABLET | Refills: 1 | Status: SHIPPED | OUTPATIENT
Start: 2021-03-24 | End: 2021-06-07

## 2021-03-24 NOTE — TELEPHONE ENCOUNTER
"Request for medication refill: Levetiracetam    Providers if patient needs an appointment and you are willing to give a one month supply please refill for one month and  send a letter/MyChart using \".SMILLIMITEDREFILL\" .smillimited and route chart to \"P SMI \" (Giving one month refill in non controlled medications is strongly recommended before denial)    If refill has been denied, meaning absolutely no refills without visit, please complete the smart phrase \".smirxrefuse\" and route it to the \"P SMI MED REFILLS\"  pool to inform the patient and the pharmacy.    Yessica Naylor, [facility]        "

## 2021-04-01 ENCOUNTER — TELEPHONE (OUTPATIENT)
Dept: FAMILY MEDICINE | Facility: CLINIC | Age: 76
End: 2021-04-01

## 2021-04-01 NOTE — CONFIDENTIAL NOTE
RN attempted to reach the patient in order to remind the second dose of vaccinations no answer,left message when patient calls back please remind the vaccination on 4/3/2021.    Marlys Dejesus RN

## 2021-04-03 ENCOUNTER — IMMUNIZATION (OUTPATIENT)
Dept: FAMILY MEDICINE | Facility: CLINIC | Age: 76
End: 2021-04-03
Attending: FAMILY MEDICINE
Payer: COMMERCIAL

## 2021-04-03 PROCEDURE — 91300 PR COVID VAC PFIZER DIL RECON 30 MCG/0.3 ML IM: CPT

## 2021-04-03 PROCEDURE — 0002A PR COVID VAC PFIZER DIL RECON 30 MCG/0.3 ML IM: CPT

## 2021-04-06 RX ORDER — NAPROXEN 500 MG/1
500 TABLET ORAL 2 TIMES DAILY WITH MEALS
COMMUNITY
Start: 2020-12-28 | End: 2021-09-24 | Stop reason: SINTOL

## 2021-04-06 NOTE — TELEPHONE ENCOUNTER
"Request for medication refill:  Naproxen    Providers if patient needs an appointment and you are willing to give a one month supply please refill for one month and  send a letter/MyChart using \".SMILLIMITEDREFILL\" .smillimited and route chart to \"P SMI \" (Giving one month refill in non controlled medications is strongly recommended before denial)    If refill has been denied, meaning absolutely no refills without visit, please complete the smart phrase \".smirxrefuse\" and route it to the \"P SMI MED REFILLS\"  pool to inform the patient and the pharmacy.    Naomi Watts MA        "

## 2021-04-07 RX ORDER — NAPROXEN 500 MG/1
500 TABLET ORAL 2 TIMES DAILY WITH MEALS
Qty: 60 TABLET | Refills: 0 | OUTPATIENT
Start: 2021-04-07

## 2021-04-07 NOTE — TELEPHONE ENCOUNTER
Medication Refill Denied  Reason: Medication was discontinued on 10/21/20  Provider: I have not called the patient about the Rx denial, please call.  PCS: Please notify the pharmacy, Please contact the patient to explain reasoning provided above and to schedule the patient for a provider visit with Ernie Arreola.       Apryl Calvert MD

## 2021-04-21 ENCOUNTER — TELEPHONE (OUTPATIENT)
Dept: FAMILY MEDICINE | Facility: CLINIC | Age: 76
End: 2021-04-21

## 2021-04-21 DIAGNOSIS — J30.1 SEASONAL ALLERGIC RHINITIS DUE TO POLLEN: Primary | ICD-10-CM

## 2021-04-21 DIAGNOSIS — J30.2 SEASONAL ALLERGIC RHINITIS, UNSPECIFIED TRIGGER: ICD-10-CM

## 2021-04-21 RX ORDER — LORATADINE 10 MG/1
10 TABLET ORAL DAILY
Qty: 90 TABLET | Refills: 1 | Status: SHIPPED | OUTPATIENT
Start: 2021-04-21 | End: 2021-04-23

## 2021-04-21 NOTE — TELEPHONE ENCOUNTER
Verify that the refill encounter hasn't been started Yes    [facility] phone call message- patient requesting a refill:  loratadine (CLARITIN) 10 MG tablet  Full Medication Name:     Dose:      Pharmacy confirmed as   [id number] - [place], [place] - [address] [address]  Phone: [phone] Fax: [phone]    [facility] - [place], [place] - [address] [place] [address]  Phone: [phone] Fax: [phone]  : No:     Medication tab checked to see if medication has been sent  Yes    Additional Comments:patient request refill would you please send [facility] [address] [facility] to leave a message on voice mail? Yes    Advised patient refill may take up to 2 business days? Yes    Primary language: English      needed? No    Call taken on April 21, 2021 at 11:36 AM by Adonis Bustos    Route to [facility] MED REFILL

## 2021-04-21 NOTE — TELEPHONE ENCOUNTER
"Request for medication refill: loratadine     Providers if patient needs an appointment and you are willing to give a one month supply please refill for one month and  send a letter/MyChart using \".SMILLIMITEDREFILL\" .smillimited and route chart to \"P SMI \" (Giving one month refill in non controlled medications is strongly recommended before denial)    If refill has been denied, meaning absolutely no refills without visit, please complete the smart phrase \".smirxrefuse\" and route it to the \"P SMI MED REFILLS\"  pool to inform the patient and the pharmacy.    Yessica Naylor, [facility]        "

## 2021-04-23 ENCOUNTER — TELEPHONE (OUTPATIENT)
Dept: FAMILY MEDICINE | Facility: CLINIC | Age: 76
End: 2021-04-23

## 2021-04-23 DIAGNOSIS — J30.2 SEASONAL ALLERGIC RHINITIS, UNSPECIFIED TRIGGER: ICD-10-CM

## 2021-04-23 RX ORDER — LORATADINE 10 MG/1
10 TABLET ORAL DAILY
Qty: 90 TABLET | Refills: 1 | Status: SHIPPED | OUTPATIENT
Start: 2021-04-23 | End: 2021-09-24

## 2021-04-23 RX ORDER — LORATADINE 10 MG/1
10 TABLET ORAL DAILY
Qty: 90 TABLET | Refills: 3 | Status: SHIPPED | OUTPATIENT
Start: 2021-04-23 | End: 2021-09-24

## 2021-04-23 NOTE — TELEPHONE ENCOUNTER
"Request for medication refill:    Loratadine    Providers if patient needs an appointment and you are willing to give a one month supply please refill for one month and  send a letter/MyChart using \".SMILLIMITEDREFILL\" .smillimited and route chart to \"P SMI \" (Giving one month refill in non controlled medications is strongly recommended before denial)    If refill has been denied, meaning absolutely no refills without visit, please complete the smart phrase \".smirxrefuse\" and route it to the \"P SMI MED REFILLS\"  pool to inform the patient and the pharmacy.    Martha Hidalgo CMA        "

## 2021-04-23 NOTE — TELEPHONE ENCOUNTER
Verify that the refill encounter hasn't been started Yes    [facility] phone call message- patient requesting a refill:    Full Medication Name: loratadine (CLARITIN) 10 MG tablet    Dose: Route: Take 1 tablet (10 mg) by mouth daily - Oral     Pharmacy confirmed as     [id number] - [place], [place] - [address] [facility]  [address] [place] [address]  Phone: [phone] Fax: [phone]  : Yes    Medication tab checked to see if medication has been sent  Yes    Additional Comments: Patient states he needs medication, Transmission failed in previous request     OK to leave a message on voice mail? Yes    Advised patient refill may take up to 2 business days? Yes    Primary language: English      needed? No    Call taken on April 23, 2021 at 11:17 AM by Martha Mena    Route to [facility] MED REFILL

## 2021-04-23 NOTE — TELEPHONE ENCOUNTER
Dr. Arreola prescribed loratadine 10 mg on 4/21- per Epic note transmission failed. Patient requesting medication be sent to [facility] instead. RN reordered and sent to requested pharmacy.   Erna Hooks RN

## 2021-04-28 DIAGNOSIS — Z86.73 HISTORY OF CVA (CEREBROVASCULAR ACCIDENT): ICD-10-CM

## 2021-04-28 NOTE — TELEPHONE ENCOUNTER

## 2021-05-12 ENCOUNTER — TELEPHONE (OUTPATIENT)
Dept: FAMILY MEDICINE | Facility: CLINIC | Age: 76
End: 2021-05-12

## 2021-05-12 NOTE — TELEPHONE ENCOUNTER
Verify that the refill encounter hasn't been started Yes    [facility] phone call message- patient requesting a refill:    Full Medication Name: pantoprazole (PROTONIX) 20 MG EC tablet    Dose: Route: Take 2 tablets (40 mg) by mouth every morning (before breakfast) - Oral     Pharmacy confirmed as     [id number] - [place], [place] - [address] [address]  Phone: [phone] Fax: [phone]  : Yes    Medication tab checked to see if medication has been sent  Yes    Additional Comments: Patient is out of medication     OK to leave a message on voice mail? Yes    Advised patient refill may take up to 2 business days? Yes    Primary language: English      needed? No    Call taken on May 12, 2021 at 4:31 PM by April Rajesh    Route to [facility] MED REFILL

## 2021-05-13 DIAGNOSIS — K21.9 GASTROESOPHAGEAL REFLUX DISEASE WITHOUT ESOPHAGITIS: ICD-10-CM

## 2021-05-13 RX ORDER — PANTOPRAZOLE SODIUM 20 MG/1
40 TABLET, DELAYED RELEASE ORAL
Qty: 180 TABLET | Refills: 0 | Status: SHIPPED | OUTPATIENT
Start: 2021-05-13 | End: 2021-05-19

## 2021-05-13 NOTE — TELEPHONE ENCOUNTER
"Request for medication refill:  pantoprazole (PROTONIX) 20 MG EC tablet  Providers if patient needs an appointment and you are willing to give a one month supply please refill for one month and  send a letter/MyChart using \".SMILLIMITEDREFILL\" .smillimited and route chart to \"[facility] \" (Giving one month refill in non controlled medications is strongly recommended before denial)    If refill has been denied, meaning absolutely no refills without visit, please complete the smart phrase \".smirxrefuse\" and route it to the \"P [facility] MED REFILLS\"  pool to inform the patient and the pharmacy.    Magdalena Goins        "

## 2021-05-13 NOTE — TELEPHONE ENCOUNTER
RN attempted to reach patient-LM to contact clinic. When patient calls back- please inform that we had received refill request for his protonix from [facility] on [address] and that is where Dr. Arreola sent prescription. If he would like it sent to [facility] instead, he should call [facility] to have it transferred. Okay to transfer to any available RN if he has questions.   Erna Hooks, RN

## 2021-05-19 DIAGNOSIS — M79.2 NEUROPATHIC PAIN: ICD-10-CM

## 2021-05-19 DIAGNOSIS — K21.9 GASTROESOPHAGEAL REFLUX DISEASE WITHOUT ESOPHAGITIS: ICD-10-CM

## 2021-05-19 DIAGNOSIS — Z86.73 HISTORY OF CVA (CEREBROVASCULAR ACCIDENT): ICD-10-CM

## 2021-05-19 RX ORDER — PANTOPRAZOLE SODIUM 20 MG/1
40 TABLET, DELAYED RELEASE ORAL
Qty: 180 TABLET | Refills: 0 | Status: SHIPPED | OUTPATIENT
Start: 2021-05-19 | End: 2021-06-07

## 2021-05-19 RX ORDER — GABAPENTIN 800 MG/1
800 TABLET ORAL 3 TIMES DAILY
Qty: 270 TABLET | Refills: 0 | Status: SHIPPED | OUTPATIENT
Start: 2021-05-19 | End: 2021-06-07

## 2021-05-19 NOTE — TELEPHONE ENCOUNTER
RN made additional attempt to contact patient-LM with call back number. If patient calls back, please relay message below about Protonix refill-okay to transfer to any available RN if he has questions.   Erna Hooks, RN

## 2021-05-19 NOTE — TELEPHONE ENCOUNTER
"RN spoke to patient to clarify request as he has multiple medications at different pharmacies. Patient states he would like his protonix, atorvastatin, and gabapentin sent to [facility] on [address] in [place]. Does not want to use [facility] pharmacy anymore. RN advised patient that he should have refills on file of aspirin at [facility] as this was sent on 4/28 for 90 day supply with 1 refill. He verbalized understanding.  Routing other medications to PCP to approve if appropriate.   Erna Hooks RN      Request for medication refill:    Providers if patient needs an appointment and you are willing to give a one month supply please refill for one month and  send a letter/MyChart using \".SMILLIMITEDREFILL\" .smillimited and route chart to \"P SMI \" (Giving one month refill in non controlled medications is strongly recommended before denial)    If refill has been denied, meaning absolutely no refills without visit, please complete the smart phrase \".smirxrefuse\" and route it to the \"P SMI MED REFILLS\"  pool to inform the patient and the pharmacy.        "

## 2021-05-19 NOTE — TELEPHONE ENCOUNTER
Verify that the refill encounter hasn't been started Yes    [facility] phone call message- patient requesting a refill:    Full Medication Name: 1. gabapentin (NEURONTIN) 800 MG tablet 2. aspirin (ASA) 81 MG EC tablet    Dose: SEE CHART     Pharmacy confirmed as   [id number] - [place], [place] - [address] [facility]  [address] [place] [address]  Phone: [phone] Fax: [phone]  : Yes    Medication tab checked to see if medication has been sent  Yes    Additional Comments: Patient stated they are out of both medications and need them ASAP. Please advise.      OK to leave a message on voice mail? Yes    Advised patient refill may take up to 2 business days? Yes    Primary language: English      needed? No    Call taken on May 19, 2021 at 2:26 PM by Candi Camp    Route to [facility] MED REFILL

## 2021-05-28 ENCOUNTER — TELEPHONE (OUTPATIENT)
Dept: FAMILY MEDICINE | Facility: CLINIC | Age: 76
End: 2021-05-28

## 2021-05-28 NOTE — TELEPHONE ENCOUNTER
.[facility] phone call message - order or referral request from patient:     Order or referral being requested: Requested order DME    Additional Details: Patient requesting a DME order for diabetic shoes to be sent to [facility] #[id number] - [place], [place] - [address] [facility]  [address] [place] [address]  Phone: [phone] Fax: [phone]    Referral only -Specialty N/A Location N/A    OK to leave a message on voice mail? Yes    Primary language: English      needed? No    Call taken on May 28, 2021 at 12:22 PM by Candi Camp    Order request route to [facility] TRIAGE   Referrals Route to [facility] (Green/Auberry/Purple) CARE COORDINATOR

## 2021-05-28 NOTE — TELEPHONE ENCOUNTER
RN attempted to reach patient, LM to contact clinic. Please relay below information that he has refill of acetaminophen at [facility].   Erna Hooks, RN      RN spoke to patient's pharmacy [facility] as he had recently been switching between [facility] and [facility] pharmacy. [facility] pharmacist states they had transferred the acetaminophen 500 mg tablets from Dr. Arreola already from [facility] pharmacy and will fill it for patient.

## 2021-05-28 NOTE — TELEPHONE ENCOUNTER
Verify that the refill encounter hasn't been started Yes    [facility] phone call message- patient requesting a refill:    Full Medication Name: acetaminophen (TYLENOL) 500 MG tablet    Dose: Route: Take 2 tablets (1,000 mg) by mouth 3 times daily as needed for mild pain - Oral     Pharmacy confirmed as       [id number] - [place], [place] - [address] [facility]  [address] [facility] [address]  Phone: [phone] Fax: [phone]      : Yes    Medication tab checked to see if medication has been sent  Yes    Additional Comments: Patient out of medication     OK to leave a message on voice mail? Yes    Advised patient refill may take up to 2 business days? Yes    Primary language: English      needed? No    Call taken on May 28, 2021 at 12:00 PM by April Rajesh    Route to [facility] MED REFILL

## 2021-05-28 NOTE — TELEPHONE ENCOUNTER
No referral/order in patient chart. RN forwarded message to  for referral/order, if appropriate.    Nicole Becerra  Care Coordinator

## 2021-06-02 NOTE — TELEPHONE ENCOUNTER
RN made additional attempt to contact patient-LM to contact clinic. If patient returns call, please relay message below about medication request.   Erna Hooks RN

## 2021-06-04 NOTE — TELEPHONE ENCOUNTER
Patient calling to check status of DME order for diabetic shoes. Advised request pending review by provider. Patient would like to  hard copy of order; please call patient at [phone] when it is ready.     Patient currently has appointment scheduled with Dr Arreola on 6/7/21.    Patient provided phone number for DME supply store. [phone].

## 2021-06-06 NOTE — PROGRESS NOTES
"[facility] Progress Note        Assessment & Plan     Type 2 diabetes mellitus without complication, without long-term current use of insulin (H)  Diabetes is still well controlled continue current medications follow-up in 3 months  - Hemoglobin A1c ([facility])  - Albumin Random Urine Quantitative with Creat Ratio  - Basic metabolic panel    CKD (chronic kidney disease) stage 2, GFR 60-89 ml/min  Stable plan to continue lisinopril and follow-up.    - CBC with Diff Plt ([facility])  - lisinopril (ZESTRIL) 10 MG tablet; Take 1 tablet (10 mg) by mouth daily    Benign essential hypertension  Controlled on current regimen continue current medications.  - carvedilol (COREG) 12.5 MG tablet; Take 1 tablet (12.5 mg) by mouth 2 times daily (with meals)    Seizures (H)  Continue current antiseizure medications.  - levETIRAcetam (KEPPRA) 500 MG tablet; Take 1 tablet (500 mg) by mouth 2 times daily    Neuropathic pain    - gabapentin (NEURONTIN) 800 MG tablet; Take 1 tablet (800 mg) by mouth 3 times daily    Gastroesophageal reflux disease without esophagitis    - pantoprazole (PROTONIX) 20 MG EC tablet; Take 2 tablets (40 mg) by mouth every morning (before breakfast)    Special screening for malignant neoplasms, colon  This will be sent to patient.  - COLOGUARD([employer])    Greater trochanteric bursitis of right hip    - acetaminophen (TYLENOL) 500 MG tablet; Take 2 tablets (1,000 mg) by mouth 3 times daily as needed for mild pain    Left knee tendonitis  Demonstrated stretching exercises to patient and also advised ice twice daily.        I spent a total of 32 minutes on the day of the visit.   Time spent doing chart review, history and exam, documentation and further activities per the note       BMI:   Estimated body mass index is 27.24 kg/m  as calculated from the following:    Height as of this encounter: 1.814 m (5' 11.42\").    Weight as of this encounter: 89.6 kg (197 lb 9.6 oz).           Return in about 3 " months (around 9/7/2021) for Diabetes Routine Check.    Ernie Arreola MD  [facility] DORCAS Bustos is a 76 year old who presents for the following health issues   Patient presents with:  RECHECK: Diabetes recheck  Musculoskeletal Problem: Pain behind left kneecap, right side feels hot   Diabetes   Needs new shoes     Knee pain   Has posterior knee pain has been bothersome for the last several weeks.  He denies any swelling injury redness drainage or tenderness.  HPI     Diabetes Follow-up      How often are you checking your blood sugar? Not at all    What concerns do you have today about your diabetes? None     Do you have any of these symptoms? (Select all that apply)  No numbness or tingling in feet.  No redness, sores or blisters on feet.  No complaints of excessive thirst.  No reports of blurry vision.  No significant changes to weight.    Have you had a diabetic eye exam in the last 12 months? No  Lab Results   Component Value Date    A1C 6.2 06/07/2021    A1C 5.6 10/16/2020    A1C 6.0 07/30/2020    A1C 5.5 02/04/2020    A1C 5.5 11/06/2019             Hyperlipidemia Follow-Up      Are you regularly taking any medication or supplement to lower your cholesterol?   Yes- atorvastatin    Are you having muscle aches or other side effects that you think could be caused by your cholesterol lowering medication?  No    Hypertension Follow-up      Do you check your blood pressure regularly outside of the clinic? No     Are you following a low salt diet? Yes    Are your blood pressures ever more than 140 on the top number (systolic) OR more   than 90 on the bottom number (diastolic), for example 140/90? No    BP Readings from Last 2 Encounters:   06/07/21 135/89   11/17/20 (!) 145/91     Hemoglobin A1C (%)   Date Value   06/07/2021 6.2 (H)   10/16/2020 5.6     LDL Cholesterol Calculated (mg/dL)   Date Value   07/30/2020 41   07/12/2019 26         How many servings of fruits and vegetables  "do you eat daily?  0-1    On average, how many sweetened beverages do you drink each day (Examples: soda, juice, sweet tea, etc.  Do NOT count diet or artificially sweetened beverages)?   1    How many days per week do you exercise enough to make your heart beat faster? 3 or less    How many minutes a day do you exercise enough to make your heart beat faster? 9 or less    How many days per week do you miss taking your medication? 0            Objective    /89 (BP Location: Right arm, Patient Position: Sitting, Cuff Size: Adult Regular)   Pulse 76   Temp 98.9  F (37.2  C) (Oral)   Ht 1.814 m (5' 11.42\")   Wt 89.6 kg (197 lb 9.6 oz)   SpO2 97%   BMI 27.24 kg/m    Body mass index is 27.24 kg/m .  Physical Exam  Vitals signs reviewed.   Constitutional:       General: He is not in acute distress.     Appearance: He is well-developed. He is not diaphoretic.   HENT:      Head: Normocephalic.   Eyes:      General: No scleral icterus.     Conjunctiva/sclera: Conjunctivae normal.   Neck:      Musculoskeletal: Normal range of motion.      Thyroid: No thyromegaly.   Cardiovascular:      Rate and Rhythm: Normal rate and regular rhythm.      Heart sounds: Normal heart sounds. No murmur.   Pulmonary:      Effort: Pulmonary effort is normal. No respiratory distress.      Breath sounds: Normal breath sounds. No wheezing.   Abdominal:      General: Bowel sounds are normal. There is no distension.      Palpations: Abdomen is soft. There is no hepatomegaly or splenomegaly.      Tenderness: There is no abdominal tenderness.   Musculoskeletal:      Right knee: He exhibits normal range of motion, no swelling, no effusion and no ecchymosis. No tenderness found.      Left knee: He exhibits normal range of motion, no swelling and no effusion. Tenderness ( At the insertion of the hamstrings especially the lateral hamstring) found.   Lymphadenopathy:      Cervical: No cervical adenopathy.   Skin:     General: Skin is warm and dry. "   Neurological:      Mental Status: He is alert and oriented to person, place, and time.   Psychiatric:         Behavior: Behavior normal.         Thought Content: Thought content normal.         Judgment: Judgment normal.

## 2021-06-07 ENCOUNTER — OFFICE VISIT (OUTPATIENT)
Dept: FAMILY MEDICINE | Facility: CLINIC | Age: 76
End: 2021-06-07
Payer: COMMERCIAL

## 2021-06-07 VITALS
HEART RATE: 76 BPM | OXYGEN SATURATION: 97 % | WEIGHT: 197.6 LBS | BODY MASS INDEX: 27.66 KG/M2 | TEMPERATURE: 98.9 F | SYSTOLIC BLOOD PRESSURE: 135 MMHG | HEIGHT: 71 IN | DIASTOLIC BLOOD PRESSURE: 89 MMHG

## 2021-06-07 DIAGNOSIS — Z12.11 SPECIAL SCREENING FOR MALIGNANT NEOPLASMS, COLON: ICD-10-CM

## 2021-06-07 DIAGNOSIS — E11.9 TYPE 2 DIABETES MELLITUS WITHOUT COMPLICATION, WITHOUT LONG-TERM CURRENT USE OF INSULIN (H): Primary | ICD-10-CM

## 2021-06-07 DIAGNOSIS — K21.9 GASTROESOPHAGEAL REFLUX DISEASE WITHOUT ESOPHAGITIS: ICD-10-CM

## 2021-06-07 DIAGNOSIS — M79.2 NEUROPATHIC PAIN: ICD-10-CM

## 2021-06-07 DIAGNOSIS — M76.892 LEFT KNEE TENDONITIS: ICD-10-CM

## 2021-06-07 DIAGNOSIS — N18.2 CKD (CHRONIC KIDNEY DISEASE) STAGE 2, GFR 60-89 ML/MIN: ICD-10-CM

## 2021-06-07 DIAGNOSIS — I10 BENIGN ESSENTIAL HYPERTENSION: ICD-10-CM

## 2021-06-07 DIAGNOSIS — M70.61 GREATER TROCHANTERIC BURSITIS OF RIGHT HIP: ICD-10-CM

## 2021-06-07 DIAGNOSIS — R56.9 SEIZURES (H): ICD-10-CM

## 2021-06-07 LAB
% IMMATURE GRANULOCYTES: 0 % (ref 0–0)
ABS IMMATURE GRANULOCYTES: 0 10E9/L (ref 0–0)
ANION GAP SERPL CALCULATED.3IONS-SCNC: 4 MMOL/L (ref 3–14)
BASOPHILS # BLD AUTO: 0.1 10E9/L (ref 0–0.2)
BASOPHILS NFR BLD AUTO: 1 % (ref 0–2)
BUN SERPL-MCNC: 24 MG/DL (ref 7–30)
CALCIUM SERPL-MCNC: 9.5 MG/DL (ref 8.5–10.1)
CHLORIDE SERPL-SCNC: 109 MMOL/L (ref 94–109)
CO2 SERPL-SCNC: 29 MMOL/L (ref 20–32)
CREAT SERPL-MCNC: 1.39 MG/DL (ref 0.66–1.25)
CREAT UR-MCNC: 297 MG/DL
EOSINOPHIL # BLD AUTO: 0.2 10E9/L (ref 0–0.7)
EOSINOPHIL NFR BLD AUTO: 3 % (ref 0–6)
ERYTHROCYTE [DISTWIDTH] IN BLOOD BY AUTOMATED COUNT: 11.8 % (ref 10–15)
GFR SERPL CREATININE-BSD FRML MDRD: 49 ML/MIN/{1.73_M2}
GLUCOSE SERPL-MCNC: 139 MG/DL (ref 70–99)
HBA1C MFR BLD: 6.2 % (ref 4.1–5.7)
HCT VFR BLD AUTO: 42.8 % (ref 40–53)
HEMOGLOBIN: 14.1 G/DL (ref 13.3–17.7)
LYMPHOCYTES # BLD AUTO: 1.5 10E9/L (ref 0.8–5.3)
LYMPHOCYTES NFR BLD AUTO: 28 % (ref 20–48)
MCH RBC QN AUTO: 33.9 PG (ref 26.5–35)
MCHC RBC AUTO-ENTMCNC: 32.9 G/DL (ref 32–36)
MCV RBC AUTO: 102.9 FL (ref 78–100)
MICROALBUMIN UR-MCNC: 22 MG/L
MICROALBUMIN/CREAT UR: 7.54 MG/G CR (ref 0–17)
MONOCYTES # BLD AUTO: 0.5 10E9/L (ref 0–1.3)
MONOCYTES NFR BLD AUTO: 10 % (ref 0–12)
NEUTROPHILS # BLD AUTO: 3.2 10E9/L (ref 1.6–8.3)
NEUTROPHILS NFR BLD AUTO: 58.2 % (ref 40–75)
PLATELET # BLD AUTO: 167 10E9/L (ref 150–450)
POTASSIUM SERPL-SCNC: 4.5 MMOL/L (ref 3.4–5.3)
RBC # BLD AUTO: 4.16 10E12/L (ref 4.4–5.9)
SODIUM SERPL-SCNC: 142 MMOL/L (ref 133–144)
WBC # BLD AUTO: 5.4 10E9/L (ref 4–11)

## 2021-06-07 PROCEDURE — 83036 HEMOGLOBIN GLYCOSYLATED A1C: CPT | Performed by: FAMILY MEDICINE

## 2021-06-07 PROCEDURE — 99214 OFFICE O/P EST MOD 30 MIN: CPT | Performed by: FAMILY MEDICINE

## 2021-06-07 PROCEDURE — 82043 UR ALBUMIN QUANTITATIVE: CPT | Performed by: FAMILY MEDICINE

## 2021-06-07 PROCEDURE — 85025 COMPLETE CBC W/AUTO DIFF WBC: CPT | Performed by: FAMILY MEDICINE

## 2021-06-07 PROCEDURE — 80048 BASIC METABOLIC PNL TOTAL CA: CPT | Performed by: FAMILY MEDICINE

## 2021-06-07 PROCEDURE — 36415 COLL VENOUS BLD VENIPUNCTURE: CPT | Performed by: FAMILY MEDICINE

## 2021-06-07 RX ORDER — LEVETIRACETAM 500 MG/1
500 TABLET ORAL 2 TIMES DAILY
Qty: 180 TABLET | Refills: 3 | Status: SHIPPED | OUTPATIENT
Start: 2021-06-07 | End: 2021-09-24

## 2021-06-07 RX ORDER — CARVEDILOL 12.5 MG/1
12.5 TABLET ORAL 2 TIMES DAILY WITH MEALS
Qty: 180 TABLET | Refills: 3 | Status: SHIPPED | OUTPATIENT
Start: 2021-06-07 | End: 2021-09-24

## 2021-06-07 RX ORDER — GABAPENTIN 800 MG/1
800 TABLET ORAL 3 TIMES DAILY
Qty: 270 TABLET | Refills: 3 | Status: SHIPPED | OUTPATIENT
Start: 2021-06-07 | End: 2021-09-09

## 2021-06-07 RX ORDER — LISINOPRIL 10 MG/1
10 TABLET ORAL DAILY
Qty: 90 TABLET | Refills: 3 | Status: SHIPPED | OUTPATIENT
Start: 2021-06-07 | End: 2021-09-24

## 2021-06-07 RX ORDER — PANTOPRAZOLE SODIUM 20 MG/1
40 TABLET, DELAYED RELEASE ORAL
Qty: 180 TABLET | Refills: 3 | Status: SHIPPED | OUTPATIENT
Start: 2021-06-07 | End: 2021-09-24

## 2021-06-07 RX ORDER — ACETAMINOPHEN 500 MG
1000 TABLET ORAL 3 TIMES DAILY PRN
Qty: 200 TABLET | Refills: 3 | Status: SHIPPED | OUTPATIENT
Start: 2021-06-07 | End: 2021-09-24

## 2021-06-07 ASSESSMENT — MIFFLIN-ST. JEOR: SCORE: 1655.05

## 2021-06-07 NOTE — PATIENT INSTRUCTIONS
Here is the plan from today's visit    1. Type 2 diabetes mellitus without complication, without long-term current use of insulin (H)  Continue current mahngment  - Hemoglobin A1c ([facility])  - Albumin Random Urine Quantitative with Creat Ratio  - Basic metabolic panel    2. CKD (chronic kidney disease) stage 2, GFR 60-89 ml/min  Continue lisinopril  - CBC with Diff Plt ([facility])  - lisinopril (ZESTRIL) 10 MG tablet; Take 1 tablet (10 mg) by mouth daily  Dispense: 90 tablet; Refill: 3    3. Benign essential hypertension  continue  - carvedilol (COREG) 12.5 MG tablet; Take 1 tablet (12.5 mg) by mouth 2 times daily (with meals)  Dispense: 180 tablet; Refill: 3    4. Seizures (H)  continue  - levETIRAcetam (KEPPRA) 500 MG tablet; Take 1 tablet (500 mg) by mouth 2 times daily  Dispense: 180 tablet; Refill: 3    5. Neuropathic pain     - gabapentin (NEURONTIN) 800 MG tablet; Take 1 tablet (800 mg) by mouth 3 times daily  Dispense: 270 tablet; Refill: 3    6. Gastroesophageal reflux disease without esophagitis      - pantoprazole (PROTONIX) 20 MG EC tablet; Take 2 tablets (40 mg) by mouth every morning (before breakfast)  Dispense: 180 tablet; Refill: 3    7. Special screening for malignant neoplasms, colon    They will send you a kit  - [employer])    8. Greater trochanteric bursitis of right hip     - acetaminophen (TYLENOL) 500 MG tablet; Take 2 tablets (1,000 mg) by mouth 3 times daily as needed for mild pain  Dispense: 200 tablet; Refill: 3    9. Left knee tendonitis  Ice two times daily and stretch      Please call or return to clinic if your symptoms don't go away.    Follow up plan  Return in about 3 months (around 9/7/2021) for Diabetes Routine Check.     Thank you for coming to [facility] today.  Lab Testing:  **If you had lab testing today and your results are reassuring or normal they will be mailed to you or sent through [facility] within 7 days.   **If the lab tests need quick action we will  call you with the results.  The phone number we will call with results is # [phone] (home) . If this is not the best number please call our clinic and change the number.  Medication Refills:  If you need any refills please call your pharmacy and they will contact us.   If you need to  your refill at a new pharmacy, please contact the new pharmacy directly. The new pharmacy will help you get your medications transferred faster.   Scheduling:  If you have any concerns about today's visit or wish to schedule another appointment please call our office during normal business hours [phone] (8-5:00 M-F)   eferrals to [facility] please call [phone].   Mammogram Scheduling [phone]     XRay/CT/Ultrasound/MRI Scheduling [phone]    Medical Concerns:  If you have urgent medical concerns please call [phone] at any time of the day.    Ernie Arreola MD

## 2021-06-07 NOTE — LETTER
June 7, 2021      [id number]    Juli Rodriguez  [address]   [place] [address]      Dear Whom It May Concern:  Regarding Mr. Juli Rodriguez     Prescription for Diabetic shoes   Diagnosis  (E11.9) Type 2 diabetes mellitus without complication, without long-term current use of insulin   REcommendation   1 pair of Diabetic shoes to reduce the risk of foot injury         If you have any questions, please do not hesitate to contact me, or our Team Nurse, at clinic.     I thank you in advance for your help.         Respectfully,     Ernie Arreola MD

## 2021-06-08 NOTE — TELEPHONE ENCOUNTER
11:07a.m Faxed order to [facility] T.[phone], F. [phone] and left copy at the .    Nicole Becerra  Care Coordinator

## 2021-06-08 NOTE — TELEPHONE ENCOUNTER
6/8/21 Patient was seen on 6/7/21 by . Not sure if patient received order for Diabetic shoes at that time. Lifetime referral in patient chart dated 7/03/20. I did contact patient and leave a message on his voicemail stating that I will leave a hard copy at the  per his request and also fax a copy to DME supply pt listed in his note.    Nicole Becerra  Care Coordinator    (Covering for [employer] [facility])

## 2021-06-10 ENCOUNTER — DOCUMENTATION ONLY (OUTPATIENT)
Dept: FAMILY MEDICINE | Facility: CLINIC | Age: 76
End: 2021-06-10

## 2021-06-16 LAB — COLOGUARD-ABSTRACT: NEGATIVE

## 2021-06-17 NOTE — PROGRESS NOTES
Telephone call to the client's home for annual health risk assessment and PCA assessment.  An  was present.    Current situation/living environment  Clt lives alone in apt complex    Activities of daily living (ADL)/instrumental activities of daily living (IADL) and functional issues  Client needs help with the following ADL's: dressing, grooming, bathing, transfers, walking and toileting  Client needs help with the following IADL's: shopping, cooking, housekeeping, laundry, managing finances/bills and transportation  Client states he is unable to perform the above due to low back pain, leg and knee pains.      Health concerns for today  Continues to have body pains and takes pain meds as needed.  This also limits his activities and clt reports the pain affects his sleeping.   Has patient fallen 2 or more times in the last year? No  Has patient fallen with injury in the last year? No    Cognition/mental health  Alert and oriented and is able to make his needs known    STARS/Med Adherence  Client is non-compliant with the following quality measures: Diabetes blood sugar testing  Comments: education efforts    Client's Plan of Care consists of:  Adult day care (2 days per week), Homemaker services (5 hours per week), Life line, Personal care assistance (PCA) (4 hours per day), Specialized supplies and equipment (lift chair, a/c unit, bathroom grab bars, cane, walker, shower chair, raised toilet seat, hh shower, reacher, leg brace, orthotic shoes) and Transportation

## 2021-08-17 ENCOUNTER — DOCUMENTATION ONLY (OUTPATIENT)
Dept: FAMILY MEDICINE | Facility: CLINIC | Age: 76
End: 2021-08-17

## 2021-08-17 NOTE — PROGRESS NOTES
"When opening a documentation only encounter, be sure to enter in \"Chief Complaint\" Forms and in \" Comments\" Title of form, description if needed.    Juli is a 76 year old  male  Form received via: Fax  Form now resides in: Provider Ready    Naomi Watts MA     Form has been completed by provider.     Form sent out via: Fax to [employer] at Fax Number: [phone]  Patient informed: No, Reason:n/a  Output date: August 20, 2021    Raysa Hamilton MA      **Please close the encounter**                      "

## 2021-08-19 ENCOUNTER — MEDICAL CORRESPONDENCE (OUTPATIENT)
Dept: HEALTH INFORMATION MANAGEMENT | Facility: CLINIC | Age: 76
End: 2021-08-19

## 2021-09-09 DIAGNOSIS — M79.2 NEUROPATHIC PAIN: ICD-10-CM

## 2021-09-09 RX ORDER — GABAPENTIN 800 MG/1
800 TABLET ORAL 3 TIMES DAILY
Qty: 270 TABLET | Refills: 3 | Status: SHIPPED | OUTPATIENT
Start: 2021-09-09 | End: 2021-09-24

## 2021-09-09 NOTE — TELEPHONE ENCOUNTER
"Request for medication refill:gabapentin (NEURONTIN) 800 MG tablet    Providers if patient needs an appointment and you are willing to give a one month supply please refill for one month and  send a letter/MyChart using \".SMILLIMITEDREFILL\" .smillimited and route chart to \"[facility] \" (Giving one month refill in non controlled medications is strongly recommended before denial)    If refill has been denied, meaning absolutely no refills without visit, please complete the smart phrase \".smirxrefuse\" and route it to the \"[facility] MED REFILLS\"  pool to inform the patient and the pharmacy.    Alysia Naranjo        "

## 2021-09-14 ENCOUNTER — DOCUMENTATION ONLY (OUTPATIENT)
Dept: FAMILY MEDICINE | Facility: CLINIC | Age: 76
End: 2021-09-14

## 2021-09-14 NOTE — PROGRESS NOTES
"When opening a documentation only encounter, be sure to enter in \"Chief Complaint\" Forms and in \" Comments\" Title of form, description if needed.    Juli is a 76 year old  male  Form received via: Fax  Form now resides in: Provider Ready    Raysa Hamilton MA    Unable to locate form in providers folder or  forms completed bin.Per protocol encounter will be signed and addressed.    Raysa Hamilton MA                "

## 2021-09-15 DIAGNOSIS — Z53.9 DIAGNOSIS NOT YET DEFINED: Primary | ICD-10-CM

## 2021-09-15 PROCEDURE — G0180 MD CERTIFICATION HHA PATIENT: HCPCS | Performed by: FAMILY MEDICINE

## 2021-09-21 ENCOUNTER — MEDICAL CORRESPONDENCE (OUTPATIENT)
Dept: HEALTH INFORMATION MANAGEMENT | Facility: CLINIC | Age: 76
End: 2021-09-21

## 2021-09-23 NOTE — PROGRESS NOTES
>65 year old Wellness Visit ( Medicare etc)         HPI       This 76 year old male presents as an established patient  Ernie Arreola who presents for a  Annual Wellness Exam.    Other issues patient wants to address today:    Right side is weaker and stiff.  Having more trouble walking in is using a cane and is worried about falling.  Diabetes Follow-up      Patient is checking blood sugars: not at all         -Last A1C was   Lab Results   Component Value Date    A1C 5.8 09/24/2021    A1C 6.2 06/07/2021    A1C 5.6 10/16/2020    A1C 6.0 07/30/2020    A1C 5.5 02/04/2020    A1C 5.5 11/06/2019        Diabetic concerns: None    Chest Pain or exercise related calf pain (claudication):no     Symptoms of hypoglycemia (low blood sugar): none     Paresthesias (numbness or burning in feet) or sores: No     Diabetic eye exam within the last year?: No      Adherence and Exercise  Medication side effects: no  How often is a medication missed? Less than 1 time per week  Exercise:walking  2-3 days/week for an average of less than 15 minutes     A [place]  was used for  this visit.   Visual Acuity:  Right Eye: 20/205   Left Eye: 20/25  Both Eyes: 20/25    Patient Active Problem List   Diagnosis     Type 2 diabetes mellitus without complication, without long-term current use of insulin (H)     History of CVA (cerebrovascular accident)     Benign essential hypertension     Right sided weakness     Seizures (H)     Gastroesophageal reflux disease without esophagitis     Other hyperlipidemia     Advanced directives, counseling/discussion     CKD (chronic kidney disease) stage 2, GFR 60-89 ml/min       Past Medical History:   Diagnosis Date     Cerebral infarction (H) 08/2013    In [facility]     Diabetes mellitus, type 2 (H)      Stroke (H)         Family History   Problem Relation Age of Onset     Diabetes No family hx of      Coronary Artery Disease No family hx of      Hypertension No family hx of      Hyperlipidemia No  family hx of      Cerebrovascular Disease No family hx of      Breast Cancer No family hx of      Colon Cancer No family hx of      Prostate Cancer No family hx of      Other Cancer No family hx of      Depression No family hx of      Anxiety Disorder No family hx of      Mental Illness No family hx of      Substance Abuse No family hx of      Anesthesia Reaction No family hx of      Asthma No family hx of      Osteoporosis No family hx of      Genetic Disorder No family hx of      Thyroid Disease No family hx of      Obesity No family hx of      Glaucoma No family hx of      Macular Degeneration No family hx of          Problem List, Family History and past Medical History reviewed and unchanged/updated.       Health risk Assessment/ Review of Systems:         Constitutional:   Fevers or night sweats?  NO      Eyes:   Vision problems?  NO            Hearing:  Do you feel you have hearing loss?  NO  Cardiovascular:  Chest pain, palpitations, or pain with walking?  NO        Respiratory:   Breathing problems or cough?  NO    :   Difficulty controlling urination?  NO        Musculoskeletal:   Stiffness or sore joints?  NO            Skin:   concerning lesions or moles?  NO           Nervous System:   loss of strength or sensation,  numbness or tingling,  tremor,  dizziness,  headache?  NO         Mental Health:   depression or anxiety,  sleep problems?  NO   Cognition:  Memory problems?  NO       Weight Loss: Have you lost 10 or more pounds unintentionally in the previous year?  NO  Energy: How much of the time during the past 3 weeks did you feel tired?   (check one of the following):   ?  All of the time  ? Most of the time  ? Some of the time  ? A little of the time  ? None of the Time    PHQ-2 Score:   PHQ-2 ( 1999 Pfizer) 9/24/2021 6/7/2021   Q1: Little interest or pleasure in doing things 0 0   Q2: Feeling down, depressed or hopeless 0 0   PHQ-2 Score 0 0       PHQ-9 Score:   No flowsheet data found.  Medical  Care:     What other specialists or organizations are involved in your medical care?     Patient Care Team       Relationship Specialty Notifications Start End    Ernie Arreola MD PCP - General Family Practice  4/19/17     Phone: [phone] Fax: [phone]         [address] [place] [id number]    Leilani Gresham OD  Optometry  4/25/17     Phone: [phone] Fax: [phone]         [address] [place] [address] [address] [facility] [address]    Mo Aguilar, RN Clinic Care Coordinator  Admissions 7/4/18     [phone]     -[facility] [id number]    Khoi Garcia, [facility] Pharmacist Pharmacist  11/12/20     Phone: [phone] Fax: [phone]         [address] [facility] [address]    Ernie Arreola MD Assigned PCP   2/14/21     Phone: [phone] Fax: [phone]         [address] [place] [address]          Do you have an advanced directive? No-ACP Packet given to patient.      Social History / Home Safety     Social History     Tobacco Use     Smoking status: Never Smoker     Smokeless tobacco: Never Used   Substance Use Topics     Alcohol use: No     Marital Status:Single  Who lives in your household? Patient  Does your home have any of the following safety concerns? Loose rugs in the hallway, no grab bars in the bathroom, no handrails on the stairs or have poorly lit areas?   YES NO grab bars,no handrails and loose rugs  Do you feel threatened or controlled by a partner, ex-partner or anyone in your life?  No   Has anyone hurt you physically, for example by pushing, hitting, slapping or kicking you   or forcing you to have sex? No       Functional Status     Do you need help with dressing yourself, bathing, or walking? YES Dressing,bathing and walking  Do you need help with the phone, transportation, shopping, preparing meals, housework, laundry, medications or managing money? YES Has PCA  By yourself and not using aids, do you have any difficulty walking  up 10 steps  without resting? No   By yourself and not using aids, do you have any difficulty walking several hundred yards? No              Risk Behaviors and Healthy Habits     History   Smoking Status     Never Smoker   Smokeless Tobacco     Never Used     How many servings of fruits and vegetables do you eat a day? 1-2  Exercise:walking  2-3 days/week for an average of less than 15 minutes  Do you frequently drive without a seatbelt? No   History   Smoking Status     Never Smoker   Smokeless Tobacco     Never Used     Do you use any other drugs? No       Do you use alcohol?No      Functional Ability   Was the patient's timed Up & Go test (Get up from chair walk, 10 feet turn, return to chair and sit down) unsteady or longer than 10 seconds? No     Fall risk:     1. Have you fallen two or more times in the past year? No   2. Have you fallen and had an injury in the past year?  No         Evaulation of Cognitive Function     Family member/caregiver input: Normal    1) Repeat 3 items (Leader, Season, Table)    2) Clock draw:   3) 3 item recall:    Results: ABNORMAL clock, 1-2 items recalled: PROBABLE COGNITIVE IMPAIRMENT, **INFORM PROVIDER**    Mini-CogTM Copyright S Corina. Licensed by the author for use in [facility]; reprinted with permission (sekou@[facility]). All rights reserved.            Other Assessments:     Patient is at high risk because of previous CVA And is on atorvastatin  The ASCVD Risk score (Alondra DC Jr., et al., 2013) failed to calculate for the following reasons:    The patient has a prior MI or stroke diagnosis    Advance Directives: Discussed with patient and family as appropriate.  Has patient completed advance directives? No, advance care planning information given to patient to review.  Patient plans to discuss their wishes with loved ones or provider.                Immunization History   Administered Date(s) Administered     COVID-19,PF,Pfizer 03/13/2021, 04/03/2021      "HepB-Adult 10/16/2020     Influenza (High Dose) 3 valent vaccine 11/06/2019     Influenza Vaccine IM > 6 months Valent IIV4 (Alfuria,Fluzone) 11/13/2016, 10/16/2020     Pneumo Conj 13-V (2010&after) 05/17/2017     Pneumococcal 23 valent 07/12/2019     TDAP Vaccine (Boostrix) 05/17/2017     Reviewed Immunization Record Today    Physical Exam     Vitals: /84   Pulse 78   Temp 98.1  F (36.7  C) (Oral)   Resp 16   Ht 1.845 m (6' 0.64\")   Wt 91.4 kg (201 lb 6.4 oz)   SpO2 99%   BMI 26.84 kg/m    BMI= Body mass index is 26.84 kg/m .  GENERAL APPEARANCE: alert and no distress  HENT: ear canals patent and TM's normal; mouth without ulcers or lesions; and oral mucous membranes moist  Hearing loss screen:   Normal   DENTITION:  Good repair?  yes  RESP: lungs clear to auscultation - no rales, rhonchi or wheezes  CV: regular rates and rhythm, no murmur, click or rub, no peripheral edema and peripheral pulses strong  SKIN: no suspicious lesions or rashes  NEURO: Normal strength and tone  MENTAL STATUS EXAM  Appearance: appropriate  Attitude: cooperative  Behavior: normal  Eye Contact: normal  Speech: normal  Orientation: oreinted to person , place, time and situation  Mood:  good  Affect: Mood Congruent  Thought Process: clear        Assessment and Plan         Welcome to Medicare Preventive Visit / Initial Medicare Annual Wellness Exam - reviewed Preventive Services and Plan form with patient as specified in Patient Instructions.    (E11.9) Type 2 diabetes mellitus without complication, without long-term current use of insulin (H)  (primary encounter diagnosis)  Comment: Patient's diabetes is well controlled we will continue with current medications  Plan: Hemoglobin A1c, Lipid panel reflex to direct         LDL Non-fasting, Basic metabolic panel,         atorvastatin (LIPITOR) 40 MG tablet, blood         glucose (NO BRAND SPECIFIED) lancets standard,         blood glucose (NO BRAND SPECIFIED) test strip,         " blood glucose monitoring (NO BRAND SPECIFIED)         meter device kit, metFORMIN (GLUCOPHAGE) 500 MG        tablet,               (N18.31) Stage 3a chronic kidney disease  Comment: Patient has a newly developed kidney disease over the last 2 years it is still mild  Plan: Advised him to avoid naproxen and other NSAIDs.    (Z86.73) History of CVA (cerebrovascular accident)  Comment: Because of patient's history of a CVA on the right and increasing stiffness and risk of falls  Plan: Physical Therapy Referral, aspirin (ASA) 81 MG         EC tablet        Referred him to physical therapy at [facility].    (I10) Benign essential hypertension  Comment: Blood pressure is controlled  Plan: carvedilol (COREG) 12.5 MG tablet,                    (N18.2) CKD (chronic kidney disease) stage 2, GFR 60-89 ml/min  Comment:   Plan: lisinopril (ZESTRIL) 10 MG tablet,      (Z00.00) Medicare annual wellness visit, subsequent  Comment:    Plan:      (M70.61) Greater trochanteric bursitis of right hip  Comment:    Plan: acetaminophen (TYLENOL) 500 MG tablet            (M79.2) Neuropathic pain  Comment: Stable   Plan: gabapentin (NEURONTIN) 800 MG tablet             (R56.9) Seizures (H)  Comment: Has not had any seizures recently  Plan: levETIRAcetam (KEPPRA) 500 MG tablet             (J30.2) Seasonal allergic rhinitis, unspecified trigger  Comment: Well-controlledPlan: loratadine (CLARITIN) 10 MG tablet           (K21.9) Gastroesophageal reflux disease without esophagitis  Comment: Well-controlled  Plan: pantoprazole (PROTONIX) 20 MG EC tablet        Refill     (K59.01) Slow transit constipation     Plan: sennosides (SENOKOT) 8.6 MG tablet        Refill    (Z23) Need for prophylactic vaccination and inoculation against influenza  Comment:    Plan: INFLUENZA QUAD, RECOMBINANT, P-FREE (RIV4)         (FLUBLOK), CANCELED: INFLUENZA, QUAD, HIGH         DOSE, PF, 65YR + (FLUZONE HD)           Options for treatment and follow-up care were  reviewed with the Juli Rodriguez and/or guardian engaged in the decision making process and verbalized understanding of the options discussed and agreed with the final plan.    Ernie Arreola MD

## 2021-09-24 ENCOUNTER — OFFICE VISIT (OUTPATIENT)
Dept: FAMILY MEDICINE | Facility: CLINIC | Age: 76
End: 2021-09-24
Payer: COMMERCIAL

## 2021-09-24 VITALS
DIASTOLIC BLOOD PRESSURE: 84 MMHG | TEMPERATURE: 98.1 F | WEIGHT: 201.4 LBS | OXYGEN SATURATION: 99 % | BODY MASS INDEX: 26.69 KG/M2 | HEART RATE: 78 BPM | HEIGHT: 73 IN | SYSTOLIC BLOOD PRESSURE: 130 MMHG | RESPIRATION RATE: 16 BRPM

## 2021-09-24 DIAGNOSIS — J30.2 SEASONAL ALLERGIC RHINITIS, UNSPECIFIED TRIGGER: ICD-10-CM

## 2021-09-24 DIAGNOSIS — Z86.73 HISTORY OF CVA (CEREBROVASCULAR ACCIDENT): ICD-10-CM

## 2021-09-24 DIAGNOSIS — K21.9 GASTROESOPHAGEAL REFLUX DISEASE WITHOUT ESOPHAGITIS: ICD-10-CM

## 2021-09-24 DIAGNOSIS — R56.9 SEIZURES (H): ICD-10-CM

## 2021-09-24 DIAGNOSIS — E11.9 TYPE 2 DIABETES MELLITUS WITHOUT COMPLICATION, WITHOUT LONG-TERM CURRENT USE OF INSULIN (H): Primary | ICD-10-CM

## 2021-09-24 DIAGNOSIS — N18.2 CKD (CHRONIC KIDNEY DISEASE) STAGE 2, GFR 60-89 ML/MIN: ICD-10-CM

## 2021-09-24 DIAGNOSIS — K59.01 SLOW TRANSIT CONSTIPATION: ICD-10-CM

## 2021-09-24 DIAGNOSIS — Z23 NEED FOR PROPHYLACTIC VACCINATION AND INOCULATION AGAINST INFLUENZA: ICD-10-CM

## 2021-09-24 DIAGNOSIS — Z00.00 MEDICARE ANNUAL WELLNESS VISIT, SUBSEQUENT: ICD-10-CM

## 2021-09-24 DIAGNOSIS — N18.31 STAGE 3A CHRONIC KIDNEY DISEASE (H): ICD-10-CM

## 2021-09-24 DIAGNOSIS — M79.2 NEUROPATHIC PAIN: ICD-10-CM

## 2021-09-24 DIAGNOSIS — I10 BENIGN ESSENTIAL HYPERTENSION: ICD-10-CM

## 2021-09-24 DIAGNOSIS — M70.61 GREATER TROCHANTERIC BURSITIS OF RIGHT HIP: ICD-10-CM

## 2021-09-24 PROBLEM — N18.30 CHRONIC KIDNEY DISEASE, STAGE 3 (H): Status: ACTIVE | Noted: 2021-09-24

## 2021-09-24 LAB
ANION GAP SERPL CALCULATED.3IONS-SCNC: 5 MMOL/L (ref 3–14)
BUN SERPL-MCNC: 17 MG/DL (ref 7–30)
CALCIUM SERPL-MCNC: 9.2 MG/DL (ref 8.5–10.1)
CHLORIDE BLD-SCNC: 104 MMOL/L (ref 94–109)
CHOLEST SERPL-MCNC: 89 MG/DL
CO2 SERPL-SCNC: 27 MMOL/L (ref 20–32)
CREAT SERPL-MCNC: 1.49 MG/DL (ref 0.66–1.25)
FASTING STATUS PATIENT QL REPORTED: NO
GFR SERPL CREATININE-BSD FRML MDRD: 45 ML/MIN/1.73M2
GLUCOSE BLD-MCNC: 123 MG/DL (ref 70–99)
HBA1C MFR BLD: 5.8 % (ref 0–5.6)
HDLC SERPL-MCNC: 38 MG/DL
LDLC SERPL CALC-MCNC: 32 MG/DL
NONHDLC SERPL-MCNC: 51 MG/DL
POTASSIUM BLD-SCNC: 4.9 MMOL/L (ref 3.4–5.3)
SODIUM SERPL-SCNC: 136 MMOL/L (ref 133–144)
TRIGL SERPL-MCNC: 96 MG/DL

## 2021-09-24 PROCEDURE — G0439 PPPS, SUBSEQ VISIT: HCPCS | Mod: 25 | Performed by: FAMILY MEDICINE

## 2021-09-24 PROCEDURE — 83036 HEMOGLOBIN GLYCOSYLATED A1C: CPT | Performed by: FAMILY MEDICINE

## 2021-09-24 PROCEDURE — 80048 BASIC METABOLIC PNL TOTAL CA: CPT | Performed by: FAMILY MEDICINE

## 2021-09-24 PROCEDURE — 80061 LIPID PANEL: CPT | Performed by: FAMILY MEDICINE

## 2021-09-24 PROCEDURE — 36415 COLL VENOUS BLD VENIPUNCTURE: CPT | Performed by: FAMILY MEDICINE

## 2021-09-24 PROCEDURE — G0008 ADMIN INFLUENZA VIRUS VAC: HCPCS | Performed by: FAMILY MEDICINE

## 2021-09-24 PROCEDURE — 90682 RIV4 VACC RECOMBINANT DNA IM: CPT | Performed by: FAMILY MEDICINE

## 2021-09-24 RX ORDER — ACETAMINOPHEN 500 MG
1000 TABLET ORAL 3 TIMES DAILY PRN
Qty: 200 TABLET | Refills: 3 | Status: SHIPPED | OUTPATIENT
Start: 2021-09-24 | End: 2021-12-28

## 2021-09-24 RX ORDER — SENNOSIDES 8.6 MG
2 TABLET ORAL DAILY
Qty: 60 TABLET | Refills: 3 | Status: SHIPPED | OUTPATIENT
Start: 2021-09-24 | End: 2023-05-22

## 2021-09-24 RX ORDER — LISINOPRIL 10 MG/1
10 TABLET ORAL DAILY
Qty: 90 TABLET | Refills: 3 | Status: SHIPPED | OUTPATIENT
Start: 2021-09-24 | End: 2021-09-24

## 2021-09-24 RX ORDER — CARVEDILOL 12.5 MG/1
12.5 TABLET ORAL 2 TIMES DAILY WITH MEALS
Qty: 180 TABLET | Refills: 3 | Status: SHIPPED | OUTPATIENT
Start: 2021-09-24 | End: 2021-09-24

## 2021-09-24 RX ORDER — GABAPENTIN 800 MG/1
800 TABLET ORAL 3 TIMES DAILY
Qty: 270 TABLET | Refills: 3 | Status: SHIPPED | OUTPATIENT
Start: 2021-09-24 | End: 2022-08-05

## 2021-09-24 RX ORDER — LISINOPRIL 10 MG/1
10 TABLET ORAL DAILY
Qty: 90 TABLET | Refills: 3 | Status: SHIPPED | OUTPATIENT
Start: 2021-09-24 | End: 2023-07-10

## 2021-09-24 RX ORDER — LORATADINE 10 MG/1
10 TABLET ORAL DAILY
Qty: 90 TABLET | Refills: 3 | Status: SHIPPED | OUTPATIENT
Start: 2021-09-24 | End: 2023-05-22

## 2021-09-24 RX ORDER — ATORVASTATIN CALCIUM 40 MG/1
40 TABLET, FILM COATED ORAL DAILY
Qty: 90 TABLET | Refills: 3 | Status: SHIPPED | OUTPATIENT
Start: 2021-09-24 | End: 2023-06-12

## 2021-09-24 RX ORDER — CARVEDILOL 12.5 MG/1
12.5 TABLET ORAL 2 TIMES DAILY WITH MEALS
Qty: 180 TABLET | Refills: 3 | Status: SHIPPED | OUTPATIENT
Start: 2021-09-24 | End: 2022-07-05

## 2021-09-24 RX ORDER — LEVETIRACETAM 500 MG/1
500 TABLET ORAL 2 TIMES DAILY
Qty: 180 TABLET | Refills: 3 | Status: SHIPPED | OUTPATIENT
Start: 2021-09-24 | End: 2023-07-10

## 2021-09-24 RX ORDER — LORATADINE 10 MG/1
10 TABLET ORAL DAILY
Qty: 90 TABLET | Refills: 1 | Status: SHIPPED | OUTPATIENT
Start: 2021-09-24 | End: 2021-12-02

## 2021-09-24 RX ORDER — PANTOPRAZOLE SODIUM 20 MG/1
40 TABLET, DELAYED RELEASE ORAL
Qty: 180 TABLET | Refills: 3 | Status: SHIPPED | OUTPATIENT
Start: 2021-09-24 | End: 2023-07-10

## 2021-09-24 ASSESSMENT — MIFFLIN-ST. JEOR: SCORE: 1691.67

## 2021-09-24 NOTE — LETTER
September 24, 2021       Juli Rodriguez  [address]   [place] [address]     September 24, 2021    [id number]    Juli Rodriguez  [address]   [facility] [address]    Dear Juli Rodriguez.      Please see below for your results from your recent testing.  Your results are reassuring.If you have questions please contact the clinic to make an appointment with  me or your primary doctor to discuss the results. Austrian translation:evyaan potria kuu gu xaqiijinay jawaabtaadii hadaad suaal qabto fadlan desiree wac dhaqtarka kuu qaaska ah oo ka qabso aman si uu kuugu sharxo jawaabtaada.    Resulted Orders   Hemoglobin A1c   Result Value Ref Range    Hemoglobin A1C 5.8 (H) 0.0 - 5.6 %      Comment:      Normal <5.7%   Prediabetes 5.7-6.4%    Diabetes 6.5% or higher     Note: Adopted from ADA consensus guidelines.   Basic metabolic panel   Result Value Ref Range    Sodium 136 133 - 144 mmol/L    Potassium 4.9 3.4 - 5.3 mmol/L    Chloride 104 94 - 109 mmol/L    Carbon Dioxide (CO2) 27 20 - 32 mmol/L    Anion Gap 5 3 - 14 mmol/L    Urea Nitrogen 17 7 - 30 mg/dL    Creatinine 1.49 (H) 0.66 - 1.25 mg/dL    Calcium 9.2 8.5 - 10.1 mg/dL    Glucose 123 (H) 70 - 99 mg/dL    GFR Estimate 45 (L) >60 mL/min/1.73m2      Comment:      As of July 11, 2021, eGFR is calculated by the CKD-EPI creatinine equation, without race adjustment. eGFR can be influenced by muscle mass, exercise, and diet. The reported eGFR is an estimation only and is only applicable if the renal function is stable.           Sincerely     rEnie Arreola MD  .

## 2021-09-24 NOTE — LETTER
September 26, 2021      Juli Rodriguez  [address]   [facility] [address]       September 26, 2021    [id number]    Juli Rodriguez  [address]   [facility] [address]    Dear Juli Rodriguez.      Please see below for your results from your recent testing.  Your results are reassuring.If you have questions please contact the clinic to make an appointment with  me or your primary doctor to discuss the results. Swazi translation:evyaamarielle pearce kuu gu xaqiijinay jawaabtaadii hadaad suaal qabto fadlan desiree wac dhaqtarka kuu qaaska ah oo ka qabso aman si uu kuugu sharxo jawaabtaada.    Resulted Orders   Hemoglobin A1c   Result Value Ref Range    Hemoglobin A1C 5.8 (H) 0.0 - 5.6 %      Comment:      Normal <5.7%   Prediabetes 5.7-6.4%    Diabetes 6.5% or higher     Note: Adopted from ADA consensus guidelines.   Lipid panel reflex to direct LDL Non-fasting   Result Value Ref Range    Cholesterol 89 <200 mg/dL    Triglycerides 96 <150 mg/dL    Direct Measure HDL 38 (L) >=40 mg/dL    LDL Cholesterol Calculated 32 <=100 mg/dL    Non HDL Cholesterol 51 <130 mg/dL    Patient Fasting > 8hrs? No     Narrative    Cholesterol  Desirable:  <200 mg/dL    Triglycerides  Normal:  Less than 150 mg/dL  Borderline High:  150-199 mg/dL  High:  200-499 mg/dL  Very High:  Greater than or equal to 500 mg/dL    Direct Measure HDL  Female:  Greater than or equal to 50 mg/dL   Male:  Greater than or equal to 40 mg/dL    LDL Cholesterol  Desirable:  <100mg/dL  Above Desirable:  100-129 mg/dL   Borderline High:  130-159 mg/dL   High:  160-189 mg/dL   Very High:  >= 190 mg/dL    Non HDL Cholesterol  Desirable:  130 mg/dL  Above Desirable:  130-159 mg/dL  Borderline High:  160-189 mg/dL  High:  190-219 mg/dL  Very High:  Greater than or equal to 220 mg/dL   Basic metabolic panel   Result Value Ref Range    Sodium 136 133 - 144 mmol/L    Potassium 4.9 3.4 - 5.3 mmol/L    Chloride 104 94 - 109 mmol/L    Carbon Dioxide (CO2) 27 20 -  32 mmol/L    Anion Gap 5 3 - 14 mmol/L    Urea Nitrogen 17 7 - 30 mg/dL    Creatinine 1.49 (H) 0.66 - 1.25 mg/dL    Calcium 9.2 8.5 - 10.1 mg/dL    Glucose 123 (H) 70 - 99 mg/dL    GFR Estimate 45 (L) >60 mL/min/1.73m2      Comment:      As of July 11, 2021, eGFR is calculated by the CKD-EPI creatinine equation, without race adjustment. eGFR can be influenced by muscle mass, exercise, and diet. The reported eGFR is an estimation only and is only applicable if the renal function is stable.           Sincerely   Ernie Arreola MD  .

## 2021-09-24 NOTE — PATIENT INSTRUCTIONS
Shingrix shotPatient Education   Here is the plan from today's visit    1. Type 2 diabetes mellitus without complication, without long-term current use of insulin (H)    - Hemoglobin A1c; Future  - Lipid panel reflex to direct LDL Non-fasting; Future  - Basic metabolic panel; Future  - blood glucose monitoring (NO BRAND SPECIFIED) meter device kit; Use to test blood sugar 2 times daily or as directed.  Dispense: 1 kit; Refill: 0  - blood glucose (NO BRAND SPECIFIED) test strip; Use to test blood sugar 2 times daily or as directed.  Dispense: 100 strip; Refill: 3  - blood glucose (NO BRAND SPECIFIED) lancets standard; Use to test blood sugar 2 times daily or as directed.  Dispense: 100 each; Refill: 3    2. Stage 3a chronic kidney disease      3. History of CVA (cerebrovascular accident)    - Physical Therapy Referral; Future    4. Benign essential hypertension    - carvedilol (COREG) 12.5 MG tablet; Take 1 tablet (12.5 mg) by mouth 2 times daily (with meals)  Dispense: 180 tablet; Refill: 3    5. CKD (chronic kidney disease) stage 2, GFR 60-89 ml/min    - lisinopril (ZESTRIL) 10 MG tablet; Take 1 tablet (10 mg) by mouth daily  Dispense: 90 tablet; Refill: 3    6. Medicare annual wellness visit, initial        Please call or return to clinic if your symptoms don't go away.    Follow up plan  Return in about 3 months (around 12/24/2021) for Diabetes Routine Check and pharmacy for Shingrix shot, .    Thank you for coming to [facility] today.  COVID-19 Vaccine:  [facility] has walk-in appointments for COVID-19 vaccines. No appointment needed!   You also have the option of receiving Moderna vaccine during your physician appointment. Please ask your care team for more information!  Lab Testing:  **If you had lab testing today and your results are reassuring or normal they will be mailed to you or sent through [employer] within 7 days.   **If the lab tests need quick action we will call you with the  results.  **If you are having labs done on a different day, please call [phone] to schedule at [facility] or [phone] for other [facility] locations.   The phone number we will call with results is # [phone] (home) . If this is not the best number please call our clinic and change the number.  Medication Refills:  If you need any refills please call your pharmacy and they will contact us.   If you need to  your refill at a new pharmacy, please contact the new pharmacy directly. The new pharmacy will help you get your medications transferred faster.   Scheduling:  If you have any concerns about today's visit or wish to schedule another appointment please call our office during normal business hours [phone] (8-5:00 M-F)  If a referral was made to a [facility] and you don't get a call from central scheduling please call [phone].  If a Mammogram was ordered for you at [facility] call [phone] to schedule or change your appointment.  If you had an EKG/XRay/CT/Ultrasound/MRI ordered the number is [phone] to schedule or change your radiology appointment.   Medical Concerns:  If you have urgent medical concerns please call [phone] at any time of the day.    Ernie Arreola MD

## 2021-09-24 NOTE — LETTER
September 27, 2021 September 27, 2021    [id number]    Juli Rodriguez  [address]   [facility] [address]    Dear Juli Rodriguez.      Please see below for your results from your recent testing.  Your results are reassuring.If you have questions please contact the clinic to make an appointment with  me or your primary doctor to discuss the results. Mongolian translation:waxaan portia kuu gu xaqiijinay jawaabtaadii hadaad suaal qabto fadlan desiree wac dhaqtarka kuu qaaska ah oo ka qabso aman si uu kuugu sharxo jawaabtaada.    Resulted Orders   Hemoglobin A1c   Result Value Ref Range    Hemoglobin A1C 5.8 (H) 0.0 - 5.6 %      Comment:      Normal <5.7%   Prediabetes 5.7-6.4%    Diabetes 6.5% or higher     Note: Adopted from ADA consensus guidelines.   Lipid panel reflex to direct LDL Non-fasting   Result Value Ref Range    Cholesterol 89 <200 mg/dL    Triglycerides 96 <150 mg/dL    Direct Measure HDL 38 (L) >=40 mg/dL    LDL Cholesterol Calculated 32 <=100 mg/dL    Non HDL Cholesterol 51 <130 mg/dL    Patient Fasting > 8hrs? No     Narrative    Cholesterol  Desirable:  <200 mg/dL    Triglycerides  Normal:  Less than 150 mg/dL  Borderline High:  150-199 mg/dL  High:  200-499 mg/dL  Very High:  Greater than or equal to 500 mg/dL    Direct Measure HDL  Female:  Greater than or equal to 50 mg/dL   Male:  Greater than or equal to 40 mg/dL    LDL Cholesterol  Desirable:  <100mg/dL  Above Desirable:  100-129 mg/dL   Borderline High:  130-159 mg/dL   High:  160-189 mg/dL   Very High:  >= 190 mg/dL    Non HDL Cholesterol  Desirable:  130 mg/dL  Above Desirable:  130-159 mg/dL  Borderline High:  160-189 mg/dL  High:  190-219 mg/dL  Very High:  Greater than or equal to 220 mg/dL   Basic metabolic panel   Result Value Ref Range    Sodium 136 133 - 144 mmol/L    Potassium 4.9 3.4 - 5.3 mmol/L    Chloride 104 94 - 109 mmol/L    Carbon Dioxide (CO2) 27 20 - 32 mmol/L    Anion Gap 5 3 - 14 mmol/L    Urea Nitrogen 17 7 - 30  mg/dL    Creatinine 1.49 (H) 0.66 - 1.25 mg/dL    Calcium 9.2 8.5 - 10.1 mg/dL    Glucose 123 (H) 70 - 99 mg/dL    GFR Estimate 45 (L) >60 mL/min/1.73m2      Comment:      As of July 11, 2021, eGFR is calculated by the CKD-EPI creatinine equation, without race adjustment. eGFR can be influenced by muscle mass, exercise, and diet. The reported eGFR is an estimation only and is only applicable if the renal function is stable.           Sincerely   Ernie Arreola MD  .

## 2021-09-29 ENCOUNTER — DOCUMENTATION ONLY (OUTPATIENT)
Dept: FAMILY MEDICINE | Facility: CLINIC | Age: 76
End: 2021-09-29

## 2021-09-29 NOTE — PROGRESS NOTES
"When opening a documentation only encounter, be sure to enter in \"Chief Complaint\" Forms and in \" Comments\" Title of form, description if needed.    Juli is a 76 year old  male  Form received via: Fax  Form now resides in: Provider Ready    Raysa Hamilton MA     Form has been completed by provider.     Form sent out via: Fax to [employer] at Fax Number: [phone]  Patient informed: No, Reason:n/a  Output date: September 29, 2021    Raysa Hamilton MA      **Please close the encounter**                      "

## 2021-10-26 ENCOUNTER — TELEPHONE (OUTPATIENT)
Dept: FAMILY MEDICINE | Facility: CLINIC | Age: 76
End: 2021-10-26

## 2021-10-26 NOTE — TELEPHONE ENCOUNTER
10/26/2021   11:17 AM     Data/Intervention:  Patient Name:  Juli Rodriguez  /Age:  1945 (76 year old)    Spoke with: Juli     Reason for Call:  ACD f/u     Assessment / Plan:  SW reached out to Juli to see if they had any questions or to see if they needed help filling out the Advanced Care Directive Form that Dr. Arreola gave them on 21. Juli stated he does not remember every receiving a form.      ANDREAS Santana  Pronouns: She/Her/Hers  , Care Coordination  [facility]  [phone]

## 2021-12-02 ENCOUNTER — OFFICE VISIT (OUTPATIENT)
Dept: FAMILY MEDICINE | Facility: CLINIC | Age: 76
End: 2021-12-02
Payer: COMMERCIAL

## 2021-12-02 VITALS
HEART RATE: 71 BPM | WEIGHT: 202.8 LBS | OXYGEN SATURATION: 98 % | BODY MASS INDEX: 27.02 KG/M2 | DIASTOLIC BLOOD PRESSURE: 83 MMHG | RESPIRATION RATE: 16 BRPM | SYSTOLIC BLOOD PRESSURE: 133 MMHG | TEMPERATURE: 98.1 F

## 2021-12-02 DIAGNOSIS — E11.9 TYPE 2 DIABETES MELLITUS WITHOUT COMPLICATION, WITHOUT LONG-TERM CURRENT USE OF INSULIN (H): ICD-10-CM

## 2021-12-02 DIAGNOSIS — R53.1 RIGHT SIDED WEAKNESS: ICD-10-CM

## 2021-12-02 DIAGNOSIS — Z86.73 HISTORY OF CVA (CEREBROVASCULAR ACCIDENT): ICD-10-CM

## 2021-12-02 DIAGNOSIS — Z23 HIGH PRIORITY FOR 2019-NCOV VACCINE: ICD-10-CM

## 2021-12-02 DIAGNOSIS — M79.2 NEUROPATHIC PAIN: Primary | ICD-10-CM

## 2021-12-02 PROCEDURE — 0004A COVID-19,PF,PFIZER (12+ YRS): CPT | Performed by: STUDENT IN AN ORGANIZED HEALTH CARE EDUCATION/TRAINING PROGRAM

## 2021-12-02 PROCEDURE — 91300 COVID-19,PF,PFIZER (12+ YRS): CPT | Performed by: STUDENT IN AN ORGANIZED HEALTH CARE EDUCATION/TRAINING PROGRAM

## 2021-12-02 PROCEDURE — 99214 OFFICE O/P EST MOD 30 MIN: CPT | Mod: 25 | Performed by: STUDENT IN AN ORGANIZED HEALTH CARE EDUCATION/TRAINING PROGRAM

## 2021-12-02 NOTE — PATIENT INSTRUCTIONS
Patient Education   Here is the plan from today's visit    1. High priority for 2019-nCoV vaccine  - COVID-19,PF,PFIZER (12+ Yrs PURPLE LABEL)    2. Neuropathic pain  - [facility]OV[facility] PT and Hand Referral; Future    A eferral was made to a [facility] and you don't get a call from central scheduling please call [phone]    Please call or return to clinic if your symptoms don't go away.    Follow up plan  Return in about 4 weeks (around 12/30/2021), or if symptoms worsen or fail to improve.    Thank you for coming to [facility] today.  Lab Testing:  **If you had lab testing today and your results are reassuring or normal they will be mailed to you or sent through Deem within 7 days.   **If the lab tests need quick action we will call you with the results.  **If you are having labs done on a different day, please call [phone] to schedule at [facility] or [phone] for other [facility] locations. Labs do not offer walk-in appointments.  The phone number we will call with results is # [phone] (home) . If this is not the best number please call our clinic and change the number.  Medication Refills:  If you need any refills please call your pharmacy and they will contact us.   If you need to  your refill at a new pharmacy, please contact the new pharmacy directly. The new pharmacy will help you get your medications transferred faster.   Scheduling:  If you have any concerns about today's visit or wish to schedule another appointment please call our office during normal business hours [phone] (8-5:00 M-F)  If a referral was made to a [facility] and you don't get a call from central scheduling please call [phone].  If a Mammogram was ordered for you at [facility] call [phone] to schedule or change your appointment.  If you had an EKG/XRay/CT/Ultrasound/MRI ordered the number is [phone] to schedule or change your  radiology appointment.   [facility] has limited ultrasound appointments available on Wednesdays, if you would like your ultrasound at [facility], please call [phone] to schedule.   Medical Concerns:  If you have urgent medical concerns please call [phone] at any time of the day.    Ruthy Meza, DO

## 2021-12-02 NOTE — PROGRESS NOTES
Preceptor Attestation:    I discussed the patient with the resident and evaluated the patient in person. I have verified the content of the note, which accurately reflects my assessment of the patient and the plan of care.   Supervising Physician:  Donny Moralez MD, MD.

## 2021-12-02 NOTE — PROGRESS NOTES
Assessment & Plan     Neuropathic pain  History of CVA (cerebrovascular accident)  Right sided weakness - stable  Acute on chronic Rsided neuropathic pain likely related to CVA 2014 with possible contributions from diabetes mellitus though last A1c well controlled with 5.8% in September.  Considered cervical versus lumbar nerve root irritation leading to neuropathy symptoms and weakness but per patient these are similar symptoms to previously experienced CVA sequela and physical exam not consistent with such.  Patient notes current dosing of gabapentin working well not interested in dose adjustment.  But is interested in physical therapy to support strengthening right side.  Plan for physical therapy evaluation to support right lower and right upper extremity weakness.  Discussed return to care in 1 month if no difference in symptoms with current gabapentin dosing with physical therapy. Patient and patient's son expressed understanding of plan of care.  - [facility]AN PT and Hand Referral; Future    High priority for 2019-nCoV vaccine  - COVID-19,PF,PFIZER (12+ Yrs PURPLE LABEL)    Type 2 diabetes mellitus without complication, without long-term current use of insulin (H)  Patient notes some intermittent eye tearing no eye pain last diabetic eye exam documented 3/30/2020.  Optometry referral for diabetic eye exam placed.  - OPTOMETRY REFERRAL; Future    Return in about 4 weeks (around 12/30/2021), or if symptoms worsen or fail to improve.    Ruthy Meza [facility] DORCAS Bustos is a 76 year old who presents for the following health issues  accompanied by his son who translates for the patient.     HPI     Right side stroke 2013  One week ago started feeling pain in the pain in the right side, like a burning in the arm and right leg.   This has happened before.   Not sure when this happened last - but seems to happen every 1-2 months.   Taken gabapentin and tylenol in the past - it seems  to help for a while (a few hours) then comes back.   Nothing else seems to help. Pain in left side in leg sometimes as well.  States previously discussed with PCP and has done leg exercises since.  Before 2019 was last time was in PT. Went to physical therapy by the [facility] - was useful 3 years ago, interested in it again for neuropathy.       Objective    /83 (BP Location: Right arm, Patient Position: Sitting, Cuff Size: Adult Large)   Pulse 71   Temp 98.1  F (36.7  C) (Oral)   Resp 16   Wt 92 kg (202 lb 12.8 oz)   SpO2 98%   BMI 27.02 kg/m    Body mass index is 27.02 kg/m .  Physical Exam  Vitals reviewed.   Constitutional:       General: He is not in acute distress.     Appearance: Normal appearance. He is obese. He is not ill-appearing.   HENT:      Head: Normocephalic and atraumatic.      Right Ear: External ear normal.      Left Ear: External ear normal.      Nose: Nose normal.      Mouth/Throat:      Mouth: Mucous membranes are moist.   Eyes:      General: No scleral icterus.     Extraocular Movements: Extraocular movements intact.      Conjunctiva/sclera: Conjunctivae normal.   Cardiovascular:      Rate and Rhythm: Normal rate.   Pulmonary:      Effort: Pulmonary effort is normal. No respiratory distress.   Musculoskeletal:         General: No deformity.      Cervical back: Normal range of motion.   Skin:     Capillary Refill: Capillary refill takes less than 2 seconds.      Comments: No rash at exposed skin   Neurological:      Mental Status: He is alert. Mental status is at baseline.      Cranial Nerves: No cranial nerve deficit.      Comments: CN II-XII grossly intact. ROM of BL UE equal, no provocation of pain with ROM of right arm/shoulder. Neck ROM full. Strength 5/5 BL w/  strength, 4/5 on Right compared to 5/5 left for elbow flexors/extensors, wrist flexors/extensors. 3-4/5 on R compared to 5/5 on L for hipflexors/extensors, knee flexors/extensors, ankle dorsiflexors and planter  flexors.    Psychiatric:         Mood and Affect: Mood normal.         Behavior: Behavior normal.

## 2021-12-27 DIAGNOSIS — M70.61 GREATER TROCHANTERIC BURSITIS OF RIGHT HIP: ICD-10-CM

## 2021-12-27 NOTE — TELEPHONE ENCOUNTER
"Request for medication refill:  acetaminophen (TYLENOL) 500 MG tablet  Providers if patient needs an appointment and you are willing to give a one month supply please refill for one month and  send a letter/MyChart using \".SMILLIMITEDREFILL\" .smillimited and route chart to \"P [facility] \" (Giving one month refill in non controlled medications is strongly recommended before denial)    If refill has been denied, meaning absolutely no refills without visit, please complete the smart phrase \".smirxrefuse\" and route it to the \"P [facility] MED REFILLS\"  pool to inform the patient and the pharmacy.    Magdalena Goins        "

## 2021-12-28 RX ORDER — ACETAMINOPHEN 500 MG
1000 TABLET ORAL 3 TIMES DAILY PRN
Qty: 200 TABLET | Refills: 3 | Status: SHIPPED | OUTPATIENT
Start: 2021-12-28 | End: 2023-07-10

## 2022-01-06 ENCOUNTER — TELEPHONE (OUTPATIENT)
Dept: PHYSICAL THERAPY | Facility: CLINIC | Age: 77
End: 2022-01-06
Payer: COMMERCIAL

## 2022-07-05 DIAGNOSIS — I10 BENIGN ESSENTIAL HYPERTENSION: ICD-10-CM

## 2022-07-05 RX ORDER — CARVEDILOL 12.5 MG/1
12.5 TABLET ORAL 2 TIMES DAILY WITH MEALS
Qty: 180 TABLET | Refills: 3 | Status: SHIPPED | OUTPATIENT
Start: 2022-07-05 | End: 2023-06-12

## 2022-07-05 NOTE — TELEPHONE ENCOUNTER
"Request for medication refill:  carvedilol (COREG) 12.5 MG tablet  Providers if patient needs an appointment and you are willing to give a one month supply please refill for one month and  send a letter/MyChart using \".SMILLIMITEDREFILL\" .smillimited and route chart to \"P [facility] \" (Giving one month refill in non controlled medications is strongly recommended before denial)    If refill has been denied, meaning absolutely no refills without visit, please complete the smart phrase \".smirxrefuse\" and route it to the \"P SMI MED REFILLS\"  pool to inform the patient and the pharmacy.    Cordelia eJrnigan MA        "

## 2022-08-05 DIAGNOSIS — M79.2 NEUROPATHIC PAIN: ICD-10-CM

## 2022-08-05 RX ORDER — GABAPENTIN 800 MG/1
800 TABLET ORAL 3 TIMES DAILY
Qty: 270 TABLET | Refills: 3 | Status: SHIPPED | OUTPATIENT
Start: 2022-08-05 | End: 2023-04-26

## 2022-08-05 NOTE — TELEPHONE ENCOUNTER
"Request for medication refill:  gabapentin (NEURONTIN) 800 MG tablet  Providers if patient needs an appointment and you are willing to give a one month supply please refill for one month and  send a letter/MyChart using \".SMILLIMITEDREFILL\" .smillimited and route chart to \"P [facility] \" (Giving one month refill in non controlled medications is strongly recommended before denial)    If refill has been denied, meaning absolutely no refills without visit, please complete the smart phrase \".smirxrefuse\" and route it to the \"P SMI MED REFILLS\"  pool to inform the patient and the pharmacy.    Magdalena Goins        "

## 2023-04-26 DIAGNOSIS — M79.2 NEUROPATHIC PAIN: ICD-10-CM

## 2023-04-26 RX ORDER — GABAPENTIN 800 MG/1
800 TABLET ORAL 3 TIMES DAILY
Qty: 270 TABLET | Refills: 3 | Status: SHIPPED | OUTPATIENT
Start: 2023-04-26 | End: 2023-05-22

## 2023-04-26 NOTE — TELEPHONE ENCOUNTER
[facility] phone call message- patient requesting a refill:    Full Medication Name: gabapentin (NEURONTIN) 800 MG tablet      Pharmacy confirmed as       [id number] - [address] [address]  Phone: [phone] Fax: [phone]      : Yes    Additional Comments: The patient is requesting a refill     OK to leave a message on voice mail? Yes    Primary language: English      needed? No    Call taken on April 26, 2023 at 9:50 AM by Ruth Mosher

## 2023-04-26 NOTE — TELEPHONE ENCOUNTER
"Request for medication refill:  gabapentin (NEURONTIN) 800 MG tablet    Providers if patient needs an appointment and you are willing to give a one month supply please refill for one month and  send a letter/MyChart using \".SMILLIMITEDREFILL\" .smillimited and route chart to \"P [facility] \" (Giving one month refill in non controlled medications is strongly recommended before denial)    If refill has been denied, meaning absolutely no refills without visit, please complete the smart phrase \".smirxrefuse\" and route it to the \"P SMI MED REFILLS\"  pool to inform the patient and the pharmacy.    Cordelia Jernigan MA        "

## 2023-05-22 ENCOUNTER — OFFICE VISIT (OUTPATIENT)
Dept: FAMILY MEDICINE | Facility: CLINIC | Age: 78
End: 2023-05-22
Payer: COMMERCIAL

## 2023-05-22 VITALS
HEART RATE: 75 BPM | OXYGEN SATURATION: 100 % | SYSTOLIC BLOOD PRESSURE: 98 MMHG | BODY MASS INDEX: 24.92 KG/M2 | DIASTOLIC BLOOD PRESSURE: 64 MMHG | TEMPERATURE: 98 F | WEIGHT: 187 LBS

## 2023-05-22 DIAGNOSIS — N18.31 TYPE 2 DIABETES MELLITUS WITH STAGE 3A CHRONIC KIDNEY DISEASE, WITH LONG-TERM CURRENT USE OF INSULIN (H): Primary | ICD-10-CM

## 2023-05-22 DIAGNOSIS — N18.2 CKD (CHRONIC KIDNEY DISEASE) STAGE 2, GFR 60-89 ML/MIN: ICD-10-CM

## 2023-05-22 DIAGNOSIS — K59.01 SLOW TRANSIT CONSTIPATION: ICD-10-CM

## 2023-05-22 DIAGNOSIS — M79.2 NEUROPATHIC PAIN: ICD-10-CM

## 2023-05-22 DIAGNOSIS — Z86.73 HISTORY OF CVA (CEREBROVASCULAR ACCIDENT): ICD-10-CM

## 2023-05-22 DIAGNOSIS — N18.31 STAGE 3A CHRONIC KIDNEY DISEASE (H): ICD-10-CM

## 2023-05-22 DIAGNOSIS — Z79.4 TYPE 2 DIABETES MELLITUS WITH STAGE 3A CHRONIC KIDNEY DISEASE, WITH LONG-TERM CURRENT USE OF INSULIN (H): Primary | ICD-10-CM

## 2023-05-22 DIAGNOSIS — J30.1 SEASONAL ALLERGIC RHINITIS DUE TO POLLEN: ICD-10-CM

## 2023-05-22 DIAGNOSIS — R56.9 SEIZURES (H): ICD-10-CM

## 2023-05-22 DIAGNOSIS — M70.61 GREATER TROCHANTERIC BURSITIS OF RIGHT HIP: ICD-10-CM

## 2023-05-22 DIAGNOSIS — E11.22 TYPE 2 DIABETES MELLITUS WITH STAGE 3A CHRONIC KIDNEY DISEASE, WITH LONG-TERM CURRENT USE OF INSULIN (H): Primary | ICD-10-CM

## 2023-05-22 LAB
ALBUMIN SERPL BCG-MCNC: 4.4 G/DL (ref 3.5–5.2)
ALP SERPL-CCNC: 101 U/L (ref 40–129)
ALT SERPL W P-5'-P-CCNC: 15 U/L (ref 10–50)
ANION GAP SERPL CALCULATED.3IONS-SCNC: 14 MMOL/L (ref 7–15)
AST SERPL W P-5'-P-CCNC: 21 U/L (ref 10–50)
BILIRUB SERPL-MCNC: 0.3 MG/DL
BUN SERPL-MCNC: 39.2 MG/DL (ref 8–23)
CALCIUM SERPL-MCNC: 9.1 MG/DL (ref 8.8–10.2)
CHLORIDE SERPL-SCNC: 102 MMOL/L (ref 98–107)
CHOLEST SERPL-MCNC: 165 MG/DL
CREAT SERPL-MCNC: 2.54 MG/DL (ref 0.67–1.17)
CREAT UR-MCNC: 254 MG/DL
DEPRECATED HCO3 PLAS-SCNC: 20 MMOL/L (ref 22–29)
ERYTHROCYTE [DISTWIDTH] IN BLOOD BY AUTOMATED COUNT: 11.9 % (ref 10–15)
GFR SERPL CREATININE-BSD FRML MDRD: 25 ML/MIN/1.73M2
GLUCOSE SERPL-MCNC: 112 MG/DL (ref 70–99)
HBA1C MFR BLD: 5.8 % (ref 0–5.6)
HCT VFR BLD AUTO: 41.8 % (ref 40–53)
HDLC SERPL-MCNC: 48 MG/DL
HGB BLD-MCNC: 13.1 G/DL (ref 13.3–17.7)
HOLD SPECIMEN: NORMAL
LDLC SERPL CALC-MCNC: 94 MG/DL
MCH RBC QN AUTO: 33.3 PG (ref 26.5–33)
MCHC RBC AUTO-ENTMCNC: 31.3 G/DL (ref 31.5–36.5)
MCV RBC AUTO: 106 FL (ref 78–100)
MICROALBUMIN UR-MCNC: <12 MG/L
MICROALBUMIN/CREAT UR: NORMAL MG/G{CREAT}
NONHDLC SERPL-MCNC: 117 MG/DL
PLATELET # BLD AUTO: 229 10E3/UL (ref 150–450)
POTASSIUM SERPL-SCNC: 4.8 MMOL/L (ref 3.4–5.3)
PROT SERPL-MCNC: 7.9 G/DL (ref 6.4–8.3)
RBC # BLD AUTO: 3.93 10E6/UL (ref 4.4–5.9)
SODIUM SERPL-SCNC: 136 MMOL/L (ref 136–145)
TRIGL SERPL-MCNC: 115 MG/DL
WBC # BLD AUTO: 4.8 10E3/UL (ref 4–11)

## 2023-05-22 PROCEDURE — 20610 DRAIN/INJ JOINT/BURSA W/O US: CPT | Performed by: FAMILY MEDICINE

## 2023-05-22 PROCEDURE — 84100 ASSAY OF PHOSPHORUS: CPT | Performed by: FAMILY MEDICINE

## 2023-05-22 PROCEDURE — 80061 LIPID PANEL: CPT | Performed by: FAMILY MEDICINE

## 2023-05-22 PROCEDURE — 82570 ASSAY OF URINE CREATININE: CPT | Performed by: FAMILY MEDICINE

## 2023-05-22 PROCEDURE — 80053 COMPREHEN METABOLIC PANEL: CPT | Performed by: FAMILY MEDICINE

## 2023-05-22 PROCEDURE — 99214 OFFICE O/P EST MOD 30 MIN: CPT | Mod: 25 | Performed by: FAMILY MEDICINE

## 2023-05-22 PROCEDURE — 36415 COLL VENOUS BLD VENIPUNCTURE: CPT | Performed by: FAMILY MEDICINE

## 2023-05-22 PROCEDURE — 85027 COMPLETE CBC AUTOMATED: CPT | Performed by: FAMILY MEDICINE

## 2023-05-22 PROCEDURE — 82043 UR ALBUMIN QUANTITATIVE: CPT | Performed by: FAMILY MEDICINE

## 2023-05-22 PROCEDURE — 83036 HEMOGLOBIN GLYCOSYLATED A1C: CPT | Performed by: FAMILY MEDICINE

## 2023-05-22 RX ORDER — SENNOSIDES 8.6 MG
2 TABLET ORAL DAILY
Qty: 60 TABLET | Refills: 3 | Status: SHIPPED | OUTPATIENT
Start: 2023-05-22 | End: 2023-07-10

## 2023-05-22 RX ORDER — GABAPENTIN 800 MG/1
800 TABLET ORAL 3 TIMES DAILY
Qty: 270 TABLET | Refills: 3 | Status: SHIPPED | OUTPATIENT
Start: 2023-05-22 | End: 2023-06-12

## 2023-05-22 RX ORDER — DULOXETIN HYDROCHLORIDE 20 MG/1
20 CAPSULE, DELAYED RELEASE ORAL 2 TIMES DAILY
Qty: 90 CAPSULE | Refills: 1 | Status: SHIPPED | OUTPATIENT
Start: 2023-05-22 | End: 2023-07-10

## 2023-05-22 RX ORDER — LORATADINE 10 MG/1
10 TABLET ORAL DAILY
Qty: 90 TABLET | Refills: 3 | Status: SHIPPED | OUTPATIENT
Start: 2023-05-22 | End: 2024-06-06

## 2023-05-22 RX ORDER — TRIAMCINOLONE ACETONIDE 40 MG/ML
40 INJECTION, SUSPENSION INTRA-ARTICULAR; INTRAMUSCULAR ONCE
Status: COMPLETED | OUTPATIENT
Start: 2023-05-22 | End: 2023-05-22

## 2023-05-22 RX ADMIN — TRIAMCINOLONE ACETONIDE 40 MG: 40 INJECTION, SUSPENSION INTRA-ARTICULAR; INTRAMUSCULAR at 15:28

## 2023-05-22 ASSESSMENT — PATIENT HEALTH QUESTIONNAIRE - PHQ9: SUM OF ALL RESPONSES TO PHQ QUESTIONS 1-9: 12

## 2023-05-22 NOTE — PATIENT INSTRUCTIONS
Patient Education   Here is the plan from today's visit    1. Stage 3a chronic kidney disease (H)    - Lipid panel reflex to direct LDL Non-fasting; Future  - Comprehensive metabolic panel; Future  - Lipid panel reflex to direct LDL Non-fasting  - Comprehensive metabolic panel    2. Type 2 diabetes mellitus with stage 3a chronic kidney disease, with long-term current use of insulin (H)      3. History of CVA (cerebrovascular accident)    - aspirin (ASA) 81 MG EC tablet; Take 1 tablet (81 mg) by mouth daily  Dispense: 100 tablet; Refill: 1    4. Neuropathic pain    - gabapentin (NEURONTIN) 800 MG tablet; Take 1 tablet (800 mg) by mouth 3 times daily  Dispense: 270 tablet; Refill: 3  - DULoxetine (CYMBALTA) 20 MG capsule; Take 1 capsule (20 mg) by mouth 2 times daily  Dispense: 90 capsule; Refill: 1    5. Slow transit constipation    - sennosides (SENOKOT) 8.6 MG tablet; Take 2 tablets by mouth daily  Dispense: 60 tablet; Refill: 3     7. Seasonal allergic rhinitis due to pollen    - loratadine (CLARITIN) 10 MG tablet; Take 1 tablet (10 mg) by mouth daily  Dispense: 90 tablet; Refill: 3    8. Greater trochanteric bursitis of right hip    - DRAIN/INJECT LARGE JOINT/BURSA  - triamcinolone (KENALOG-40) injection 40 mg          Please call or return to clinic if your symptoms don't go away.    Follow up plan  Return in about 4 weeks (around 6/19/2023) for Chronic pain.    Thank you for coming to [facility] today.  Lab Testing:  **If you had lab testing today and your results are reassuring or normal they will be mailed to you or sent through [employer] within 7 days.   **If the lab tests need quick action we will call you with the results.  **If you are having labs done on a different day, please call [phone] to schedule at [facility] or [phone] for other [facility] locations. Labs do not offer walk-in appointments.  The phone number we will call with results is # [phone] (home) .  If this is not the best number please call our clinic and change the number.  Medication Refills:  If you need any refills please call your pharmacy and they will contact us.   If you need to  your refill at a new pharmacy, please contact the new pharmacy directly. The new pharmacy will help you get your medications transferred faster.   Scheduling:  If you have any concerns about today's visit or wish to schedule another appointment please call our office during normal business hours [phone] (8-5:00 M-F). If you can no longer make a scheduled visit, please cancel via Unveil or call us to cancel.   If a referral was made to an [facility] specialty provider and you do not get a call from central scheduling, please refer to directions on your visit summary or call our office during normal business hours for assistance.   If a M[facility] was [facility] for you at [facility] call [phone] to schedule or change your appointment.  If you had an XRay/CT/Ultrasound/MRI ordered the number is [phone] to schedule or change your radiology appointment.   [facility] has limited ultrasound appointments available on Wednesdays, if you would like your ultrasound at [facility], please call [phone] to schedule.   Medical Concerns:  If you have urgent medical concerns please call [phone] at any time of the day.    Ernie Arreola MD

## 2023-05-22 NOTE — PROGRESS NOTES
Assessment & Plan     Stage 3a chronic kidney disease (H)  Monitor and recheck  - Lipid panel reflex to direct LDL Non-fasting; Future  - Comprehensive metabolic panel; Future  - Lipid panel reflex to direct LDL Non-fasting  - Comprehensive metabolic panel    Type 2 diabetes mellitus with stage 3a chronic kidney disease, with long-term current use of insulin (H)  Well controlled with current regemin    History of CVA (cerebrovascular accident)  Stable   - aspirin (ASA) 81 MG EC tablet; Take 1 tablet (81 mg) by mouth daily    Neuropathic pain   will add duloxitine and follow up in one month for recheck  - gabapentin (NEURONTIN) 800 MG tablet; Take 1 tablet (800 mg) by mouth 3 times daily  - DULoxetine (CYMBALTA) 20 MG capsule; Take 1 capsule (20 mg) by mouth 2 times daily    Slow transit constipation  refill  - sennosides (SENOKOT) 8.6 MG tablet; Take 2 tablets by mouth daily    Seizures (H)   has bee seizure free    Seasonal allergic rhinitis due to pollen  \  - loratadine (CLARITIN) 10 MG tablet; Take 1 tablet (10 mg) by mouth daily    Greater trochanteric bursitis of right hip  [facility]   Injection Note    Juli Rodriguez is a patient of Ernie Carey here for injection for right trochanteric bursitis  .    Consent: Affirmation of informed consent was obtained verbally  Risks, benefits and alternatives were discussed. Patient's questions were elicited and answered.  Patient reported that he had tried ice and that was not helpful    Time Out (Pause for the Cause) completed: Yes    Preoperative Diagnosis:Right trochanteric bursitis p  Postoperative Diagnosis: same       The Skin p over the right trochanteric bursa was prepped  in the usual sterile fashion INJECTION:  Using 4 mL of 1% lidocaine mixed with 40 mg of kenalog, the   was successfully injected without complication.  Patient did experience some pain relief following injection.    Technique:   Skin prep Technicare  Anesthesia 1%  lidocaine  Complications:  No  Tolerance:  Pt tolerated procedure well and was in stable condition.     Follow up: Patient was instructed to use ice on the affected area tonight for at least 30 minutes and  that there will be a return to pain in a couple hours followed by relief in the next several days to a week. Patient was advised to call if increasing redness and swelling.     Follow up in 2-4 weeks.    Faculty: Ernie Arreola MD present for and supervised this entire procedure.    - DRAIN/INJECT LARGE JOINT/BURSA  - triamcinolone (KENALOG-40) injection 40 mg    Ordering of each unique test  Prescription drug management  I spent a total of 38 minutes on the day of the visit.   Time spent by me doing chart review, history and exam, documentation and further activities per the note       Depression Screening Follow Up        5/22/2023     2:51 PM   PHQ   PHQ-9 Total Score 12   Q9: Thoughts of better off dead/self-harm past 2 weeks Not at all         Follow Up Actions Taken  Crisis resource information provided in After Visit Summary  Patient counseled, no additional follow up at this time.         Return in about 4 weeks (around 6/19/2023) for Chronic pain.    Ernie Arreola MD  [facility] DORCAS Bustos is a 78 year old, presenting for the following health issues:  Flank Pain (Pt reports having pain on the right side of his body. Pain started about a year and half ago. Pain is 8/10 when sitting) and Med Change Request (Pt is requesting a change in stool softener and allergy med.)        5/22/2023     2:45 PM   Additional Questions   Roomed by ashley diop   Accompanied by self     Forms 5/22/2023   Any forms needing to be completed Yes         5/22/2023     2:45 PM   Patient Reported Additional Medications   Patient reports taking the following new medications No new meds reported     HPI   Having increased left sided pain, especially in the left hip -pain has been present  for 18 months. He reports it is hard to lie on.         Diabetes   Review of Systems         Objective    BP 98/64   Pulse 75   Temp 98  F (36.7  C) (Oral)   Wt 84.8 kg (187 lb)   SpO2 100%   BMI 24.92 kg/m    Body mass index is 24.92 kg/m .  Physical Exam  Vitals reviewed.   Constitutional:       General: He is not in acute distress.     Appearance: He is well-developed. He is not diaphoretic.   HENT:      Head: Normocephalic.   Eyes:      General: No scleral icterus.     Conjunctiva/sclera: Conjunctivae normal.   Neck:      Thyroid: No thyromegaly.   Cardiovascular:      Rate and Rhythm: Normal rate and regular rhythm.      Heart sounds: Normal heart sounds. No murmur heard.  Pulmonary:      Effort: Pulmonary effort is normal. No respiratory distress.      Breath sounds: Normal breath sounds. No wheezing.   Musculoskeletal:      Cervical back: Normal range of motion.        Legs:       Comments: Difficulty with movement on Right side -paraparesis   Feet:      Right foot:      Protective Sensation: 10 sites tested. 10 sites sensed.      Skin integrity: Skin integrity normal.      Toenail Condition: Right toenails are normal.      Left foot:      Protective Sensation: 10 sites tested. 10 sites sensed.      Skin integrity: Skin integrity normal.      Toenail Condition: Left toenails are normal.   Lymphadenopathy:      Cervical: No cervical adenopathy.   Skin:     General: Skin is warm and dry.   Neurological:      Mental Status: He is alert and oriented to person, place, and time.   Psychiatric:         Behavior: Behavior normal.         Thought Content: Thought content normal.         Judgment: Judgment normal.

## 2023-05-23 ENCOUNTER — TELEPHONE (OUTPATIENT)
Dept: FAMILY MEDICINE | Facility: CLINIC | Age: 78
End: 2023-05-23
Payer: MEDICAID

## 2023-05-23 DIAGNOSIS — N18.4 CKD (CHRONIC KIDNEY DISEASE) STAGE 4, GFR 15-29 ML/MIN (H): Primary | ICD-10-CM

## 2023-05-23 PROBLEM — E11.22 TYPE 2 DIABETES MELLITUS WITH STAGE 3A CHRONIC KIDNEY DISEASE, WITH LONG-TERM CURRENT USE OF INSULIN (H): Status: RESOLVED | Noted: 2023-05-22 | Resolved: 2023-05-23

## 2023-05-23 PROBLEM — N18.2 CKD (CHRONIC KIDNEY DISEASE) STAGE 2, GFR 60-89 ML/MIN: Status: RESOLVED | Noted: 2019-09-06 | Resolved: 2023-05-23

## 2023-05-23 PROBLEM — N18.31 TYPE 2 DIABETES MELLITUS WITH STAGE 3A CHRONIC KIDNEY DISEASE, WITH LONG-TERM CURRENT USE OF INSULIN (H): Status: RESOLVED | Noted: 2023-05-22 | Resolved: 2023-05-23

## 2023-05-23 PROBLEM — Z79.4 TYPE 2 DIABETES MELLITUS WITH STAGE 3A CHRONIC KIDNEY DISEASE, WITH LONG-TERM CURRENT USE OF INSULIN (H): Status: RESOLVED | Noted: 2023-05-22 | Resolved: 2023-05-23

## 2023-05-23 PROBLEM — E11.22 TYPE 2 DIABETES MELLITUS WITH DIABETIC CHRONIC KIDNEY DISEASE (H): Status: ACTIVE | Noted: 2017-04-19

## 2023-05-23 LAB — PHOSPHATE SERPL-MCNC: 4.5 MG/DL (ref 2.5–4.5)

## 2023-05-24 NOTE — CONFIDENTIAL NOTE
DIAGNOSIS: CKD (chronic kidney disease) stage 4, GFR 15-29 ml/min (H)   DATE RECEIVED: 07.19.2023    NOTES STATUS DETAILS   OFFICE NOTE from referring provider Internal 05.23.2023 Ernie Arreola MD   OFFICE NOTE from other specialist      *Only VASCULITIS or LUPUS gather office notes for the following     *PULMONARY       *ENT     *DERMATOLOGY     *RHEUMATOLOGY     DISCHARGE SUMMARY from hospital     DISCHARGE REPORT from the ER     MEDICATION LIST Internal    IMAGING  (NEED IMAGES AND REPORTS)     KIDNEY CT SCAN     KIDNEY ULTRASOUND     MR ABDOMEN     NUCLEAR MEDICINE RENAL     LABS     CBC Internal 05.22.2023   CMP Internal 05.22.2023   BMP Internal 09.21.2021   UA     URINE PROTEIN     RENAL PANEL     BIOPSY     KIDNEY BIOPSY

## 2023-05-25 ENCOUNTER — TELEPHONE (OUTPATIENT)
Dept: NEPHROLOGY | Facility: CLINIC | Age: 78
End: 2023-05-25
Payer: MEDICAID

## 2023-05-25 NOTE — TELEPHONE ENCOUNTER
Called the pt to attempt to schedule the pt for a sooner appointment with nephrology.     Left Voicemail (1st Attempt) for the patient to call back and schedule the following:    Appointment type: Sooner appointment   Provider: Sofie baldwin to use OMAR  Return date: next available  Specialty phone number: [phone]  Additional appointment(s) needed: lab  Additonal Notes: n/a

## 2023-05-25 NOTE — TELEPHONE ENCOUNTER
Pt is returning a call, writer unsure which OMAR slot to use, in person or virtual? cathryn or CSC with ? Please call Juli, thank you

## 2023-05-25 NOTE — TELEPHONE ENCOUNTER
Called the pt 3x lvm for pt to cb to schedule NP appointment with Dr Carrizales. Did not successfully reach the pt.       Left Voicemail (2nd Attempt) for the patient to call back and schedule the following:    Appointment type: New pt appointment  Provider: Sofie bettencourt use MOAR   Return date: 6.12.23 or next available   Specialty phone number: [phone]  Additional appointment(s) needed: lab  Additonal Notes: n/a

## 2023-06-05 ENCOUNTER — TELEPHONE (OUTPATIENT)
Dept: NEPHROLOGY | Facility: CLINIC | Age: 78
End: 2023-06-05
Payer: COMMERCIAL

## 2023-06-05 NOTE — TELEPHONE ENCOUNTER
Called patient with a appointment reminder for Mon. 6/12/23 @ 4:00 pm with Dr. Carrizales, also you have a lab draw @ 3:00 pm    Sean Nettles on 6/5/2023 at 9:07 AM

## 2023-06-08 ENCOUNTER — DOCUMENTATION ONLY (OUTPATIENT)
Dept: LAB | Facility: CLINIC | Age: 78
End: 2023-06-08
Payer: COMMERCIAL

## 2023-06-08 DIAGNOSIS — N18.4 CKD (CHRONIC KIDNEY DISEASE) STAGE 4, GFR 15-29 ML/MIN (H): Primary | ICD-10-CM

## 2023-06-08 NOTE — PROGRESS NOTES
Hello,   In order to offer our patients the best possible experience, the lab schedule is reviewed to ensure patients have lab orders in Epic prior to arriving at the lab.    Please have one of your team members place a lab order in Epic for this patient prior to the lab appointment date.     Thank you for your attention,   [facility] [phone]

## 2023-06-12 ENCOUNTER — LAB (OUTPATIENT)
Dept: LAB | Facility: CLINIC | Age: 78
End: 2023-06-12
Attending: INTERNAL MEDICINE
Payer: COMMERCIAL

## 2023-06-12 ENCOUNTER — OFFICE VISIT (OUTPATIENT)
Dept: NEPHROLOGY | Facility: CLINIC | Age: 78
End: 2023-06-12
Attending: FAMILY MEDICINE
Payer: COMMERCIAL

## 2023-06-12 VITALS
BODY MASS INDEX: 24.52 KG/M2 | SYSTOLIC BLOOD PRESSURE: 128 MMHG | WEIGHT: 184 LBS | OXYGEN SATURATION: 98 % | DIASTOLIC BLOOD PRESSURE: 88 MMHG | HEART RATE: 92 BPM

## 2023-06-12 DIAGNOSIS — I10 BENIGN ESSENTIAL HYPERTENSION: ICD-10-CM

## 2023-06-12 DIAGNOSIS — E11.22 TYPE 2 DIABETES MELLITUS WITH STAGE 3 CHRONIC KIDNEY DISEASE, WITHOUT LONG-TERM CURRENT USE OF INSULIN, UNSPECIFIED WHETHER STAGE 3A OR 3B CKD (H): ICD-10-CM

## 2023-06-12 DIAGNOSIS — E11.9 TYPE 2 DIABETES MELLITUS WITHOUT COMPLICATION, WITHOUT LONG-TERM CURRENT USE OF INSULIN (H): ICD-10-CM

## 2023-06-12 DIAGNOSIS — N18.30 TYPE 2 DIABETES MELLITUS WITH STAGE 3 CHRONIC KIDNEY DISEASE, WITHOUT LONG-TERM CURRENT USE OF INSULIN, UNSPECIFIED WHETHER STAGE 3A OR 3B CKD (H): Primary | ICD-10-CM

## 2023-06-12 DIAGNOSIS — R56.9 SEIZURES (H): ICD-10-CM

## 2023-06-12 DIAGNOSIS — N18.30 TYPE 2 DIABETES MELLITUS WITH STAGE 3 CHRONIC KIDNEY DISEASE, WITHOUT LONG-TERM CURRENT USE OF INSULIN, UNSPECIFIED WHETHER STAGE 3A OR 3B CKD (H): ICD-10-CM

## 2023-06-12 DIAGNOSIS — N18.4 CKD (CHRONIC KIDNEY DISEASE) STAGE 4, GFR 15-29 ML/MIN (H): ICD-10-CM

## 2023-06-12 DIAGNOSIS — Z86.73 HISTORY OF CVA (CEREBROVASCULAR ACCIDENT): ICD-10-CM

## 2023-06-12 DIAGNOSIS — E11.22 TYPE 2 DIABETES MELLITUS WITH STAGE 3 CHRONIC KIDNEY DISEASE, WITHOUT LONG-TERM CURRENT USE OF INSULIN, UNSPECIFIED WHETHER STAGE 3A OR 3B CKD (H): Primary | ICD-10-CM

## 2023-06-12 DIAGNOSIS — M79.2 NEUROPATHIC PAIN: ICD-10-CM

## 2023-06-12 LAB
ALBUMIN MFR UR ELPH: 10.3 MG/DL
ALBUMIN SERPL BCG-MCNC: 4.6 G/DL (ref 3.5–5.2)
ALBUMIN UR-MCNC: NEGATIVE MG/DL
ANION GAP SERPL CALCULATED.3IONS-SCNC: 10 MMOL/L (ref 7–15)
APPEARANCE UR: CLEAR
BACTERIA #/AREA URNS HPF: ABNORMAL /HPF
BILIRUB UR QL STRIP: NEGATIVE
BUN SERPL-MCNC: 26.4 MG/DL (ref 8–23)
CALCIUM SERPL-MCNC: 9.7 MG/DL (ref 8.8–10.2)
CHLORIDE SERPL-SCNC: 98 MMOL/L (ref 98–107)
COLOR UR AUTO: ABNORMAL
CREAT SERPL-MCNC: 1.56 MG/DL (ref 0.67–1.17)
CREAT UR-MCNC: 70.8 MG/DL
DEPRECATED CALCIDIOL+CALCIFEROL SERPL-MC: 20 UG/L (ref 20–75)
DEPRECATED HCO3 PLAS-SCNC: 28 MMOL/L (ref 22–29)
ERYTHROCYTE [DISTWIDTH] IN BLOOD BY AUTOMATED COUNT: 11.9 % (ref 10–15)
GFR SERPL CREATININE-BSD FRML MDRD: 45 ML/MIN/1.73M2
GLUCOSE SERPL-MCNC: 121 MG/DL (ref 70–99)
GLUCOSE SERPL-MCNC: 124 MG/DL (ref 70–99)
GLUCOSE UR STRIP-MCNC: NEGATIVE MG/DL
HCT VFR BLD AUTO: 44.3 % (ref 40–53)
HGB BLD-MCNC: 14.7 G/DL (ref 13.3–17.7)
HGB UR QL STRIP: NEGATIVE
KETONES UR STRIP-MCNC: NEGATIVE MG/DL
LEUKOCYTE ESTERASE UR QL STRIP: ABNORMAL
MCH RBC QN AUTO: 34.4 PG (ref 26.5–33)
MCHC RBC AUTO-ENTMCNC: 33.2 G/DL (ref 31.5–36.5)
MCV RBC AUTO: 104 FL (ref 78–100)
NITRATE UR QL: NEGATIVE
PH UR STRIP: 6 [PH] (ref 5–7)
PHOSPHATE SERPL-MCNC: 3.5 MG/DL (ref 2.5–4.5)
PLATELET # BLD AUTO: 186 10E3/UL (ref 150–450)
POTASSIUM SERPL-SCNC: 5.3 MMOL/L (ref 3.4–5.3)
PROT/CREAT 24H UR: 0.15 MG/MG CR (ref 0–0.2)
PTH-INTACT SERPL-MCNC: 96 PG/ML (ref 15–65)
RBC # BLD AUTO: 4.27 10E6/UL (ref 4.4–5.9)
RBC URINE: <1 /HPF
SODIUM SERPL-SCNC: 136 MMOL/L (ref 136–145)
SP GR UR STRIP: 1.01 (ref 1–1.03)
SQUAMOUS EPITHELIAL: <1 /HPF
UROBILINOGEN UR STRIP-MCNC: NORMAL MG/DL
WBC # BLD AUTO: 5.7 10E3/UL (ref 4–11)
WBC URINE: 33 /HPF

## 2023-06-12 PROCEDURE — 85027 COMPLETE CBC AUTOMATED: CPT | Performed by: PATHOLOGY

## 2023-06-12 PROCEDURE — 80069 RENAL FUNCTION PANEL: CPT | Performed by: PATHOLOGY

## 2023-06-12 PROCEDURE — G0463 HOSPITAL OUTPT CLINIC VISIT: HCPCS | Performed by: INTERNAL MEDICINE

## 2023-06-12 PROCEDURE — 83970 ASSAY OF PARATHORMONE: CPT | Performed by: PATHOLOGY

## 2023-06-12 PROCEDURE — 84156 ASSAY OF PROTEIN URINE: CPT | Performed by: PATHOLOGY

## 2023-06-12 PROCEDURE — 82306 VITAMIN D 25 HYDROXY: CPT | Performed by: INTERNAL MEDICINE

## 2023-06-12 PROCEDURE — 81001 URINALYSIS AUTO W/SCOPE: CPT | Performed by: PATHOLOGY

## 2023-06-12 PROCEDURE — 36415 COLL VENOUS BLD VENIPUNCTURE: CPT | Performed by: PATHOLOGY

## 2023-06-12 PROCEDURE — 99205 OFFICE O/P NEW HI 60 MIN: CPT | Performed by: INTERNAL MEDICINE

## 2023-06-12 RX ORDER — ATORVASTATIN CALCIUM 40 MG/1
40 TABLET, FILM COATED ORAL DAILY
Qty: 90 TABLET | Refills: 3 | Status: SHIPPED | OUTPATIENT
Start: 2023-06-12 | End: 2023-07-10

## 2023-06-12 RX ORDER — GABAPENTIN 800 MG/1
400 TABLET ORAL DAILY
Qty: 270 TABLET | Refills: 3 | Status: SHIPPED | OUTPATIENT
Start: 2023-06-12 | End: 2023-06-13

## 2023-06-12 RX ORDER — CARVEDILOL 12.5 MG/1
12.5 TABLET ORAL 2 TIMES DAILY WITH MEALS
Qty: 180 TABLET | Refills: 3 | Status: SHIPPED | OUTPATIENT
Start: 2023-06-12 | End: 2023-07-10

## 2023-06-12 RX ORDER — ASPIRIN 81 MG/1
81 TABLET ORAL DAILY
Qty: 60 TABLET | Refills: 4 | Status: SHIPPED | OUTPATIENT
Start: 2023-06-12 | End: 2023-10-09

## 2023-06-12 ASSESSMENT — PAIN SCALES - GENERAL: PAINLEVEL: EXTREME PAIN (8)

## 2023-06-12 NOTE — LETTER
6/12/2023       RE: Juli Rodriguez  [address]  [facility] [address]     Dear Colleague,    Thank you for referring your patient, Juli Rodriguez, to the [facility] [place] at [facility]. Please see a copy of my visit note below.    Nephrology Clinic    Juli Rodriguez MRN:[id number] YOB: 1945  Date of Service: 06/12/2023  Primary care provider: Ernie Arreola  Requesting physician: Ernie Arreola      REASON FOR CONSULT: ALAN vs CKD    HISTORY OF PRESENT ILLNESS:   Juli Rodriguez is a 78 year old male who presents for evaluation of ALAN vs CKD.  The past medical history is significant for type 2 DM for more than 10 years, HTN for  10 years ,CVA in 2013 and CKD.  Regarding his type 2 DM, his glycemia is well controlled and his last Hba1c is 5.8 on 5/22/2023.  From a renal standpoint, his baseline creatinine level was around 1.4 mg/dL up to September 2021. He was thereafter lost to follow up in [place] as he moved to [place] and a kidney function was checked again in May 2023 and his creatinine level was noticed then to be 2.54 mg/dL. A repeat level on 6/12/2023 is 1.56 mg/dL. He reports that he had been following up regularly in [place] with a provider there up to January 2023 and that his renal function was stable.  The UACR is negative for any proteinuria on 5/22/2023.  There is no renal imaging available. He reports that his PCP stopped all his medications when he discovered that his kidney function was down and that his glycemia has remained well controlled off metformin and that his BP has remained reasonably well controlled. He denies any intake of NSAIDs or other nephrotoxic medications.  The patient denies any dysuria, any pollakiuria, any nocturia, any LE edema, any dyspnea on exertion .  The following portions of the patient's history were reviewed and updated as appropriate: allergies, current medications,  past family history, past medical history, past social history, past surgical history and problem list.    PAST MEDICAL HISTORY:  Past Medical History:   Diagnosis Date    Cerebral infarction (H) 08/2013    [facility]    Diabetes mellitus, type 2 (H)     Stroke (H)      PAST SURGICAL HISTORY:  Past Surgical History:   Procedure Laterality Date    CATARACT IOL, RT/LT Bilateral 2007 and 2015    HERNIA REPAIR      INJECT MAJOR JOINT / BURSA  9/18/2020    JOINT REPLACEMENT, HIP RT/LT Right      MEDICATIONS:  Prescription Medications as of 6/12/2023         Rx Number Disp Refills Start End Last Dispensed Date Next Fill Date Owning Pharmacy    acetaminophen (TYLENOL) 500 MG tablet  200 tablet 3 12/28/2021    [address]    Sig: Take 2 tablets (1,000 mg) by mouth 3 times daily as needed for mild pain    Class: E-Prescribe    Route: Oral    aspirin (ASA) 81 MG EC tablet  100 tablet 1 5/22/2023    [facility] #[id number] [address]    Sig: Take 1 tablet (81 mg) by mouth daily    Class: E-Prescribe    Route: Oral    atorvastatin (LIPITOR) 40 MG tablet  90 tablet 3 9/24/2021    [address]    Sig: Take 1 tablet (40 mg) by mouth daily    Class: E-Prescribe    Route: Oral    blood glucose (NO BRAND SPECIFIED) lancets standard  100 each 3 9/24/2021    [address]    Sig: Use to test blood sugar 2 times daily or as directed.    Class: E-Prescribe    blood glucose (NO BRAND SPECIFIED) test strip  100 strip 3 9/24/2021    [address]    Sig: Use to test blood sugar 2 times daily or as directed.    Class: E-Prescribe    blood glucose monitoring (NO BRAND SPECIFIED) meter device kit  1 kit 0 9/24/2021    [address]    Sig: Use to test blood sugar 2 times daily or as directed.    Class:  E-Prescribe    carvedilol (COREG) 12.5 MG tablet  180 tablet 3 7/5/2022    [facility] #[id number] - [place], [place] - [address]    Sig: Take 1 tablet (12.5 mg) by mouth 2 times daily (with meals)    Class: E-Prescribe    Route: Oral    DULoxetine (CYMBALTA) 20 MG capsule  90 capsule 1 5/22/2023    [facility] #[id number] [address]    Sig: Take 1 capsule (20 mg) by mouth 2 times daily    Class: E-Prescribe    Route: Oral    gabapentin (NEURONTIN) 800 MG tablet  270 tablet 3 5/22/2023    [facility] [address] [place] [address]    Sig: Take 1 tablet (800 mg) by mouth 3 times daily    Class: E-Prescribe    Route: Oral    levETIRAcetam (KEPPRA) 500 MG tablet  180 tablet 3 9/24/2021    [address]    Sig: Take 1 tablet (500 mg) by mouth 2 times daily    Class: E-Prescribe    Route: Oral    lisinopril (ZESTRIL) 10 MG tablet  90 tablet 3 9/24/2021    [address]    Sig: Take 1 tablet (10 mg) by mouth daily    Class: E-Prescribe    Route: Oral    loratadine (CLARITIN) 10 MG tablet  90 tablet 3 5/22/2023    [facility] [address] [place] [address]    Sig: Take 1 tablet (10 mg) by mouth daily    Class: E-Prescribe    Route: Oral    metFORMIN (GLUCOPHAGE) 500 MG tablet  180 tablet 1 9/24/2021    [address]    Sig: Take 1 tablet (500 mg) by mouth 2 times daily (with meals)    Class: E-Prescribe    Route: Oral    order for DME  1 each 0 3/6/2020    [place], [place] - [address] [facility]    Sig: Diabetic shoes One Pair    Class: Local Print    order for DME  1 each 1 2/26/2018    [facility] #[id number] - [place], [place] - [address]    Sig: Equipment being ordered: Diabetic Shoes    Class: Local Print    pantoprazole (PROTONIX) 20 MG EC tablet  180 tablet 3  9/24/2021    [place], [place] - [address] [facility]    Sig: Take 2 tablets (40 mg) by mouth every morning (before breakfast)    Class: E-Prescribe    Route: Oral    sennosides (SENOKOT) 8.6 MG tablet  60 tablet 3 5/22/2023    [id number] - [place], [place] - [address] [facility]    Sig: Take 2 tablets by mouth daily    Class: E-Prescribe    Route: Oral           ALLERGIES:    Allergies   Allergen Reactions    Beef-Derived Products Other (See Comments)     Sneezing when eats beef    Eggs [Chicken-Derived Products (Egg)]      REVIEW OF SYSTEMS:  Review Of Systems  Skin: negative for, pigmentation, acne, rash, scaling, itching, bruising, lumps or bumps  Eyes: negative for, visual blurring, double vision, glaucoma, cataracts, eye pain, color blindness, glasses, contacts  Ears/Nose/Throat: negative for, nasal congestion, purulent rhinorrhea, sneezing, postnasal drainage, hearing loss, deafness, tinnitus  Respiratory: No shortness of breath, dyspnea on exertion, cough, or hemoptysis  Cardiovascular: negative for, palpitations, tachycardia, irregular heart beat, chest pain, exertional chest pain or pressure, paroxysmal nocturnal dyspnea and dyspnea on exertion  Gastrointestinal: negative for, poor appetite, dysphagia, nausea, vomiting, heartburn, dyspepsia, reflux and hematemesis  Genitourinary: negative for, nocturia, dysuria, frequency, urgency, hesitancy, hematuria, retention, decreased urinary stream and incontinence  Musculoskeletal: negative for, fracture, back pain, neck pain, arthritis, joint pain, joint swelling, joint stiffness, gout and fibromyalgia  Neurologic: negative for, headaches, syncope, stroke, seizures, paralysis, local weakness, numbness or tingling of hands and numbness or tingling of feet    A comprehensive review of systems was performed and found to be negative except as described here or above.  SOCIAL HISTORY:   Social History     Socioeconomic History    Marital  status:      Spouse name: Not on file    Number of children: Not on file    Years of education: Not on file    Highest education level: Not on file   Occupational History    Not on file   Tobacco Use    Smoking status: Never    Smokeless tobacco: Never   Vaping Use    Vaping status: Never Used   Substance and Sexual Activity    Alcohol use: No    Drug use: No    Sexual activity: Not Currently   Other Topics Concern    Not on file   Social History Narrative    Not on file     Social Determinants of Health     Financial Resource Strain: Not on file   Food Insecurity: Not on file   Transportation Needs: Not on file   Physical Activity: Not on file   Stress: Not on file   Social Connections: Not on file   Intimate Partner Violence: Not on file   Housing Stability: Not on file     FAMILY MEDICAL HISTORY:   Family History   Problem Relation Age of Onset    Diabetes No family hx of     Coronary Artery Disease No family hx of     Hypertension No family hx of     Hyperlipidemia No family hx of     Cerebrovascular Disease No family hx of     Breast Cancer No family hx of     Colon Cancer No family hx of     Prostate Cancer No family hx of     Other Cancer No family hx of     Depression No family hx of     Anxiety Disorder No family hx of     Mental Illness No family hx of     Substance Abuse No family hx of     Anesthesia Reaction No family hx of     Asthma No family hx of     Osteoporosis No family hx of     Genetic Disorder No family hx of     Thyroid Disease No family hx of     Obesity No family hx of     Glaucoma No family hx of     Macular Degeneration No family hx of      PHYSICAL EXAM:   /88 (BP Location: Right arm, Patient Position: Sitting, Cuff Size: Adult Large)   Pulse 92   Wt 83.5 kg (184 lb)   SpO2 98%   BMI 24.52 kg/m    GENERAL APPEARANCE: alert and no distress  EYES: nonicteric  HENT: mouth without ulcers or lesions  NECK: supple, no adenopathy  RESP: lungs clear to auscultation   CV:  regular rhythm, normal rate, no rub  ABDOMEN: soft, nontender, normal bowel sounds, no HSM   Extremities: no clubbing, cyanosis, or edema  MS: no evidence of inflammation in joints, no muscle tenderness  SKIN: no rash  NEURO: mentation intact and speech normal  PSYCH: affect normal/bright   LABS:   Recent Results (from the past 672 hour(s))   Lipid panel reflex to direct LDL Non-fasting    Collection Time: 05/22/23  2:17 PM   Result Value Ref Range    Cholesterol 165 <200 mg/dL    Triglycerides 115 <150 mg/dL    Direct Measure HDL 48 >=40 mg/dL    LDL Cholesterol Calculated 94 <=100 mg/dL    Non HDL Cholesterol 117 <130 mg/dL   Hemoglobin A1c    Collection Time: 05/22/23  2:17 PM   Result Value Ref Range    Hemoglobin A1C 5.8 (H) 0.0 - 5.6 %   Comprehensive metabolic panel    Collection Time: 05/22/23  2:17 PM   Result Value Ref Range    Sodium 136 136 - 145 mmol/L    Potassium 4.8 3.4 - 5.3 mmol/L    Chloride 102 98 - 107 mmol/L    Carbon Dioxide (CO2) 20 (L) 22 - 29 mmol/L    Anion Gap 14 7 - 15 mmol/L    Urea Nitrogen 39.2 (H) 8.0 - 23.0 mg/dL    Creatinine 2.54 (H) 0.67 - 1.17 mg/dL    Calcium 9.1 8.8 - 10.2 mg/dL    Glucose 112 (H) 70 - 99 mg/dL    Alkaline Phosphatase 101 40 - 129 U/L    AST 21 10 - 50 U/L    ALT 15 10 - 50 U/L    Protein Total 7.9 6.4 - 8.3 g/dL    Albumin 4.4 3.5 - 5.2 g/dL    Bilirubin Total 0.3 <=1.2 mg/dL    GFR Estimate 25 (L) >60 mL/min/1.73m2   CBC with Platelets and Reflex to Iron Studies    Collection Time: 05/22/23  2:17 PM   Result Value Ref Range    WBC Count 4.8 4.0 - 11.0 10e3/uL    RBC Count 3.93 (L) 4.40 - 5.90 10e6/uL    Hemoglobin 13.1 (L) 13.3 - 17.7 g/dL    Hematocrit 41.8 40.0 - 53.0 %     (H) 78 - 100 fL    MCH 33.3 (H) 26.5 - 33.0 pg    MCHC 31.3 (L) 31.5 - 36.5 g/dL    RDW 11.9 10.0 - 15.0 %    Platelet Count 229 150 - 450 10e3/uL   Extra Green Top (Lithium Heparin) Tube    Collection Time: 05/22/23  2:17 PM   Result Value Ref Range    Hold Specimen JIC     Phosphorus    Collection Time: 05/22/23  2:17 PM   Result Value Ref Range    Phosphorus 4.5 2.5 - 4.5 mg/dL   Albumin Random Urine Quantitative with Creat Ratio    Collection Time: 05/22/23  2:51 PM   Result Value Ref Range    Creatinine Urine mg/dL 254.0 mg/dL    Albumin Urine mg/L <12.0 mg/L    Albumin Urine mg/g Cr     CBC with platelets    Collection Time: 06/12/23  3:11 PM   Result Value Ref Range    WBC Count 5.7 4.0 - 11.0 10e3/uL    RBC Count 4.27 (L) 4.40 - 5.90 10e6/uL    Hemoglobin 14.7 13.3 - 17.7 g/dL    Hematocrit 44.3 40.0 - 53.0 %     (H) 78 - 100 fL    MCH 34.4 (H) 26.5 - 33.0 pg    MCHC 33.2 31.5 - 36.5 g/dL    RDW 11.9 10.0 - 15.0 %    Platelet Count 186 150 - 450 10e3/uL   UA with Microscopic    Collection Time: 06/12/23  3:18 PM   Result Value Ref Range    Color Urine Light Yellow Colorless, Straw, Light Yellow, Yellow    Appearance Urine Clear Clear    Glucose Urine Negative Negative mg/dL    Bilirubin Urine Negative Negative    Ketones Urine Negative Negative mg/dL    Specific Gravity Urine 1.011 1.003 - 1.035    Blood Urine Negative Negative    pH Urine 6.0 5.0 - 7.0    Protein Albumin Urine Negative Negative mg/dL    Urobilinogen Urine Normal Normal, 2.0 mg/dL    Nitrite Urine Negative Negative    Leukocyte Esterase Urine Large (A) Negative    Bacteria Urine Many (A) None Seen /HPF    RBC Urine <1 <=2 /HPF    WBC Urine 33 (H) <=5 /HPF    Squamous Epithelials Urine <1 <=1 /HPF     CMP  Recent Labs   Lab Test 05/22/23  1417 09/24/21  1204 06/07/21  1607 10/16/20  1545 09/18/20  1417 02/04/20  1426 07/12/19  0911 07/25/17  1530 04/19/17  1506    136 142 136 136.8 135.5 140.0   < > 141.2   POTASSIUM 4.8 4.9 4.5 4.3 3.6 4.2 4.4   < > 3.9   CHLORIDE 102 104 109 105 99.8 98.4 102.9   < > 104.3   CO2 20* 27 29 26 21.5 29.2 27.2   < > 26.7   ANIONGAP 14 5 4 6  --   --   --   --   --    * 123* 139* 115* 169.5* 170.7* 142.1*   < > 105.1*   BUN 39.2* 17 24 22 34.5* 20.2 13.5   < >  22.0*   CR 2.54* 1.49* 1.39* 1.45* 1.5* 1.2 1.2   < > 1.2   GFRESTIMATED 25* 45* 49* 47* 49.8* 63.6 62.9   < > 63.3   GFRESTBLACK  --   --  56* 54* 60.3 76.9 76.1   < > 76.5   WILIAM 9.1 9.2 9.5 9.3 9.4 9.3 9.2   < > 9.4   PHOS 4.5  --   --   --   --   --   --   --   --    PROTTOTAL 7.9  --   --  7.5  --   --  6.8  --  7.8   ALBUMIN 4.4  --   --  4.0  --   --   --   --   --    BILITOTAL 0.3  --   --  0.8  --   --  0.6  --  0.4   ALKPHOS 101  --   --  81  --   --  73.5  --  90.5   AST 21  --   --  13  --   --  14.4  --  13.4   ALT 15  --   --  19  --   --  12.0  --  15.6    < > = values in this interval not displayed.     CBC  Recent Labs   Lab Test 06/12/23  1511 05/22/23  1417 06/07/21  1356 09/18/20  1417   HGB 14.7 13.1* 14.1 14.2   WBC 5.7 4.8  --   --    RBC 4.27* 3.93*  --   --    HCT 44.3 41.8 42.8 49.5   * 106* 102.9* 110.2*   MCH 34.4* 33.3* 33.9 31.6   MCHC 33.2 31.3* 32.9 28.7*   RDW 11.9 11.9 11.8  --     229  --   --      INRNo lab results found.  ABGNo lab results found.   URINE STUDIES  Recent Labs   Lab Test 06/12/23  1518   COLOR Light Yellow   APPEARANCE Clear   URINEGLC Negative   URINEBILI Negative   URINEKETONE Negative   SG 1.011   UBLD Negative   URINEPH 6.0   PROTEIN Negative   NITRITE Negative   LEUKEST Large*   RBCU <1   WBCU 33*     No lab results found.    ASSESSMENT AND PLAN:   #ALAN of unknown etiology that has resolved after all his medications were held. Polypharmacy? Will restart some of them cautiously    #CKD stage 3a  eGFR around 45 mL/min  At baseline with no evidence of proteinuria off ACE inhibitors  There is no renal imaging available and therefore will order a renal ultrasound  The potential responsible factors include vascular disease, hypertension and normal decline related to age.  No documented intake of NSAIDs or other nephrotoxic medications.   The patient was also instructed to keep the sodium intake around 2400 mg /day, follow a plant-based diet and to avoid  NSAIDs     #HTN  Primary and secondary to CKD. Current BP in clinic is 128/88. Will restart him on carvedilol 12.5 mg twice daily given his history of CVA but keep him off lisinopril for now as the potassium level is 5.3. The patient was instructed to keep a BP diary and to communicate the results    #Type 2 DM   Hba1c 5.8 in May 2023, will restart metformin 500 mg twice daily    #CVD/dyslipidemia  Given his history of CVA, will restart him on atorvastatin 40 mg daily     #Blood count  Hemoglobin 14.7 no acute issue    #Acid-base status  CO2 level 28 no need for sodium bicarbonate supplementation    #Electrolytes  Na  136  K 5.3      #BMD  Ca  9.7        P 3.5   alb 4.6  iPTH 96 Vitamin D level is pending    #Neuropathy/ chronic pain will restart a small dose of gabapentin at bedtime    #CKD journey/transplant: not a candidate at this point     The total time of this encounter amounted to 60 minutes on the day of the encounter. This time included time spent with the patient, reviewing records, ordering tests, and performing post visit documentation.   Labs ordered include: CBC with diff, Renal Panel, CMP, UA with microscopy, UPCR, UACR    The patient will return to follow up in 3 months    Taylor Carrizales MD  Division of Renal Disease and Hypertension  June 12, 2023  3:36 PM      Again, thank you for allowing me to participate in the care of your patient.      Sincerely,    Taylor Carrizales MD

## 2023-06-12 NOTE — NURSING NOTE
Chief Complaint   Patient presents with     Consult     /88 (BP Location: Right arm, Patient Position: Sitting, Cuff Size: Adult Large)   Pulse 92   Wt 83.5 kg (184 lb)   SpO2 98%   BMI 24.52 kg/m

## 2023-06-12 NOTE — PROGRESS NOTES
Nephrology Clinic    Juli Rodriguez MRN:[id number] YOB: 1945  Date of Service: 06/12/2023  Primary care provider: Ernie Arreola  Requesting physician: Ernie Arreola      REASON FOR CONSULT: ALAN vs CKD    HISTORY OF PRESENT ILLNESS:   Juli Rodriguez is a 78 year old male who presents for evaluation of ALAN vs CKD.  The past medical history is significant for type 2 DM for more than 10 years, HTN for  10 years ,CVA in 2013 and CKD.  Regarding his type 2 DM, his glycemia is well controlled and his last Hba1c is 5.8 on 5/22/2023.  From a renal standpoint, his baseline creatinine level was around 1.4 mg/dL up to September 2021. He was thereafter lost to follow up in [place] as he moved to [place] and a kidney function was checked again in May 2023 and his creatinine level was noticed then to be 2.54 mg/dL. A repeat level on 6/12/2023 is 1.56 mg/dL. He reports that he had been following up regularly in [place] with a provider there up to January 2023 and that his renal function was stable.  The UACR is negative for any proteinuria on 5/22/2023.  There is no renal imaging available. He reports that his PCP stopped all his medications when he discovered that his kidney function was down and that his glycemia has remained well controlled off metformin and that his BP has remained reasonably well controlled. He denies any intake of NSAIDs or other nephrotoxic medications.  The patient denies any dysuria, any pollakiuria, any nocturia, any LE edema, any dyspnea on exertion .  The following portions of the patient's history were reviewed and updated as appropriate: allergies, current medications, past family history, past medical history, past social history, past surgical history and problem list.    PAST MEDICAL HISTORY:  Past Medical History:   Diagnosis Date     Cerebral infarction (H) 08/2013    In [facility]     Diabetes mellitus, type 2 (H)      Stroke (H)      PAST SURGICAL HISTORY:  Past Surgical History:    Procedure Laterality Date     CATARACT IOL, RT/LT Bilateral 2007 and 2015     HERNIA REPAIR       INJECT MAJOR JOINT / BURSA  9/18/2020     JOINT REPLACEMENT, HIP RT/LT Right      MEDICATIONS:  Prescription Medications as of 6/12/2023       Rx Number Disp Refills Start End Last Dispensed Date Next Fill Date Owning Pharmacy    acetaminophen (TYLENOL) 500 MG tablet  200 tablet 3 12/28/2021    [address]    Sig: Take 2 tablets (1,000 mg) by mouth 3 times daily as needed for mild pain    Class: E-Prescribe    Route: Oral    aspirin (ASA) 81 MG EC tablet  100 tablet 1 5/22/2023    [id number] [address] [place] [address]    Sig: Take 1 tablet (81 mg) by mouth daily    Class: E-Prescribe    Route: Oral    atorvastatin (LIPITOR) 40 MG tablet  90 tablet 3 9/24/2021    [address]    Sig: Take 1 tablet (40 mg) by mouth daily    Class: E-Prescribe    Route: Oral    blood glucose (NO BRAND SPECIFIED) lancets standard  100 each 3 9/24/2021    [address]    Sig: Use to test blood sugar 2 times daily or as directed.    Class: E-Prescribe    blood glucose (NO BRAND SPECIFIED) test strip  100 strip 3 9/24/2021    [address]    Sig: Use to test blood sugar 2 times daily or as directed.    Class: E-Prescribe    blood glucose monitoring (NO BRAND SPECIFIED) meter device kit  1 kit 0 9/24/2021    [address]    Sig: Use to test blood sugar 2 times daily or as directed.    Class: E-Prescribe    carvedilol (COREG) 12.5 MG tablet  180 tablet 3 7/5/2022    [id number] - [place], [place] - [address]    Sig: Take 1 tablet (12.5 mg) by mouth 2 times daily (with meals)    Class: E-Prescribe    Route: Oral    DULoxetine (CYMBALTA) 20 MG capsule  90 capsule 1 5/22/2023     [facility] #[id number] [address]    Sig: Take 1 capsule (20 mg) by mouth 2 times daily    Class: E-Prescribe    Route: Oral    gabapentin (NEURONTIN) 800 MG tablet  270 tablet 3 5/22/2023    [facility] #[id number] [place] [address]    Sig: Take 1 tablet (800 mg) by mouth 3 times daily    Class: E-Prescribe    Route: Oral    levETIRAcetam (KEPPRA) 500 MG tablet  180 tablet 3 9/24/2021    [address]    Sig: Take 1 tablet (500 mg) by mouth 2 times daily    Class: E-Prescribe    Route: Oral    lisinopril (ZESTRIL) 10 MG tablet  90 tablet 3 9/24/2021    [address]    Sig: Take 1 tablet (10 mg) by mouth daily    Class: E-Prescribe    Route: Oral    loratadine (CLARITIN) 10 MG tablet  90 tablet 3 5/22/2023    [facility] #[id number] [place] [address]    Sig: Take 1 tablet (10 mg) by mouth daily    Class: E-Prescribe    Route: Oral    metFORMIN (GLUCOPHAGE) 500 MG tablet  180 tablet 1 9/24/2021    [address]    Sig: Take 1 tablet (500 mg) by mouth 2 times daily (with meals)    Class: E-Prescribe    Route: Oral    order for DME  1 each 0 3/6/2020    [place], [place] - [address] [place] [address]    Sig: Diabetic shoes One Pair    Class: Local Print    order for DME  1 each 1 2/26/2018    [facility] #[id number] [place], [place] - [address]    Sig: Equipment being ordered: Diabetic Shoes    Class: Local Print    pantoprazole (PROTONIX) 20 MG EC tablet  180 tablet 3 9/24/2021    [address]    Sig: Take 2 tablets (40 mg) by mouth every morning (before breakfast)    Class: E-Prescribe    Route: Oral    sennosides (SENOKOT) 8.6 MG tablet  60 tablet 3 5/22/2023    [facility] #[id number] [address]    Sig: Take 2 tablets  by mouth daily    Class: E-Prescribe    Route: Oral         ALLERGIES:    Allergies   Allergen Reactions     Beef-Derived Products Other (See Comments)     Sneezing when eats beef     Eggs [Chicken-Derived Products (Egg)]      REVIEW OF SYSTEMS:  Review Of Systems  Skin: negative for, pigmentation, acne, rash, scaling, itching, bruising, lumps or bumps  Eyes: negative for, visual blurring, double vision, glaucoma, cataracts, eye pain, color blindness, glasses, contacts  Ears/Nose/Throat: negative for, nasal congestion, purulent rhinorrhea, sneezing, postnasal drainage, hearing loss, deafness, tinnitus  Respiratory: No shortness of breath, dyspnea on exertion, cough, or hemoptysis  Cardiovascular: negative for, palpitations, tachycardia, irregular heart beat, chest pain, exertional chest pain or pressure, paroxysmal nocturnal dyspnea and dyspnea on exertion  Gastrointestinal: negative for, poor appetite, dysphagia, nausea, vomiting, heartburn, dyspepsia, reflux and hematemesis  Genitourinary: negative for, nocturia, dysuria, frequency, urgency, hesitancy, hematuria, retention, decreased urinary stream and incontinence  Musculoskeletal: negative for, fracture, back pain, neck pain, arthritis, joint pain, joint swelling, joint stiffness, gout and fibromyalgia  Neurologic: negative for, headaches, syncope, stroke, seizures, paralysis, local weakness, numbness or tingling of hands and numbness or tingling of feet    A comprehensive review of systems was performed and found to be negative except as described here or above.  SOCIAL HISTORY:   Social History     Socioeconomic History     Marital status:      Spouse name: Not on file     Number of children: Not on file     Years of education: Not on file     Highest education level: Not on file   Occupational History     Not on file   Tobacco Use     Smoking status: Never     Smokeless tobacco: Never   Vaping Use     Vaping status: Never Used   Substance and Sexual  Activity     Alcohol use: No     Drug use: No     Sexual activity: Not Currently   Other Topics Concern     Not on file   Social History Narrative     Not on file     Social Determinants of Health     Financial Resource Strain: Not on file   Food Insecurity: Not on file   Transportation Needs: Not on file   Physical Activity: Not on file   Stress: Not on file   Social Connections: Not on file   Intimate Partner Violence: Not on file   Housing Stability: Not on file     FAMILY MEDICAL HISTORY:   Family History   Problem Relation Age of Onset     Diabetes No family hx of      Coronary Artery Disease No family hx of      Hypertension No family hx of      Hyperlipidemia No family hx of      Cerebrovascular Disease No family hx of      Breast Cancer No family hx of      Colon Cancer No family hx of      Prostate Cancer No family hx of      Other Cancer No family hx of      Depression No family hx of      Anxiety Disorder No family hx of      Mental Illness No family hx of      Substance Abuse No family hx of      Anesthesia Reaction No family hx of      Asthma No family hx of      Osteoporosis No family hx of      Genetic Disorder No family hx of      Thyroid Disease No family hx of      Obesity No family hx of      Glaucoma No family hx of      Macular Degeneration No family hx of      PHYSICAL EXAM:   /88 (BP Location: Right arm, Patient Position: Sitting, Cuff Size: Adult Large)   Pulse 92   Wt 83.5 kg (184 lb)   SpO2 98%   BMI 24.52 kg/m    GENERAL APPEARANCE: alert and no distress  EYES: nonicteric  HENT: mouth without ulcers or lesions  NECK: supple, no adenopathy  RESP: lungs clear to auscultation   CV: regular rhythm, normal rate, no rub  ABDOMEN: soft, nontender, normal bowel sounds, no HSM   Extremities: no clubbing, cyanosis, or edema  MS: no evidence of inflammation in joints, no muscle tenderness  SKIN: no rash  NEURO: mentation intact and speech normal  PSYCH: affect normal/bright   LABS:   Recent  Results (from the past 672 hour(s))   Lipid panel reflex to direct LDL Non-fasting    Collection Time: 05/22/23  2:17 PM   Result Value Ref Range    Cholesterol 165 <200 mg/dL    Triglycerides 115 <150 mg/dL    Direct Measure HDL 48 >=40 mg/dL    LDL Cholesterol Calculated 94 <=100 mg/dL    Non HDL Cholesterol 117 <130 mg/dL   Hemoglobin A1c    Collection Time: 05/22/23  2:17 PM   Result Value Ref Range    Hemoglobin A1C 5.8 (H) 0.0 - 5.6 %   Comprehensive metabolic panel    Collection Time: 05/22/23  2:17 PM   Result Value Ref Range    Sodium 136 136 - 145 mmol/L    Potassium 4.8 3.4 - 5.3 mmol/L    Chloride 102 98 - 107 mmol/L    Carbon Dioxide (CO2) 20 (L) 22 - 29 mmol/L    Anion Gap 14 7 - 15 mmol/L    Urea Nitrogen 39.2 (H) 8.0 - 23.0 mg/dL    Creatinine 2.54 (H) 0.67 - 1.17 mg/dL    Calcium 9.1 8.8 - 10.2 mg/dL    Glucose 112 (H) 70 - 99 mg/dL    Alkaline Phosphatase 101 40 - 129 U/L    AST 21 10 - 50 U/L    ALT 15 10 - 50 U/L    Protein Total 7.9 6.4 - 8.3 g/dL    Albumin 4.4 3.5 - 5.2 g/dL    Bilirubin Total 0.3 <=1.2 mg/dL    GFR Estimate 25 (L) >60 mL/min/1.73m2   CBC with Platelets and Reflex to Iron Studies    Collection Time: 05/22/23  2:17 PM   Result Value Ref Range    WBC Count 4.8 4.0 - 11.0 10e3/uL    RBC Count 3.93 (L) 4.40 - 5.90 10e6/uL    Hemoglobin 13.1 (L) 13.3 - 17.7 g/dL    Hematocrit 41.8 40.0 - 53.0 %     (H) 78 - 100 fL    MCH 33.3 (H) 26.5 - 33.0 pg    MCHC 31.3 (L) 31.5 - 36.5 g/dL    RDW 11.9 10.0 - 15.0 %    Platelet Count 229 150 - 450 10e3/uL   Extra Green Top (Lithium Heparin) Tube    Collection Time: 05/22/23  2:17 PM   Result Value Ref Range    Hold Specimen JIC    Phosphorus    Collection Time: 05/22/23  2:17 PM   Result Value Ref Range    Phosphorus 4.5 2.5 - 4.5 mg/dL   Albumin Random Urine Quantitative with Creat Ratio    Collection Time: 05/22/23  2:51 PM   Result Value Ref Range    Creatinine Urine mg/dL 254.0 mg/dL    Albumin Urine mg/L <12.0 mg/L    Albumin Urine  mg/g Cr     CBC with platelets    Collection Time: 06/12/23  3:11 PM   Result Value Ref Range    WBC Count 5.7 4.0 - 11.0 10e3/uL    RBC Count 4.27 (L) 4.40 - 5.90 10e6/uL    Hemoglobin 14.7 13.3 - 17.7 g/dL    Hematocrit 44.3 40.0 - 53.0 %     (H) 78 - 100 fL    MCH 34.4 (H) 26.5 - 33.0 pg    MCHC 33.2 31.5 - 36.5 g/dL    RDW 11.9 10.0 - 15.0 %    Platelet Count 186 150 - 450 10e3/uL   UA with Microscopic    Collection Time: 06/12/23  3:18 PM   Result Value Ref Range    Color Urine Light Yellow Colorless, Straw, Light Yellow, Yellow    Appearance Urine Clear Clear    Glucose Urine Negative Negative mg/dL    Bilirubin Urine Negative Negative    Ketones Urine Negative Negative mg/dL    Specific Gravity Urine 1.011 1.003 - 1.035    Blood Urine Negative Negative    pH Urine 6.0 5.0 - 7.0    Protein Albumin Urine Negative Negative mg/dL    Urobilinogen Urine Normal Normal, 2.0 mg/dL    Nitrite Urine Negative Negative    Leukocyte Esterase Urine Large (A) Negative    Bacteria Urine Many (A) None Seen /HPF    RBC Urine <1 <=2 /HPF    WBC Urine 33 (H) <=5 /HPF    Squamous Epithelials Urine <1 <=1 /HPF     CMP  Recent Labs   Lab Test 05/22/23  1417 09/24/21  1204 06/07/21  1607 10/16/20  1545 09/18/20  1417 02/04/20  1426 07/12/19  0911 07/25/17  1530 04/19/17  1506    136 142 136 136.8 135.5 140.0   < > 141.2   POTASSIUM 4.8 4.9 4.5 4.3 3.6 4.2 4.4   < > 3.9   CHLORIDE 102 104 109 105 99.8 98.4 102.9   < > 104.3   CO2 20* 27 29 26 21.5 29.2 27.2   < > 26.7   ANIONGAP 14 5 4 6  --   --   --   --   --    * 123* 139* 115* 169.5* 170.7* 142.1*   < > 105.1*   BUN 39.2* 17 24 22 34.5* 20.2 13.5   < > 22.0*   CR 2.54* 1.49* 1.39* 1.45* 1.5* 1.2 1.2   < > 1.2   GFRESTIMATED 25* 45* 49* 47* 49.8* 63.6 62.9   < > 63.3   GFRESTBLACK  --   --  56* 54* 60.3 76.9 76.1   < > 76.5   WILIAM 9.1 9.2 9.5 9.3 9.4 9.3 9.2   < > 9.4   PHOS 4.5  --   --   --   --   --   --   --   --    PROTTOTAL 7.9  --   --  7.5  --   --  6.8   --  7.8   ALBUMIN 4.4  --   --  4.0  --   --   --   --   --    BILITOTAL 0.3  --   --  0.8  --   --  0.6  --  0.4   ALKPHOS 101  --   --  81  --   --  73.5  --  90.5   AST 21  --   --  13  --   --  14.4  --  13.4   ALT 15  --   --  19  --   --  12.0  --  15.6    < > = values in this interval not displayed.     CBC  Recent Labs   Lab Test 06/12/23  1511 05/22/23  1417 06/07/21  1356 09/18/20  1417   HGB 14.7 13.1* 14.1 14.2   WBC 5.7 4.8  --   --    RBC 4.27* 3.93*  --   --    HCT 44.3 41.8 42.8 49.5   * 106* 102.9* 110.2*   MCH 34.4* 33.3* 33.9 31.6   MCHC 33.2 31.3* 32.9 28.7*   RDW 11.9 11.9 11.8  --     229  --   --      INRNo lab results found.  ABGNo lab results found.   URINE STUDIES  Recent Labs   Lab Test 06/12/23  1518   COLOR Light Yellow   APPEARANCE Clear   URINEGLC Negative   URINEBILI Negative   URINEKETONE Negative   SG 1.011   UBLD Negative   URINEPH 6.0   PROTEIN Negative   NITRITE Negative   LEUKEST Large*   RBCU <1   WBCU 33*     No lab results found.    ASSESSMENT AND PLAN:   #ALAN of unknown etiology that has resolved after all his medications were held. Polypharmacy? Will restart some of them cautiously    #CKD stage 3a  eGFR around 45 mL/min  At baseline with no evidence of proteinuria off ACE inhibitors  There is no renal imaging available and therefore will order a renal ultrasound  The potential responsible factors include vascular disease, hypertension and normal decline related to age.  No documented intake of NSAIDs or other nephrotoxic medications.   The patient was also instructed to keep the sodium intake around 2400 mg /day, follow a plant-based diet and to avoid NSAIDs     #HTN  Primary and secondary to CKD. Current BP in clinic is 128/88. Will restart him on carvedilol 12.5 mg twice daily given his history of CVA but keep him off lisinopril for now as the potassium level is 5.3. The patient was instructed to keep a BP diary and to communicate the results    #Type 2  DM   Hba1c 5.8 in May 2023, will restart metformin 500 mg twice daily    #CVD/dyslipidemia  Given his history of CVA, will restart him on atorvastatin 40 mg daily     #Blood count  Hemoglobin 14.7 no acute issue    #Acid-base status  CO2 level 28 no need for sodium bicarbonate supplementation    #Electrolytes  Na  136  K 5.3      #BMD  Ca  9.7        P 3.5   alb 4.6  iPTH 96 Vitamin D level is pending    #Neuropathy/ chronic pain will restart a small dose of gabapentin at bedtime    #CKD journey/transplant: not a candidate at this point     The total time of this encounter amounted to 60 minutes on the day of the encounter. This time included time spent with the patient, reviewing records, ordering tests, and performing post visit documentation.   Labs ordered include: CBC with diff, Renal Panel, CMP, UA with microscopy, UPCR, UACR    The patient will return to follow up in 3 months    Taylor Carrizales MD  Division of Renal Disease and Hypertension  June 12, 2023  3:36 PM

## 2023-06-12 NOTE — PATIENT INSTRUCTIONS
It was a pleasure taking care of you today.  I've included a brief summary of our discussion and care plan from today's visit below.  Please review this information with your primary care provider.  _______________________________________________________________________    My recommendations are summarized as follows:  -Keep the amount of sodium in your diet at 2.4 g/day (also see instructions attached in that regard)  -Keep a Blood Pressure diary by taking your blood pressure twice a day as instructed for the next 2 weeks and also your sugar level twice a day (also see instructions attached in that regard). Please make sure that you are using a validated blood pressure device by checking that it is the case at: https://www.validatebp.org/  -Avoid all NSAID's. Examples include Ibuprofen (Advil, Motrin), naprosyn (Aleve), celebrex among others. Acetaminophen (Tylenol) is ok with maximum dose in 24 hours of 3200 mg.  -Healthy lifestyle measures will keep your kidney's functioning at their current best. This includes regular exercise, weight loss and smoking cessation if you smoke.   -Do not exceed one alcoholic drink per day  -Please restart metformin 500 mg twice daily with meals, carvedilol 12.5 mg twice daily, gabapentin 400 mg once a day at bedtime, atorvastatin 40 mg at bedtime, aspirin 81 mg daily and tylenol as needed  -Please do a renal ultrasound  -Please do labs in 2 weeks and in 3 months    To schedule imaging please call [phone]     To schedule your lab appointment at the [facility], please call       Return to Nephrology Clinic in 3 months to review your progress.    _______________________________________________________________________    Who do I call with any questions after my visit?    Please be in touch if there are any further questions that arise following today's visit.  There are multiple ways to contact your nephrology care team.      During business hours,  you may reach your Nephrology LPN Care Coordinator, Penny, at .      To schedule or reschedule an appointment, please call [phone].    You can always send a secure message through Puddle.  Puddle messages are answered by your nurse or doctor typically within 24 hours.  Please allow extra time on weekends and holidays.      For urgent/emergent questions after business hours, you may reach the on-call Nephrology Fellow by contacting the [facility]  at [phone].     How will I get the results of any tests ordered?    You will receive all of your results.  If you have signed up for [facility], any tests ordered at your visit will be available to you after your physician reviews them.  Typically this takes 1-2 weeks.  If there are urgent results that require a change in your care plan, your physician or nurse will call you to discuss the next steps.      What is Puddle?  Puddle is a secure way for you to access all of your healthcare records from the [facility].  It is a web based computer program, so you can sign on to it from any location.  It also allows you to send secure messages to your care team.  I recommend signing up for Puddle access if you have not already done so and are comfortable with using a computer.      How do I schedule a follow-up visit?  If you did not schedule a follow-up visit today, please call [phone] to schedule a follow-up office visit.        Sincerely,      Dr. Taylro Carrizales  [facility]  Division of Nephrology and Hypertension

## 2023-06-13 ENCOUNTER — OFFICE VISIT (OUTPATIENT)
Dept: FAMILY MEDICINE | Facility: CLINIC | Age: 78
End: 2023-06-13
Payer: COMMERCIAL

## 2023-06-13 VITALS
HEART RATE: 64 BPM | HEIGHT: 72 IN | BODY MASS INDEX: 25.25 KG/M2 | DIASTOLIC BLOOD PRESSURE: 85 MMHG | RESPIRATION RATE: 18 BRPM | WEIGHT: 186.4 LBS | SYSTOLIC BLOOD PRESSURE: 125 MMHG | OXYGEN SATURATION: 99 %

## 2023-06-13 DIAGNOSIS — N18.4 CKD (CHRONIC KIDNEY DISEASE) STAGE 4, GFR 15-29 ML/MIN (H): ICD-10-CM

## 2023-06-13 DIAGNOSIS — R53.1 RIGHT SIDED WEAKNESS: Primary | ICD-10-CM

## 2023-06-13 DIAGNOSIS — Z86.73 HISTORY OF CVA (CEREBROVASCULAR ACCIDENT): ICD-10-CM

## 2023-06-13 DIAGNOSIS — M79.2 NEUROPATHIC PAIN: ICD-10-CM

## 2023-06-13 DIAGNOSIS — Z96.649 AFTERCARE FOLLOWING HIP JOINT REPLACEMENT SURGERY, UNSPECIFIED LATERALITY: ICD-10-CM

## 2023-06-13 DIAGNOSIS — Z47.1 AFTERCARE FOLLOWING HIP JOINT REPLACEMENT SURGERY, UNSPECIFIED LATERALITY: ICD-10-CM

## 2023-06-13 PROCEDURE — 99213 OFFICE O/P EST LOW 20 MIN: CPT | Performed by: FAMILY MEDICINE

## 2023-06-13 RX ORDER — GABAPENTIN 800 MG/1
400 TABLET ORAL DAILY
Qty: 45 TABLET | Refills: 3 | Status: SHIPPED | OUTPATIENT
Start: 2023-06-13 | End: 2023-07-10

## 2023-06-13 NOTE — Clinical Note
Patient returned to [place], had home services previously. I ordered new home care referral. Also DME order for shower chair and handrail. Please call daughter to coordinate [phone]. Thanks! -Apryl Calvert MD

## 2023-06-13 NOTE — PATIENT INSTRUCTIONS
Instructions from Kidney Doctor:  - Kidney ultrasound  - The patient was also instructed to keep the sodium intake around 2400 mg /day, follow a plant-based diet and to avoid NSAIDs   - Keep a diary of blood pressures, bring to next Kidney doctor appointment  - Do not restart lisinopril  - Avoid ibuprofen/NSAIDs    Patient Education   Here is the plan from today's visit    Juli was seen today for orders.    Diagnoses and all orders for this visit:    Right sided weakness  Aftercare following hip joint replacement surgery, unspecified laterality  History of CVA (cerebrovascular accident)  -     Safety Bar Order  -     Bath Seat/Shower Chair Order  -     Home Care Referral    Neuropathic pain  -     gabapentin (NEURONTIN) 800 MG tablet; Take 0.5 tablets (400 mg) by mouth daily    Follow up plan  Return in about 4 weeks (around 7/11/2023) for with Dr. Arreola for pain follow up.    Thank you for coming to [facility] today.  Lab Testing:  **If you had lab testing today and your results are reassuring or normal they will be mailed to you or sent through [facility] within 7 days.   **If the lab tests need quick action we will call you with the results.  **If you are having labs done on a different day, please call [phone] to schedule at [facility] or [phone] for other [facility] locations. Labs do not offer walk-in appointments.  The phone number we will call with results is # [phone] (home) . If this is not the best number please call our clinic and change the number.  Medication Refills:  If you need any refills please call your pharmacy and they will contact us.   If you need to  your refill at a new pharmacy, please contact the new pharmacy directly. The new pharmacy will help you get your medications transferred faster.   Scheduling:  If you have any concerns about today's visit or wish to schedule another appointment please call our office during normal business hours  [phone] (8-5:00 M-F). If you can no longer make a scheduled visit, please cancel via [facility] or call us to cancel.   If a referral was made to an [facility] specialty provider and you do not get a call from central scheduling, please refer to directions on your visit summary or call our office during normal business hours for assistance.   If a Mammogram was ordered for you at the [facility] call [phone] to schedule or change your appointment.  If you had an XRay/CT/Ultrasound/MRI ordered the number is [phone] to schedule or change your radiology appointment.   [facility] has limited ultrasound appointments available on Wednesdays, if you would like your ultrasound at [facility], please call [phone] to schedule.   Medical Concerns:  If you have urgent medical concerns please call [phone] at any time of the day.    Apryl Calvert MD

## 2023-06-13 NOTE — PROGRESS NOTES
Assessment & Plan     Instructions from Kidney Doctor (verbalized and provided in writing to the patient/family)  - Kidney ultrasound  - The patient was also instructed to keep the sodium intake around 2400 mg /day, follow a plant-based diet and to avoid NSAIDs   - Keep a diary of blood pressures, bring to next Kidney doctor appointment  - Do not restart lisinopril  - Avoid ibuprofen/NSAIDs    Patient Education  Here is the plan from today's visit    Juli was seen today for orders - accompanied by his daughter.     Diagnoses and all orders for this visit:    Right sided weakness  Aftercare following hip joint replacement surgery, unspecified laterality  History of CVA (cerebrovascular accident)  -     Safety Bar Order  -     Bath Seat/Shower Chair Order  -     Home Care Referral    Neuropathic pain  -     gabapentin (NEURONTIN) 800 MG tablet; Take 0.5 tablets (400 mg) by mouth daily    Follow up plan  Return in about 4 weeks (around 7/11/2023) for with Dr. Arreola for pain follow up.    I spent a total of 23 minutes on the day of the visit.   Time spent by me doing chart review, history and exam, documentation and further activities per the note     Return in about 4 weeks (around 7/11/2023) for with Dr. Arreola for pain follow up.    Apryl Calvert MD  [facility] DORCAS Bustos is a 78 year old, presenting for the following health issues:  Orders (Shower chair and safety bars due to hip pain)        6/13/2023     4:04 PM   Additional Questions   Roomed by Eduardo   Accompanied by Daughter     PHILLIP     Previously lived in [place], had shower chair and rails      Recently moved from [place]  [facility], has no rails and no shower chair  Due to his R sided weakness (h/o CVA) and hip replacement, he can not stand for long periods of time (to shower) and that is why he needs the shower chair and grab bar.     He wants us to call daughter Paige for  appointments and coordination [phone]    Reviewed Home Health Assessment from 6/10/21:  Client's Plan of Care consists of: Adult day care (2 days per week), Homemaker services (5 hours per week), Life line, Personal care assistance (PCA) (4 hours per day), Specialized supplies and equipment (lift chair, a/c unit, bathroom grab bars, cane, walker, shower chair, raised toilet seat, hh shower, reacher, leg brace, orthotic shoes) and Transportation    COVID vaccines - Jan 2023 in [place]    Review of Systems         Objective    /85   Pulse 64   Resp 18   Ht 1.829 m (6')   Wt 84.6 kg (186 lb 6.4 oz)   SpO2 99%   BMI 25.28 kg/m    Body mass index is 25.28 kg/m .  Physical Exam  Vitals reviewed.   Constitutional:       Appearance: He is well-developed.   HENT:      Head: Normocephalic and atraumatic.   Neck:      Thyroid: No thyromegaly.   Cardiovascular:      Rate and Rhythm: Normal rate.   Pulmonary:      Effort: Pulmonary effort is normal. No respiratory distress.   Musculoskeletal:         General: No tenderness. Normal range of motion.   Skin:     General: Skin is warm and dry.      Coloration: Skin is not pale.      Findings: No erythema or rash.   Neurological:      Cranial Nerves: No cranial nerve deficit.                            DME (Durable Medical Equipment) Orders and Documentation  Orders Placed This Encounter   Procedures     Safety Bar Order     Bath Seat/Shower Chair Order      The patient was assessed and it was determined the patient is in need of the following listed DME Supplies/Equipment. Please complete supporting documentation below to demonstrate medical necessity.      DME All Other Item(s) Documentation    List reason for need and supporting documentation for medical necessity below for each DME item.     1. H/o CVA, right sided weakness, and h/o hip replacement

## 2023-06-14 ENCOUNTER — ANCILLARY PROCEDURE (OUTPATIENT)
Dept: ULTRASOUND IMAGING | Facility: CLINIC | Age: 78
End: 2023-06-14
Attending: INTERNAL MEDICINE
Payer: COMMERCIAL

## 2023-06-14 DIAGNOSIS — N18.4 CKD (CHRONIC KIDNEY DISEASE) STAGE 4, GFR 15-29 ML/MIN (H): ICD-10-CM

## 2023-06-14 PROCEDURE — 76770 US EXAM ABDO BACK WALL COMP: CPT | Mod: GC | Performed by: RADIOLOGY

## 2023-06-15 ENCOUNTER — DOCUMENTATION ONLY (OUTPATIENT)
Dept: FAMILY MEDICINE | Facility: CLINIC | Age: 78
End: 2023-06-15
Payer: COMMERCIAL

## 2023-06-15 NOTE — PROGRESS NOTES
6/15/2023    Care Coordinator successfully faxed DME orders for bath seat/ shower chair and safety bar to [facility]. ([phone])     CC successfully faxed home care referral to [facility]. And asked for them to contact patient's daughter at [phone]. Will check in with patient's daughter about this request.      Karmen Abbasi  Care Coordinator

## 2023-06-22 ENCOUNTER — DOCUMENTATION ONLY (OUTPATIENT)
Dept: FAMILY MEDICINE | Facility: CLINIC | Age: 78
End: 2023-06-22
Payer: COMMERCIAL

## 2023-06-27 NOTE — LETTER
May 24, 2017      Juli Rodriguez  [address]   [facility] [address]        Dear Juli,    Thank you for getting your care at [facility]. Please see below for your test results.  The colon cancer screening test is negative.  Resulted Orders   Fecal colorectal cancer screen FITT   Result Value Ref Range    Occult Blood Scn FIT Negative NEG       If you have any concerns about these results please call and leave a message for me or send a MyCBunkrt message to the clinic.    Sincerely,    Ernie Arreola MD    
3 = A little assistance

## 2023-06-28 ENCOUNTER — TELEPHONE (OUTPATIENT)
Dept: FAMILY MEDICINE | Facility: CLINIC | Age: 78
End: 2023-06-28
Payer: COMMERCIAL

## 2023-06-28 NOTE — TELEPHONE ENCOUNTER
6/28/2023    Care Coordinator attempted to contact patient's daughter, Paige, to check in on DME orders and a Home Care referral for patient. CC left a voicemail and gave direct number for Paige to call back if she had any questions.       1:30pm Paige called CC back and said she needs to talk to Juli about home care referral. They don't know if they want that service right now. CC confirmed DME  facility  location and provided address.     Karmen Abbasi  Care Coordinator

## 2023-07-10 ENCOUNTER — TRANSITIONAL CARE UNIT VISIT (OUTPATIENT)
Dept: GERIATRICS | Facility: CLINIC | Age: 78
End: 2023-07-10
Payer: COMMERCIAL

## 2023-07-10 VITALS
OXYGEN SATURATION: 98 % | HEIGHT: 72 IN | DIASTOLIC BLOOD PRESSURE: 56 MMHG | HEART RATE: 80 BPM | WEIGHT: 184 LBS | BODY MASS INDEX: 24.92 KG/M2 | TEMPERATURE: 97.6 F | SYSTOLIC BLOOD PRESSURE: 150 MMHG | RESPIRATION RATE: 16 BRPM

## 2023-07-10 DIAGNOSIS — N30.00 ACUTE CYSTITIS WITHOUT HEMATURIA: ICD-10-CM

## 2023-07-10 DIAGNOSIS — R29.6 FREQUENT FALLS: ICD-10-CM
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
## 2023-07-10 DIAGNOSIS — R91.8 PULMONARY NODULES: ICD-10-CM

## 2023-07-10 DIAGNOSIS — E11.9 TYPE 2 DIABETES MELLITUS WITHOUT COMPLICATION, WITHOUT LONG-TERM CURRENT USE OF INSULIN (H): ICD-10-CM

## 2023-07-10 DIAGNOSIS — T67.1XXD HEAT SYNCOPE, SUBSEQUENT ENCOUNTER: ICD-10-CM

## 2023-07-10 DIAGNOSIS — I95.9 HYPOTENSION, UNSPECIFIED HYPOTENSION TYPE: ICD-10-CM

## 2023-07-10 DIAGNOSIS — Z86.73 HISTORY OF CVA (CEREBROVASCULAR ACCIDENT): ICD-10-CM

## 2023-07-10 DIAGNOSIS — G89.11 ACUTE PAIN DUE TO INJURY: ICD-10-CM

## 2023-07-10 DIAGNOSIS — S32.009D: ICD-10-CM

## 2023-07-10 DIAGNOSIS — R53.1 RIGHT SIDED WEAKNESS: ICD-10-CM

## 2023-07-10 PROCEDURE — 99306 1ST NF CARE HIGH MDM 50: CPT | Performed by: FAMILY MEDICINE

## 2023-07-10 RX ORDER — ACETAMINOPHEN 500 MG
1000 TABLET ORAL 3 TIMES DAILY
COMMUNITY
End: 2023-10-09

## 2023-07-10 RX ORDER — CARVEDILOL 12.5 MG/1
12.5 TABLET ORAL 2 TIMES DAILY WITH MEALS
COMMUNITY
End: 2024-01-17

## 2023-07-10 RX ORDER — DULOXETIN HYDROCHLORIDE 20 MG/1
20 CAPSULE, DELAYED RELEASE ORAL DAILY
COMMUNITY
End: 2023-10-09

## 2023-07-10 RX ORDER — AMOXICILLIN 250 MG
2 CAPSULE ORAL 2 TIMES DAILY
COMMUNITY
End: 2024-09-05

## 2023-07-10 RX ORDER — PANTOPRAZOLE SODIUM 40 MG/1
40 TABLET, DELAYED RELEASE ORAL DAILY
COMMUNITY
End: 2023-10-09

## 2023-07-10 RX ORDER — LIDOCAINE 4 G/G
1 PATCH TOPICAL EVERY 24 HOURS
COMMUNITY
End: 2024-09-05

## 2023-07-10 RX ORDER — OXYCODONE HYDROCHLORIDE 5 MG/1
5 TABLET ORAL EVERY 4 HOURS PRN
COMMUNITY
End: 2023-07-17

## 2023-07-10 RX ORDER — LANOLIN ALCOHOL/MO/W.PET/CERES
3 CREAM (GRAM) TOPICAL
COMMUNITY
End: 2024-06-25

## 2023-07-10 RX ORDER — GABAPENTIN 300 MG/1
300 CAPSULE ORAL 3 TIMES DAILY
COMMUNITY
End: 2023-07-31

## 2023-07-10 RX ORDER — POLYETHYLENE GLYCOL 3350 17 G/17G
17 POWDER, FOR SOLUTION ORAL 2 TIMES DAILY
COMMUNITY
End: 2023-10-09

## 2023-07-10 RX ORDER — ATORVASTATIN CALCIUM 20 MG/1
20 TABLET, FILM COATED ORAL DAILY
COMMUNITY
End: 2023-10-09

## 2023-07-10 RX ORDER — CEFADROXIL 500 MG/1
1000 CAPSULE ORAL 2 TIMES DAILY
COMMUNITY
End: 2023-07-31

## 2023-07-10 NOTE — LETTER
7/10/2023        RE: Juli Rodriguez  [address]  [facility] [address]        [facility]    PRIMARY CARE PROVIDER AND CLINIC:  Ernie Arreola MD, [address] [address] / [facility] [address]  Chief Complaint   Patient presents with     Hospital F/U      [facility] Medical Record Number:  [id number]  Place of Service where encounter took place:  [facility] (SNF) [[id number]]    Juli Rodriguez  is a 78 year old man,  (1945),  Living independently, with pmhx including falls, history of CVA with residual right sided weakness, chronic pain syndrome, T2DM (last A1c 5.8%),  CKD3 (seen by nephrology 23),  admitted to the above facility from  [facility] . Hospital stay 23 through 23.     HPI:    A [employer]  assisted with this visit  Son arrived later in visit and also assisted with interpretation    Hospital Course  He was brought to the emergency department via EMS after a fall. It was noted he is independent in his apartment though has some persistent mild right-sided weakness from prior CVA.  Prior to his fall he reported having increased urinary frequency and dysuria.  He fell onto the floor when he was leaving the bathroom though does not recall the events.  He had severe low back pain and was brought to the ED for further evaluation.    In the ED, overall normal BMP, with stable creatinine of 1.35.  Notably initial potassium was 8.9 but this was erroneous on the point-of-care testing.  Follow-up potassiums were normal.  CBC without leukocytosis or anemia, though mild macrocytosis with MCV of 103, B12 was in low normal range of 339.    UA did show moderately elevated SpG of 1.025, positive nitrates, leuk esterase, no glucose or ketones.  Many bacteria.  In light of symptoms, felt to represent acute cystitis.    CT imaging of the head and neck without acute findings.  CT of the spine showed transverse process fractures of  "L1 and L2.  Was evaluated by orthopedics with recommendations for nonoperative repair.  Recommend he wear splint as able for pain control.  PT and OT recommended TCU following hospitalization.  He was monitored on telemetry as well with no evidence of arrhythmia.  Cystitis was treated with cefadroxil for pan-sensitive E. coli which will be completed 7/12/2023.  He was noted to have evidence of hypotension and carvedilol was decreased to 6.25 mg twice daily.     Additionally, per chart: \"Family noted that he never uses air conditioning in the summer his apartment can become extremely hot. When they found him he was completely soaked in sweat. Overheating with vasodilation may also have contributed to his fall as well.\"    Hemoglobin A1c was found to be low at 5.6% and so metformin was discontinued.    Today   Just evaluated with PT. Primarily working on standing and balance. Difficulty with getting up from supine due to pain. Have not attempted ambulating yet.     Recalls falling down onto his left side and continues to have pain in his back. Having a lot of pain when he is standing, saying he feels like he wants to scream. Having pain in his buttocks from limited ability to stand and sitting a lot. Tolerating brace ok, but can feel tight on his ribs at times.     He has no ongoing dysuria or frequency.  He does have a feeling of incomplete voiding.  He does feel constipated at times as well.  He primarily has been having more pain when getting up to the bathroom.  His appetite is doing well.  He has no chest pain or pressure.  No shortness of breath.    Functional Review  Lives at home on his own, though son lives next door in same apartment complex. Family assists with cooking, cleaning. Was managing own medications and finances. Has fallen 3 times in the last 2 months. Son states these falls also have had episodes of brief loss of consciousness, without confusion or post-ictal symptoms. Not all in the bathroom. No " injuries with prior falls.     CODE STATUS/ADVANCE DIRECTIVES DISCUSSION:  No Order  CPR/Full code   ALLERGIES:   Allergies   Allergen Reactions     Beef-Derived Products Other (See Comments)     Sneezing when eats beef     Eggs [Chicken-Derived Products (Egg)]       PAST MEDICAL HISTORY:   Past Medical History:   Diagnosis Date     Cerebral infarction (H) 08/2013    In [facility]     Diabetes mellitus, type 2 (H)      Stroke (H)       PAST SURGICAL HISTORY:   has a past surgical history that includes hernia repair; joint replacement, hip rt/lt (Right); cataract iol, rt/lt (Bilateral, 2007 and 2015); and Inject Major Joint / Bursa (9/18/2020).  FAMILY HISTORY: family history is not on file.  SOCIAL HISTORY:   reports that he has never smoked. He has never used smokeless tobacco. He reports that he does not drink alcohol and does not use drugs.  Patient's living condition: lives alone    Post Discharge Medication Reconciliation Status:   MED REC REQUIREDPost Medication Reconciliation Status:  Discharge medications reconciled and changed, see notes/orders    Current Outpatient Medications   Medication Sig     acetaminophen (TYLENOL) 500 MG tablet Take 1,000 mg by mouth 3 times daily     aspirin 81 MG EC tablet Take 1 tablet (81 mg) by mouth daily for 300 days     atorvastatin (LIPITOR) 20 MG tablet Take 20 mg by mouth daily     blood glucose (NO BRAND SPECIFIED) lancets standard Use to test blood sugar 2 times daily or as directed.     blood glucose (NO BRAND SPECIFIED) test strip Use to test blood sugar 2 times daily or as directed.     blood glucose monitoring (NO BRAND SPECIFIED) meter device kit Use to test blood sugar 2 times daily or as directed.     carvedilol (COREG) 12.5 MG tablet Take 6.25 mg by mouth 2 times daily (with meals)     cefadroxil (DURICEF) 500 MG capsule Take 1,000 mg by mouth 2 times daily     DULoxetine (CYMBALTA) 20 MG capsule Take 20 mg by mouth daily     Folic Acid-Vit B6-Vit B12 (B  COMPLEX-FOLIC ACID PO) Take 1 capsule by mouth daily     gabapentin (NEURONTIN) 300 MG capsule Take 300 mg by mouth 3 times daily     Lidocaine (LIDOCARE) 4 % Patch Place 1 patch onto the skin every 24 hours To prevent lidocaine toxicity, patient should be patch free for 12 hrs daily.     loratadine (CLARITIN) 10 MG tablet Take 1 tablet (10 mg) by mouth daily     melatonin 3 MG tablet Take 3 mg by mouth nightly as needed for sleep     order for DME Diabetic shoes One Pair     order for DME Equipment being ordered: Diabetic Shoes     oxyCODONE (ROXICODONE) 5 MG tablet Take 5 mg by mouth every 4 hours as needed for severe pain     pantoprazole (PROTONIX) 40 MG EC tablet Take 40 mg by mouth daily     polyethylene glycol (MIRALAX) 17 g packet Take 17 g by mouth 2 times daily     senna-docusate (SENOKOT-S/PERICOLACE) 8.6-50 MG tablet Take 2 tablets by mouth 2 times daily     No current facility-administered medications for this visit.     Vitals:  BP (!) 150/56   Pulse 80   Temp 97.6  F (36.4  C)   Resp 16   Ht 1.829 m (6')   Wt 83.5 kg (184 lb)   SpO2 98%   BMI 24.95 kg/m    Exam:  GENERAL APPEARANCE: Seated comfortably, NAD  HENT:  NCAT, not [place], reasonable dentition  EYES:  Conjunctiva clear, anicteric, EOMI, PERRL  PULM  Normal WOB on RA, lungs CTAB, no wheezes or crackles  CV:  RRR, S1/S2 normal, no murmurs; no LE edema  ABDOMEN: Abdomen soft, not tender, not distended, BS normal and active throughout   M/S:   Wearing TLSO brace  SKIN:  No skin injuries of exposed skin (unable to evaluate sacral or buttocks area for pressure injury with his pain symptoms)  NEURO: Alert, answering questions appropriately, normal thought processes; CN II-XII grossly intact  PSYCH:  Normal mood with congruent affect    Lab/Diagnostic data:  Recent labs in [facility] reviewed by me today.      CT CHEST ABDOMEN PELVIS W  Order: [id number]  Impression    1. Nondisplaced avulsion fractures of the left transverse processes of the L1 and L2  vertebral bodies.   2. No acute traumatic process within the chest, abdomen, or pelvis.   3. 5 millimeter left upper lobe indeterminate pulmonary nodule. If the patient is considered low risk for primary lung cancer, these do not require additional follow-up. If the patient is considered high-risk for primary lung cancer, consider optional unenhanced chest CT in 12 months to document stability and to assess for underlying malignant potential per Fleischner society guidelines.   4. Although relatively decompressed there is suggestion of circumferential wall thickening of the urinary bladder. Recommend correlation with urinalysis if not already performed to assess for underlying cystitis. A few bladder diverticula.   5. Posterior midline nodular focus abutting the bladder wall, potentially arising from the prostate. Indeterminate. Consider correlation with direct visualization in the nonemergent setting.       ASSESSMENT/PLAN:    (W19.XXXD) Falls, subsequent encounter  (primary encounter diagnosis)  Comment: Primary reason for admission with subsequent spinous process vertebral fractures.  Likely multifactorial related to CVA sequelae, heat injury, and possible cystitis.  Admitted to TCU for ongoing therapies.  Plan:   -PT/OT    (R29.6) Frequent falls  Comment: Multifactorial related to the above concerns.  Additionally may be related to orthostatic hypotension for which his carvedilol was decreased.  We will also decrease gabapentin to 200 mg 3 times daily and further decrease as tolerated  Plan:   -Decrease gabapentin to 200 mg 3 times daily, if tolerated, decreased to 100 mg 3 times daily for a period then off    (S32.009D) Closed fracture of spinous process of lumbar vertebra, with routine healing, subsequent encounter  Comment: Left transverse process fractures at L1 and L2.  Likely related to most recent fall.  Contributing to significant pain with therapies.  Plan:   -Continue TLSO bracing when up and  ambulating  -Follow-up with neurosurgery as planned  -Consider outpatient DEXA scan    (G89.11) Acute pain due to injury  Comment: Significant pain with therapies related to the above fractures.  Particularly significant with therapies.  Plan:   -Continue scheduled Tylenol 1000 mg 3 times daily  -Oxycodone 5mg every 4 hours as needed  -Additionally, start oxycodone 2.5 mg 3 times daily as needed with therapy PT    (N30.00) Acute cystitis without hematuria  Comment: Symptoms suggestive of cystitis on admission.  Discharged with cefadroxil which will be completed 7/12/2023.    (T67.1XXD) Heat syncope, subsequent encounter  Comment: Likely also contributing to fall with family reporting he does not have air conditioning and recent hot and humid weather.  Recommendations for cooling particularly at time of discharge.    (Z86.73) History of CVA (cerebrovascular accident)  (R53.1) Right sided weakness  Comment: Contributing to follow with weakness and imbalance.  Does live independently though assistance from son who lives just a few doors down to the same apartment complex.  Plan:   -PT/OT    (E11.9) Type 2 diabetes mellitus without complication, without long-term current use of insulin (H)  Comment: Well-controlled with last A1c of 5.8%.  Concerns for hypoglycemia potentially with poor intake, though less likely with metformin.  However this was discontinued appropriately at discharge.  Plan:   -Discontinue sliding scale insulin    (I95.9) Hypotension, unspecified hypotension type  Comment: Concerns for hypotension during his admission and also potential contributing to falls.  Carvedilol was decreased to 6.25 mg twice daily.    (R91.8) Pulmonary nodules  Comment: Noted on imaging.  Likely low risk and follow-up may not be necessary.  Plan:   -Consider repeat scan per patient preferences and discussion with PCP    Orders:  [x] Decrease gabapentin to 200mg TID  [x] Add oxycodone 2.5mg TID PRN for severe pain with  therapy  [x] Miralax 34mg qam  [x] Discontinue sliding scale    Electronically signed by:    Benjamin Rosenstein, MD, MA  [facility]    This note was completed with the assistance of dictation software. Typos and word substitution-errors are expected and unintended.      52 MINUTES SPENT BY ME on the date of service doing chart review, history, exam, documentation & further activities per the note.                     Sincerely,        Benjamin Rosenstein, MD

## 2023-07-10 NOTE — PROGRESS NOTES
[facility] GERIATRICS    PRIMARY CARE PROVIDER AND CLINIC:  Ernie Arreola MD, [address] [address] / [facility] [id number]  Chief Complaint   Patient presents with     Hospital F/U      [place] Medical Record Number:  [id number]  Place of Service where encounter took place:  [facility] ([facility]) [[id number]]    Juli Rodriguez  is a 78 year old man,  (1945),  Living independently, with pmhx including falls, history of CVA with residual right sided weakness, chronic pain syndrome, T2DM (last A1c 5.8%),  CKD3 (seen by nephrology 23),  admitted to the above facility from  [facility] . Hospital stay 23 through 23.     HPI:    A [employer] phone  assisted with this visit  Son arrived later in visit and also assisted with interpretation    Hospital Course  He was brought to the emergency department via EMS after a fall. It was noted he is independent in his apartment though has some persistent mild right-sided weakness from prior CVA.  Prior to his fall he reported having increased urinary frequency and dysuria.  He fell onto the floor when he was leaving the bathroom though does not recall the events.  He had severe low back pain and was brought to the ED for further evaluation.    In the ED, overall normal BMP, with stable creatinine of 1.35.  Notably initial potassium was 8.9 but this was erroneous on the point-of-care testing.  Follow-up potassiums were normal.  CBC without leukocytosis or anemia, though mild macrocytosis with MCV of 103, B12 was in low normal range of 339.    UA did show moderately elevated SpG of 1.025, positive nitrates, leuk esterase, no glucose or ketones.  Many bacteria.  In light of symptoms, felt to represent acute cystitis.    CT imaging of the head and neck without acute findings.  CT of the spine showed transverse process fractures of L1 and L2.  Was evaluated by orthopedics with recommendations for nonoperative repair.   "Recommend he wear splint as able for pain control.  PT and OT recommended TCU following hospitalization.  He was monitored on telemetry as well with no evidence of arrhythmia.  Cystitis was treated with cefadroxil for pan-sensitive E. coli which will be completed 7/12/2023.  He was noted to have evidence of hypotension and carvedilol was decreased to 6.25 mg twice daily.     Additionally, per chart: \"Family noted that he never uses air conditioning in the summer his apartment can become extremely hot. When they found him he was completely soaked in sweat. Overheating with vasodilation may also have contributed to his fall as well.\"    Hemoglobin A1c was found to be low at 5.6% and so metformin was discontinued.    Today   Just evaluated with PT. Primarily working on standing and balance. Difficulty with getting up from supine due to pain. Have not attempted ambulating yet.     Recalls falling down onto his left side and continues to have pain in his back. Having a lot of pain when he is standing, saying he feels like he wants to scream. Having pain in his buttocks from limited ability to stand and sitting a lot. Tolerating brace ok, but can feel tight on his ribs at times.     He has no ongoing dysuria or frequency.  He does have a feeling of incomplete voiding.  He does feel constipated at times as well.  He primarily has been having more pain when getting up to the bathroom.  His appetite is doing well.  He has no chest pain or pressure.  No shortness of breath.    Functional Review  Lives at home on his own, though son lives next door in same apartment complex. Family assists with cooking, cleaning. Was managing own medications and finances. Has fallen 3 times in the last 2 months. Son states these falls also have had episodes of brief loss of consciousness, without confusion or post-ictal symptoms. Not all in the bathroom. No injuries with prior falls.     CODE STATUS/ADVANCE DIRECTIVES DISCUSSION:  No Order  " CPR/Full code   ALLERGIES:   Allergies   Allergen Reactions     Beef-Derived Products Other (See Comments)     Sneezing when eats beef     Eggs [Chicken-Derived Products (Egg)]       PAST MEDICAL HISTORY:   Past Medical History:   Diagnosis Date     Cerebral infarction (H) 08/2013    In [facility]     Diabetes mellitus, type 2 (H)      Stroke (H)       PAST SURGICAL HISTORY:   has a past surgical history that includes hernia repair; joint replacement, hip rt/lt (Right); cataract iol, rt/lt (Bilateral, 2007 and 2015); and Inject Major Joint / Bursa (9/18/2020).  FAMILY HISTORY: family history is not on file.  SOCIAL HISTORY:   reports that he has never smoked. He has never used smokeless tobacco. He reports that he does not drink alcohol and does not use drugs.  Patient's living condition: lives alone    Post Discharge Medication Reconciliation Status:   MED REC REQUIREDPost Medication Reconciliation Status:  Discharge medications reconciled and changed, see notes/orders    Current Outpatient Medications   Medication Sig     acetaminophen (TYLENOL) 500 MG tablet Take 1,000 mg by mouth 3 times daily     aspirin 81 MG EC tablet Take 1 tablet (81 mg) by mouth daily for 300 days     atorvastatin (LIPITOR) 20 MG tablet Take 20 mg by mouth daily     blood glucose (NO BRAND SPECIFIED) lancets standard Use to test blood sugar 2 times daily or as directed.     blood glucose (NO BRAND SPECIFIED) test strip Use to test blood sugar 2 times daily or as directed.     blood glucose monitoring (NO BRAND SPECIFIED) meter device kit Use to test blood sugar 2 times daily or as directed.     carvedilol (COREG) 12.5 MG tablet Take 6.25 mg by mouth 2 times daily (with meals)     cefadroxil (DURICEF) 500 MG capsule Take 1,000 mg by mouth 2 times daily     DULoxetine (CYMBALTA) 20 MG capsule Take 20 mg by mouth daily     Folic Acid-Vit B6-Vit B12 (B COMPLEX-FOLIC ACID PO) Take 1 capsule by mouth daily     gabapentin (NEURONTIN) 300 MG capsule  Take 300 mg by mouth 3 times daily     Lidocaine (LIDOCARE) 4 % Patch Place 1 patch onto the skin every 24 hours To prevent lidocaine toxicity, patient should be patch free for 12 hrs daily.     loratadine (CLARITIN) 10 MG tablet Take 1 tablet (10 mg) by mouth daily     melatonin 3 MG tablet Take 3 mg by mouth nightly as needed for sleep     order for DME Diabetic shoes One Pair     order for DME Equipment being ordered: Diabetic Shoes     oxyCODONE (ROXICODONE) 5 MG tablet Take 5 mg by mouth every 4 hours as needed for severe pain     pantoprazole (PROTONIX) 40 MG EC tablet Take 40 mg by mouth daily     polyethylene glycol (MIRALAX) 17 g packet Take 17 g by mouth 2 times daily     senna-docusate (SENOKOT-S/PERICOLACE) 8.6-50 MG tablet Take 2 tablets by mouth 2 times daily     No current facility-administered medications for this visit.     Vitals:  BP (!) 150/56   Pulse 80   Temp 97.6  F (36.4  C)   Resp 16   Ht 1.829 m (6')   Wt 83.5 kg (184 lb)   SpO2 98%   BMI 24.95 kg/m    Exam:  GENERAL APPEARANCE: Seated comfortably, NAD  HENT:  NCAT, not [place], reasonable dentition  EYES:  Conjunctiva clear, anicteric, EOMI, PERRL  PULM  Normal WOB on RA, lungs CTAB, no wheezes or crackles  CV:  RRR, S1/S2 normal, no murmurs; no LE edema  ABDOMEN: Abdomen soft, not tender, not distended, BS normal and active throughout   M/S:   Wearing TLSO brace  SKIN:  No skin injuries of exposed skin (unable to evaluate sacral or buttocks area for pressure injury with his pain symptoms)  NEURO: Alert, answering questions appropriately, normal thought processes; CN II-XII grossly intact  PSYCH:  Normal mood with congruent affect    Lab/Diagnostic data:  Recent labs in [facility] reviewed by me today.      CT CHEST ABDOMEN PELVIS W  Order: [id number]  Impression    1. Nondisplaced avulsion fractures of the left transverse processes of the L1 and L2 vertebral bodies.   2. No acute traumatic process within the chest, abdomen, or pelvis.   3. 5  millimeter left upper lobe indeterminate pulmonary nodule. If the patient is considered low risk for primary lung cancer, these do not require additional follow-up. If the patient is considered high-risk for primary lung cancer, consider optional unenhanced chest CT in 12 months to document stability and to assess for underlying malignant potential per Fleischner society guidelines.   4. Although relatively decompressed there is suggestion of circumferential wall thickening of the urinary bladder. Recommend correlation with urinalysis if not already performed to assess for underlying cystitis. A few bladder diverticula.   5. Posterior midline nodular focus abutting the bladder wall, potentially arising from the prostate. Indeterminate. Consider correlation with direct visualization in the nonemergent setting.       ASSESSMENT/PLAN:    (W19.XXXD) Falls, subsequent encounter  (primary encounter diagnosis)  Comment: Primary reason for admission with subsequent spinous process vertebral fractures.  Likely multifactorial related to CVA sequelae, heat injury, and possible cystitis.  Admitted to TCU for ongoing therapies.  Plan:   -PT/OT    (R29.6) Frequent falls  Comment: Multifactorial related to the above concerns.  Additionally may be related to orthostatic hypotension for which his carvedilol was decreased.  We will also decrease gabapentin to 200 mg 3 times daily and further decrease as tolerated  Plan:   -Decrease gabapentin to 200 mg 3 times daily, if tolerated, decreased to 100 mg 3 times daily for a period then off    (S32.009D) Closed fracture of spinous process of lumbar vertebra, with routine healing, subsequent encounter  Comment: Left transverse process fractures at L1 and L2.  Likely related to most recent fall.  Contributing to significant pain with therapies.  Plan:   -Continue TLSO bracing when up and ambulating  -Follow-up with neurosurgery as planned  -Consider outpatient DEXA scan    (G89.11) Acute  pain due to injury  Comment: Significant pain with therapies related to the above fractures.  Particularly significant with therapies.  Plan:   -Continue scheduled Tylenol 1000 mg 3 times daily  -Oxycodone 5mg every 4 hours as needed  -Additionally, start oxycodone 2.5 mg 3 times daily as needed with therapy PT    (N30.00) Acute cystitis without hematuria  Comment: Symptoms suggestive of cystitis on admission.  Discharged with cefadroxil which will be completed 7/12/2023.    (T67.1XXD) Heat syncope, subsequent encounter  Comment: Likely also contributing to fall with family reporting he does not have air conditioning and recent hot and humid weather.  Recommendations for cooling particularly at time of discharge.    (Z86.73) History of CVA (cerebrovascular accident)  (R53.1) Right sided weakness  Comment: Contributing to follow with weakness and imbalance.  Does live independently though assistance from son who lives just a few doors down to the same apartment complex.  Plan:   -PT/OT    (E11.9) Type 2 diabetes mellitus without complication, without long-term current use of insulin (H)  Comment: Well-controlled with last A1c of 5.8%.  Concerns for hypoglycemia potentially with poor intake, though less likely with metformin.  However this was discontinued appropriately at discharge.  Plan:   -Discontinue sliding scale insulin    (I95.9) Hypotension, unspecified hypotension type  Comment: Concerns for hypotension during his admission and also potential contributing to falls.  Carvedilol was decreased to 6.25 mg twice daily.    (R91.8) Pulmonary nodules  Comment: Noted on imaging.  Likely low risk and follow-up may not be necessary.  Plan:   -Consider repeat scan per patient preferences and discussion with PCP    Orders:  [x] Decrease gabapentin to 200mg TID  [x] Add oxycodone 2.5mg TID PRN for severe pain with therapy  [x] Miralax 34mg qam  [x] Discontinue sliding scale    Electronically signed by:    Shay  Rosenstein, MD, MA  [facility]    This note was completed with the assistance of dictation software. Typos and word substitution-errors are expected and unintended.      52 MINUTES SPENT BY ME on the date of service doing chart review, history, exam, documentation & further activities per the note.

## 2023-07-10 NOTE — PROGRESS NOTES
Initial home visit completed today.  Clt recently moved back to [place] from [place] a few months ago.   Completed HRA/LTCC, OBRA, POC.  Needs assistance with some of his personal cares and PCA assessment also completed.  Clt would like to resume attending [employer].  CM to put in auth once clt is open to EW.  Also plan to order shower chair, hh shower and bathroom grab bars.  Safe disposal of meds discussed and copy of sites given to client.  CC reviewed and received expressed/verbal approval by member of the care plan, including choice of services and providers, choices related to sharing the plan or portions of the plan with others, appeals rights, and data privacy information.          POC completed and copies to be sent to client and PCP.  Revisit in six months

## 2023-07-17 ENCOUNTER — TRANSITIONAL CARE UNIT VISIT (OUTPATIENT)
Dept: GERIATRICS | Facility: CLINIC | Age: 78
End: 2023-07-17
Payer: COMMERCIAL

## 2023-07-17 VITALS
TEMPERATURE: 98 F | WEIGHT: 184 LBS | OXYGEN SATURATION: 96 % | BODY MASS INDEX: 24.39 KG/M2 | HEART RATE: 70 BPM | HEIGHT: 73 IN | RESPIRATION RATE: 16 BRPM | DIASTOLIC BLOOD PRESSURE: 80 MMHG | SYSTOLIC BLOOD PRESSURE: 120 MMHG

## 2023-07-17 DIAGNOSIS — I10 BENIGN ESSENTIAL HYPERTENSION: ICD-10-CM

## 2023-07-17 DIAGNOSIS — E11.9 TYPE 2 DIABETES MELLITUS WITHOUT COMPLICATION, WITHOUT LONG-TERM CURRENT USE OF INSULIN (H): ICD-10-CM

## 2023-07-17 DIAGNOSIS — G89.11 ACUTE PAIN DUE TO INJURY: ICD-10-CM

## 2023-07-17 DIAGNOSIS — R91.8 PULMONARY NODULES: ICD-10-CM

## 2023-07-17 DIAGNOSIS — R29.6 FREQUENT FALLS: ICD-10-CM

## 2023-07-17 DIAGNOSIS — S32.009D: Primary | ICD-10-CM

## 2023-07-17 DIAGNOSIS — N18.4 CKD (CHRONIC KIDNEY DISEASE) STAGE 4, GFR 15-29 ML/MIN (H): ICD-10-CM

## 2023-07-17 DIAGNOSIS — Z86.73 HISTORY OF CVA (CEREBROVASCULAR ACCIDENT): ICD-10-CM

## 2023-07-17 PROBLEM — R55 SYNCOPE: Status: ACTIVE | Noted: 2017-07-25

## 2023-07-17 PROBLEM — R91.1 LUNG NODULE: Status: ACTIVE | Noted: 2023-07-04

## 2023-07-17 PROBLEM — N32.89 BLADDER WALL THICKENING: Status: ACTIVE | Noted: 2023-07-04

## 2023-07-17 PROBLEM — N40.2 PROSTATE NODULE: Status: ACTIVE | Noted: 2023-07-04

## 2023-07-17 PROBLEM — N17.9 AKI (ACUTE KIDNEY INJURY) (H): Status: ACTIVE | Noted: 2023-07-04

## 2023-07-17 PROBLEM — E78.5 HYPERLIPIDEMIA: Status: ACTIVE | Noted: 2018-02-26

## 2023-07-17 PROCEDURE — 99309 SBSQ NF CARE MODERATE MDM 30: CPT

## 2023-07-17 RX ORDER — OXYCODONE HYDROCHLORIDE 5 MG/1
5 TABLET ORAL 3 TIMES DAILY
Qty: 12 TABLET | Refills: 0 | Status: SHIPPED | OUTPATIENT
Start: 2023-07-17 | End: 2023-07-27

## 2023-07-17 NOTE — LETTER
"    7/17/2023        RE: Juli Rodriguez  [address]  [facility] [address]        [facility] GERIATRICS    Chief Complaint   Patient presents with     RECHECK     HPI:  Juli Rodriguez is a 78 year old  (1945), who is being seen today for an episodic care visit at: [facility] ([facility]) [[id number]]. Today's concern is: pain, insomnia and decreased appetite.     Hospitalization    Pt seen at [facility] 7/4/23-7/723 following a mechanical fall of which he has no memory of. CT head/neck did not show acute changes; However, CT of chest/abdomen/pelvis showed nondisplaced avulsion fx of left transverse process at L1 and L2. He was also having symptoms of dysuria, urinary frequency, and creat elevated at 1.35. He was seen by [facility] spine, non-operative management with fitted splint from [facility]. Urine cultures revealed pan sensitive e.coli, and he was treated with cefadroxil 500 mg BID x 7 days. Creat returned to 1.19. His blood pressure was on low side of normal, and given fall, Coreg reduced from 12.5 mg to 6.25 mg. Of note, family mentioned pt does not use air conditioning. Overheating vasodilation could be contributing to fall.     Today    HPI information obtained from: facility chart records, facility staff, patient report and [facility] chart review. A [employer] phone  assisted with visit.     When I asked pt about pain, he still reports his pain levels are high. He does say he has been up with therapies using 2WW and wheelchair, and nursing does confirm they have seen him up and walking. Note on whiteboard does read \"Pt not able to sit up with therapies due to pain\". He reports pain is constant, without spasms, no radiculopathy. He reports he also has pain over his left side between his armpit and hip over his ribs, tender to the touch. He reports he is not sleeping because his pain is bad. He reports he is eating, but it is small amounts. He attributes " "his lack of appetite due to pain. My colleague saw pt on 7/10, and at that time, he was reporting large amount of pain. Thought he does know he has to call nursing staff for PRN oxycodone, MAR shows he is only taking 2x/day.     He denies dysuria, frequency or urgency. No loss of bowel or bladder control. Reports LBM today, and no concerns of constipation.    He reports he lives in a home by himself. When asked what type of support from family he will have at home, he reports \"I won't have help. Everyone is so busy\".    Allergies, and PMH/PSH reviewed in EPIC today.  REVIEW OF SYSTEMS:  10 point ROS of systems including Constitutional, Eyes, Respiratory, Cardiovascular, Gastroenterology, Genitourinary, Integumentary, Musculoskeletal, Psychiatric were all negative except for pertinent positives noted in my HPI.    Objective:   /80   Pulse 70   Temp 98  F (36.7  C)   Resp 16   Ht 1.854 m (6' 1\")   Wt 83.5 kg (184 lb)   SpO2 96%   BMI 24.28 kg/m    GENERAL APPEARANCE:  Alert, in no distress  ENT:  Mouth and posterior oropharynx normal, moist mucous membranes  EYES:  EOM, conjunctivae, lids, pupils and irises normal  RESP:  respiratory effort and palpation of chest normal, lungs clear to auscultation   CV:  Palpation and auscultation of heart done , regular rate and rhythm, no murmur, rub, or gallop, no edema  ABDOMEN:  normal bowel sounds, soft, nontender, no hepatosplenomegaly or other masses  SKIN: Unable to sit forward or roll due to pain  M/S:   Dorsi/plantar strong left, moderate right (baseline weakness after CVA)  NEURO:   Examination of sensation by touch normal  PSYCH:  oriented X 3, affect and mood normal      Most Recent 3 CBC's:Recent Labs   Lab Test 06/12/23  1511 05/22/23  1417 06/07/21  1356   WBC 5.7 4.8  --    HGB 14.7 13.1* 14.1   * 106* 102.9*    229  --      Most Recent 3 BMP's:Recent Labs   Lab Test 06/12/23  1511 05/22/23  1417 09/24/21  1204    136 136   POTASSIUM " 5.3 4.8 4.9   CHLORIDE 98 102 104   CO2 28 20* 27   BUN 26.4* 39.2* 17   CR 1.56* 2.54* 1.49*   ANIONGAP 10 14 5   WILIAM 9.7 9.1 9.2   *  124* 112* 123*       Assessment/Plan:  (G89.11) Acute pain due to injury  (S32.009D) Closed fracture of spinous process of lumbar vertebra, with routine healing, subsequent encounter  (primary encounter diagnosis)  Comment: Pt's sleep and mobility limited at this time due to pain. Though he reports he understands he has to call nursing staff for pain medication, it appears he is only receiving PRN oxycodone 5 mg two times a day, and he has not taken oxycodone 2.5 mg PRN. Will try scheduling short term to promote sleep and ambulation.   Plan:   - changed oxycodone 5 mg PO q4 hours PRN to oxyCODONE (ROXICODONE) 5 MG PO TID scheduled x 4 days, consider decreasing to 2.5 mg PO TID, then discontinue and with only PRN dosing available  -continue oxycodone 2.5 mg PO q 8 hrs PRN pain  -continue tylenol 1000 mg PO TID  -continue lidocaine 4% patch daily, 12 hours on, 12 hours off  -continue gabapentin 200 mg TID  -consider outpatient DEXA scan  -F/U with neurosurgery   -continue TLSO brace when ambulating  -Continue PT/OT     (R29.6) Frequent falls  (Z86.73) History of CVA (cerebrovascular accident)  Comment: Per chart review, pt has had 3 falls in the last 2 months. Pt reports he has residual right sided weakness after CVA, Ind at home without assistive device. Gabapentin reduced from 300 mg TID to 200 mg TID by my colleague on 7/10. Vasodilation from heat could also be a factor in falls as pt's family reported he does not use AC.  Plan:   -Continue Gabapentin 200 mg TID, do not wish to reduce further at this time because of pt's high pain ratings  -Continue PT/OT  -Continue Aspirin 81 mg PO daily  -Continue Atorvastatin 20 mg PO daily    (E11.9) Type 2 diabetes mellitus without complication, without long-term current use of insulin (H)  Comment:   Plan: A1C 5.8%  -Blood sugar  checks previously discontinued    (I10) Benign essential hypertension  Comment: Well controlled, see above  Plan:   -Continue Carvedilol 6.25 mg PO BID    (R91.8) Pulmonary nodules  Comment: Per chart review pt never smoker, 5 mm anterior segment left upper lobe indeterminate pulm nodule, considered low risk for primary lung cancer  Plan:   -Determine repeat imaging with PCP post TCU discharge    (N18.4) CKD (chronic kidney disease) stage 4, GFR 15-29 ml/min (H)  Comment: Initially elevated on admission at 1.35, back to 1.19 on 7/5/23. Suspect initial elevation related to dehydration from heat.  Plan: Consider BMP recheck should pt's appetite and PO intake worsen.     MED REC REQUIRED{  Post Medication Reconciliation Status: discharge medications reconciled and changed, per note/orders    Orders:  -Change Oxycodone 5 mg PO q4 hours PRN to Oxycodone 5 mg PO TID    Electronically signed by: ALVINA Mcdonough CNP             Sincerely,        ALVINA Mcdonough CNP

## 2023-07-17 NOTE — PROGRESS NOTES
"[facility] GERIATRICS    Chief Complaint   Patient presents with     RECHECK     HPI:  Juli Rodriguez is a 78 year old  (1945), who is being seen today for an episodic care visit at: [facility] ([facility]) [[id number]]. Today's concern is: pain, insomnia and decreased appetite.     Hospitalization    Pt seen at [facility] 7/4/23-7/723 following a mechanical fall of which he has no memory of. CT head/neck did not show acute changes; However, CT of chest/abdomen/pelvis showed nondisplaced avulsion fx of left transverse process at L1 and L2. He was also having symptoms of dysuria, urinary frequency, and creat elevated at 1.35. He was seen by [facility] spine, non-operative management with fitted splint from [facility]. Urine cultures revealed pan sensitive e.coli, and he was treated with cefadroxil 500 mg BID x 7 days. Creat returned to 1.19. His blood pressure was on low side of normal, and given fall, Coreg reduced from 12.5 mg to 6.25 mg. Of note, family mentioned pt does not use air conditioning. Overheating vasodilation could be contributing to fall.     Today    HPI information obtained from: facility chart records, facility staff, patient report and [facility] chart review. A [employer] phone  assisted with visit.     When I asked pt about pain, he still reports his pain levels are high. He does say he has been up with therapies using 2WW and wheelchair, and nursing does confirm they have seen him up and walking. Note on whiteboard does read \"Pt not able to sit up with therapies due to pain\". He reports pain is constant, without spasms, no radiculopathy. He reports he also has pain over his left side between his armpit and hip over his ribs, tender to the touch. He reports he is not sleeping because his pain is bad. He reports he is eating, but it is small amounts. He attributes his lack of appetite due to pain. My colleague saw pt on 7/10, and at that time, he was " "reporting large amount of pain. Thought he does know he has to call nursing staff for PRN oxycodone, MAR shows he is only taking 2x/day.     He denies dysuria, frequency or urgency. No loss of bowel or bladder control. Reports LBM today, and no concerns of constipation.    He reports he lives in a home by himself. When asked what type of support from family he will have at home, he reports \"I won't have help. Everyone is so busy\".    Allergies, and PMH/PSH reviewed in EPIC today.  REVIEW OF SYSTEMS:  10 point ROS of systems including Constitutional, Eyes, Respiratory, Cardiovascular, Gastroenterology, Genitourinary, Integumentary, Musculoskeletal, Psychiatric were all negative except for pertinent positives noted in my HPI.    Objective:   /80   Pulse 70   Temp 98  F (36.7  C)   Resp 16   Ht 1.854 m (6' 1\")   Wt 83.5 kg (184 lb)   SpO2 96%   BMI 24.28 kg/m    GENERAL APPEARANCE:  Alert, in no distress  ENT:  Mouth and posterior oropharynx normal, moist mucous membranes  EYES:  EOM, conjunctivae, lids, pupils and irises normal  RESP:  respiratory effort and palpation of chest normal, lungs clear to auscultation   CV:  Palpation and auscultation of heart done , regular rate and rhythm, no murmur, rub, or gallop, no edema  ABDOMEN:  normal bowel sounds, soft, nontender, no hepatosplenomegaly or other masses  SKIN: Unable to sit forward or roll due to pain  M/S:   Dorsi/plantar strong left, moderate right (baseline weakness after CVA)  NEURO:   Examination of sensation by touch normal  PSYCH:  oriented X 3, affect and mood normal      Most Recent 3 CBC's:Recent Labs   Lab Test 06/12/23  1511 05/22/23  1417 06/07/21  1356   WBC 5.7 4.8  --    HGB 14.7 13.1* 14.1   * 106* 102.9*    229  --      Most Recent 3 BMP's:Recent Labs   Lab Test 06/12/23  1511 05/22/23  1417 09/24/21  1204    136 136   POTASSIUM 5.3 4.8 4.9   CHLORIDE 98 102 104   CO2 28 20* 27   BUN 26.4* 39.2* 17   CR 1.56* 2.54* " 1.49*   ANIONGAP 10 14 5   WILIAM 9.7 9.1 9.2   *  124* 112* 123*       Assessment/Plan:  (G89.11) Acute pain due to injury  (S32.009D) Closed fracture of spinous process of lumbar vertebra, with routine healing, subsequent encounter  (primary encounter diagnosis)  Comment: Pt's sleep and mobility limited at this time due to pain. Though he reports he understands he has to call nursing staff for pain medication, it appears he is only receiving PRN oxycodone 5 mg two times a day, and he has not taken oxycodone 2.5 mg PRN. Will try scheduling short term to promote sleep and ambulation.   Plan:   - changed oxycodone 5 mg PO q4 hours PRN to oxyCODONE (ROXICODONE) 5 MG PO TID scheduled x 4 days, consider decreasing to 2.5 mg PO TID, then discontinue and with only PRN dosing available  -continue oxycodone 2.5 mg PO q 8 hrs PRN pain  -continue tylenol 1000 mg PO TID  -continue lidocaine 4% patch daily, 12 hours on, 12 hours off  -continue gabapentin 200 mg TID  -consider outpatient DEXA scan  -F/U with neurosurgery   -continue TLSO brace when ambulating  -Continue PT/OT     (R29.6) Frequent falls  (Z86.73) History of CVA (cerebrovascular accident)  Comment: Per chart review, pt has had 3 falls in the last 2 months. Pt reports he has residual right sided weakness after CVA, Ind at home without assistive device. Gabapentin reduced from 300 mg TID to 200 mg TID by my colleague on 7/10. Vasodilation from heat could also be a factor in falls as pt's family reported he does not use AC.  Plan:   -Continue Gabapentin 200 mg TID, do not wish to reduce further at this time because of pt's high pain ratings  -Continue PT/OT  -Continue Aspirin 81 mg PO daily  -Continue Atorvastatin 20 mg PO daily    (E11.9) Type 2 diabetes mellitus without complication, without long-term current use of insulin (H)  Comment:   Plan: A1C 5.8%  -Blood sugar checks previously discontinued    (I10) Benign essential hypertension  Comment: Well  controlled, see above  Plan:   -Continue Carvedilol 6.25 mg PO BID    (R91.8) Pulmonary nodules  Comment: Per chart review pt never smoker, 5 mm anterior segment left upper lobe indeterminate pulm nodule, considered low risk for primary lung cancer  Plan:   -Determine repeat imaging with PCP post TCU discharge    (N18.4) CKD (chronic kidney disease) stage 4, GFR 15-29 ml/min (H)  Comment: Initially elevated on admission at 1.35, back to 1.19 on 7/5/23. Suspect initial elevation related to dehydration from heat.  Plan: Consider BMP recheck should pt's appetite and PO intake worsen.     MED REC REQUIRED{  Post Medication Reconciliation Status: discharge medications reconciled and changed, per note/orders    Orders:  -Change Oxycodone 5 mg PO q4 hours PRN to Oxycodone 5 mg PO TID    Electronically signed by: ALVINA Mcdonough CNP

## 2023-07-19 ENCOUNTER — PRE VISIT (OUTPATIENT)
Dept: NEPHROLOGY | Facility: CLINIC | Age: 78
End: 2023-07-19

## 2023-07-20 ENCOUNTER — LAB REQUISITION (OUTPATIENT)
Dept: LAB | Facility: CLINIC | Age: 78
End: 2023-07-20
Payer: COMMERCIAL

## 2023-07-20 ENCOUNTER — TRANSITIONAL CARE UNIT VISIT (OUTPATIENT)
Dept: GERIATRICS | Facility: CLINIC | Age: 78
End: 2023-07-20
Payer: COMMERCIAL

## 2023-07-20 VITALS
SYSTOLIC BLOOD PRESSURE: 115 MMHG | TEMPERATURE: 97.9 F | WEIGHT: 184.6 LBS | OXYGEN SATURATION: 95 % | BODY MASS INDEX: 24.46 KG/M2 | HEIGHT: 73 IN | DIASTOLIC BLOOD PRESSURE: 77 MMHG | RESPIRATION RATE: 18 BRPM | HEART RATE: 78 BPM

## 2023-07-20 DIAGNOSIS — R29.6 FREQUENT FALLS: ICD-10-CM

## 2023-07-20 DIAGNOSIS — E83.9 CHRONIC KIDNEY DISEASE-MINERAL AND BONE DISORDER: ICD-10-CM

## 2023-07-20 DIAGNOSIS — S32.009D: Primary | ICD-10-CM

## 2023-07-20 DIAGNOSIS — S32.000A WEDGE COMPRESSION FRACTURE OF UNSPECIFIED LUMBAR VERTEBRA, INITIAL ENCOUNTER FOR CLOSED FRACTURE (H): ICD-10-CM

## 2023-07-20 DIAGNOSIS — M89.9 CHRONIC KIDNEY DISEASE-MINERAL AND BONE DISORDER: ICD-10-CM

## 2023-07-20 DIAGNOSIS — Z86.73 HISTORY OF CVA (CEREBROVASCULAR ACCIDENT): ICD-10-CM

## 2023-07-20 DIAGNOSIS — E11.9 TYPE 2 DIABETES MELLITUS WITHOUT COMPLICATION, WITHOUT LONG-TERM CURRENT USE OF INSULIN (H): ICD-10-CM

## 2023-07-20 DIAGNOSIS — R53.1 RIGHT SIDED WEAKNESS: ICD-10-CM

## 2023-07-20 DIAGNOSIS — N18.9 CHRONIC KIDNEY DISEASE-MINERAL AND BONE DISORDER: ICD-10-CM

## 2023-07-20 DIAGNOSIS — T67.1XXD HEAT SYNCOPE, SUBSEQUENT ENCOUNTER: ICD-10-CM

## 2023-07-20 PROCEDURE — 99309 SBSQ NF CARE MODERATE MDM 30: CPT | Performed by: FAMILY MEDICINE

## 2023-07-20 NOTE — LETTER
"    2023        RE: Juli Rodriguez  [address]  [facility] [address]        [facility] GERIATRICS    Chief Complaint   Patient presents with     RECHECK     HPI:  Juli Rodriguez is a 78 year old M,  (1945), with pmhx including falls, history of CVA with residual right sided weakness, chronic pain syndrome, T2DM (last A1c 5.8%),  CKD3 (seen by nephrology 23) who is being seen today for an episodic care visit at: [facility] ([facility]) [[id number]].     A Pakistani  via phone assisted with this visit    Seen today for general follow-up.  He had previously been seen by my colleague and was continued to have significant pain even after I had started as needed oxycodone.  Oxycodone was changed to scheduled as the concern was with the language barrier he may not have known to ask for it.    He states today he is still having pain but it seems to be improved.  Definitely is better with oxycodone but he can notice when it wears off.  Does seem this is helping with therapies and he has been able to participate more.  Says therapy is going well.  Continues to use a walker to ambulate and distance is increasing.    His appetite is doing well.  Has no difficulty with eating.  No abdominal pain.  No nausea or vomiting.  No dysphagia symptoms.    No further falls since being at the facility.    Most recent care conference reviewed as follows    \" 2023 12:16 Psychosocial Note   Note Text: Care conference held today with patient, son present over the phone. Therapy, S.S. and nursing were present. Occupational therapy reported grooming is independent, upper body dressing is independent, lower body dressing is maximum assist. Toileting is to be evaluated. Patient is able to eat independently. Patient initially scored 2/28 on a cognition test done with a  showing normal cognition. Physical therapy reports bed mobility is is moderate assist. Transfers are minimum assist. " "Patient is walking 125ft with 2 wheel walker and contact guard assist to stand by assist. Patients balance was tested with the Timed Up and Go: 55 seconds. Nurse manager reported vital signs and went over any nursing concerns. Patient is taking Tylenol, Gabapentin, oxycodone, Lidocaine Patch for pain as needed. Patient is on a regular diet and eating 50-75% of meals. No last cover date at this time. PT and OT will continue working on Toileting, lower body dressing, bathing, bed mobility and bathing. S.S. offered care plan and care conference notes. S.S. will order a 2 wheel walker for discharge. S.S. will continue to monitor and assist with dc planning.\"    Objective:   /77   Pulse 78   Temp 97.9  F (36.6  C)   Resp 18   Ht 1.854 m (6' 1\")   Wt 83.7 kg (184 lb 9.6 oz)   SpO2 95%   BMI 24.36 kg/m      GENERAL APPEARANCE: Laying in bed comfortably, NAD  HENT:  NCAT, not [place], good dentition  PULM  Normal WOB on RA, lungs CTAB, no wheezes or crackles, good air movement  CV:  RRR, S1/S2 normal, no murmurs; no LE edema  ABDOMEN: Abdomen soft, not tender, not distended, BS normal and active throughout   M/S:  Limited mobility of right extremities  NEURO: Alert, answering questions appropriately, normal thought processes; CN II-XII grossly intact. RLE 3/5 throughout (baseline). Strength testing LLE limited by pain  PSYCH: Mood normal with congruent affect, insight/judgment adequate    Recent labs in EPIC reviewed by me today.     Assessment/Plan:  (S32.673D) Closed fracture of spinous process of lumbar vertebra, with routine healing, subsequent encounter  (primary encounter diagnosis)  Comment: Primary reason for admission to the hospital.  Does continue to have significant back pain with activities, but this has improved with scheduled oxycodone.  No changes at this time.  Continues working with therapies.  Discharge date not yet established.  Plan:   -PT/OT  -Consider outpatient DEXA scan  -TLSO as " recommended    (R29.6) Frequent falls  Comment: Likely multifactorial related to right-sided weakness from prior CVA, medication effects, heat illness as he does not have AC in his apartment.  Plan:   -Therapies as above  -Previously decrease gabapentin to 100 mg 3 times daily.  Continue further weaning pending course    (Z86.73) History of CVA (cerebrovascular accident)  (R53.1) Right sided weakness  Comment: Likely contributing to his fall with weakness and imbalance.  Had been living independently in his apartment though does receive assistance from his son who is just a few doors down the same complex.    (T67.1XXD) Heat syncope, subsequent encounter  Comment: Concern for heat illness contributing to his fall as well as he does not have AC in his apartment.  May require medical recommendation for AC for his complex.    (E11.9) Type 2 diabetes mellitus without complication, without long-term current use of insulin (H)  Comment: Per history, though A1c in the hospital 5.6%.  Diabetic medications all discontinued.  Blood sugars have been within normal range.  No indication to resume.    (N18.9,  E83.9,  M89.9) Chronic kidney disease-mineral and bone disorder  Comment: Renal function relatively normal for his age.  However, consider mineral bone disorder related to changes in renal function contributing to osteoporosis.  Plan:   -Evaluate further today with vitamin D level, PTH, TSH, and CMP    MED REC REQUIRED  Post Medication Reconciliation Status: medication reconcilation previously completed during another office visit - no changes today      Orders:  [x] CMP, PTH, TSH, Vit D    Electronically signed by:     Benjamin Rosenstein, MD, MA  [facility]    This note was completed with the assistance of dictation software. Typos and word substitution-errors are expected and unintended.      Medical Decision Making     MDM: 2+ chronic conditions stable with medication mgmt, multiple tests  ordered.        Sincerely,        Benjamin Rosenstein, MD

## 2023-07-20 NOTE — PROGRESS NOTES
"[facility] GERIATRICS    Chief Complaint   Patient presents with     RECHECK     HPI:  Juli Rodriguez is a 78 year old M,  (1945), with pmhx including falls, history of CVA with residual right sided weakness, chronic pain syndrome, T2DM (last A1c 5.8%),  CKD3 (seen by nephrology 23) who is being seen today for an episodic care visit at: [facility] ([facility]) [[id number]].     A Trinidadian  via phone assisted with this visit    Seen today for general follow-up.  He had previously been seen by my colleague and was continued to have significant pain even after I had started as needed oxycodone.  Oxycodone was changed to scheduled as the concern was with the language barrier he may not have known to ask for it.    He states today he is still having pain but it seems to be improved.  Definitely is better with oxycodone but he can notice when it wears off.  Does seem this is helping with therapies and he has been able to participate more.  Says therapy is going well.  Continues to use a walker to ambulate and distance is increasing.    His appetite is doing well.  Has no difficulty with eating.  No abdominal pain.  No nausea or vomiting.  No dysphagia symptoms.    No further falls since being at the facility.    Most recent care conference reviewed as follows    \" 2023 12:16 Psychosocial Note   Note Text: Care conference held today with patient, son present over the phone. Therapy, S.S. and nursing were present. Occupational therapy reported grooming is independent, upper body dressing is independent, lower body dressing is maximum assist. Toileting is to be evaluated. Patient is able to eat independently. Patient initially scored 2/28 on a cognition test done with a  showing normal cognition. Physical therapy reports bed mobility is is moderate assist. Transfers are minimum assist. Patient is walking 125ft with 2 wheel walker and contact guard assist to stand by assist. Patients " "balance was tested with the Timed Up and Go: 55 seconds. Nurse manager reported vital signs and went over any nursing concerns. Patient is taking Tylenol, Gabapentin, oxycodone, Lidocaine Patch for pain as needed. Patient is on a regular diet and eating 50-75% of meals. No last cover date at this time. PT and OT will continue working on Toileting, lower body dressing, bathing, bed mobility and bathing. S.S. offered care plan and care conference notes. S.S. will order a 2 wheel walker for discharge. S.S. will continue to monitor and assist with dc planning.\"    Objective:   /77   Pulse 78   Temp 97.9  F (36.6  C)   Resp 18   Ht 1.854 m (6' 1\")   Wt 83.7 kg (184 lb 9.6 oz)   SpO2 95%   BMI 24.36 kg/m      GENERAL APPEARANCE: Laying in bed comfortably, NAD  HENT:  NCAT, not [place], good dentition  PULM  Normal WOB on RA, lungs CTAB, no wheezes or crackles, good air movement  CV:  RRR, S1/S2 normal, no murmurs; no LE edema  ABDOMEN: Abdomen soft, not tender, not distended, BS normal and active throughout   M/S:  Limited mobility of right extremities  NEURO: Alert, answering questions appropriately, normal thought processes; CN II-XII grossly intact. RLE 3/5 throughout (baseline). Strength testing LLE limited by pain  PSYCH: Mood normal with congruent affect, insight/judgment adequate    Recent labs in EPIC reviewed by me today.     Assessment/Plan:  (S32.009D) Closed fracture of spinous process of lumbar vertebra, with routine healing, subsequent encounter  (primary encounter diagnosis)  Comment: Primary reason for admission to the hospital.  Does continue to have significant back pain with activities, but this has improved with scheduled oxycodone.  No changes at this time.  Continues working with therapies.  Discharge date not yet established.  Plan:   -PT/OT  -Consider outpatient DEXA scan  -TLSO as recommended    (R29.6) Frequent falls  Comment: Likely multifactorial related to right-sided weakness from " prior CVA, medication effects, heat illness as he does not have AC in his apartment.  Plan:   -Therapies as above  -Previously decrease gabapentin to 100 mg 3 times daily.  Continue further weaning pending course    (Z86.73) History of CVA (cerebrovascular accident)  (R53.1) Right sided weakness  Comment: Likely contributing to his fall with weakness and imbalance.  Had been living independently in his apartment though does receive assistance from his son who is just a few doors down the same complex.    (T67.1XXD) Heat syncope, subsequent encounter  Comment: Concern for heat illness contributing to his fall as well as he does not have AC in his apartment.  May require medical recommendation for AC for his complex.    (E11.9) Type 2 diabetes mellitus without complication, without long-term current use of insulin (H)  Comment: Per history, though A1c in the hospital 5.6%.  Diabetic medications all discontinued.  Blood sugars have been within normal range.  No indication to resume.    (N18.9,  E83.9,  M89.9) Chronic kidney disease-mineral and bone disorder  Comment: Renal function relatively normal for his age.  However, consider mineral bone disorder related to changes in renal function contributing to osteoporosis.  Plan:   -Evaluate further today with vitamin D level, PTH, TSH, and CMP    MED REC REQUIRED  Post Medication Reconciliation Status: medication reconcilation previously completed during another office visit - no changes today      Orders:  [x] CMP, PTH, TSH, Vit D    Electronically signed by:     Benjamin Rosenstein, MD, MA  [facility]    This note was completed with the assistance of dictation software. Typos and word substitution-errors are expected and unintended.      Medical Decision Making     MDM: 2+ chronic conditions stable with medication mgmt, multiple tests ordered.      Face to Face and Medical Necessity Statement for DME Provider visit    Demographic Information on  Juli Rodriguez:  Gender: male  : 1945  [address]   [facility] [address]  [phone] (home)     Medical Record: [id number]  Social Security Number: [id number]  Primary Care Provider: Ernie Arreola  Insurance: Payor: [facility] / Plan: [facility] PMAP / Product Type: HMO /     HPI:   Juli Rodriguez is a 78 year old  (1945), who is being seen today for a face to face provider visit at [facility]; medical necessity statement for DME included. This patient requires the following:  DME Ordered and Medical Necessity Statement     1.  Walker with wheels    2.This patient has a mobility limitation that significantly impairs their ability to participate in one or more mobility related activities of daily living in the home. They have a limitation that prevents the patient from accomplishing the MRADL entirely- please describe severe back pain due to fracture    OR        3. Places the patient at reasonably determined heightened risk of morbidity or mortality secondary to attempts to perform the MRADL- please describe  As above    OR    4. Prevents the patient from completing the MRADL within a reasonable time frame- Ye    5. The patient is able to safely use the walker Yes  AND    6.  The functional mobility deficit can be sufficiently resolved with use of a walker  Yes        Pt needing above DME with expected length of need of 12   month  due to medical necessity associated with following diagnosis:     Closed fracture of spinous process of lumbar vertebra, with routine healing, subsequent encounter  Frequent falls  History of CVA (cerebrovascular accident)  Right sided weakness  Heat syncope, subsequent encounter    Orders:  1. Facility staff/TC to contact DME company to get their order form for provider to fill out    ELECTRONICALLY SIGNED BY OMER CERTIFIED PROVIDER:  Benjamin Rosenstein, MD   NPI: [id number]  [facility]  [address] [place] [address], [address]  [place]  [address]

## 2023-07-21 LAB
ALBUMIN SERPL BCG-MCNC: 4.2 G/DL (ref 3.5–5.2)
ALP SERPL-CCNC: 113 U/L (ref 40–129)
ALT SERPL W P-5'-P-CCNC: 11 U/L (ref 0–70)
ANION GAP SERPL CALCULATED.3IONS-SCNC: 12 MMOL/L (ref 7–15)
AST SERPL W P-5'-P-CCNC: 25 U/L (ref 0–45)
BILIRUB SERPL-MCNC: 0.4 MG/DL
BUN SERPL-MCNC: 23 MG/DL (ref 8–23)
CALCIUM SERPL-MCNC: 9.5 MG/DL (ref 8.8–10.2)
CHLORIDE SERPL-SCNC: 98 MMOL/L (ref 98–107)
CREAT SERPL-MCNC: 1.31 MG/DL (ref 0.67–1.17)
DEPRECATED HCO3 PLAS-SCNC: 28 MMOL/L (ref 22–29)
GFR SERPL CREATININE-BSD FRML MDRD: 56 ML/MIN/1.73M2
GLUCOSE SERPL-MCNC: 104 MG/DL (ref 70–99)
POTASSIUM SERPL-SCNC: 4.6 MMOL/L (ref 3.4–5.3)
PROT SERPL-MCNC: 7.6 G/DL (ref 6.4–8.3)
PTH-INTACT SERPL-MCNC: 54 PG/ML (ref 15–65)
SODIUM SERPL-SCNC: 138 MMOL/L (ref 136–145)
TSH SERPL DL<=0.005 MIU/L-ACNC: 3.39 UIU/ML (ref 0.3–4.2)

## 2023-07-21 PROCEDURE — P9604 ONE-WAY ALLOW PRORATED TRIP: HCPCS | Performed by: FAMILY MEDICINE

## 2023-07-21 PROCEDURE — 80053 COMPREHEN METABOLIC PANEL: CPT | Performed by: FAMILY MEDICINE

## 2023-07-21 PROCEDURE — 82306 VITAMIN D 25 HYDROXY: CPT | Performed by: FAMILY MEDICINE

## 2023-07-21 PROCEDURE — 84443 ASSAY THYROID STIM HORMONE: CPT | Performed by: FAMILY MEDICINE

## 2023-07-21 PROCEDURE — 36415 COLL VENOUS BLD VENIPUNCTURE: CPT | Performed by: FAMILY MEDICINE

## 2023-07-21 PROCEDURE — 83970 ASSAY OF PARATHORMONE: CPT | Performed by: FAMILY MEDICINE

## 2023-07-24 ENCOUNTER — TELEPHONE (OUTPATIENT)
Dept: GERIATRICS | Facility: CLINIC | Age: 78
End: 2023-07-24
Payer: COMMERCIAL

## 2023-07-24 LAB — DEPRECATED CALCIDIOL+CALCIFEROL SERPL-MC: 16 UG/L (ref 20–75)

## 2023-07-24 RX ORDER — ERGOCALCIFEROL 1.25 MG/1
50000 CAPSULE, LIQUID FILLED ORAL WEEKLY
COMMUNITY
Start: 2023-07-24

## 2023-07-25 ENCOUNTER — LAB REQUISITION (OUTPATIENT)
Dept: LAB | Facility: CLINIC | Age: 78
End: 2023-07-25
Payer: COMMERCIAL

## 2023-07-25 DIAGNOSIS — E55.0 RICKETS, ACTIVE: ICD-10-CM

## 2023-07-26 LAB — DEPRECATED CALCIDIOL+CALCIFEROL SERPL-MC: 16 UG/L (ref 20–75)

## 2023-07-26 PROCEDURE — 82306 VITAMIN D 25 HYDROXY: CPT | Performed by: FAMILY MEDICINE

## 2023-07-26 PROCEDURE — 36415 COLL VENOUS BLD VENIPUNCTURE: CPT | Performed by: FAMILY MEDICINE

## 2023-07-26 PROCEDURE — P9604 ONE-WAY ALLOW PRORATED TRIP: HCPCS | Performed by: FAMILY MEDICINE

## 2023-07-27 DIAGNOSIS — S32.009D: Primary | ICD-10-CM

## 2023-07-27 RX ORDER — OXYCODONE HYDROCHLORIDE 5 MG/1
5 TABLET ORAL EVERY 8 HOURS PRN
COMMUNITY
End: 2023-07-27

## 2023-07-27 RX ORDER — OXYCODONE HYDROCHLORIDE 5 MG/1
5 TABLET ORAL EVERY 8 HOURS PRN
Qty: 30 TABLET | Refills: 0 | Status: SHIPPED | OUTPATIENT
Start: 2023-07-27 | End: 2024-03-19

## 2023-07-31 ENCOUNTER — DISCHARGE SUMMARY NURSING HOME (OUTPATIENT)
Dept: GERIATRICS | Facility: CLINIC | Age: 78
End: 2023-07-31
Payer: COMMERCIAL

## 2023-07-31 VITALS
SYSTOLIC BLOOD PRESSURE: 119 MMHG | DIASTOLIC BLOOD PRESSURE: 78 MMHG | OXYGEN SATURATION: 95 % | HEIGHT: 73 IN | WEIGHT: 184.6 LBS | TEMPERATURE: 97.6 F | BODY MASS INDEX: 24.46 KG/M2 | RESPIRATION RATE: 19 BRPM | HEART RATE: 84 BPM

## 2023-07-31 DIAGNOSIS — R53.1 RIGHT SIDED WEAKNESS: ICD-10-CM

## 2023-07-31 DIAGNOSIS — R91.8 PULMONARY NODULES: ICD-10-CM

## 2023-07-31 DIAGNOSIS — T67.1XXD HEAT SYNCOPE, SUBSEQUENT ENCOUNTER: ICD-10-CM

## 2023-07-31 DIAGNOSIS — R29.6 FREQUENT FALLS: ICD-10-CM
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
## 2023-07-31 DIAGNOSIS — G89.11 ACUTE PAIN DUE TO INJURY: ICD-10-CM

## 2023-07-31 DIAGNOSIS — I10 BENIGN ESSENTIAL HYPERTENSION: ICD-10-CM

## 2023-07-31 DIAGNOSIS — E83.9 CHRONIC KIDNEY DISEASE-MINERAL AND BONE DISORDER: ICD-10-CM

## 2023-07-31 DIAGNOSIS — N18.4 CKD (CHRONIC KIDNEY DISEASE) STAGE 4, GFR 15-29 ML/MIN (H): ICD-10-CM

## 2023-07-31 DIAGNOSIS — M89.9 CHRONIC KIDNEY DISEASE-MINERAL AND BONE DISORDER: ICD-10-CM

## 2023-07-31 DIAGNOSIS — E11.9 TYPE 2 DIABETES MELLITUS WITHOUT COMPLICATION, WITHOUT LONG-TERM CURRENT USE OF INSULIN (H): ICD-10-CM

## 2023-07-31 DIAGNOSIS — N18.9 CHRONIC KIDNEY DISEASE-MINERAL AND BONE DISORDER: ICD-10-CM

## 2023-07-31 DIAGNOSIS — I95.9 HYPOTENSION, UNSPECIFIED HYPOTENSION TYPE: ICD-10-CM

## 2023-07-31 DIAGNOSIS — Z86.73 HISTORY OF CVA (CEREBROVASCULAR ACCIDENT): ICD-10-CM

## 2023-07-31 DIAGNOSIS — S32.009D: Primary | ICD-10-CM

## 2023-07-31 PROCEDURE — 99316 NF DSCHRG MGMT 30 MIN+: CPT | Performed by: FAMILY MEDICINE

## 2023-07-31 RX ORDER — GABAPENTIN 100 MG/1
200 CAPSULE ORAL 3 TIMES DAILY
COMMUNITY
Start: 2023-07-31 | End: 2023-10-09

## 2023-07-31 NOTE — LETTER
"    2023        RE: Juli Rodriguez  [address]  [facility] [address]        [facility] GERIATRICS DISCHARGE SUMMARY  PATIENT'S NAME: Juli Rodriguez  YOB: 1945  MEDICAL RECORD NUMBER:  [id number]  Place of Service where encounter took place:  [facility] (SNF) [[id number]]    PRIMARY CARE PROVIDER AND CLINIC RESPONSIBLE AFTER TRANSFER:   Ernie Arreola MD, [address] [facility] [address] / [facility] [address]     Transferring providers: Benjamin Rosenstein, MD, ALVINA Bright CNP  Recent Hospitalization/ED:  Hospital  [facility]  stay 23 to 23.  Date of SNF Admission: 2023  Date of SNF (anticipated) Discharge:  2023  Discharged to: with family - lives near his son  Cognitive Scores: Short blessed:  (normal)  Physical Function: Ambulating 125-150 ft with 2ww, TUG 55 sec  DME: Walker    CODE STATUS/ADVANCE DIRECTIVES DISCUSSION: Full Code  ALLERGIES: Beef-derived products and Eggs [chicken-derived products (egg)]    NURSING FACILITY COURSE   Medication Changes/Rationale:   High dose Vitamin D  Prn oxycodone  Increased bowel regimen    Summary of nursing facility stay:     Narrative Summary    Per my facility admission note:     \"Juli Rodriguez  is a 78 year old man,  (1945),  Living independently, with pmhx including falls, history of CVA with residual right sided weakness, chronic pain syndrome, T2DM (last A1c 5.8%),  CKD3 (seen by nephrology 23),  admitted to the above facility from  [facility] . Hospital stay 23 through 23.      HPI:    A [employer] phone  assisted with this visit  Son arrived later in visit and also assisted with interpretation     Hospital Course  He was brought to the emergency department via EMS after a fall. It was noted he is independent in his apartment though has some persistent mild right-sided weakness from prior CVA.  Prior to his fall he reported " "having increased urinary frequency and dysuria.  He fell onto the floor when he was leaving the bathroom though does not recall the events.  He had severe low back pain and was brought to the ED for further evaluation.     In the ED, overall normal BMP, with stable creatinine of 1.35.  Notably initial potassium was 8.9 but this was erroneous on the point-of-care testing.  Follow-up potassiums were normal.  CBC without leukocytosis or anemia, though mild macrocytosis with MCV of 103, B12 was in low normal range of 339.     UA did show moderately elevated SpG of 1.025, positive nitrates, leuk esterase, no glucose or ketones.  Many bacteria.  In light of symptoms, felt to represent acute cystitis.     CT imaging of the head and neck without acute findings.  CT of the spine showed transverse process fractures of L1 and L2.  Was evaluated by orthopedics with recommendations for nonoperative repair.  Recommend he wear splint as able for pain control.  PT and OT recommended TCU following hospitalization.  He was monitored on telemetry as well with no evidence of arrhythmia.  Cystitis was treated with cefadroxil for pan-sensitive E. coli which will be completed 7/12/2023.  He was noted to have evidence of hypotension and carvedilol was decreased to 6.25 mg twice daily.      Additionally, per chart: \"Family noted that he never uses air conditioning in the summer his apartment can become extremely hot. When they found him he was completely soaked in sweat. Overheating with vasodilation may also have contributed to his fall as well.\"     Hemoglobin A1c was found to be low at 5.6% and so metformin was discontinued.\"     At the facility, he progressed slowly.  Significant difficulties with pain control though this is improved with scheduled oxycodone which was able to be decreased to as needed oxycodone.  Did decrease gabapentin to 200 mg 3 times a day from prior dose of 300 mg to decrease overall fall risk.  Did not decrease " further but this could be evaluated with his primary.  Evaluation for osteoporosis given fractures did show significantly low vitamin D level.  My colleague started him on high-dose vitamin D weekly.  Given improved pain control and function, he was able to discharge home to his apartment with care of his family.    Summary by problem  (S32.009D) Closed fracture of spinous process of lumbar vertebra, with routine healing, subsequent encounter  (primary encounter diagnosis)  Comment: Sustained left transverse process fractures at L1/L2.  Likely related to his most recent fall.  Contributing to significant pain as above.  Was seen by neurosurgery during admission with TLSO bracing placed.  Recommend to continue when up and ambulating.  Will follow-up with neurosurgery as planned.  Plan:   -Continue TLSO bracing  -Continue high-dose vitamin D replacement  -Consider outpatient DEXA scan    (W19.XXXD) Falls, subsequent encounter  (R29.6) Frequent falls  Comment: Primary reason for admission was related to fall with findings of spinous process fractures as above.  Falls likely multifactorial related to history of CVA, heat injury, some concern for cystitis.  Also some concern for orthostatic hypotension for which his carvedilol was decreased.  Additionally decrease gabapentin to 200 mg 3 times daily and consider further decrease outpatient.  Plan:   -We will continue with outpatient PT and OT    (Z86.73) History of CVA (cerebrovascular accident)  (R53.1) Right sided weakness  Comment: Likely contributing to fall with weakness and imbalance.  He lives in his own apartment though only a few doors down from his son in the same complex.  Ongoing therapies at home as above.    (T67.1XXD) Heat syncope, subsequent encounter  Comment: Family noted that he lives in an apartment without air conditioning.  Likely contributed to his fall with recent hot humid weather.  Discussed recommendation for cooling and this may be medically  indicated if necessary    (I95.9) Hypotension, unspecified hypotension type  (I10) Benign essential hypertension  Comment: Concerns for hypotension during his hospital admission as another factor potentially contributing to falls.  Carvedilol was decreased to 6.25 mg twice a day.  Blood pressures here remained reasonable, 130s over 80s.  Plan:   Follow-up with PCP    (N18.9,  E83.9,  M89.9) Chronic kidney disease-mineral and bone disorder  (N18.4) CKD (chronic kidney disease) stage 4, GFR 15-29 ml/min (H)  Comment: History of renal dysfunction greater than would expect for age.  Likely contributing to CKD MBD with finding of low vitamin D and likely secondary hyperparathyroidism.  Started on vitamin D here.  Plan:   -Consideration for DEXA scan as noted    (G89.11) Acute pain due to injury  Comment: Was having significant back pain likely related to his fractures.  Difficult to control though improved with scheduled doses of oxycodone which we were able to decrease to as needed.  Ongoing pain management with primary care follow-up.    (E11.9) Type 2 diabetes mellitus without complication, without long-term current use of insulin (H)  Comment: Well-controlled with last A1c of 5.8%.  Some concern for hypoglycemia contributing to falls with decreased intake, glucoses less likely with metformin.  Though this was discontinued at time of hospital discharge recently.    (R91.8) Pulmonary nodules  Comment: Noted on imaging as part of overall evaluation.  Likely low risk and follow-up may not be necessary per report.  Plan:   -Review with PCP    Discharge Medications:  MED REC REQUIRED  Post Medication Reconciliation Status: medication reconcilation previously completed during another office visit     Current Outpatient Medications   Medication Sig Dispense Refill     gabapentin (NEURONTIN) 100 MG capsule Take 2 capsules (200 mg) by mouth 3 times daily       acetaminophen (TYLENOL) 500 MG tablet Take 1,000 mg by mouth 3  times daily       aspirin 81 MG EC tablet Take 1 tablet (81 mg) by mouth daily for 300 days 60 tablet 4     atorvastatin (LIPITOR) 20 MG tablet Take 20 mg by mouth daily       blood glucose (NO BRAND SPECIFIED) lancets standard Use to test blood sugar 2 times daily or as directed. 100 each 3     blood glucose (NO BRAND SPECIFIED) test strip Use to test blood sugar 2 times daily or as directed. 100 strip 3     blood glucose monitoring (NO BRAND SPECIFIED) meter device kit Use to test blood sugar 2 times daily or as directed. 1 kit 0     carvedilol (COREG) 12.5 MG tablet Take 6.25 mg by mouth 2 times daily (with meals)       DULoxetine (CYMBALTA) 20 MG capsule Take 20 mg by mouth daily       Folic Acid-Vit B6-Vit B12 (B COMPLEX-FOLIC ACID PO) Take 1 capsule by mouth daily       Lidocaine (LIDOCARE) 4 % Patch Place 1 patch onto the skin every 24 hours To prevent lidocaine toxicity, patient should be patch free for 12 hrs daily.       loratadine (CLARITIN) 10 MG tablet Take 1 tablet (10 mg) by mouth daily 90 tablet 3     melatonin 3 MG tablet Take 3 mg by mouth nightly as needed for sleep       order for DME Diabetic shoes One Pair 1 each 0     order for DME Equipment being ordered: Diabetic Shoes 1 each 1     oxyCODONE (ROXICODONE) 5 MG tablet Take 1 tablet (5 mg) by mouth every 8 hours as needed for severe pain For severe pain 30 tablet 0     pantoprazole (PROTONIX) 40 MG EC tablet Take 40 mg by mouth daily       polyethylene glycol (MIRALAX) 17 g packet Take 17 g by mouth 2 times daily       senna-docusate (SENOKOT-S/PERICOLACE) 8.6-50 MG tablet Take 2 tablets by mouth 2 times daily       vitamin D2 (ERGOCALCIFEROL) 51667 units (1250 mcg) capsule Take 50,000 Units by mouth once a week        Controlled medications:   Discharged with short course of as needed oxycodone     Past Medical History:   Past Medical History:   Diagnosis Date     Cerebral infarction (H) 08/2013    In [facility]     Diabetes mellitus, type 2 (H)  "     Stroke (H)      Physical Exam:   Vitals: /78   Pulse 84   Temp 97.6  F (36.4  C)   Resp 19   Ht 1.854 m (6' 1\")   Wt 83.7 kg (184 lb 9.6 oz)   SpO2 95%   BMI 24.36 kg/m    BMI: Body mass index is 24.36 kg/m .    GENERAL APPEARANCE: Seated comfortably, NAD  HENT:  NCAT, not Hughes, good dentition  EYES:  Conjunctiva clear, anicteric, EOMI, PERRL  PULM  Normal WOB on RA, lungs CTAB, no wheezes or crackles, air movement slightly limited by TLSO  CV:  RRR, S1/S2 normal, bi murmurs; bi LE edema  ABDOMEN: Abdomen soft, not tender, BS normal and active throughout   M/S:   Wearing TLSO without concerns  NEURO: Alert, answering questions appropriately, normal thought processes; CN II-XII grossly intact, 5/5 strength bilateral LEs  PSYCH: Mood normal with congruent affect    SNF labs: Recent labs in [facility] reviewed by me today.     Lab Requisition on 07/26/2023   Component Date Value Ref Range Status     Vitamin D, Total (25-Hydroxy) 07/26/2023 16 (L)  20 - 75 ug/L Final     DISCHARGE PLAN:  Follow up labs: Repeat Vitamin D in 1-2 months  Medical Follow Up:      Follow up with primary care provider in 1-2 weeks  [facility] scheduled appointments:   -Recommended close follow-up with Dr. Arreola  Discharge Services: Home Care:  Occupational Therapy and Physical Therapy  Discharge Instructions Verbalized to Patient at Discharge:   None    TOTAL DISCHARGE TIME:   Greater than 30 minutes in review of discharge needs and follow-up recommendations, review of recommendations with son.     Electronically signed by:    Benjamin Rosenstein, MD, MA  [facility] Medicine Faculty    Documentation of Face to Face and Certification for Home Health Services    I certify that patient: Juli Rodriguez is under my care and that I, or a nurse practitioner or physician's assistant working with me, had a face-to-face encounter that meets the physician face-to-face encounter requirements with this patient on: " 2023.    This encounter with the patient was in whole, or in part, for the following medical condition, which is the primary reason for home health care: Falls frequently, Closed fracture of lumbar spinous process(es).    I certify that, based on my findings, the following services are medically necessary home health services: Occupational Therapy and Physical Therapy.    My clinical findings support the need for the above services because: Occupational Therapy Services are needed to assess and treat cognitive ability and address ADL safety due to impairment in ADLs including toileting, dressing. and Physical Therapy Services are needed to assess and treat the following functional impairments: ambulation, balance, endurance.    Further, I certify that my clinical findings support that this patient is homebound (i.e. absences from home require considerable and taxing effort and are for medical reasons or [employer] services or infrequently or of short duration when for other reasons) because: Requires assistance of another person or specialized equipment to access medical services because patient: Is unable to walk greater than 150 feet without rest...    Based on the above findings. I certify that this patient is confined to the home and needs intermittent skilled nursing care, physical therapy and/or speech therapy.  The patient is under my care, and I have initiated the establishment of the plan of care.  This patient will be followed by a physician who will periodically review the plan of care.  Physician/Provider to provide follow up care: Ernie Arreola    Attending [facility] physician (the Medicare certified [place] provider): Benjamin Rosenstein, MD  Physician Signature: See electronic signature associated with these discharge orders.  Date: 23      Face to Face and Medical Necessity Statement for DME Provider visit    Demographic Information on Juli Rodriguez:  Gender: male  : 1945  [address]   [facility] [address]  [phone] (home)     Medical Record: [id number]  Social Security Number: [id number]  Primary Care Provider: Ernie Arreola  Insurance: Payor: [facility] / Plan: [facility] DUAL / Product Type: HMO /     HPI:   Juli Rodriguez is a 78 year old  (1945), who is being seen today for a face to face provider visit at [facility]; medical necessity statement for DME included. This patient requires the followin wheel Walker    This patient has a mobility limitation that significantly impairs their ability to participate in one or more mobility related activities of daily living in the home. They have a limitation that prevents the patient from accomplishing the MRADL entirely    OR    Places the patient at reasonably determined heightened risk of morbidity or mortality secondary to attempts to perform the MRADL- Yes, history of CVA with right sided weakness further impaired by fracture and bracing    OR    4. Prevents the patient from completing the MRADL within a reasonable time frame- Yes, due to the above    5. The patient is able to safely use the walker Yes  AND    6.  The functional mobility deficit can be sufficiently resolved with use of a walker  Yes       Pt needing above DME with expected length of need of 99   month  due to medical necessity associated with following diagnosis:     Closed fracture of spinous process of lumbar vertebra, with routine healing, subsequent encounter  Falls, subsequent encounter  Frequent falls  History of CVA (cerebrovascular accident)  Right sided weakness  Heat syncope, subsequent encounter  Hypotension, unspecified hypotension type  Chronic kidney disease-mineral and bone disorder  CKD (chronic kidney disease) stage 4, GFR 15-29 ml/min (H)  Acute pain due to injury  Type 2 diabetes mellitus without complication, without long-term current use of insulin (H)  Benign essential hypertension  Pulmonary nodules      PMH    has a past medical history of Cerebral infarction (H) (08/2013), Diabetes mellitus, type 2 (H), and Stroke (H).    ASSESSMENT/PLAN:  1. Closed fracture of spinous process of lumbar vertebra, with routine healing, subsequent encounter    2. Falls, subsequent encounter    3. Frequent falls    4. History of CVA (cerebrovascular accident)    5. Right sided weakness      Orders:  1. Facility staff/TC to contact [employer] to get their order form for provider to fill out    ELECTRONICALLY SIGNED BY PECOS CERTIFIED PROVIDER:  Benjamin Rosenstein, MD   NPI: [id number]  [facility]  [address] [place] [address], [address]  [place] [address]                  Sincerely,        Benjamin Rosenstein, MD

## 2023-07-31 NOTE — Clinical Note
Sree Arreola I saw Mr. Rodriguez at the [facility]. Let me know if there are any clarifications needed. Thank you Benjamin Rosenstein

## 2023-07-31 NOTE — PROGRESS NOTES
"[facility] GERIATRICS DISCHARGE SUMMARY  PATIENT'S NAME: Juli Rodriguez  YOB: 1945  MEDICAL RECORD NUMBER:  [id number]  Place of Service where encounter took place:  [facility] (SNF) [[id number]]    PRIMARY CARE PROVIDER AND CLINIC RESPONSIBLE AFTER TRANSFER:   Ernie Arreola MD, [address] [address] / [facility] [id number]     Transferring providers: Benjamin Rosenstein, MD, ALVINA Bright CNP  Recent Hospitalization/ED:  Hospital  [facility]  stay 23 to 23.  Date of SNF Admission: 2023  Date of SNF (anticipated) Discharge:  2023  Discharged to: with family - lives near his son  Cognitive Scores: Short blessed:  (normal)  Physical Function: Ambulating 125-150 ft with 2ww, TUG 55 sec  DME: Walker    CODE STATUS/ADVANCE DIRECTIVES DISCUSSION: Full Code  ALLERGIES: Beef-derived products and Eggs [chicken-derived products (egg)]    NURSING FACILITY COURSE   Medication Changes/Rationale:   High dose Vitamin D  Prn oxycodone  Increased bowel regimen    Summary of nursing facility stay:     Narrative Summary    Per my facility admission note:     \"Juli Rodriguez  is a 78 year old man,  (1945),  Living independently, with pmhx including falls, history of CVA with residual right sided weakness, chronic pain syndrome, T2DM (last A1c 5.8%),  CKD3 (seen by nephrology 23),  admitted to the above facility from  [facility] . Hospital stay 23 through 23.      HPI:    A German phone  assisted with this visit  Son arrived later in visit and also assisted with interpretation     Hospital Course  He was brought to the emergency department via EMS after a fall. It was noted he is independent in his apartment though has some persistent mild right-sided weakness from prior CVA.  Prior to his fall he reported having increased urinary frequency and dysuria.  He fell onto the floor when he was leaving the " "bathroom though does not recall the events.  He had severe low back pain and was brought to the ED for further evaluation.     In the ED, overall normal BMP, with stable creatinine of 1.35.  Notably initial potassium was 8.9 but this was erroneous on the point-of-care testing.  Follow-up potassiums were normal.  CBC without leukocytosis or anemia, though mild macrocytosis with MCV of 103, B12 was in low normal range of 339.     UA did show moderately elevated SpG of 1.025, positive nitrates, leuk esterase, no glucose or ketones.  Many bacteria.  In light of symptoms, felt to represent acute cystitis.     CT imaging of the head and neck without acute findings.  CT of the spine showed transverse process fractures of L1 and L2.  Was evaluated by orthopedics with recommendations for nonoperative repair.  Recommend he wear splint as able for pain control.  PT and OT recommended TCU following hospitalization.  He was monitored on telemetry as well with no evidence of arrhythmia.  Cystitis was treated with cefadroxil for pan-sensitive E. coli which will be completed 7/12/2023.  He was noted to have evidence of hypotension and carvedilol was decreased to 6.25 mg twice daily.      Additionally, per chart: \"Family noted that he never uses air conditioning in the summer his apartment can become extremely hot. When they found him he was completely soaked in sweat. Overheating with vasodilation may also have contributed to his fall as well.\"     Hemoglobin A1c was found to be low at 5.6% and so metformin was discontinued.\"     At the facility, he progressed slowly.  Significant difficulties with pain control though this is improved with scheduled oxycodone which was able to be decreased to as needed oxycodone.  Did decrease gabapentin to 200 mg 3 times a day from prior dose of 300 mg to decrease overall fall risk.  Did not decrease further but this could be evaluated with his primary.  Evaluation for osteoporosis given fractures " did show significantly low vitamin D level.  My colleague started him on high-dose vitamin D weekly.  Given improved pain control and function, he was able to discharge home to his apartment with care of his family.    Summary by problem  (S32.009D) Closed fracture of spinous process of lumbar vertebra, with routine healing, subsequent encounter  (primary encounter diagnosis)  Comment: Sustained left transverse process fractures at L1/L2.  Likely related to his most recent fall.  Contributing to significant pain as above.  Was seen by neurosurgery during admission with TLSO bracing placed.  Recommend to continue when up and ambulating.  Will follow-up with neurosurgery as planned.  Plan:   -Continue TLSO bracing  -Continue high-dose vitamin D replacement  -Consider outpatient DEXA scan    (W19.XXXD) Falls, subsequent encounter  (R29.6) Frequent falls  Comment: Primary reason for admission was related to fall with findings of spinous process fractures as above.  Falls likely multifactorial related to history of CVA, heat injury, some concern for cystitis.  Also some concern for orthostatic hypotension for which his carvedilol was decreased.  Additionally decrease gabapentin to 200 mg 3 times daily and consider further decrease outpatient.  Plan:   -We will continue with outpatient PT and OT    (Z86.73) History of CVA (cerebrovascular accident)  (R53.1) Right sided weakness  Comment: Likely contributing to fall with weakness and imbalance.  He lives in his own apartment though only a few doors down from his son in the same complex.  Ongoing therapies at home as above.    (T67.1XXD) Heat syncope, subsequent encounter  Comment: Family noted that he lives in an apartment without air conditioning.  Likely contributed to his fall with recent hot humid weather.  Discussed recommendation for cooling and this may be medically indicated if necessary    (I95.9) Hypotension, unspecified hypotension type  (I10) Benign essential  hypertension  Comment: Concerns for hypotension during his hospital admission as another factor potentially contributing to falls.  Carvedilol was decreased to 6.25 mg twice a day.  Blood pressures here remained reasonable, 130s over 80s.  Plan:   Follow-up with PCP    (N18.9,  E83.9,  M89.9) Chronic kidney disease-mineral and bone disorder  (N18.4) CKD (chronic kidney disease) stage 4, GFR 15-29 ml/min (H)  Comment: History of renal dysfunction greater than would expect for age.  Likely contributing to CKD MBD with finding of low vitamin D and likely secondary hyperparathyroidism.  Started on vitamin D here.  Plan:   -Consideration for DEXA scan as noted    (G89.11) Acute pain due to injury  Comment: Was having significant back pain likely related to his fractures.  Difficult to control though improved with scheduled doses of oxycodone which we were able to decrease to as needed.  Ongoing pain management with primary care follow-up.    (E11.9) Type 2 diabetes mellitus without complication, without long-term current use of insulin (H)  Comment: Well-controlled with last A1c of 5.8%.  Some concern for hypoglycemia contributing to falls with decreased intake, glucoses less likely with metformin.  Though this was discontinued at time of hospital discharge recently.    (R91.8) Pulmonary nodules  Comment: Noted on imaging as part of overall evaluation.  Likely low risk and follow-up may not be necessary per report.  Plan:   -Review with PCP    Discharge Medications:  MED REC REQUIRED  Post Medication Reconciliation Status: medication reconcilation previously completed during another office visit     Current Outpatient Medications   Medication Sig Dispense Refill    gabapentin (NEURONTIN) 100 MG capsule Take 2 capsules (200 mg) by mouth 3 times daily      acetaminophen (TYLENOL) 500 MG tablet Take 1,000 mg by mouth 3 times daily      aspirin 81 MG EC tablet Take 1 tablet (81 mg) by mouth daily for 300 days 60 tablet 4     "atorvastatin (LIPITOR) 20 MG tablet Take 20 mg by mouth daily      blood glucose (NO BRAND SPECIFIED) lancets standard Use to test blood sugar 2 times daily or as directed. 100 each 3    blood glucose (NO BRAND SPECIFIED) test strip Use to test blood sugar 2 times daily or as directed. 100 strip 3    blood glucose monitoring (NO BRAND SPECIFIED) meter device kit Use to test blood sugar 2 times daily or as directed. 1 kit 0    carvedilol (COREG) 12.5 MG tablet Take 6.25 mg by mouth 2 times daily (with meals)      DULoxetine (CYMBALTA) 20 MG capsule Take 20 mg by mouth daily      Folic Acid-Vit B6-Vit B12 (B COMPLEX-FOLIC ACID PO) Take 1 capsule by mouth daily      Lidocaine (LIDOCARE) 4 % Patch Place 1 patch onto the skin every 24 hours To prevent lidocaine toxicity, patient should be patch free for 12 hrs daily.      loratadine (CLARITIN) 10 MG tablet Take 1 tablet (10 mg) by mouth daily 90 tablet 3    melatonin 3 MG tablet Take 3 mg by mouth nightly as needed for sleep      order for DME Diabetic shoes One Pair 1 each 0    order for DME Equipment being ordered: Diabetic Shoes 1 each 1    oxyCODONE (ROXICODONE) 5 MG tablet Take 1 tablet (5 mg) by mouth every 8 hours as needed for severe pain For severe pain 30 tablet 0    pantoprazole (PROTONIX) 40 MG EC tablet Take 40 mg by mouth daily      polyethylene glycol (MIRALAX) 17 g packet Take 17 g by mouth 2 times daily      senna-docusate (SENOKOT-S/PERICOLACE) 8.6-50 MG tablet Take 2 tablets by mouth 2 times daily      vitamin D2 (ERGOCALCIFEROL) 09387 units (1250 mcg) capsule Take 50,000 Units by mouth once a week        Controlled medications:   Discharged with short course of as needed oxycodone     Past Medical History:   Past Medical History:   Diagnosis Date    Cerebral infarction (H) 08/2013    In [facility]    Diabetes mellitus, type 2 (H)     Stroke (H)      Physical Exam:   Vitals: /78   Pulse 84   Temp 97.6  F (36.4  C)   Resp 19   Ht 1.854 m (6' 1\") "   Wt 83.7 kg (184 lb 9.6 oz)   SpO2 95%   BMI 24.36 kg/m    BMI: Body mass index is 24.36 kg/m .    GENERAL APPEARANCE: Seated comfortably, NAD  HENT:  NCAT, not [place], good dentition  EYES:  Conjunctiva clear, anicteric, EOMI, PERRL  PULM  Normal WOB on RA, lungs CTAB, no wheezes or crackles, air movement slightly limited by TLSO  CV:  RRR, S1/S2 normal, bi murmurs; bi LE edema  ABDOMEN: Abdomen soft, not tender, BS normal and active throughout   M/S:   Wearing TLSO without concerns  NEURO: Alert, answering questions appropriately, normal thought processes; CN II-XII grossly intact, 5/5 strength bilateral LEs  PSYCH: Mood normal with congruent affect    SNF labs: Recent labs in [facility] reviewed by me today.     Lab Requisition on 07/26/2023   Component Date Value Ref Range Status    Vitamin D, Total (25-Hydroxy) 07/26/2023 16 (L)  20 - 75 ug/L Final     DISCHARGE PLAN:  Follow up labs: Repeat Vitamin D in 1-2 months  Medical Follow Up:      Follow up with primary care provider in 1-2 weeks  [facility] scheduled appointments:   -Recommended close follow-up with Dr. Arreola  Discharge Services: Home Care:  Occupational Therapy and Physical Therapy  Discharge Instructions Verbalized to Patient at Discharge:   None    TOTAL DISCHARGE TIME:   Greater than 30 minutes in review of discharge needs and follow-up recommendations, review of recommendations with son.     Electronically signed by:    Benjamin Rosenstein, MD, MA  [facility] Faculty    Documentation of Face to Face and Certification for Home Health Services    I certify that patient: Juli Rodriguez is under my care and that I, or a nurse practitioner or physician's assistant working with me, had a face-to-face encounter that meets the physician face-to-face encounter requirements with this patient on: 7/31/2023.    This encounter with the patient was in whole, or in part, for the following medical condition, which is the primary reason for home  health care: Falls frequently, Closed fracture of lumbar spinous process(es).    I certify that, based on my findings, the following services are medically necessary home health services: Occupational Therapy and Physical Therapy.    My clinical findings support the need for the above services because: Occupational Therapy Services are needed to assess and treat cognitive ability and address ADL safety due to impairment in ADLs including toileting, dressing. and Physical Therapy Services are needed to assess and treat the following functional impairments: ambulation, balance, endurance.    Further, I certify that my clinical findings support that this patient is homebound (i.e. absences from home require considerable and taxing effort and are for medical reasons or [facility] services or infrequently or of short duration when for other reasons) because: Requires assistance of another person or specialized equipment to access medical services because patient: Is unable to walk greater than 150 feet without rest...    Based on the above findings. I certify that this patient is confined to the home and needs intermittent skilled nursing care, physical therapy and/or speech therapy.  The patient is under my care, and I have initiated the establishment of the plan of care.  This patient will be followed by a physician who will periodically review the plan of care.  Physician/Provider to provide follow up care: Ernie Arreola    Attending hospital physician (the Medicare certified PECOS provider): Benjamin Rosenstein, MD  Physician Signature: See electronic signature associated with these discharge orders.  Date: 23      Face to Face and Medical Necessity Statement for DME Provider visit    Demographic Information on Juli Rodriguez:  Gender: male  : 1945  [address]   [facility] [address]  [phone] (home)     Medical Record: [id number]  Social Security Number: [id number]  Primary Care  Provider: rEnie Arreola  Insurance: Payor: [facility] / Plan: [facility] DUAL / Product Type: HMO /     HPI:   Juli Rodriguez is a 78 year old  (1945), who is being seen today for a face to face provider visit at [facility]; medical necessity statement for DME included. This patient requires the followin wheel Walker    This patient has a mobility limitation that significantly impairs their ability to participate in one or more mobility related activities of daily living in the home. They have a limitation that prevents the patient from accomplishing the MRADL entirely    OR    Places the patient at reasonably determined heightened risk of morbidity or mortality secondary to attempts to perform the MRADL- Yes, history of CVA with right sided weakness further impaired by fracture and bracing    OR    4. Prevents the patient from completing the MRADL within a reasonable time frame- Yes, due to the above    5. The patient is able to safely use the walker Yes  AND    6.  The functional mobility deficit can be sufficiently resolved with use of a walker  Yes       Pt needing above DME with expected length of need of 99   month  due to medical necessity associated with following diagnosis:     Closed fracture of spinous process of lumbar vertebra, with routine healing, subsequent encounter  Falls, subsequent encounter  Frequent falls  History of CVA (cerebrovascular accident)  Right sided weakness  Heat syncope, subsequent encounter  Hypotension, unspecified hypotension type  Chronic kidney disease-mineral and bone disorder  CKD (chronic kidney disease) stage 4, GFR 15-29 ml/min (H)  Acute pain due to injury  Type 2 diabetes mellitus without complication, without long-term current use of insulin (H)  Benign essential hypertension  Pulmonary nodules      [facility]   has a past medical history of Cerebral infarction (H) (2013), Diabetes mellitus, type 2 (H), and Stroke (H).    ASSESSMENT/PLAN:  1. Closed  fracture of spinous process of lumbar vertebra, with routine healing, subsequent encounter    2. Falls, subsequent encounter    3. Frequent falls    4. History of CVA (cerebrovascular accident)    5. Right sided weakness      Orders:  1. Facility staff/TC to contact DME company to get their order form for provider to fill out    ELECTRONICALLY SIGNED BY OMER CERTIFIED PROVIDER:  Benjamin Rosenstein, MD   NPI: [id number]  [facility]  [address] [place] [address], [address]  [place] [address]

## 2023-08-03 ENCOUNTER — DOCUMENTATION ONLY (OUTPATIENT)
Dept: FAMILY MEDICINE | Facility: CLINIC | Age: 78
End: 2023-08-03
Payer: COMMERCIAL

## 2023-08-03 NOTE — PROGRESS NOTES
"When opening a documentation only encounter, be sure to enter in \"Chief Complaint\" Forms and in \" Comments\" Title of form, description if needed.    Juli is a 78 year old  male  Form received via: Fax  Form now resides in: Provider Ready    RACHEL BUTT    Form has been completed by provider.     Form sent out via: Fax to [employer] at Fax Number: [phone]  Patient informed: Yes  Output date: August 8, 2023    RACHEL BUTT      **Please close the encounter**            "

## 2023-08-06 ENCOUNTER — TELEPHONE (OUTPATIENT)
Dept: NEPHROLOGY | Facility: CLINIC | Age: 78
End: 2023-08-06
Payer: COMMERCIAL

## 2023-08-06 NOTE — TELEPHONE ENCOUNTER
Patient Contacted for the patient to call back and schedule the following:    Appointment type: return neph  Provider: Taylor Carrizales  Return date: 9/12/23  Specialty phone number: [phone]  Additional appointment(s) needed: lab  Additonal Notes:

## 2023-08-24 ENCOUNTER — APPOINTMENT (OUTPATIENT)
Dept: INTERPRETER SERVICES | Facility: CLINIC | Age: 78
End: 2023-08-24
Payer: COMMERCIAL

## 2023-08-28 ENCOUNTER — TELEPHONE (OUTPATIENT)
Dept: NEPHROLOGY | Facility: CLINIC | Age: 78
End: 2023-08-28
Payer: COMMERCIAL

## 2023-09-11 ENCOUNTER — LAB (OUTPATIENT)
Dept: LAB | Facility: CLINIC | Age: 78
End: 2023-09-11
Payer: COMMERCIAL

## 2023-09-11 ENCOUNTER — OFFICE VISIT (OUTPATIENT)
Dept: NEPHROLOGY | Facility: CLINIC | Age: 78
End: 2023-09-11
Attending: INTERNAL MEDICINE
Payer: COMMERCIAL

## 2023-09-11 VITALS
WEIGHT: 194.5 LBS | DIASTOLIC BLOOD PRESSURE: 86 MMHG | BODY MASS INDEX: 25.66 KG/M2 | SYSTOLIC BLOOD PRESSURE: 138 MMHG | HEART RATE: 80 BPM | OXYGEN SATURATION: 95 %

## 2023-09-11 DIAGNOSIS — E11.22 TYPE 2 DIABETES MELLITUS WITH STAGE 3 CHRONIC KIDNEY DISEASE, WITHOUT LONG-TERM CURRENT USE OF INSULIN, UNSPECIFIED WHETHER STAGE 3A OR 3B CKD (H): Primary | ICD-10-CM

## 2023-09-11 DIAGNOSIS — N18.30 TYPE 2 DIABETES MELLITUS WITH STAGE 3 CHRONIC KIDNEY DISEASE, WITHOUT LONG-TERM CURRENT USE OF INSULIN, UNSPECIFIED WHETHER STAGE 3A OR 3B CKD (H): ICD-10-CM

## 2023-09-11 DIAGNOSIS — I10 BENIGN ESSENTIAL HYPERTENSION: ICD-10-CM

## 2023-09-11 DIAGNOSIS — N18.30 TYPE 2 DIABETES MELLITUS WITH STAGE 3 CHRONIC KIDNEY DISEASE, WITHOUT LONG-TERM CURRENT USE OF INSULIN, UNSPECIFIED WHETHER STAGE 3A OR 3B CKD (H): Primary | ICD-10-CM

## 2023-09-11 DIAGNOSIS — E11.22 TYPE 2 DIABETES MELLITUS WITH STAGE 3 CHRONIC KIDNEY DISEASE, WITHOUT LONG-TERM CURRENT USE OF INSULIN, UNSPECIFIED WHETHER STAGE 3A OR 3B CKD (H): ICD-10-CM

## 2023-09-11 DIAGNOSIS — N18.4 CKD (CHRONIC KIDNEY DISEASE) STAGE 4, GFR 15-29 ML/MIN (H): ICD-10-CM

## 2023-09-11 LAB
ALBUMIN SERPL BCG-MCNC: 4.6 G/DL (ref 3.5–5.2)
ALP SERPL-CCNC: 103 U/L (ref 40–129)
ALT SERPL W P-5'-P-CCNC: 9 U/L (ref 0–70)
ANION GAP SERPL CALCULATED.3IONS-SCNC: 10 MMOL/L (ref 7–15)
AST SERPL W P-5'-P-CCNC: 18 U/L (ref 0–45)
BASOPHILS # BLD AUTO: 0.1 10E3/UL (ref 0–0.2)
BASOPHILS NFR BLD AUTO: 1 %
BILIRUB SERPL-MCNC: 0.7 MG/DL
BUN SERPL-MCNC: 20.9 MG/DL (ref 8–23)
CALCIUM SERPL-MCNC: 10 MG/DL (ref 8.8–10.2)
CHLORIDE SERPL-SCNC: 101 MMOL/L (ref 98–107)
CREAT SERPL-MCNC: 1.27 MG/DL (ref 0.67–1.17)
DEPRECATED HCO3 PLAS-SCNC: 29 MMOL/L (ref 22–29)
EGFRCR SERPLBLD CKD-EPI 2021: 58 ML/MIN/1.73M2
EOSINOPHIL # BLD AUTO: 0.2 10E3/UL (ref 0–0.7)
EOSINOPHIL NFR BLD AUTO: 4 %
ERYTHROCYTE [DISTWIDTH] IN BLOOD BY AUTOMATED COUNT: 11.1 % (ref 10–15)
GLUCOSE SERPL-MCNC: 150 MG/DL (ref 70–99)
HCT VFR BLD AUTO: 41 % (ref 40–53)
HGB BLD-MCNC: 14.1 G/DL (ref 13.3–17.7)
IMM GRANULOCYTES # BLD: 0 10E3/UL
IMM GRANULOCYTES NFR BLD: 0 %
LYMPHOCYTES # BLD AUTO: 1.7 10E3/UL (ref 0.8–5.3)
LYMPHOCYTES NFR BLD AUTO: 36 %
MCH RBC QN AUTO: 34 PG (ref 26.5–33)
MCHC RBC AUTO-ENTMCNC: 34.4 G/DL (ref 31.5–36.5)
MCV RBC AUTO: 99 FL (ref 78–100)
MONOCYTES # BLD AUTO: 0.6 10E3/UL (ref 0–1.3)
MONOCYTES NFR BLD AUTO: 13 %
NEUTROPHILS # BLD AUTO: 2.2 10E3/UL (ref 1.6–8.3)
NEUTROPHILS NFR BLD AUTO: 46 %
NRBC # BLD AUTO: 0 10E3/UL
NRBC BLD AUTO-RTO: 0 /100
PLATELET # BLD AUTO: 206 10E3/UL (ref 150–450)
POTASSIUM SERPL-SCNC: 4.5 MMOL/L (ref 3.4–5.3)
PROT SERPL-MCNC: 8 G/DL (ref 6.4–8.3)
RBC # BLD AUTO: 4.15 10E6/UL (ref 4.4–5.9)
SODIUM SERPL-SCNC: 140 MMOL/L (ref 136–145)
WBC # BLD AUTO: 4.8 10E3/UL (ref 4–11)

## 2023-09-11 PROCEDURE — G0463 HOSPITAL OUTPT CLINIC VISIT: HCPCS | Performed by: INTERNAL MEDICINE

## 2023-09-11 PROCEDURE — 99215 OFFICE O/P EST HI 40 MIN: CPT | Performed by: INTERNAL MEDICINE

## 2023-09-11 PROCEDURE — 36415 COLL VENOUS BLD VENIPUNCTURE: CPT | Performed by: PATHOLOGY

## 2023-09-11 PROCEDURE — 85025 COMPLETE CBC W/AUTO DIFF WBC: CPT | Performed by: PATHOLOGY

## 2023-09-11 PROCEDURE — 80053 COMPREHEN METABOLIC PANEL: CPT | Performed by: PATHOLOGY

## 2023-09-11 RX ORDER — CARVEDILOL 12.5 MG/1
12.5 TABLET ORAL 2 TIMES DAILY WITH MEALS
Qty: 180 TABLET | Refills: 3 | Status: SHIPPED | OUTPATIENT
Start: 2023-09-11 | End: 2023-10-09

## 2023-09-11 RX ORDER — METFORMIN HCL 500 MG
500 TABLET, EXTENDED RELEASE 24 HR ORAL
Qty: 90 TABLET | Refills: 1 | Status: SHIPPED | OUTPATIENT
Start: 2023-09-11 | End: 2024-03-19

## 2023-09-11 NOTE — PROGRESS NOTES
Nephrology Clinic    Juli Rodriguez MRN:[id number] YOB: 1945  Date of Service: 09/11/2023  Primary care provider: Ernie Arreola  Requesting physician: Ernie Arreola      REASON FOR CONSULT: ALAN vs CKD    HISTORY OF PRESENT ILLNESS:   Juli Rodriguez is a 78 year old male who  first presented  for evaluation of ALAN and CKD in June 2023.  The past medical history is significant for type 2 DM for more than 10 years, HTN for  10 years ,CVA in 2013 and CKD.  Regarding his type 2 DM, his glycemia is well controlled and his last Hba1c is 5.8 on 5/22/2023.  From a renal standpoint, his baseline creatinine level was around 1.4 mg/dL up to September 2021. He was thereafter lost to follow up in [place] as he moved to [place] and a kidney function was checked again in May 2023 and his creatinine level was noticed then to be 2.54 mg/dL. A repeat level on 6/12/2023 is 1.56 mg/dL and 1.27 mg/dL on 9/11. He reported that he had been following up regularly in [place] with a provider there up to January 2023 and that his renal function was stable.  The UACR is negative for any proteinuria on 5/22/2023 and also on 9/11/2023  There is no renal imaging available. He reports that his PCP stopped all his medications when he discovered that his kidney function was down and that his glycemia has remained well controlled off metformin and that his BP has remained reasonably well controlled. He denies any intake of NSAIDs or other nephrotoxic medications. On his last visit he was restarted on carvedilol 12.5 mg twice daily which was decreased later on 6.25 mg twice daily when he fell in July 2023.   Also since last seen the patient was admitted to the hospital after he had a fall. A CT of the head that showed age-related and chronic small vessel disease but no hemorrhage or acute stroke. CT of the chest abdomen pelvis showed nondisplaced avulsion fractures of the left transverse process at L1 and L2.   The patient denies any  dysuria, any pollakiuria, any nocturia, any LE edema, any dyspnea on exertion .  The following portions of the patient's history were reviewed and updated as appropriate: allergies, current medications, past family history, past medical history, past social history, past surgical history and problem list.    EXAMINATION: US RENAL COMPLETE NON-VASCULAR, 6/14/2023 1:02 PM      COMPARISON: None.     HISTORY: CKD (chronic kidney disease) stage 4, GFR 15-29 ml/min (H);  elevated creatinine level     TECHNIQUE: The kidneys and bladder were scanned in the standard  fashion with specialized ultrasound transducer(s) using both gray  scale and limited color/spectral Doppler techniques.     FINDINGS:     Right kidney: Measures 11.0 cm in length. Parenchyma is of normal  thickness and echogenicity. No focal mass. No hydronephrosis.     Left kidney: Measures 10.3 cm in length. Parenchyma is of normal  thickness and echogenicity. No focal mass. No hydronephrosis.      Bladder: Partially distended.                                                                      IMPRESSION: Unremarkable renal ultrasound.     I have personally reviewed the examination and initial interpretation  and I agree with the findings.     HARISH BENOIT MD   PAST MEDICAL HISTORY:  Past Medical History:   Diagnosis Date    Cerebral infarction (H) 08/2013    In [facility]    Diabetes mellitus, type 2 (H)     Stroke (H)      PAST SURGICAL HISTORY:  Past Surgical History:   Procedure Laterality Date    CATARACT IOL, RT/LT Bilateral 2007 and 2015    HERNIA REPAIR      INJECT MAJOR JOINT / BURSA  9/18/2020    JOINT REPLACEMENT, HIP RT/LT Right      MEDICATIONS:  Prescription Medications as of 9/11/2023         Rx Number Disp Refills Start End Last Dispensed Date Next Fill Date Owning Pharmacy    acetaminophen (TYLENOL) 500 MG tablet            Sig: Take 1,000 mg by mouth 3 times daily    Class: Historical    Route: Oral    aspirin 81 MG EC tablet  60 tablet  4 6/12/2023 4/7/2024   [id number] - [facility] [address]    Sig: Take 1 tablet (81 mg) by mouth daily for 300 days    Class: E-Prescribe    Route: Oral    atorvastatin (LIPITOR) 20 MG tablet            Sig: Take 20 mg by mouth daily    Class: Historical    Route: Oral    blood glucose (NO BRAND SPECIFIED) lancets standard  100 each 3 9/24/2021    [address]    Sig: Use to test blood sugar 2 times daily or as directed.    Class: E-Prescribe    blood glucose (NO BRAND SPECIFIED) test strip  100 strip 3 9/24/2021    [address]    Sig: Use to test blood sugar 2 times daily or as directed.    Class: E-Prescribe    blood glucose monitoring (NO BRAND SPECIFIED) meter device kit  1 kit 0 9/24/2021    [address]    Sig: Use to test blood sugar 2 times daily or as directed.    Class: E-Prescribe    carvedilol (COREG) 12.5 MG tablet            Sig: Take 6.25 mg by mouth 2 times daily (with meals)    Class: Historical    Route: Oral    DULoxetine (CYMBALTA) 20 MG capsule            Sig: Take 20 mg by mouth daily    Class: Historical    Route: Oral    Folic Acid-Vit B6-Vit B12 (B COMPLEX-FOLIC ACID PO)            Sig: Take 1 capsule by mouth daily    Class: Historical    Route: Oral    gabapentin (NEURONTIN) 100 MG capsule    7/31/2023        Sig: Take 2 capsules (200 mg) by mouth 3 times daily    Class: Historical    Route: Oral    Lidocaine (LIDOCARE) 4 % Patch            Sig: Place 1 patch onto the skin every 24 hours To prevent lidocaine toxicity, patient should be patch free for 12 hrs daily.    Class: Historical    Route: Transdermal    loratadine (CLARITIN) 10 MG tablet  90 tablet 3 5/22/2023    [facility][id number] - [facility] [address]    Sig: Take 1 tablet (10 mg) by mouth daily    Class: E-Prescribe    Route: Oral    melatonin 3 MG  tablet            Sig: Take 3 mg by mouth nightly as needed for sleep    Class: Historical    Route: Oral    order for DME  1 each 0 3/6/2020    [facility] - [place], [place] - [address] [facility]    Sig: Diabetic shoes One Pair    Class: Local Print    order for DME  1 each 1 2/26/2018    [id number] - [place], [place] - [address]    Sig: Equipment being ordered: Diabetic Shoes    Class: Local Print    oxyCODONE (ROXICODONE) 5 MG tablet  30 tablet 0 7/27/2023    [place], [place] - [address] [facility]    Sig: Take 1 tablet (5 mg) by mouth every 8 hours as needed for severe pain For severe pain    Class: E-Prescribe    Earliest Fill Date: 7/27/2023    Route: Oral    pantoprazole (PROTONIX) 40 MG EC tablet            Sig: Take 40 mg by mouth daily    Class: Historical    Route: Oral    polyethylene glycol (MIRALAX) 17 g packet            Sig: Take 17 g by mouth 2 times daily    Class: Historical    Route: Oral    senna-docusate (SENOKOT-S/PERICOLACE) 8.6-50 MG tablet            Sig: Take 2 tablets by mouth 2 times daily    Class: Historical    Route: Oral    vitamin D2 (ERGOCALCIFEROL) 84283 units (1250 mcg) capsule    7/24/2023        Sig: Take 50,000 Units by mouth once a week    Class: Historical    Route: Oral           ALLERGIES:    Allergies   Allergen Reactions    Beef-Derived Products Other (See Comments)     Sneezing when eats beef    Eggs [Chicken-Derived Products (Egg)]      REVIEW OF SYSTEMS:  Review Of Systems  Skin: negative for, pigmentation, acne, rash, scaling, itching, bruising, lumps or bumps  Eyes: negative for, visual blurring, double vision, glaucoma, cataracts, eye pain, color blindness, glasses, contacts  Ears/Nose/Throat: negative for, nasal congestion, purulent rhinorrhea, sneezing, postnasal drainage, hearing loss, deafness, tinnitus  Respiratory: No shortness of breath, dyspnea on exertion, cough, or hemoptysis  Cardiovascular:  negative for, palpitations, tachycardia, irregular heart beat, chest pain, exertional chest pain or pressure, paroxysmal nocturnal dyspnea and dyspnea on exertion  Gastrointestinal: negative for, poor appetite, dysphagia, nausea, vomiting, heartburn, dyspepsia, reflux and hematemesis  Genitourinary: negative for, nocturia, dysuria, frequency, urgency, hesitancy, hematuria, retention, decreased urinary stream and incontinence  Musculoskeletal: negative for, fracture, back pain, neck pain, arthritis, joint pain, joint swelling, joint stiffness, gout and fibromyalgia  Neurologic: negative for, headaches, syncope, stroke, seizures, paralysis, local weakness, numbness or tingling of hands and numbness or tingling of feet    A comprehensive review of systems was performed and found to be negative except as described here or above.  SOCIAL HISTORY:   Social History     Socioeconomic History    Marital status:      Spouse name: Not on file    Number of children: Not on file    Years of education: Not on file    Highest education level: Not on file   Occupational History    Not on file   Tobacco Use    Smoking status: Never    Smokeless tobacco: Never   Vaping Use    Vaping Use: Never used   Substance and Sexual Activity    Alcohol use: No    Drug use: No    Sexual activity: Not Currently   Other Topics Concern    Not on file   Social History Narrative    Not on file     Social Determinants of Health     Financial Resource Strain: Not on file   Food Insecurity: Not on file   Transportation Needs: Not on file   Physical Activity: Not on file   Stress: Not on file   Social Connections: Not on file   Intimate Partner Violence: Not on file   Housing Stability: Not on file     FAMILY MEDICAL HISTORY:   Family History   Problem Relation Age of Onset    Diabetes No family hx of     Coronary Artery Disease No family hx of     Hypertension No family hx of     Hyperlipidemia No family hx of     Cerebrovascular Disease No  family hx of     Breast Cancer No family hx of     Colon Cancer No family hx of     Prostate Cancer No family hx of     Other Cancer No family hx of     Depression No family hx of     Anxiety Disorder No family hx of     Mental Illness No family hx of     Substance Abuse No family hx of     Anesthesia Reaction No family hx of     Asthma No family hx of     Osteoporosis No family hx of     Genetic Disorder No family hx of     Thyroid Disease No family hx of     Obesity No family hx of     Glaucoma No family hx of     Macular Degeneration No family hx of      PHYSICAL EXAM:   There were no vitals taken for this visit.  GENERAL APPEARANCE: alert and no distress  EYES: nonicteric  HENT: mouth without ulcers or lesions  NECK: supple, no adenopathy  RESP: lungs clear to auscultation   CV: regular rhythm, normal rate, no rub  ABDOMEN: soft, nontender, normal bowel sounds, no HSM   Extremities: no clubbing, cyanosis, or edema  MS: no evidence of inflammation in joints, no muscle tenderness  SKIN: no rash  NEURO: mentation intact and speech normal  PSYCH: affect normal/bright   LABS:   Recent Results (from the past 672 hour(s))   Comprehensive metabolic panel    Collection Time: 09/11/23 10:52 AM   Result Value Ref Range    Sodium 140 136 - 145 mmol/L    Potassium 4.5 3.4 - 5.3 mmol/L    Chloride 101 98 - 107 mmol/L    Carbon Dioxide (CO2) 29 22 - 29 mmol/L    Anion Gap 10 7 - 15 mmol/L    Urea Nitrogen 20.9 8.0 - 23.0 mg/dL    Creatinine 1.27 (H) 0.67 - 1.17 mg/dL    Calcium 10.0 8.8 - 10.2 mg/dL    Glucose 150 (H) 70 - 99 mg/dL    Alkaline Phosphatase 103 40 - 129 U/L    AST 18 0 - 45 U/L    ALT 9 0 - 70 U/L    Protein Total 8.0 6.4 - 8.3 g/dL    Albumin 4.6 3.5 - 5.2 g/dL    Bilirubin Total 0.7 <=1.2 mg/dL    GFR Estimate 58 (L) >60 mL/min/1.73m2   CBC with platelets and differential    Collection Time: 09/11/23 10:52 AM   Result Value Ref Range    WBC Count 4.8 4.0 - 11.0 10e3/uL    RBC Count 4.15 (L) 4.40 - 5.90 10e6/uL     Hemoglobin 14.1 13.3 - 17.7 g/dL    Hematocrit 41.0 40.0 - 53.0 %    MCV 99 78 - 100 fL    MCH 34.0 (H) 26.5 - 33.0 pg    MCHC 34.4 31.5 - 36.5 g/dL    RDW 11.1 10.0 - 15.0 %    Platelet Count 206 150 - 450 10e3/uL    % Neutrophils 46 %    % Lymphocytes 36 %    % Monocytes 13 %    % Eosinophils 4 %    % Basophils 1 %    % Immature Granulocytes 0 %    NRBCs per 100 WBC 0 <1 /100    Absolute Neutrophils 2.2 1.6 - 8.3 10e3/uL    Absolute Lymphocytes 1.7 0.8 - 5.3 10e3/uL    Absolute Monocytes 0.6 0.0 - 1.3 10e3/uL    Absolute Eosinophils 0.2 0.0 - 0.7 10e3/uL    Absolute Basophils 0.1 0.0 - 0.2 10e3/uL    Absolute Immature Granulocytes 0.0 <=0.4 10e3/uL    Absolute NRBCs 0.0 10e3/uL     CMP  Recent Labs   Lab Test 09/11/23  1052 07/21/23  1149 06/12/23  1511 05/22/23  1417 09/24/21  1204 06/07/21  1607 10/16/20  1545 09/18/20  1417 02/04/20  1426 07/12/19  0911    138 136 136   < > 142 136 136.8 135.5  --    POTASSIUM 4.5 4.6 5.3 4.8   < > 4.5 4.3 3.6 4.2  --    CHLORIDE 101 98 98 102   < > 109 105 99.8 98.4  --    CO2 29 28 28 20*   < > 29 26 21.5 29.2  --    ANIONGAP 10 12 10 14   < > 4 6  --   --    < >   * 104* 121*  124* 112*   < > 139* 115* 169.5* 170.7*  --    BUN 20.9 23.0 26.4* 39.2*   < > 24 22 34.5* 20.2  --    CR 1.27* 1.31* 1.56* 2.54*   < > 1.39* 1.45* 1.5* 1.2  --    GFRESTIMATED 58* 56* 45* 25*   < > 49* 47* 49.8* 63.6  --    GFRESTBLACK  --   --   --   --   --  56* 54* 60.3 76.9  --    WILIAM 10.0 9.5 9.7 9.1   < > 9.5 9.3 9.4 9.3  --    PHOS  --   --  3.5 4.5  --   --   --   --   --   --    PROTTOTAL 8.0 7.6  --  7.9  --   --  7.5  --   --   --    ALBUMIN 4.6 4.2 4.6 4.4  --   --  4.0  --   --   --    BILITOTAL 0.7 0.4  --  0.3  --   --  0.8  --   --   --    ALKPHOS 103 113  --  101  --   --  81  --   --   --    AST 18 25  --  21  --   --  13  --   --   --    ALT 9 11  --  15  --   --  19  --   --   --     < > = values in this interval not displayed.     CBC  Recent Labs   Lab Test  09/11/23  1052 06/12/23  1511 05/22/23  1417 06/07/21  1356   HGB 14.1 14.7 13.1* 14.1   WBC 4.8 5.7 4.8  --    RBC 4.15* 4.27* 3.93*  --    HCT 41.0 44.3 41.8 42.8   MCV 99 104* 106* 102.9*   MCH 34.0* 34.4* 33.3* 33.9   MCHC 34.4 33.2 31.3* 32.9   RDW 11.1 11.9 11.9 11.8    186 229  --      INRNo lab results found.  ABGNo lab results found.   URINE STUDIES  Recent Labs   Lab Test 06/12/23  1518   COLOR Light Yellow   APPEARANCE Clear   URINEGLC Negative   URINEBILI Negative   URINEKETONE Negative   SG 1.011   UBLD Negative   URINEPH 6.0   PROTEIN Negative   NITRITE Negative   LEUKEST Large*   RBCU <1   WBCU 33*     No lab results found.    ASSESSMENT AND PLAN:   #ALAN of unknown etiology that has resolved after all his medications were held is likely hemodynamically mediated and secondary to polypharmacy    #CKD stage 3a  eGFR around 45 mL/min  At baseline with no evidence of proteinuria off ACE inhibitors  A  renal ultrasound shows unremarkable kidneys  The potential responsible factors include vascular disease, hypertension and normal decline related to age. No documented intake of NSAIDs or other nephrotoxic medications. The risk of progression is low provided there are no additional episodes of ALAN. His BP control needs to be optimized.  The patient was  instructed to keep the sodium intake around 2400 mg /day, follow a plant-based diet and to avoid NSAIDs     #HTN  Primary and secondary to CKD. Current BP in clinic is 138/86. Will increase his carvedilol again to 12.5 mg twice daily. The patient was also instructed to keep a BP diary and to communicate the results    #Type 2 DM   Hba1c 5.8 in May 2023, but as his kidney function has improved will restart metformin 500 mg once daily    #CVD/dyslipidemia  Given his history of CVA, he was restarted on his last visit on atorvastatin 40 mg daily     #Blood count  Hemoglobin 14.7 -> 14.1 no acute issue    #Acid-base status  CO2 level 28 -> 29 no need for  sodium bicarbonate supplementation    #Electrolytes  Na  136  K 5.3 -> 4.5     #BMD  Ca  9.7        P 3.5   alb 4.6  iPTH 96 Vitamin D level is 16   He was started on ergocalciferol 41355 units once a week     #Neuropathy/ chronic pain/recent fracture on duloxetine 20 mg daily and gabapentin 200 mg three times a day    #CKD journey/transplant: not a candidate at this point     The total time of this encounter amounted to 40 minutes on the day of the encounter. This time included time spent with the patient, reviewing records, ordering tests, and performing post visit documentation.   Labs ordered include: CBC with diff, Renal Panel, CMP, UA with microscopy, UPCR, UACR    The patient will return to follow up in 3 months    Taylor Carrizales MD  Division of Renal Disease and Hypertension

## 2023-09-11 NOTE — LETTER
9/11/2023       RE: Juli Rodriguez  [address]  [facility] [address]     Dear Colleague,    Thank you for referring your patient, Juli Rodriguez, to the [facility] NEPHROLOGY [facility]IN[facility] [place] at [facility]. Please see a copy of my visit note below.    Nephrology Clinic    Juli Rodriguez MRN:[id number] YOB: 1945  Date of Service: 09/11/2023  Primary care provider: Ernie Arreola  Requesting physician: Ernie Arreola      REASON FOR CONSULT: ALAN vs CKD    HISTORY OF PRESENT ILLNESS:   Juli Rodriguez is a 78 year old male who  first presented  for evaluation of ALAN and CKD in June 2023.  The past medical history is significant for type 2 DM for more than 10 years, HTN for  10 years ,CVA in 2013 and CKD.  Regarding his type 2 DM, his glycemia is well controlled and his last Hba1c is 5.8 on 5/22/2023.  From a renal standpoint, his baseline creatinine level was around 1.4 mg/dL up to September 2021. He was thereafter lost to follow up in [place] as he moved to [place] and a kidney function was checked again in May 2023 and his creatinine level was noticed then to be 2.54 mg/dL. A repeat level on 6/12/2023 is 1.56 mg/dL and 1.27 mg/dL on 9/11. He reported that he had been following up regularly in [place] with a provider there up to January 2023 and that his renal function was stable.  The UACR is negative for any proteinuria on 5/22/2023 and also on 9/11/2023  There is no renal imaging available. He reports that his PCP stopped all his medications when he discovered that his kidney function was down and that his glycemia has remained well controlled off metformin and that his BP has remained reasonably well controlled. He denies any intake of NSAIDs or other nephrotoxic medications. On his last visit he was restarted on carvedilol 12.5 mg twice daily which was decreased later on 6.25 mg twice daily when he fell in July 2023.   Also since  last seen the patient was admitted to the hospital after he had a fall. A CT of the head that showed age-related and chronic small vessel disease but no hemorrhage or acute stroke. CT of the chest abdomen pelvis showed nondisplaced avulsion fractures of the left transverse process at L1 and L2.   The patient denies any dysuria, any pollakiuria, any nocturia, any LE edema, any dyspnea on exertion .  The following portions of the patient's history were reviewed and updated as appropriate: allergies, current medications, past family history, past medical history, past social history, past surgical history and problem list.    EXAMINATION: US RENAL COMPLETE NON-VASCULAR, 6/14/2023 1:02 PM      COMPARISON: None.     HISTORY: CKD (chronic kidney disease) stage 4, GFR 15-29 ml/min (H);  elevated creatinine level     TECHNIQUE: The kidneys and bladder were scanned in the standard  fashion with specialized ultrasound transducer(s) using both gray  scale and limited color/spectral Doppler techniques.     FINDINGS:     Right kidney: Measures 11.0 cm in length. Parenchyma is of normal  thickness and echogenicity. No focal mass. No hydronephrosis.     Left kidney: Measures 10.3 cm in length. Parenchyma is of normal  thickness and echogenicity. No focal mass. No hydronephrosis.      Bladder: Partially distended.                                                                      IMPRESSION: Unremarkable renal ultrasound.     I have personally reviewed the examination and initial interpretation  and I agree with the findings.     HARISH BENOIT MD   PAST MEDICAL HISTORY:  Past Medical History:   Diagnosis Date    Cerebral infarction (H) 08/2013    In [facility]    Diabetes mellitus, type 2 (H)     Stroke (H)      PAST SURGICAL HISTORY:  Past Surgical History:   Procedure Laterality Date    CATARACT IOL, RT/LT Bilateral 2007 and 2015    HERNIA REPAIR      INJECT MAJOR JOINT / BURSA  9/18/2020    JOINT REPLACEMENT, HIP RT/LT  Right      MEDICATIONS:  Prescription Medications as of 9/11/2023         Rx Number Disp Refills Start End Last Dispensed Date Next Fill Date Owning Pharmacy    acetaminophen (TYLENOL) 500 MG tablet            Sig: Take 1,000 mg by mouth 3 times daily    Class: Historical    Route: Oral    aspirin 81 MG EC tablet  60 tablet 4 6/12/2023 4/7/2024   [id number] - [place], [place] - [address]    Sig: Take 1 tablet (81 mg) by mouth daily for 300 days    Class: E-Prescribe    Route: Oral    atorvastatin (LIPITOR) 20 MG tablet            Sig: Take 20 mg by mouth daily    Class: Historical    Route: Oral    blood glucose (NO BRAND SPECIFIED) lancets standard  100 each 3 9/24/2021    [address]    Sig: Use to test blood sugar 2 times daily or as directed.    Class: E-Prescribe    blood glucose (NO BRAND SPECIFIED) test strip  100 strip 3 9/24/2021    [address]    Sig: Use to test blood sugar 2 times daily or as directed.    Class: E-Prescribe    blood glucose monitoring (NO BRAND SPECIFIED) meter device kit  1 kit 0 9/24/2021    [address]    Sig: Use to test blood sugar 2 times daily or as directed.    Class: E-Prescribe    carvedilol (COREG) 12.5 MG tablet            Sig: Take 6.25 mg by mouth 2 times daily (with meals)    Class: Historical    Route: Oral    DULoxetine (CYMBALTA) 20 MG capsule            Sig: Take 20 mg by mouth daily    Class: Historical    Route: Oral    Folic Acid-Vit B6-Vit B12 (B COMPLEX-FOLIC ACID PO)            Sig: Take 1 capsule by mouth daily    Class: Historical    Route: Oral    gabapentin (NEURONTIN) 100 MG capsule    7/31/2023        Sig: Take 2 capsules (200 mg) by mouth 3 times daily    Class: Historical    Route: Oral    Lidocaine (LIDOCARE) 4 % Patch            Sig: Place 1 patch onto the skin every 24 hours To prevent lidocaine toxicity,  patient should be patch free for 12 hrs daily.    Class: Historical    Route: Transdermal    loratadine (CLARITIN) 10 MG tablet  90 tablet 3 5/22/2023    [facility] #[id number] - [place], [place] - [address]    Sig: Take 1 tablet (10 mg) by mouth daily    Class: E-Prescribe    Route: Oral    melatonin 3 MG tablet            Sig: Take 3 mg by mouth nightly as needed for sleep    Class: Historical    Route: Oral    order for DME  1 each 0 3/6/2020    [facility] - [place], [place] - [address]    Sig: Diabetic shoes One Pair    Class: Local Print    order for DME  1 each 1 2/26/2018    [facility] #[id number] - [place], [place] - [address]    Sig: Equipment being ordered: Diabetic Shoes    Class: Local Print    oxyCODONE (ROXICODONE) 5 MG tablet  30 tablet 0 7/27/2023    [place], [place] - [address]    Sig: Take 1 tablet (5 mg) by mouth every 8 hours as needed for severe pain For severe pain    Class: E-Prescribe    Earliest Fill Date: 7/27/2023    Route: Oral    pantoprazole (PROTONIX) 40 MG EC tablet            Sig: Take 40 mg by mouth daily    Class: Historical    Route: Oral    polyethylene glycol (MIRALAX) 17 g packet            Sig: Take 17 g by mouth 2 times daily    Class: Historical    Route: Oral    senna-docusate (SENOKOT-S/PERICOLACE) 8.6-50 MG tablet            Sig: Take 2 tablets by mouth 2 times daily    Class: Historical    Route: Oral    vitamin D2 (ERGOCALCIFEROL) 75457 units (1250 mcg) capsule    7/24/2023        Sig: Take 50,000 Units by mouth once a week    Class: Historical    Route: Oral           ALLERGIES:    Allergies   Allergen Reactions    Beef-Derived Products Other (See Comments)     Sneezing when eats beef    Eggs [Chicken-Derived Products (Egg)]      REVIEW OF SYSTEMS:  Review Of Systems  Skin: negative for, pigmentation, acne, rash, scaling, itching, bruising, lumps or bumps  Eyes: negative for, visual blurring,  double vision, glaucoma, cataracts, eye pain, color blindness, glasses, contacts  Ears/Nose/Throat: negative for, nasal congestion, purulent rhinorrhea, sneezing, postnasal drainage, hearing loss, deafness, tinnitus  Respiratory: No shortness of breath, dyspnea on exertion, cough, or hemoptysis  Cardiovascular: negative for, palpitations, tachycardia, irregular heart beat, chest pain, exertional chest pain or pressure, paroxysmal nocturnal dyspnea and dyspnea on exertion  Gastrointestinal: negative for, poor appetite, dysphagia, nausea, vomiting, heartburn, dyspepsia, reflux and hematemesis  Genitourinary: negative for, nocturia, dysuria, frequency, urgency, hesitancy, hematuria, retention, decreased urinary stream and incontinence  Musculoskeletal: negative for, fracture, back pain, neck pain, arthritis, joint pain, joint swelling, joint stiffness, gout and fibromyalgia  Neurologic: negative for, headaches, syncope, stroke, seizures, paralysis, local weakness, numbness or tingling of hands and numbness or tingling of feet    A comprehensive review of systems was performed and found to be negative except as described here or above.  SOCIAL HISTORY:   Social History     Socioeconomic History    Marital status:      Spouse name: Not on file    Number of children: Not on file    Years of education: Not on file    Highest education level: Not on file   Occupational History    Not on file   Tobacco Use    Smoking status: Never    Smokeless tobacco: Never   Vaping Use    Vaping Use: Never used   Substance and Sexual Activity    Alcohol use: No    Drug use: No    Sexual activity: Not Currently   Other Topics Concern    Not on file   Social History Narrative    Not on file     Social Determinants of Health     Financial Resource Strain: Not on file   Food Insecurity: Not on file   Transportation Needs: Not on file   Physical Activity: Not on file   Stress: Not on file   Social Connections: Not on file   Intimate  Partner Violence: Not on file   Housing Stability: Not on file     FAMILY MEDICAL HISTORY:   Family History   Problem Relation Age of Onset    Diabetes No family hx of     Coronary Artery Disease No family hx of     Hypertension No family hx of     Hyperlipidemia No family hx of     Cerebrovascular Disease No family hx of     Breast Cancer No family hx of     Colon Cancer No family hx of     Prostate Cancer No family hx of     Other Cancer No family hx of     Depression No family hx of     Anxiety Disorder No family hx of     Mental Illness No family hx of     Substance Abuse No family hx of     Anesthesia Reaction No family hx of     Asthma No family hx of     Osteoporosis No family hx of     Genetic Disorder No family hx of     Thyroid Disease No family hx of     Obesity No family hx of     Glaucoma No family hx of     Macular Degeneration No family hx of      PHYSICAL EXAM:   There were no vitals taken for this visit.  GENERAL APPEARANCE: alert and no distress  EYES: nonicteric  HENT: mouth without ulcers or lesions  NECK: supple, no adenopathy  RESP: lungs clear to auscultation   CV: regular rhythm, normal rate, no rub  ABDOMEN: soft, nontender, normal bowel sounds, no HSM   Extremities: no clubbing, cyanosis, or edema  MS: no evidence of inflammation in joints, no muscle tenderness  SKIN: no rash  NEURO: mentation intact and speech normal  PSYCH: affect normal/bright   LABS:   Recent Results (from the past 672 hour(s))   Comprehensive metabolic panel    Collection Time: 09/11/23 10:52 AM   Result Value Ref Range    Sodium 140 136 - 145 mmol/L    Potassium 4.5 3.4 - 5.3 mmol/L    Chloride 101 98 - 107 mmol/L    Carbon Dioxide (CO2) 29 22 - 29 mmol/L    Anion Gap 10 7 - 15 mmol/L    Urea Nitrogen 20.9 8.0 - 23.0 mg/dL    Creatinine 1.27 (H) 0.67 - 1.17 mg/dL    Calcium 10.0 8.8 - 10.2 mg/dL    Glucose 150 (H) 70 - 99 mg/dL    Alkaline Phosphatase 103 40 - 129 U/L    AST 18 0 - 45 U/L    ALT 9 0 - 70 U/L     Protein Total 8.0 6.4 - 8.3 g/dL    Albumin 4.6 3.5 - 5.2 g/dL    Bilirubin Total 0.7 <=1.2 mg/dL    GFR Estimate 58 (L) >60 mL/min/1.73m2   CBC with platelets and differential    Collection Time: 09/11/23 10:52 AM   Result Value Ref Range    WBC Count 4.8 4.0 - 11.0 10e3/uL    RBC Count 4.15 (L) 4.40 - 5.90 10e6/uL    Hemoglobin 14.1 13.3 - 17.7 g/dL    Hematocrit 41.0 40.0 - 53.0 %    MCV 99 78 - 100 fL    MCH 34.0 (H) 26.5 - 33.0 pg    MCHC 34.4 31.5 - 36.5 g/dL    RDW 11.1 10.0 - 15.0 %    Platelet Count 206 150 - 450 10e3/uL    % Neutrophils 46 %    % Lymphocytes 36 %    % Monocytes 13 %    % Eosinophils 4 %    % Basophils 1 %    % Immature Granulocytes 0 %    NRBCs per 100 WBC 0 <1 /100    Absolute Neutrophils 2.2 1.6 - 8.3 10e3/uL    Absolute Lymphocytes 1.7 0.8 - 5.3 10e3/uL    Absolute Monocytes 0.6 0.0 - 1.3 10e3/uL    Absolute Eosinophils 0.2 0.0 - 0.7 10e3/uL    Absolute Basophils 0.1 0.0 - 0.2 10e3/uL    Absolute Immature Granulocytes 0.0 <=0.4 10e3/uL    Absolute NRBCs 0.0 10e3/uL     CMP  Recent Labs   Lab Test 09/11/23  1052 07/21/23  1149 06/12/23  1511 05/22/23  1417 09/24/21  1204 06/07/21  1607 10/16/20  1545 09/18/20  1417 02/04/20  1426 07/12/19  0911    138 136 136   < > 142 136 136.8 135.5  --    POTASSIUM 4.5 4.6 5.3 4.8   < > 4.5 4.3 3.6 4.2  --    CHLORIDE 101 98 98 102   < > 109 105 99.8 98.4  --    CO2 29 28 28 20*   < > 29 26 21.5 29.2  --    ANIONGAP 10 12 10 14   < > 4 6  --   --    < >   * 104* 121*  124* 112*   < > 139* 115* 169.5* 170.7*  --    BUN 20.9 23.0 26.4* 39.2*   < > 24 22 34.5* 20.2  --    CR 1.27* 1.31* 1.56* 2.54*   < > 1.39* 1.45* 1.5* 1.2  --    GFRESTIMATED 58* 56* 45* 25*   < > 49* 47* 49.8* 63.6  --    GFRESTBLACK  --   --   --   --   --  56* 54* 60.3 76.9  --    WILIAM 10.0 9.5 9.7 9.1   < > 9.5 9.3 9.4 9.3  --    PHOS  --   --  3.5 4.5  --   --   --   --   --   --    PROTTOTAL 8.0 7.6  --  7.9  --   --  7.5  --   --   --    ALBUMIN 4.6 4.2 4.6 4.4  --    --  4.0  --   --   --    BILITOTAL 0.7 0.4  --  0.3  --   --  0.8  --   --   --    ALKPHOS 103 113  --  101  --   --  81  --   --   --    AST 18 25  --  21  --   --  13  --   --   --    ALT 9 11  --  15  --   --  19  --   --   --     < > = values in this interval not displayed.     CBC  Recent Labs   Lab Test 09/11/23  1052 06/12/23  1511 05/22/23  1417 06/07/21  1356   HGB 14.1 14.7 13.1* 14.1   WBC 4.8 5.7 4.8  --    RBC 4.15* 4.27* 3.93*  --    HCT 41.0 44.3 41.8 42.8   MCV 99 104* 106* 102.9*   MCH 34.0* 34.4* 33.3* 33.9   MCHC 34.4 33.2 31.3* 32.9   RDW 11.1 11.9 11.9 11.8    186 229  --      INRNo lab results found.  ABGNo lab results found.   URINE STUDIES  Recent Labs   Lab Test 06/12/23  1518   COLOR Light Yellow   APPEARANCE Clear   URINEGLC Negative   URINEBILI Negative   URINEKETONE Negative   SG 1.011   UBLD Negative   URINEPH 6.0   PROTEIN Negative   NITRITE Negative   LEUKEST Large*   RBCU <1   WBCU 33*     No lab results found.    ASSESSMENT AND PLAN:   #ALAN of unknown etiology that has resolved after all his medications were held is likely hemodynamically mediated and secondary to polypharmacy    #CKD stage 3a  eGFR around 45 mL/min  At baseline with no evidence of proteinuria off ACE inhibitors  A  renal ultrasound shows unremarkable kidneys  The potential responsible factors include vascular disease, hypertension and normal decline related to age. No documented intake of NSAIDs or other nephrotoxic medications. The risk of progression is low provided there are no additional episodes of ALAN. His BP control needs to be optimized.  The patient was  instructed to keep the sodium intake around 2400 mg /day, follow a plant-based diet and to avoid NSAIDs     #HTN  Primary and secondary to CKD. Current BP in clinic is 138/86. Will increase his carvedilol again to 12.5 mg twice daily. The patient was also instructed to keep a BP diary and to communicate the results    #Type 2 DM   Hba1c 5.8 in May  2023, but as his kidney function has improved will restart metformin 500 mg once daily    #CVD/dyslipidemia  Given his history of CVA, he was restarted on his last visit on atorvastatin 40 mg daily     #Blood count  Hemoglobin 14.7 -> 14.1 no acute issue    #Acid-base status  CO2 level 28 -> 29 no need for sodium bicarbonate supplementation    #Electrolytes  Na  136  K 5.3 -> 4.5     #BMD  Ca  9.7        P 3.5   alb 4.6  iPTH 96 Vitamin D level is 16   He was started on ergocalciferol 26520 units once a week     #Neuropathy/ chronic pain/recent fracture on duloxetine 20 mg daily and gabapentin 200 mg three times a day    #CKD journey/transplant: not a candidate at this point     The total time of this encounter amounted to 40 minutes on the day of the encounter. This time included time spent with the patient, reviewing records, ordering tests, and performing post visit documentation.   Labs ordered include: CBC with diff, Renal Panel, CMP, UA with microscopy, UPCR, UACR    The patient will return to follow up in 3 months    Taylor Carrizales MD  Division of Renal Disease and Hypertension

## 2023-09-11 NOTE — PATIENT INSTRUCTIONS
It was a pleasure seeing you today. Your kidney function has significantly improved. The following changes to your medications need to be made:  -Increase carvedilol to 12.5 mg twice daily  -Start metformin 500 mg once a day in the evening  -Come back to clinic in 6 months with repeat labs

## 2023-09-26 ENCOUNTER — LAB REQUISITION (OUTPATIENT)
Dept: LAB | Facility: CLINIC | Age: 78
End: 2023-09-26
Payer: COMMERCIAL

## 2023-09-26 DIAGNOSIS — E55.9 VITAMIN D DEFICIENCY, UNSPECIFIED: ICD-10-CM

## 2023-10-06 ENCOUNTER — TELEPHONE (OUTPATIENT)
Dept: NEPHROLOGY | Facility: CLINIC | Age: 78
End: 2023-10-06
Payer: COMMERCIAL

## 2023-10-09 ENCOUNTER — OFFICE VISIT (OUTPATIENT)
Dept: FAMILY MEDICINE | Facility: CLINIC | Age: 78
End: 2023-10-09
Payer: COMMERCIAL

## 2023-10-09 VITALS
HEART RATE: 84 BPM | DIASTOLIC BLOOD PRESSURE: 86 MMHG | OXYGEN SATURATION: 99 % | SYSTOLIC BLOOD PRESSURE: 131 MMHG | WEIGHT: 185 LBS | BODY MASS INDEX: 24.41 KG/M2

## 2023-10-09 DIAGNOSIS — E11.9 TYPE 2 DIABETES MELLITUS WITHOUT COMPLICATION, WITHOUT LONG-TERM CURRENT USE OF INSULIN (H): Primary | ICD-10-CM

## 2023-10-09 DIAGNOSIS — N18.31 STAGE 3A CHRONIC KIDNEY DISEASE (H): ICD-10-CM

## 2023-10-09 DIAGNOSIS — K59.01 SLOW TRANSIT CONSTIPATION: ICD-10-CM

## 2023-10-09 DIAGNOSIS — I10 BENIGN ESSENTIAL HYPERTENSION: ICD-10-CM

## 2023-10-09 DIAGNOSIS — Z86.73 HISTORY OF CVA (CEREBROVASCULAR ACCIDENT): ICD-10-CM

## 2023-10-09 DIAGNOSIS — K21.9 GASTROESOPHAGEAL REFLUX DISEASE WITHOUT ESOPHAGITIS: ICD-10-CM

## 2023-10-09 DIAGNOSIS — M79.2 NEUROPATHIC PAIN: ICD-10-CM

## 2023-10-09 LAB
ALBUMIN SERPL BCG-MCNC: 4.4 G/DL (ref 3.5–5.2)
ALP SERPL-CCNC: 101 U/L (ref 40–129)
ALT SERPL W P-5'-P-CCNC: 10 U/L (ref 0–70)
ANION GAP SERPL CALCULATED.3IONS-SCNC: 12 MMOL/L (ref 7–15)
AST SERPL W P-5'-P-CCNC: 20 U/L (ref 0–45)
BILIRUB SERPL-MCNC: 0.4 MG/DL
BUN SERPL-MCNC: 17.5 MG/DL (ref 8–23)
CALCIUM SERPL-MCNC: 9.2 MG/DL (ref 8.8–10.2)
CHLORIDE SERPL-SCNC: 105 MMOL/L (ref 98–107)
CHOLEST SERPL-MCNC: 116 MG/DL
CREAT SERPL-MCNC: 1.09 MG/DL (ref 0.67–1.17)
CREAT UR-MCNC: 143 MG/DL
DEPRECATED HCO3 PLAS-SCNC: 22 MMOL/L (ref 22–29)
EGFRCR SERPLBLD CKD-EPI 2021: 69 ML/MIN/1.73M2
GLUCOSE SERPL-MCNC: 146 MG/DL (ref 70–99)
HBA1C MFR BLD: 6.9 % (ref 0–5.6)
HDLC SERPL-MCNC: 43 MG/DL
LDLC SERPL CALC-MCNC: 36 MG/DL
MICROALBUMIN UR-MCNC: 13 MG/L
MICROALBUMIN/CREAT UR: 9.09 MG/G CR (ref 0–17)
NONHDLC SERPL-MCNC: 73 MG/DL
POTASSIUM SERPL-SCNC: 4.3 MMOL/L (ref 3.4–5.3)
PROT SERPL-MCNC: 7.7 G/DL (ref 6.4–8.3)
SODIUM SERPL-SCNC: 139 MMOL/L (ref 135–145)
TRIGL SERPL-MCNC: 187 MG/DL

## 2023-10-09 PROCEDURE — 83036 HEMOGLOBIN GLYCOSYLATED A1C: CPT | Performed by: FAMILY MEDICINE

## 2023-10-09 PROCEDURE — 99214 OFFICE O/P EST MOD 30 MIN: CPT | Mod: 25 | Performed by: FAMILY MEDICINE

## 2023-10-09 PROCEDURE — 80053 COMPREHEN METABOLIC PANEL: CPT | Performed by: FAMILY MEDICINE

## 2023-10-09 PROCEDURE — G0008 ADMIN INFLUENZA VIRUS VAC: HCPCS | Performed by: FAMILY MEDICINE

## 2023-10-09 PROCEDURE — 90662 IIV NO PRSV INCREASED AG IM: CPT | Performed by: FAMILY MEDICINE

## 2023-10-09 PROCEDURE — 82043 UR ALBUMIN QUANTITATIVE: CPT | Performed by: FAMILY MEDICINE

## 2023-10-09 PROCEDURE — 36415 COLL VENOUS BLD VENIPUNCTURE: CPT | Performed by: FAMILY MEDICINE

## 2023-10-09 PROCEDURE — 82570 ASSAY OF URINE CREATININE: CPT | Performed by: FAMILY MEDICINE

## 2023-10-09 PROCEDURE — 80061 LIPID PANEL: CPT | Performed by: FAMILY MEDICINE

## 2023-10-09 RX ORDER — GABAPENTIN 100 MG/1
200 CAPSULE ORAL 3 TIMES DAILY
Status: CANCELLED | OUTPATIENT
Start: 2023-10-09

## 2023-10-09 RX ORDER — ATORVASTATIN CALCIUM 20 MG/1
20 TABLET, FILM COATED ORAL DAILY
Qty: 90 TABLET | Refills: 1 | Status: SHIPPED | OUTPATIENT
Start: 2023-10-09 | End: 2024-06-25

## 2023-10-09 RX ORDER — ACETAMINOPHEN 500 MG
1000 TABLET ORAL 3 TIMES DAILY
Qty: 200 TABLET | Refills: 3 | Status: SHIPPED | OUTPATIENT
Start: 2023-10-09 | End: 2024-09-05

## 2023-10-09 RX ORDER — CARVEDILOL 12.5 MG/1
12.5 TABLET ORAL 2 TIMES DAILY WITH MEALS
Status: CANCELLED | OUTPATIENT
Start: 2023-10-09

## 2023-10-09 RX ORDER — GABAPENTIN 800 MG/1
800 TABLET ORAL 2 TIMES DAILY
COMMUNITY
Start: 2023-08-31 | End: 2023-10-10

## 2023-10-09 RX ORDER — POLYETHYLENE GLYCOL 3350 17 G/17G
17 POWDER, FOR SOLUTION ORAL DAILY
Qty: 510 G | Refills: 1 | Status: SHIPPED | OUTPATIENT
Start: 2023-10-09 | End: 2024-03-19

## 2023-10-09 RX ORDER — CARVEDILOL 12.5 MG/1
12.5 TABLET ORAL 2 TIMES DAILY WITH MEALS
Qty: 180 TABLET | Refills: 3 | Status: SHIPPED | OUTPATIENT
Start: 2023-10-09 | End: 2024-06-24

## 2023-10-09 RX ORDER — LIDOCAINE 50 MG/G
1 PATCH TOPICAL EVERY 24 HOURS
Qty: 30 PATCH | Refills: 3 | Status: SHIPPED | OUTPATIENT
Start: 2023-10-09 | End: 2024-09-05

## 2023-10-09 RX ORDER — LIDOCAINE 4 G/G
1 PATCH TOPICAL EVERY 24 HOURS
Status: CANCELLED | OUTPATIENT
Start: 2023-10-09

## 2023-10-09 RX ORDER — PANTOPRAZOLE SODIUM 40 MG/1
40 TABLET, DELAYED RELEASE ORAL DAILY
Qty: 90 TABLET | Refills: 1 | Status: SHIPPED | OUTPATIENT
Start: 2023-10-09 | End: 2024-09-05

## 2023-10-09 RX ORDER — DULOXETIN HYDROCHLORIDE 20 MG/1
20 CAPSULE, DELAYED RELEASE ORAL DAILY
Qty: 90 CAPSULE | Refills: 1 | Status: SHIPPED | OUTPATIENT
Start: 2023-10-09 | End: 2024-01-17

## 2023-10-09 RX ORDER — ASPIRIN 81 MG/1
81 TABLET ORAL DAILY
Qty: 60 TABLET | Refills: 4 | Status: SHIPPED | OUTPATIENT
Start: 2023-10-09 | End: 2024-03-19

## 2023-10-09 NOTE — PATIENT INSTRUCTIONS
FollowPatient Education   Here is the plan from today's visit    1. Type 2 diabetes mellitus without complication, without long-term current use of insulin (H)    - Hemoglobin A1c; Future  - Lipid panel reflex to direct LDL Fasting; Future  - Albumin Random Urine Quantitative with Creat Ratio; Future  - Comprehensive metabolic panel; Future  - Hemoglobin A1c  - Lipid panel reflex to direct LDL Fasting  - Albumin Random Urine Quantitative with Creat Ratio  - Comprehensive metabolic panel  - blood glucose (NO BRAND SPECIFIED) lancets standard; Use to test blood sugar 2 times daily or as directed.  Dispense: 100 each; Refill: 3  - blood glucose (NO BRAND SPECIFIED) test strip; Use to test blood sugar 2 times daily or as directed.  Dispense: 100 strip; Refill: 3  - blood glucose monitoring (NO BRAND SPECIFIED) meter device kit; Use to test blood sugar 2 times daily or as directed.  Dispense: 1 kit; Refill: 0    2. History of CVA (cerebrovascular accident)    - aspirin 81 MG EC tablet; Take 1 tablet (81 mg) by mouth daily  Dispense: 60 tablet; Refill: 4    3. Benign essential hypertension    - carvedilol (COREG) 12.5 MG tablet; Take 1 tablet (12.5 mg) by mouth 2 times daily (with meals)  Dispense: 180 tablet; Refill: 3    4. Neuropathic pain    - acetaminophen (TYLENOL) 500 MG tablet; Take 2 tablets (1,000 mg) by mouth 3 times daily  Dispense: 200 tablet; Refill: 3  - DULoxetine (CYMBALTA) 20 MG capsule; Take 1 capsule (20 mg) by mouth daily  Dispense: 90 capsule; Refill: 1  - lidocaine (LIDODERM) 5 % patch; Place 1 patch onto the skin every 24 hours To prevent lidocaine toxicity, patient should be patch free for 12 hrs daily.  Dispense: 30 patch; Refill: 3    5. Gastroesophageal reflux disease without esophagitis    - pantoprazole (PROTONIX) 40 MG EC tablet; Take 1 tablet (40 mg) by mouth daily  Dispense: 90 tablet; Refill: 1    6. CKD (chronic kidney disease) stage 4, GFR 15-29 ml/min (H)    - atorvastatin (LIPITOR) 20  MG tablet; Take 1 tablet (20 mg) by mouth daily  Dispense: 90 tablet; Refill: 1    7. Slow transit constipation    - polyethylene glycol (MIRALAX) 17 GM/Dose powder; Take 17 g by mouth daily  Dispense: 510 g; Refill: 1          Please call or return to clinic if your symptoms don't go away.    Follow up plan  Return in about 4 weeks (around 11/6/2023) for Diabetes Routine Check.    Thank you for coming to [facility] today.  Lab Testing:  **If you had lab testing today and your results are reassuring or normal they will be mailed to you or sent through Apiary within 7 days.   **If the lab tests need quick action we will call you with the results.  **If you are having labs done on a different day, please call [phone] to schedule at [facility] or [phone] for other [facility]patient [facility] locations. Labs do not offer walk-in appointments.  The phone number we will call with results is # [phone] (home) . If this is not the best number please call our clinic and change the number.  Medication Refills:  If you need any refills please call your pharmacy and they will contact us.   If you need to  your refill at a new pharmacy, please contact the new pharmacy directly. The new pharmacy will help you get your medications transferred faster.   Scheduling:  If you have any concerns about today's visit or wish to schedule another appointment please call our office during normal business hours [phone] (8-5:00 M-F). If you can no longer make a scheduled visit, please cancel via Apiary or call us to cancel.   If a referral was made to an [facility] specialty provider and you do not get a call from central scheduling, please refer to directions on your visit summary or call our office during normal business hours for assistance.   If a Mammogram was ordered for you at the Breast Center call [phone] to schedule or change your appointment.  If you had an XRay/CT/Ultrasound/MRI  ordered the number is [phone] to schedule or change your radiology appointment.   [facility] has limited ultrasound appointments available on Wednesdays, if you would like your ultrasound at [facility], please call [phone] to schedule.   Medical Concerns:  If you have urgent medical concerns please call [phone] at any time of the day.    Ernie Arreola MD

## 2023-10-09 NOTE — LETTER
October 10, 2023      Juli Rodriguez  [address]   [facility] [address]        Dear Juli,    Thank you for getting your care at [facility]. Please see below for your test results. They are reassuring.    Resulted Orders   Hemoglobin A1c   Result Value Ref Range    Hemoglobin A1C 6.9 (H) 0.0 - 5.6 %      Comment:      Normal <5.7%   Prediabetes 5.7-6.4%    Diabetes 6.5% or higher     Note: Adopted from ADA consensus guidelines.   Lipid panel reflex to direct LDL Fasting   Result Value Ref Range    Cholesterol 116 <200 mg/dL    Triglycerides 187 (H) <150 mg/dL    Direct Measure HDL 43 >=40 mg/dL    LDL Cholesterol Calculated 36 <=100 mg/dL    Non HDL Cholesterol 73 <130 mg/dL    Narrative    Cholesterol  Desirable:  <200 mg/dL    Triglycerides  Normal:  Less than 150 mg/dL  Borderline High:  150-199 mg/dL  High:  200-499 mg/dL  Very High:  Greater than or equal to 500 mg/dL    Direct Measure HDL  Female:  Greater than or equal to 50 mg/dL   Male:  Greater than or equal to 40 mg/dL    LDL Cholesterol  Desirable:  <100mg/dL  Above Desirable:  100-129 mg/dL   Borderline High:  130-159 mg/dL   High:  160-189 mg/dL   Very High:  >= 190 mg/dL    Non HDL Cholesterol  Desirable:  130 mg/dL  Above Desirable:  130-159 mg/dL  Borderline High:  160-189 mg/dL  High:  190-219 mg/dL  Very High:  Greater than or equal to 220 mg/dL   Albumin Random Urine Quantitative with Creat Ratio   Result Value Ref Range    Creatinine Urine mg/dL 143.0 mg/dL      Comment:      The reference ranges have not been established in urine creatinine. The results should be integrated into the clinical context for interpretation.    Albumin Urine mg/L 13.0 mg/L      Comment:      The reference ranges have not been established in urine albumin. The results should be integrated into the clinical context for interpretation.    Albumin Urine mg/g Cr 9.09 0.00 - 17.00 mg/g Cr      Comment:      Microalbuminuria is defined as an  albumin:creatinine ratio of 17 to 299 for males and 25 to 299 for females. A ratio of albumin:creatinine of 300 or higher is indicative of overt proteinuria.  Due to biologic variability, positive results should be confirmed by a second, first-morning random or 24-hour timed urine specimen. If there is discrepancy, a third specimen is recommended. When 2 out of 3 results are in the microalbuminuria range, this is evidence for incipient nephropathy and warrants increased efforts at glucose control, blood pressure control, and institution of therapy with an angiotensin-converting-enzyme (ACE) inhibitor (if the patient can tolerate it).     Comprehensive metabolic panel   Result Value Ref Range    Sodium 139 135 - 145 mmol/L      Comment:      Reference intervals for this test were updated on 09/26/2023 to more accurately reflect our healthy population. There may be differences in the flagging of prior results with similar values performed with this method. Interpretation of those prior results can be made in the context of the updated reference intervals.     Potassium 4.3 3.4 - 5.3 mmol/L    Carbon Dioxide (CO2) 22 22 - 29 mmol/L    Anion Gap 12 7 - 15 mmol/L    Urea Nitrogen 17.5 8.0 - 23.0 mg/dL    Creatinine 1.09 0.67 - 1.17 mg/dL    GFR Estimate 69 >60 mL/min/1.73m2    Calcium 9.2 8.8 - 10.2 mg/dL    Chloride 105 98 - 107 mmol/L    Glucose 146 (H) 70 - 99 mg/dL    Alkaline Phosphatase 101 40 - 129 U/L    AST 20 0 - 45 U/L      Comment:      Reference intervals for this test were updated on 6/12/2023 to more accurately reflect our healthy population. There may be differences in the flagging of prior results with similar values performed with this method. Interpretation of those prior results can be made in the context of the updated reference intervals.    ALT 10 0 - 70 U/L      Comment:      Reference intervals for this test were updated on 6/12/2023 to more accurately reflect our healthy population. There may be  differences in the flagging of prior results with similar values performed with this method. Interpretation of those prior results can be made in the context of the updated reference intervals.      Protein Total 7.7 6.4 - 8.3 g/dL    Albumin 4.4 3.5 - 5.2 g/dL    Bilirubin Total 0.4 <=1.2 mg/dL       As we discussed at our last visit, please follow up with a clinic appointment to review these results further.    Sincerely,    Ernie Arreola MD

## 2023-10-09 NOTE — PROGRESS NOTES
Assessment & Plan     Type 2 diabetes mellitus without complication, without long-term current use of insulin (H)  Diabetes is well controlled.  Plan to continue current regimen.  - Hemoglobin A1c; Future  - Lipid panel reflex to direct LDL Fasting; Future  - Albumin Random Urine Quantitative with Creat Ratio; Future  - Comprehensive metabolic panel; Future  - Hemoglobin A1c  - Lipid panel reflex to direct LDL Fasting  - Albumin Random Urine Quantitative with Creat Ratio  - Comprehensive metabolic panel  - blood glucose (NO BRAND SPECIFIED) lancets standard; Use to test blood sugar 2 times daily or as directed.  - blood glucose (NO BRAND SPECIFIED) test strip; Use to test blood sugar 2 times daily or as directed.  - blood glucose monitoring (NO BRAND SPECIFIED) meter device kit; Use to test blood sugar 2 times daily or as directed.  - Miscellaneous Order for DME - ONLY FOR DME    History of CVA (cerebrovascular accident)  Patient history of CVA and his that right-sided paraparesis is stable he is doing well we will continue on current anticoagulation.  - aspirin 81 MG EC tablet; Take 1 tablet (81 mg) by mouth daily    Benign essential hypertension  Blood pressure is controlled with Karve it will all we will continue  - carvedilol (COREG) 12.5 MG tablet; Take 1 tablet (12.5 mg) by mouth 2 times daily (with meals)    Neuropathic pain  Patient is on higher doses of gabapentin.  - acetaminophen (TYLENOL) 500 MG tablet; Take 2 tablets (1,000 mg) by mouth 3 times daily  - DULoxetine (CYMBALTA) 20 MG capsule; Take 1 capsule (20 mg) by mouth daily  - lidocaine (LIDODERM) 5 % patch; Place 1 patch onto the skin every 24 hours To prevent lidocaine toxicity, patient should be patch free for 12 hrs daily.  Gabapentin 800 mg twice daily  Gastroesophageal reflux disease without esophagitis  We discussed possibly switching to famotidine patient would like to not do that at this point.  - pantoprazole (PROTONIX) 40 MG EC tablet;  Take 1 tablet (40 mg) by mouth daily    CKD (chronic kidney disease) stage 3A  Kidney function has recovered   - atorvastatin (LIPITOR) 20 MG tablet; Take 1 tablet (20 mg) by mouth daily    Slow transit constipation  continue  - polyethylene glycol (MIRALAX) 17 GM/Dose powder; Take 17 g by mouth daily      I spent a total of 32 minutes on the day of the visit.   Time spent by me doing chart review, history and exam, documentation and further activities per the note           Return in about 4 weeks (around 11/6/2023) for Diabetes Routine Check.    Ernie Arreola MD  [facility] DORCAS Bustos is a 78 year old, presenting for the following health issues:  Consult (Pain on right side from head to toe. And a form to be filled out)      HPI           July 4th fainted and fell and had  rib and L2 spine fracture   Was in hospital, was in rehab -now is living alone in an apartment.  Would like to live with other people though does not have family to live with. Son I s  and most of th time he is on the road.   He reports he continues to have pretty widespread pain.  He also reports that his kidneys have recovered enough that the nephrologist felt he could go back on metformin.  He presents today for reestablishing care and monitoring medications.    Review of Systems         Objective    /86   Pulse 84   Wt 83.9 kg (185 lb)   SpO2 99%   BMI 24.41 kg/m    Body mass index is 24.41 kg/m .  Physical Exam  Vitals reviewed.   Constitutional:       General: He is not in acute distress.     Appearance: He is well-developed. He is not diaphoretic.   HENT:      Head: Normocephalic.   Eyes:      General: No scleral icterus.     Conjunctiva/sclera: Conjunctivae normal.   Neck:      Thyroid: No thyromegaly.   Cardiovascular:      Rate and Rhythm: Normal rate and regular rhythm.      Heart sounds: Normal heart sounds. No murmur heard.  Pulmonary:      Effort: Pulmonary effort is normal.  No respiratory distress.      Breath sounds: Normal breath sounds. No wheezing.   Musculoskeletal:      Cervical back: Normal range of motion.        Legs:       Comments: Difficulty with movement on Right side -paraparesis   Feet:      Right foot:      Protective Sensation: 10 sites tested.  10 sites sensed.      Skin integrity: Skin integrity normal.      Toenail Condition: Right toenails are normal.      Left foot:      Protective Sensation: 10 sites tested.  10 sites sensed.      Skin integrity: Skin integrity normal.      Toenail Condition: Left toenails are normal.   Lymphadenopathy:      Cervical: No cervical adenopathy.   Skin:     General: Skin is warm and dry.   Neurological:      Mental Status: He is alert and oriented to person, place, and time.   Psychiatric:         Behavior: Behavior normal.         Thought Content: Thought content normal.         Judgment: Judgment normal.          Diabetic foot exam: normal DP and PT pulses, no trophic changes or ulcerative lesions, normal sensory exam, and normal monofilament exam

## 2023-10-10 ENCOUNTER — TELEPHONE (OUTPATIENT)
Dept: FAMILY MEDICINE | Facility: CLINIC | Age: 78
End: 2023-10-10
Payer: COMMERCIAL

## 2023-10-10 DIAGNOSIS — M79.2 NEUROPATHIC PAIN: Primary | ICD-10-CM

## 2023-10-10 DIAGNOSIS — E11.42 TYPE 2 DIABETES MELLITUS WITH DIABETIC POLYNEUROPATHY, WITHOUT LONG-TERM CURRENT USE OF INSULIN (H): ICD-10-CM

## 2023-10-10 PROBLEM — N18.31 STAGE 3A CHRONIC KIDNEY DISEASE (H): Status: ACTIVE | Noted: 2021-09-24

## 2023-10-10 RX ORDER — GABAPENTIN 800 MG/1
800 TABLET ORAL 2 TIMES DAILY
Qty: 180 TABLET | Refills: 1 | Status: SHIPPED | OUTPATIENT
Start: 2023-10-10 | End: 2024-03-19

## 2023-10-13 ENCOUNTER — TELEPHONE (OUTPATIENT)
Dept: FAMILY MEDICINE | Facility: CLINIC | Age: 78
End: 2023-10-13
Payer: COMMERCIAL

## 2023-10-13 NOTE — TELEPHONE ENCOUNTER
"Demetra from [facility] left message on Care Coordinators voicemail stating that \"the order written for patient DM shoes and inserts is invalid and a new Rx needs to be written and specifically state \"diabetic shoes and quantity 3 inserts\". New order can then be faxed to Demetra at [phone]. Forwarding message to .      Nicole Becerra  Care Coordinator  [facility]  [phone]    "

## 2023-10-16 ENCOUNTER — TELEPHONE (OUTPATIENT)
Dept: FAMILY MEDICINE | Facility: CLINIC | Age: 78
End: 2023-10-16

## 2023-10-16 DIAGNOSIS — E11.42 TYPE 2 DIABETES MELLITUS WITH DIABETIC POLYNEUROPATHY, WITHOUT LONG-TERM CURRENT USE OF INSULIN (H): Primary | ICD-10-CM

## 2023-10-16 NOTE — TELEPHONE ENCOUNTER
"I have written 2 orders the last on 10/10 for [employer] that says in it \"one pair of diabetic shoes with 3 inserts\" -can you find out what I need to do?  Thanks Bradly Tao  "

## 2023-12-14 ENCOUNTER — TRANSFERRED RECORDS (OUTPATIENT)
Dept: HEALTH INFORMATION MANAGEMENT | Facility: CLINIC | Age: 78
End: 2023-12-14

## 2024-01-17 ENCOUNTER — OFFICE VISIT (OUTPATIENT)
Dept: FAMILY MEDICINE | Facility: CLINIC | Age: 79
End: 2024-01-17
Payer: COMMERCIAL

## 2024-01-17 VITALS
DIASTOLIC BLOOD PRESSURE: 85 MMHG | OXYGEN SATURATION: 99 % | SYSTOLIC BLOOD PRESSURE: 125 MMHG | BODY MASS INDEX: 27.68 KG/M2 | TEMPERATURE: 98.3 F | WEIGHT: 204.4 LBS | RESPIRATION RATE: 16 BRPM | HEIGHT: 72 IN | HEART RATE: 86 BPM

## 2024-01-17 DIAGNOSIS — M79.2 NEUROPATHIC PAIN: ICD-10-CM

## 2024-01-17 DIAGNOSIS — M70.61 GREATER TROCHANTERIC BURSITIS OF RIGHT HIP: ICD-10-CM

## 2024-01-17 DIAGNOSIS — E11.42 TYPE 2 DIABETES MELLITUS WITH DIABETIC POLYNEUROPATHY, WITHOUT LONG-TERM CURRENT USE OF INSULIN (H): Primary | ICD-10-CM

## 2024-01-17 DIAGNOSIS — Z29.11 NEED FOR VACCINATION AGAINST RESPIRATORY SYNCYTIAL VIRUS: ICD-10-CM

## 2024-01-17 DIAGNOSIS — N18.31 STAGE 3A CHRONIC KIDNEY DISEASE (H): ICD-10-CM

## 2024-01-17 PROBLEM — N18.4 CKD (CHRONIC KIDNEY DISEASE) STAGE 4, GFR 15-29 ML/MIN (H): Status: ACTIVE | Noted: 2024-01-17

## 2024-01-17 PROBLEM — N18.4 CKD (CHRONIC KIDNEY DISEASE) STAGE 4, GFR 15-29 ML/MIN (H): Status: RESOLVED | Noted: 2024-01-17 | Resolved: 2024-01-17

## 2024-01-17 LAB
ANION GAP SERPL CALCULATED.3IONS-SCNC: 11 MMOL/L (ref 3–14)
BUN SERPL-MCNC: 20 MG/DL (ref 7–30)
CALCIUM SERPL-MCNC: 10 MG/DL (ref 8.5–10.1)
CHLORIDE BLD-SCNC: 103 MMOL/L (ref 94–109)
CO2 SERPL-SCNC: 30 MMOL/L (ref 20–32)
CREAT SERPL-MCNC: 2.4 MG/DL (ref 0.66–1.25)
EGFRCR SERPLBLD CKD-EPI 2021: 27 ML/MIN/1.73M2
GLUCOSE BLD-MCNC: 179 MG/DL (ref 70–99)
HBA1C MFR BLD: 6.7 % (ref 0–5.6)
POTASSIUM BLD-SCNC: 5.2 MMOL/L (ref 3.4–5.3)
SODIUM SERPL-SCNC: 144 MMOL/L (ref 135–145)

## 2024-01-17 PROCEDURE — 83036 HEMOGLOBIN GLYCOSYLATED A1C: CPT | Performed by: FAMILY MEDICINE

## 2024-01-17 PROCEDURE — 36415 COLL VENOUS BLD VENIPUNCTURE: CPT | Performed by: FAMILY MEDICINE

## 2024-01-17 PROCEDURE — 99214 OFFICE O/P EST MOD 30 MIN: CPT | Mod: 25 | Performed by: FAMILY MEDICINE

## 2024-01-17 PROCEDURE — 80048 BASIC METABOLIC PNL TOTAL CA: CPT | Performed by: FAMILY MEDICINE

## 2024-01-17 PROCEDURE — 20610 DRAIN/INJ JOINT/BURSA W/O US: CPT | Performed by: FAMILY MEDICINE

## 2024-01-17 RX ORDER — TRIAMCINOLONE ACETONIDE 40 MG/ML
40 INJECTION, SUSPENSION INTRA-ARTICULAR; INTRAMUSCULAR ONCE
Status: COMPLETED | OUTPATIENT
Start: 2024-01-17 | End: 2024-01-17

## 2024-01-17 RX ORDER — RESPIRATORY SYNCYTIAL VIRUS VACCINE 120MCG/0.5
0.5 KIT INTRAMUSCULAR ONCE
Qty: 1 EACH | Refills: 0 | Status: CANCELLED | OUTPATIENT
Start: 2024-01-17 | End: 2024-01-17

## 2024-01-17 RX ORDER — DULOXETIN HYDROCHLORIDE 20 MG/1
20 CAPSULE, DELAYED RELEASE ORAL DAILY
Qty: 90 CAPSULE | Refills: 1 | Status: SHIPPED | OUTPATIENT
Start: 2024-01-17 | End: 2024-06-25

## 2024-01-17 RX ADMIN — TRIAMCINOLONE ACETONIDE 40 MG: 40 INJECTION, SUSPENSION INTRA-ARTICULAR; INTRAMUSCULAR at 17:42

## 2024-01-17 NOTE — PATIENT INSTRUCTIONS
Patient Education   Here is the plan from today's visit    1. Type 2 diabetes mellitus with diabetic polyneuropathy, without long-term current use of insulin (H)  controlled  - Hemoglobin A1c; Future  - Hemoglobin A1c    2. Stage 3a chronic kidney disease (H)    - Basic metabolic panel; Future  - Basic metabolic panel    3. Need for vaccination against respiratory syncytial virus      4. Neuropathic pain    - DULoxetine (CYMBALTA) 20 MG capsule; Take 1 capsule (20 mg) by mouth daily  Dispense: 90 capsule; Refill: 1    5. Greater trochanteric bursitis of right hip  Follow up if pain not improved          Please call or return to clinic if your symptoms don't go away.    Follow up plan  Return in about 3 months (around 4/17/2024), or if symptoms worsen or fail to improve, for Diabetes Routine Check.    Thank you for coming to [facility] today.  Lab Testing:  **If you had lab testing today and your results are reassuring or normal they will be mailed to you or sent through [employer] within 7 days.   **If the lab tests need quick action we will call you with the results.  **If you are having labs done on a different day, please call [phone] to schedule at [facility] or [phone] for other [facility] locations. Labs do not offer walk-in appointments.  The phone number we will call with results is # [phone] (home) . If this is not the best number please call our clinic and change the number.  Medication Refills:  If you need any refills please call your pharmacy and they will contact us.   If you need to  your refill at a new pharmacy, please contact the new pharmacy directly. The new pharmacy will help you get your medications transferred faster.   Scheduling:  If you have any concerns about today's visit or wish to schedule another appointment please call our office during normal business hours [phone] (8-5:00 M-F). If you can no longer make a scheduled visit, please  cancel via [employer] or call us to cancel.   If a referral was made to an [facility] [place] specialty provider and you do not get a call from central scheduling, please refer to directions on your visit summary or call our office during normal business hours for assistance.   If a Mammogram was ordered for you at the [facility] call [phone] to schedule or change your appointment.  If you had an XRay/CT/Ultrasound/MRI ordered the number is [phone] to schedule or change your radiology appointment.   [facility] has limited ultrasound appointments available on Wednesdays, if you would like your ultrasound at [facility], please call [phone] to schedule.   Medical Concerns:  If you have urgent medical concerns please call [phone] at any time of the day.    Ernie Arreola MD

## 2024-01-17 NOTE — PROGRESS NOTES
Assessment & Plan     Type 2 diabetes mellitus with diabetic polyneuropathy, without long-term current use of insulin (H)  Well controlled continue current medications  - Hemoglobin A1c; Future  - Hemoglobin A1c    Stage 3a chronic kidney disease (H)  Seen by nephrology, well controlled BP  - Basic metabolic panel; Future  - Basic metabolic panel       Neuropathic pain  For hip pain  - DULoxetine (CYMBALTA) 20 MG capsule; Take 1 capsule (20 mg) by mouth daily  - diclofenac (VOLTAREN) 1 % topical gel; Apply 4 grams to knees or 2 grams to hands four times daily using enclosed dosing card.    Greater trochanteric bursitis of right hip  Injection of GT bursae done and was well tolerated  - Large Joint/Bursa injection and/or drainage - Unilateral (Shoulder, Knee) [20610]  - triamcinolone (KENALOG-40) injection 40 mg    Review of prior external note(s) from - nephrology  Ordering of each unique test  Prescription drug management             Return in about 3 months (around 4/17/2024), or if symptoms worsen or fail to improve, for Diabetes Routine Check.    Ana Bustos is a 79 year old, presenting for the following health issues:  RECHECK (Pt here body pain and diabetes follow up)        1/17/2024     3:45 PM   Additional Questions   Roomed by David   Accompanied by Self     HPI       Diabetes Follow-up    How often are you checking your blood sugar? A few times a month  What time of day are you checking your blood sugars (select all that apply)?  Before meals  Have you had any blood sugars above 200?  No  Have you had any blood sugars below 70?  No  What symptoms do you notice when your blood sugar is low?  None  What concerns do you have today about your diabetes? None   Do you have any of these symptoms? (Select all that apply)  No numbness or tingling in feet.  No redness, sores or blisters on feet.  No complaints of excessive thirst.  No reports of blurry vision.  No significant changes to weight.  Have you  "had a diabetic eye exam in the last 12 months? No                 Do you check your blood pressure regularly outside of the clinic? No   Are you following a low salt diet? Yes  Are your blood pressures ever more than 140 on the top number (systolic) OR more   than 90 on the bottom number (diastolic), for example 140/90? No    BP Readings from Last 2 Encounters:   01/17/24 125/85   10/09/23 131/86     Hemoglobin A1C (%)   Date Value   01/17/2024 6.7 (H)   10/09/2023 6.9 (H)   06/07/2021 6.2 (H)   10/16/2020 5.6     LDL Cholesterol Calculated (mg/dL)   Date Value   10/09/2023 36   05/22/2023 94   07/30/2020 41   07/12/2019 26        Pain on Left side of hip   Unable to sleep on that side   Also c/o increased urination   Has had GT inflammation and was helped by that injection      Objective    /85 (BP Location: Left arm, Patient Position: Sitting, Cuff Size: Adult Large)   Pulse 86   Temp 98.3  F (36.8  C) (Oral)   Resp 16   Ht 1.835 m (6' 0.24\")   Wt 92.7 kg (204 lb 6.4 oz)   SpO2 99%   BMI 27.53 kg/m    Body mass index is 27.53 kg/m .    Physical Exam  Vitals reviewed.   Constitutional:       General: He is not in acute distress.     Appearance: He is well-developed. He is not diaphoretic.   HENT:      Head: Normocephalic.   Eyes:      General: No scleral icterus.     Conjunctiva/sclera: Conjunctivae normal.   Neck:      Thyroid: No thyromegaly.   Cardiovascular:      Rate and Rhythm: Normal rate and regular rhythm.      Heart sounds: Normal heart sounds. No murmur heard.  Pulmonary:      Effort: Pulmonary effort is normal. No respiratory distress.      Breath sounds: Normal breath sounds. No wheezing.   Musculoskeletal:      Cervical back: Normal range of motion.      Right hip: Tenderness (over GT) present.      Left hip: No tenderness.      Left lower leg: No edema.   Skin:     General: Skin is warm and dry.   Neurological:      Mental Status: He is alert and oriented to person, place, and time. "   Psychiatric:         Behavior: Behavior normal.         Thought Content: Thought content normal.         Judgment: Judgment normal.                    Signed Electronically by: Ernie Arreola MD

## 2024-01-17 NOTE — PROCEDURES
[facility]   Injection Note    Juli Rodriguez is a patient of Ernie Carey here for injection for GT bursitis of the right GT bursda.    Consent: Affirmation of informed consent was discussed verbaslly Risks, benefits and alternatives were discussed. Patient's questions were elicited and answered.       Preoperative Diagnosis:GT Bursitis  Postoperative Diagnosis: same       The right Skin over lying the GT  was prepped  in the usual sterile fashion INJECTION:  Using 4 mL of 1% lidocaine mixed with 40 mg of kenalog, the   was successfully injected without complication.  Patient did experience some pain relief following injection.    Technique:   Skin prep Technicare  Anesthesia 1% lidocaine  Complications:  No  Tolerance:  Pt tolerated procedure well and was in stable condition.     Follow up: Patient was instructed to use ice on the affected area tonight for at least 30 minutes and  that there will be a return to pain in a couple hours followed by relief in the next several days to a week. Patient was advised to call if increasing redness and swelling.     Follow up in 2-3 months.    Faculty: Ernie Arreola MD present for and supervised this entire procedure.

## 2024-01-17 NOTE — LETTER
January 19, 2024      Juli Rodriguez  [address]   [facility] [address]        Dear Juli,    Thank you for getting your care at [facility]. Please see below for your test results.    Resulted Orders   Hemoglobin A1c   Result Value Ref Range    Hemoglobin A1C 6.7 (H) 0.0 - 5.6 %      Comment:      Normal <5.7%   Prediabetes 5.7-6.4%    Diabetes 6.5% or higher     Note: Adopted from ADA consensus guidelines.   Basic metabolic panel   Result Value Ref Range    Sodium 144 135 - 145 mmol/L    Potassium 5.2 3.4 - 5.3 mmol/L    Chloride 103 94 - 109 mmol/L    Carbon Dioxide (CO2) 30 20 - 32 mmol/L    Anion Gap 11 3 - 14 mmol/L    Urea Nitrogen 20 7 - 30 mg/dL    Creatinine 2.40 (H) 0.66 - 1.25 mg/dL    GFR Estimate 27 (L) >60 mL/min/1.73m2    Calcium 10.0 8.5 - 10.1 mg/dL    Glucose 179 (H) 70 - 99 mg/dL       Please call the clinic for a clinic appointment to further reveiw these results.    Sincerely,    Ernie Arreola MD

## 2024-02-14 DIAGNOSIS — E11.9 DIABETIC EYE EXAM (H): Primary | ICD-10-CM

## 2024-02-14 DIAGNOSIS — Z01.00 DIABETIC EYE EXAM (H): Primary | ICD-10-CM

## 2024-02-14 DIAGNOSIS — Z03.89 ENCOUNTER FOR OBSERVATION FOR OTHER SUSPECTED DISEASES AND CONDITIONS RULED OUT: ICD-10-CM

## 2024-02-14 NOTE — PROGRESS NOTES
HPI:  Patient presents for a diabetic eye exam. Patient complains of watering and burning, both eyes.     Phone .       Pertinent Medical History:  Type 2 diabetes mellitus  Syncope  Hyperlipidemia  Hypertension  CKD stage 3a  Prostate nodule  Anemia  CVA    Ocular History:  Pseudophakia, both eyes.  Macular RPE mottling, both eyes.      Eye Medications:  None    Assessment and Plan:    #   No diabetic retinopathy, both eyes.   Macular OCT 02/19/2024: Right eye: normal; Left eye: SRF.   Last HA1C was 6.7 on 01/17/2024.   Goal is to keep HA1C under 7.0 to prevent blindness.   Recommend annual diabetic eye exam with macular OCT.      #   Subretinal Fluid vs , left eye.   02/19/2024: Right eye: normal; Left eye: SRF.   Not visually signficant.   Follow up in the retina service.     #   History of Stroke/CVA - 2016  Return for baseline visual field.     #   Glaucoma suspect due to age, cupping, and ethnicity, both eyes.   We discussed that glaucoma is a treatable blinding disease. There is no cure for glaucoma. There are treatments to slow down progression. The only way to treat glaucoma is to lower the eye pressure with eye drops, lasers, and/or surgeries. Recommend regular follow ups to rule out glaucoma.   Return for baseline visual field 24-2, optic nerve OCT, pachymetry, and gonioscopy.      #   History of Stroke/CVA - 2016  Return for baseline visual field.     #   Pseudophakia, both eyes.   Monitor.     #   Posterior Capsular Opacification, left eye.   Not visually significant. Monitor.           Patient consented to a dilated eye exam:    Yes. Side effects discussed.    Medical History:  Past Medical History:   Diagnosis Date    Cerebral infarction (H) 08/2013    In [facility]    Diabetes mellitus, type 2 (H)     Stroke (H)        Medications:  Current Outpatient Medications   Medication Sig Dispense Refill    acetaminophen (TYLENOL) 500 MG tablet Take 2 tablets (1,000 mg) by mouth 3 times daily 200  tablet 3    aspirin 81 MG EC tablet Take 1 tablet (81 mg) by mouth daily 60 tablet 4    atorvastatin (LIPITOR) 20 MG tablet Take 1 tablet (20 mg) by mouth daily 90 tablet 1    blood glucose (NO BRAND SPECIFIED) lancets standard Use to test blood sugar 2 times daily or as directed. 100 each 3    blood glucose (NO BRAND SPECIFIED) test strip Use to test blood sugar 2 times daily or as directed. 100 strip 3    blood glucose monitoring (NO BRAND SPECIFIED) meter device kit Use to test blood sugar 2 times daily or as directed. 1 kit 0    carvedilol (COREG) 12.5 MG tablet Take 1 tablet (12.5 mg) by mouth 2 times daily (with meals) 180 tablet 3    diclofenac (VOLTAREN) 1 % topical gel Apply 4 grams to knees or 2 grams to hands four times daily using enclosed dosing card. 100 g 1    DULoxetine (CYMBALTA) 20 MG capsule Take 1 capsule (20 mg) by mouth daily 90 capsule 1    Folic Acid-Vit B6-Vit B12 (B COMPLEX-FOLIC ACID PO) Take 1 capsule by mouth daily      gabapentin (NEURONTIN) 800 MG tablet Take 1 tablet (800 mg) by mouth 2 times daily 180 tablet 1    Lidocaine (LIDOCARE) 4 % Patch Place 1 patch onto the skin every 24 hours To prevent lidocaine toxicity, patient should be patch free for 12 hrs daily.      lidocaine (LIDODERM) 5 % patch Place 1 patch onto the skin every 24 hours To prevent lidocaine toxicity, patient should be patch free for 12 hrs daily. 30 patch 3    loratadine (CLARITIN) 10 MG tablet Take 1 tablet (10 mg) by mouth daily 90 tablet 3    melatonin 3 MG tablet Take 3 mg by mouth nightly as needed for sleep      metFORMIN (GLUCOPHAGE XR) 500 MG 24 hr tablet Take 1 tablet (500 mg) by mouth daily (with dinner) for 180 days 90 tablet 1    order for DME Diabetic shoes One Pair 1 each 0    order for DME Equipment being ordered: Diabetic Shoes 1 each 1    oxyCODONE (ROXICODONE) 5 MG tablet Take 1 tablet (5 mg) by mouth every 8 hours as needed for severe pain For severe pain 30 tablet 0    pantoprazole (PROTONIX)  40 MG EC tablet Take 1 tablet (40 mg) by mouth daily 90 tablet 1    polyethylene glycol (MIRALAX) 17 GM/Dose powder Take 17 g by mouth daily 510 g 1    senna-docusate (SENOKOT-S/PERICOLACE) 8.6-50 MG tablet Take 2 tablets by mouth 2 times daily      vitamin D2 (ERGOCALCIFEROL) 44425 units (1250 mcg) capsule Take 50,000 Units by mouth once a week     Complete documentation of historical and exam elements from today's encounter can be found in the full encounter summary report (not reduplicated in this progress note). I personally obtained the chief complaint(s) and history of present illness.  I confirmed and edited as necessary the review of systems, past medical/surgical history, family history, social history, and examination findings as documented by others; and I examined the patient myself. I personally reviewed the relevant tests, images, and reports as documented above. I formulated and edited as necessary the assessment and plan and discussed the findings and management plan with the patient and family. - Negra Pierre OD

## 2024-02-19 ENCOUNTER — OFFICE VISIT (OUTPATIENT)
Dept: OPHTHALMOLOGY | Facility: CLINIC | Age: 79
End: 2024-02-19
Attending: OPTOMETRIST
Payer: COMMERCIAL

## 2024-02-19 DIAGNOSIS — Z03.89 ENCOUNTER FOR OBSERVATION FOR OTHER SUSPECTED DISEASES AND CONDITIONS RULED OUT: ICD-10-CM

## 2024-02-19 DIAGNOSIS — H26.492 POSTERIOR CAPSULAR OPACIFICATION NON VISUALLY SIGNIFICANT OF LEFT EYE: ICD-10-CM

## 2024-02-19 DIAGNOSIS — H35.712 CSR (CENTRAL SEROUS RETINOPATHY), LEFT: ICD-10-CM

## 2024-02-19 DIAGNOSIS — E11.9 DIABETIC EYE EXAM (H): ICD-10-CM

## 2024-02-19 DIAGNOSIS — Z01.00 DIABETIC EYE EXAM (H): ICD-10-CM

## 2024-02-19 DIAGNOSIS — Z96.1 PSEUDOPHAKIA OF BOTH EYES: ICD-10-CM

## 2024-02-19 DIAGNOSIS — H04.123 DRY EYE SYNDROME OF BOTH EYES: Primary | ICD-10-CM

## 2024-02-19 PROCEDURE — G0463 HOSPITAL OUTPT CLINIC VISIT: HCPCS | Performed by: OPTOMETRIST

## 2024-02-19 PROCEDURE — 92134 CPTRZ OPH DX IMG PST SGM RTA: CPT | Performed by: OPTOMETRIST

## 2024-02-19 PROCEDURE — 92004 COMPRE OPH EXAM NEW PT 1/>: CPT | Performed by: OPTOMETRIST

## 2024-02-19 RX ORDER — CARBOXYMETHYLCELLULOSE SODIUM 5 MG/ML
1 SOLUTION/ DROPS OPHTHALMIC 4 TIMES DAILY
Qty: 400 EACH | Refills: 11 | Status: SHIPPED | OUTPATIENT
Start: 2024-02-19 | End: 2024-09-05

## 2024-02-19 ASSESSMENT — REFRACTION_WEARINGRX
OS_SPHERE: -0.25
OS_AXIS: 092
OD_ADD: +2.50
OS_ADD: +2.50
OD_SPHERE: --0.50
OS_CYLINDER: +0.50
OD_CYLINDER: +0.25
OD_AXIS: 131

## 2024-02-19 ASSESSMENT — CUP TO DISC RATIO
OS_RATIO: 0.65
OD_RATIO: 0.6

## 2024-02-19 ASSESSMENT — REFRACTION_MANIFEST
OD_CYLINDER: +0.25
OD_ADD: +2.50
OS_SPHERE: PLANO
OS_CYLINDER: SPHERE
OD_SPHERE: -0.50
OS_ADD: +2.50
OD_AXIS: 160

## 2024-02-19 ASSESSMENT — CONF VISUAL FIELD
OD_INFERIOR_TEMPORAL_RESTRICTION: 0
OD_SUPERIOR_NASAL_RESTRICTION: 0
OS_SUPERIOR_TEMPORAL_RESTRICTION: 0
OD_NORMAL: 1
OS_NORMAL: 1
METHOD: COUNTING FINGERS
OS_INFERIOR_TEMPORAL_RESTRICTION: 0
OD_INFERIOR_NASAL_RESTRICTION: 0
OS_INFERIOR_NASAL_RESTRICTION: 0
OS_SUPERIOR_NASAL_RESTRICTION: 0
OD_SUPERIOR_TEMPORAL_RESTRICTION: 0

## 2024-02-19 ASSESSMENT — TONOMETRY
OS_IOP_MMHG: 19
OD_IOP_MMHG: 18
IOP_METHOD: TONOPEN

## 2024-02-19 ASSESSMENT — VISUAL ACUITY
OD_CC+: -1
CORRECTION_TYPE: GLASSES
OD_CC: 20/20
OS_CC: 20/25
OS_CC+: -1
METHOD: SNELLEN - LINEAR

## 2024-02-19 ASSESSMENT — EXTERNAL EXAM - LEFT EYE: OS_EXAM: NORMAL

## 2024-02-19 ASSESSMENT — SLIT LAMP EXAM - LIDS
COMMENTS: NORMAL
COMMENTS: NORMAL

## 2024-02-19 ASSESSMENT — EXTERNAL EXAM - RIGHT EYE: OD_EXAM: NORMAL

## 2024-02-19 NOTE — NURSING NOTE
Chief Complaints and History of Present Illnesses   Patient presents with    Diabetic Eye Exam     Type 2 DM     Chief Complaint(s) and History of Present Illness(es)       Diabetic Eye Exam              Comments: Type 2 DM              Comments    Pt C/o watering and burning  both eyes  No flashes or floaters   No eye pain  LBS: does not check    Last A1C:6.7  Lab Results       Component                Value               Date                       A1C                      6.7                 01/17/2024                 A1C                      6.9                 10/09/2023                 A1C                      5.8                 05/22/2023                 A1C                      5.8                 09/24/2021                 A1C                      6.2                 06/07/2021                 A1C                      5.6                 10/16/2020                 A1C                      6.0                 07/30/2020                 A1C                      5.5                 02/04/2020                 A1C                      5.5                 11/06/2019          Janelle Elizabeth COT 9:04 AM February 19, 2024

## 2024-02-20 DIAGNOSIS — H40.003 GLAUCOMA SUSPECT OF BOTH EYES: Primary | ICD-10-CM

## 2024-02-22 ENCOUNTER — OFFICE VISIT (OUTPATIENT)
Dept: OPHTHALMOLOGY | Facility: CLINIC | Age: 79
End: 2024-02-22
Attending: OPHTHALMOLOGY
Payer: COMMERCIAL

## 2024-02-22 DIAGNOSIS — H35.712 CSR (CENTRAL SEROUS RETINOPATHY), LEFT: Primary | ICD-10-CM

## 2024-02-22 PROCEDURE — G0463 HOSPITAL OUTPT CLINIC VISIT: HCPCS | Performed by: OPHTHALMOLOGY

## 2024-02-22 PROCEDURE — 99203 OFFICE O/P NEW LOW 30 MIN: CPT | Performed by: OPHTHALMOLOGY

## 2024-02-22 ASSESSMENT — TONOMETRY
OD_IOP_MMHG: 16
IOP_METHOD: ICARE
OS_IOP_MMHG: 19

## 2024-02-22 ASSESSMENT — REFRACTION_WEARINGRX
OS_ADD: +2.50
OD_SPHERE: --0.50
OS_CYLINDER: +0.50
SPECS_TYPE: BIFOCAL
OD_CYLINDER: +0.25
OS_SPHERE: -0.25
OD_ADD: +2.50
OS_AXIS: 092
OD_AXIS: 131

## 2024-02-22 ASSESSMENT — EXTERNAL EXAM - RIGHT EYE: OD_EXAM: NORMAL

## 2024-02-22 ASSESSMENT — SLIT LAMP EXAM - LIDS
COMMENTS: NORMAL
COMMENTS: NORMAL

## 2024-02-22 ASSESSMENT — VISUAL ACUITY
OD_CC: 20/25
METHOD: SNELLEN - LINEAR
CORRECTION_TYPE: GLASSES
OS_CC: 20/30

## 2024-02-22 ASSESSMENT — CUP TO DISC RATIO
OS_RATIO: 0.65
OD_RATIO: 0.6

## 2024-02-22 ASSESSMENT — EXTERNAL EXAM - LEFT EYE: OS_EXAM: NORMAL

## 2024-02-22 NOTE — PROGRESS NOTES
I have confirmed the patient's and reviewed Past Medical History, Past Surgical History, Social History, Family History, Problem List, Medication List and agree with Tech note.    CC: consult diabetic retinopathy roshan    HPI: patient seen 2/19/2024 and referred    Assessment/plan:   1.  Central serous retinopathy left eye    -monitor by Optical Coherence Tomography Scan    - avoid steroid use      RTC 6 months ofr Exam/OCT and fundus autofluorescence both eyes     No follow-ups on file.    Sneha Dodge MD PhD.  Professor & Chair

## 2024-02-22 NOTE — NURSING NOTE
Chief Complaints and History of Present Illnesses   Patient presents with    Retinal Evaluation     Refer by Negra Palm Chi, OD     Chief Complaint(s) and History of Present Illness(es)       Retinal Evaluation              Comments: Refer by Negra Palm Chi, OD              Comments    Patient states he is having pain in both eyes and losing vision. Ongoing for 8 months and gradually worsening. No flashes of light or floaters.     Type 2 diabetic. Last A1c was 6.7    Radha Parsons 8:45 AM February 22, 2024

## 2024-03-08 ENCOUNTER — TELEPHONE (OUTPATIENT)
Dept: NEPHROLOGY | Facility: CLINIC | Age: 79
End: 2024-03-08
Payer: COMMERCIAL

## 2024-03-08 NOTE — TELEPHONE ENCOUNTER
Called and talked with daughter (Paige). Appt reminder for Monday 3/11 at 11:00 am in person with non-fasting labs at 10:00 am. Paige in agreement.  Fariha Kulkarni,  Nephrology Clinic Navigator  [facility]  Direct: [phone].

## 2024-03-11 ENCOUNTER — LAB (OUTPATIENT)
Dept: LAB | Facility: CLINIC | Age: 79
End: 2024-03-11
Attending: INTERNAL MEDICINE
Payer: COMMERCIAL

## 2024-03-11 ENCOUNTER — OFFICE VISIT (OUTPATIENT)
Dept: NEPHROLOGY | Facility: CLINIC | Age: 79
End: 2024-03-11
Attending: INTERNAL MEDICINE
Payer: COMMERCIAL

## 2024-03-11 VITALS
BODY MASS INDEX: 27.9 KG/M2 | HEIGHT: 72 IN | DIASTOLIC BLOOD PRESSURE: 83 MMHG | WEIGHT: 206 LBS | SYSTOLIC BLOOD PRESSURE: 123 MMHG | HEART RATE: 82 BPM

## 2024-03-11 DIAGNOSIS — N18.4 CKD (CHRONIC KIDNEY DISEASE) STAGE 4, GFR 15-29 ML/MIN (H): ICD-10-CM

## 2024-03-11 DIAGNOSIS — N18.30 TYPE 2 DIABETES MELLITUS WITH STAGE 3 CHRONIC KIDNEY DISEASE, WITHOUT LONG-TERM CURRENT USE OF INSULIN, UNSPECIFIED WHETHER STAGE 3A OR 3B CKD (H): Primary | ICD-10-CM

## 2024-03-11 DIAGNOSIS — E11.22 TYPE 2 DIABETES MELLITUS WITH STAGE 3 CHRONIC KIDNEY DISEASE, WITHOUT LONG-TERM CURRENT USE OF INSULIN, UNSPECIFIED WHETHER STAGE 3A OR 3B CKD (H): Primary | ICD-10-CM

## 2024-03-11 DIAGNOSIS — E11.22 TYPE 2 DIABETES MELLITUS WITH STAGE 3 CHRONIC KIDNEY DISEASE, WITHOUT LONG-TERM CURRENT USE OF INSULIN, UNSPECIFIED WHETHER STAGE 3A OR 3B CKD (H): ICD-10-CM

## 2024-03-11 DIAGNOSIS — N18.30 TYPE 2 DIABETES MELLITUS WITH STAGE 3 CHRONIC KIDNEY DISEASE, WITHOUT LONG-TERM CURRENT USE OF INSULIN, UNSPECIFIED WHETHER STAGE 3A OR 3B CKD (H): ICD-10-CM

## 2024-03-11 LAB
ALBUMIN MFR UR ELPH: 13.4 MG/DL
ALBUMIN SERPL BCG-MCNC: 4.3 G/DL (ref 3.5–5.2)
ALBUMIN UR-MCNC: NEGATIVE MG/DL
ALP SERPL-CCNC: 89 U/L (ref 40–150)
ALT SERPL W P-5'-P-CCNC: 5 U/L (ref 0–70)
ANION GAP SERPL CALCULATED.3IONS-SCNC: 8 MMOL/L (ref 7–15)
APPEARANCE UR: CLEAR
AST SERPL W P-5'-P-CCNC: 17 U/L (ref 0–45)
BACTERIA #/AREA URNS HPF: ABNORMAL /HPF
BILIRUB SERPL-MCNC: 0.4 MG/DL
BILIRUB UR QL STRIP: NEGATIVE
BUN SERPL-MCNC: 24.3 MG/DL (ref 8–23)
CALCIUM SERPL-MCNC: 9.3 MG/DL (ref 8.8–10.2)
CHLORIDE SERPL-SCNC: 102 MMOL/L (ref 98–107)
COLOR UR AUTO: ABNORMAL
CREAT SERPL-MCNC: 1.61 MG/DL (ref 0.67–1.17)
CREAT UR-MCNC: 119 MG/DL
CREAT UR-MCNC: 121 MG/DL
DEPRECATED HCO3 PLAS-SCNC: 28 MMOL/L (ref 22–29)
EGFRCR SERPLBLD CKD-EPI 2021: 43 ML/MIN/1.73M2
ERYTHROCYTE [DISTWIDTH] IN BLOOD BY AUTOMATED COUNT: 11.9 % (ref 10–15)
FASTING STATUS PATIENT QL REPORTED: ABNORMAL
GLUCOSE SERPL-MCNC: 156 MG/DL (ref 70–99)
GLUCOSE SERPL-MCNC: 156 MG/DL (ref 70–99)
GLUCOSE UR STRIP-MCNC: NEGATIVE MG/DL
HBA1C MFR BLD: 6.5 %
HCT VFR BLD AUTO: 40.3 % (ref 40–53)
HGB BLD-MCNC: 13.3 G/DL (ref 13.3–17.7)
HGB UR QL STRIP: NEGATIVE
KETONES UR STRIP-MCNC: NEGATIVE MG/DL
LEUKOCYTE ESTERASE UR QL STRIP: ABNORMAL
MCH RBC QN AUTO: 34.4 PG (ref 26.5–33)
MCHC RBC AUTO-ENTMCNC: 33 G/DL (ref 31.5–36.5)
MCV RBC AUTO: 104 FL (ref 78–100)
MICROALBUMIN UR-MCNC: <12 MG/L
MICROALBUMIN/CREAT UR: NORMAL MG/G{CREAT}
NITRATE UR QL: POSITIVE
PH UR STRIP: 5.5 [PH] (ref 5–7)
PLATELET # BLD AUTO: 199 10E3/UL (ref 150–450)
POTASSIUM SERPL-SCNC: 5.1 MMOL/L (ref 3.4–5.3)
PROT SERPL-MCNC: 7.3 G/DL (ref 6.4–8.3)
PROT/CREAT 24H UR: 0.11 MG/MG CR (ref 0–0.2)
RBC # BLD AUTO: 3.87 10E6/UL (ref 4.4–5.9)
RBC URINE: 1 /HPF
SODIUM SERPL-SCNC: 138 MMOL/L (ref 135–145)
SP GR UR STRIP: 1.02 (ref 1–1.03)
SQUAMOUS EPITHELIAL: <1 /HPF
UROBILINOGEN UR STRIP-MCNC: NORMAL MG/DL
WBC # BLD AUTO: 6.4 10E3/UL (ref 4–11)
WBC CLUMPS #/AREA URNS HPF: PRESENT /HPF
WBC URINE: 20 /HPF

## 2024-03-11 PROCEDURE — 82570 ASSAY OF URINE CREATININE: CPT | Performed by: INTERNAL MEDICINE

## 2024-03-11 PROCEDURE — 83036 HEMOGLOBIN GLYCOSYLATED A1C: CPT | Performed by: INTERNAL MEDICINE

## 2024-03-11 PROCEDURE — 36415 COLL VENOUS BLD VENIPUNCTURE: CPT | Performed by: PATHOLOGY

## 2024-03-11 PROCEDURE — 81001 URINALYSIS AUTO W/SCOPE: CPT | Performed by: PATHOLOGY

## 2024-03-11 PROCEDURE — 99214 OFFICE O/P EST MOD 30 MIN: CPT | Performed by: INTERNAL MEDICINE

## 2024-03-11 PROCEDURE — 80053 COMPREHEN METABOLIC PANEL: CPT | Performed by: PATHOLOGY

## 2024-03-11 PROCEDURE — 84156 ASSAY OF PROTEIN URINE: CPT | Performed by: PATHOLOGY

## 2024-03-11 PROCEDURE — 99000 SPECIMEN HANDLING OFFICE-LAB: CPT | Performed by: PATHOLOGY

## 2024-03-11 PROCEDURE — G0463 HOSPITAL OUTPT CLINIC VISIT: HCPCS | Performed by: INTERNAL MEDICINE

## 2024-03-11 PROCEDURE — 87086 URINE CULTURE/COLONY COUNT: CPT | Performed by: INTERNAL MEDICINE

## 2024-03-11 PROCEDURE — 85027 COMPLETE CBC AUTOMATED: CPT | Performed by: PATHOLOGY

## 2024-03-11 PROCEDURE — 87186 SC STD MICRODIL/AGAR DIL: CPT | Performed by: INTERNAL MEDICINE

## 2024-03-11 RX ORDER — METFORMIN HCL 500 MG
500 TABLET, EXTENDED RELEASE 24 HR ORAL 2 TIMES DAILY WITH MEALS
Qty: 180 TABLET | Refills: 3 | Status: SHIPPED | OUTPATIENT
Start: 2024-03-11 | End: 2024-06-25

## 2024-03-11 ASSESSMENT — PAIN SCALES - GENERAL: PAINLEVEL: SEVERE PAIN (7)

## 2024-03-11 NOTE — PROGRESS NOTES
Nephrology Clinic    Juli Rodriguez MRN:[id number] YOB: 1945  Date of Service: 03/11/2024  Primary care provider: Ernie Arreola  Requesting physician: Ernie Arreola      REASON FOR CONSULT: ALAN vs CKD    HISTORY OF PRESENT ILLNESS:   Juli Rodriguez is a 79 year old male who  first presented  for evaluation of ALAN and CKD in June 2023.  The past medical history is significant for type 2 DM for more than 10 years, HTN for  10 years ,CVA in 2013 and CKD.  Regarding his type 2 DM, his glycemia is well controlled and his last Hba1c is 5.8 on 5/22/2023.  From a renal standpoint, his baseline creatinine level was around 1.4 mg/dL up to September 2021. He was thereafter lost to follow up in [place] as he moved to [place] and a kidney function was checked again in May 2023 and his creatinine level was noticed then to be 2.54 mg/dL. A repeat level on 6/12/2023 is 1.56 mg/dL and 1.27 mg/dL on 9/11/23. He reported that he had been following up regularly in [place] with a provider there up to January 2023 and that his renal function was stable. His kidney function normalized in October 2023 with creatinine level at 1.09 but deteriorated again in January 2024 as his creatinine level went up to 2.4 mg/dL in the setting of a bursitis for which he might have taken NSAIDs.  The UACR is negative for any proteinuria on 5/22/2023 , on 9/11/2023 and on 3/11/2024. A renal ultrasound done in June 2023 shows normal size kidneys.  On his last visit he was restarted on carvedilol 12.5 mg twice daily which was decreased later on 6.25 mg twice daily when he fell in July 2023 and then increased again to 12.5 mg twice daily. For his diabetes he is supposed to be on metformin 500 mg twice daily but it is unclear if he is taking it.  Also  the patient was admitted to the hospital in July 2023 after he had a fall. A CT of the head that showed age-related and chronic small vessel disease but no hemorrhage or acute stroke. CT of the  chest abdomen pelvis showed nondisplaced avulsion fractures of the left transverse process at L1 and L2.   The patient denies any dysuria, any pollakiuria, any nocturia, any LE edema, any dyspnea on exertion .  The following portions of the patient's history were reviewed and updated as appropriate: allergies, current medications, past family history, past medical history, past social history, past surgical history and problem list.    EXAMINATION: US RENAL COMPLETE NON-VASCULAR, 6/14/2023 1:02 PM      COMPARISON: None.     HISTORY: CKD (chronic kidney disease) stage 4, GFR 15-29 ml/min (H);  elevated creatinine level     TECHNIQUE: The kidneys and bladder were scanned in the standard  fashion with specialized ultrasound transducer(s) using both gray  scale and limited color/spectral Doppler techniques.     FINDINGS:     Right kidney: Measures 11.0 cm in length. Parenchyma is of normal  thickness and echogenicity. No focal mass. No hydronephrosis.     Left kidney: Measures 10.3 cm in length. Parenchyma is of normal  thickness and echogenicity. No focal mass. No hydronephrosis.      Bladder: Partially distended.                                                                      IMPRESSION: Unremarkable renal ultrasound.     I have personally reviewed the examination and initial interpretation  and I agree with the findings.     HARISH BENOIT MD   PAST MEDICAL HISTORY:  Past Medical History:   Diagnosis Date    Cerebral infarction (H) 08/2013    In [facility]    Diabetes mellitus, type 2 (H)     Stroke (H)      PAST SURGICAL HISTORY:  Past Surgical History:   Procedure Laterality Date    CATARACT IOL, RT/LT Bilateral 2007 and 2015    HERNIA REPAIR      INJECT MAJOR JOINT / BURSA  9/18/2020    JOINT REPLACEMENT, HIP RT/LT Right      MEDICATIONS:  Prescription Medications as of 3/11/2024         Rx Number Disp Refills Start End Last Dispensed Date Next Fill Date Owning Pharmacy    acetaminophen (TYLENOL) 500 MG  tablet  200 tablet 3 10/9/2023 --   [facility] #[id number] [place] [address] [address]    Sig: Take 2 tablets (1,000 mg) by mouth 3 times daily    Class: E-Prescribe    Route: Oral    aspirin 81 MG EC tablet  60 tablet 4 10/9/2023 --   [facility] STORE #[id number] [facility] [address]    Sig: Take 1 tablet (81 mg) by mouth daily    Class: E-Prescribe    Route: Oral    atorvastatin (LIPITOR) 20 MG tablet  90 tablet 1 10/9/2023 --   [facility] STORE #[id number] [facility] [address]    Sig: Take 1 tablet (20 mg) by mouth daily    Class: E-Prescribe    Route: Oral    blood glucose (NO BRAND SPECIFIED) lancets standard  100 each 3 10/9/2023 --   [facility] #[id number] [place] [address] [address]    Sig: Use to test blood sugar 2 times daily or as directed.    Class: E-Prescribe    blood glucose (NO BRAND SPECIFIED) test strip  100 strip 3 10/9/2023 --   [facility] #[id number] [place] [address] [address]    Sig: Use to test blood sugar 2 times daily or as directed.    Class: E-Prescribe    blood glucose monitoring (NO BRAND SPECIFIED) meter device kit  1 kit 0 10/9/2023 --   [facility] #[id number] [place] [address] [address]    Sig: Use to test blood sugar 2 times daily or as directed.    Class: E-Prescribe    carboxymethylcellulose PF (CARBOXYMETHYLCELLULOSE SODIUM) 0.5 % ophthalmic solution  400 each 11 2/19/2024 --   [id number] [place] [address] [address]    Sig: Place 1 drop into both eyes 4 times daily Preservative free artificial tears, single use vials.    Class: E-Prescribe    Notes to Pharmacy: Preservative free artificial tears, single use vials.    Route: Both Eyes    carvedilol (COREG) 12.5 MG tablet  180 tablet 3 10/9/2023 --   [facility] STORE #[id number] [facility] [address]    Sig: Take 1 tablet (12.5 mg) by mouth 2 times daily (with meals)    Class: E-Prescribe    Route:  Oral    diclofenac (VOLTAREN) 1 % topical gel  100 g 1 1/17/2024 --   [id number] [address]    Sig: Apply 4 grams to knees or 2 grams to hands four times daily using enclosed dosing card.    Class: E-Prescribe    DULoxetine (CYMBALTA) 20 MG capsule  90 capsule 1 1/17/2024 --   [facility] ST[facility] #[id number] [facility] [address]    Sig: Take 1 capsule (20 mg) by mouth daily    Class: E-Prescribe    Route: Oral    Folic Acid-Vit B6-Vit B12 (B COMPLEX-FOLIC ACID PO)  -- --  --       Sig: Take 1 capsule by mouth daily    Class: Historical    Route: Oral    gabapentin (NEURONTIN) 800 MG tablet  180 tablet 1 10/10/2023 --   [facility] #[id number] [facility] [address]    Sig: Take 1 tablet (800 mg) by mouth 2 times daily    Class: E-Prescribe    Route: Oral    Lidocaine (LIDOCARE) 4 % Patch  -- --  --       Sig: Place 1 patch onto the skin every 24 hours To prevent lidocaine toxicity, patient should be patch free for 12 hrs daily.    Class: Historical    Route: Transdermal    lidocaine (LIDODERM) 5 % patch  30 patch 3 10/9/2023 --   [id number] [address]    Sig: Place 1 patch onto the skin every 24 hours To prevent lidocaine toxicity, patient should be patch free for 12 hrs daily.    Class: E-Prescribe    Route: Transdermal    Prior authorization: Approved    loratadine (CLARITIN) 10 MG tablet  90 tablet 3 5/22/2023 --   [id number] [address]    Sig: Take 1 tablet (10 mg) by mouth daily    Class: E-Prescribe    Route: Oral    melatonin 3 MG tablet  -- --  --       Sig: Take 3 mg by mouth nightly as needed for sleep    Class: Historical    Route: Oral    order for DME  1 each 0 3/6/2020 --   [facility] - [place], [place] - [address] [place] [address]    Sig: Diabetic shoes One Pair    Class: Local Print    order for DME  1 each 1 2/26/2018 --   [facility]  #[id number] - [place], [place] - [address]    Sig: Equipment being ordered: Diabetic Shoes    Class: Local Print    oxyCODONE (ROXICODONE) 5 MG tablet  30 tablet 0 7/27/2023 --   [facility] - [place], [place] - [address] [facility]    Sig: Take 1 tablet (5 mg) by mouth every 8 hours as needed for severe pain For severe pain    Class: E-Prescribe    Earliest Fill Date: 7/27/2023    Route: Oral    pantoprazole (PROTONIX) 40 MG EC tablet  90 tablet 1 10/9/2023 --   [facility] #[id number] [facility] [address]    Sig: Take 1 tablet (40 mg) by mouth daily    Class: E-Prescribe    Route: Oral    polyethylene glycol (MIRALAX) 17 GM/Dose powder  510 g 1 10/9/2023 --   [facility] #[id number] [facility] [address]    Sig: Take 17 g by mouth daily    Class: E-Prescribe    Route: Oral    senna-docusate (SENOKOT-S/PERICOLACE) 8.6-50 MG tablet  -- --  --       Sig: Take 2 tablets by mouth 2 times daily    Class: Historical    Route: Oral    vitamin D2 (ERGOCALCIFEROL) 91888 units (1250 mcg) capsule  -- -- 7/24/2023 --       Sig: Take 50,000 Units by mouth once a week    Class: Historical    Route: Oral    metFORMIN (GLUCOPHAGE XR) 500 MG 24 hr tablet  90 tablet 1 9/11/2023 3/9/2024   [facility] #[id number] [facility] [address]    Sig: Take 1 tablet (500 mg) by mouth daily (with dinner) for 180 days    Class: E-Prescribe    Route: Oral           ALLERGIES:    Allergies   Allergen Reactions    Beef-Derived Products Other (See Comments)     Sneezing when eats beef    Eggs [Chicken-Derived Products (Egg)]      REVIEW OF SYSTEMS:  Review Of Systems  Skin: negative for, pigmentation, acne, rash, scaling, itching, bruising, lumps or bumps  Eyes: negative for, visual blurring, double vision, glaucoma, cataracts, eye pain, color blindness, glasses, contacts  Ears/Nose/Throat: negative for, nasal congestion, purulent rhinorrhea, sneezing, postnasal  drainage, hearing loss, deafness, tinnitus  Respiratory: No shortness of breath, dyspnea on exertion, cough, or hemoptysis  Cardiovascular: negative for, palpitations, tachycardia, irregular heart beat, chest pain, exertional chest pain or pressure, paroxysmal nocturnal dyspnea and dyspnea on exertion  Gastrointestinal: negative for, poor appetite, dysphagia, nausea, vomiting, heartburn, dyspepsia, reflux and hematemesis  Genitourinary: negative for, nocturia, dysuria, frequency, urgency, hesitancy, hematuria, retention, decreased urinary stream and incontinence  Musculoskeletal: negative for, fracture, back pain, neck pain, arthritis, joint pain, joint swelling, joint stiffness, gout and fibromyalgia  Neurologic: negative for, headaches, syncope, stroke, seizures, paralysis, local weakness, numbness or tingling of hands and numbness or tingling of feet    A comprehensive review of systems was performed and found to be negative except as described here or above.  SOCIAL HISTORY:   Social History     Socioeconomic History    Marital status:      Spouse name: Not on file    Number of children: Not on file    Years of education: Not on file    Highest education level: Not on file   Occupational History    Not on file   Tobacco Use    Smoking status: Never    Smokeless tobacco: Never   Vaping Use    Vaping Use: Never used   Substance and Sexual Activity    Alcohol use: No    Drug use: No    Sexual activity: Not Currently   Other Topics Concern    Not on file   Social History Narrative    Not on file     Social Determinants of Health     Financial Resource Strain: Not on file   Food Insecurity: Not on file   Transportation Needs: Not on file   Physical Activity: Not on file   Stress: Not on file   Social Connections: Not on file   Interpersonal Safety: Low Risk  (10/9/2023)    Interpersonal Safety     Do you feel physically and emotionally safe where you currently live?: Yes     Within the past 12 months, have  you been hit, slapped, kicked or otherwise physically hurt by someone?: No     Within the past 12 months, have you been humiliated or emotionally abused in other ways by your partner or ex-partner?: No   Housing Stability: Not on file     FAMILY MEDICAL HISTORY:   Family History   Problem Relation Age of Onset    Diabetes No family hx of     Coronary Artery Disease No family hx of     Hypertension No family hx of     Hyperlipidemia No family hx of     Cerebrovascular Disease No family hx of     Breast Cancer No family hx of     Colon Cancer No family hx of     Prostate Cancer No family hx of     Other Cancer No family hx of     Depression No family hx of     Anxiety Disorder No family hx of     Mental Illness No family hx of     Substance Abuse No family hx of     Anesthesia Reaction No family hx of     Asthma No family hx of     Osteoporosis No family hx of     Genetic Disorder No family hx of     Thyroid Disease No family hx of     Obesity No family hx of     Glaucoma No family hx of     Macular Degeneration No family hx of      PHYSICAL EXAM:   /83   Pulse 82   Ht 1.829 m (6')   Wt 93.4 kg (206 lb)   BMI 27.94 kg/m    GENERAL APPEARANCE: alert and no distress  EYES: nonicteric  HENT: mouth without ulcers or lesions  NECK: supple, no adenopathy  RESP: lungs clear to auscultation   CV: regular rhythm, normal rate, no rub  ABDOMEN: soft, nontender, normal bowel sounds, no HSM   Extremities: no clubbing, cyanosis, or edema  MS: no evidence of inflammation in joints, no muscle tenderness  SKIN: no rash  NEURO: mentation intact and speech normal  PSYCH: affect normal/bright   LABS:   Recent Results (from the past 672 hour(s))   CBC with platelets    Collection Time: 03/11/24 10:19 AM   Result Value Ref Range    WBC Count 6.4 4.0 - 11.0 10e3/uL    RBC Count 3.87 (L) 4.40 - 5.90 10e6/uL    Hemoglobin 13.3 13.3 - 17.7 g/dL    Hematocrit 40.3 40.0 - 53.0 %     (H) 78 - 100 fL    MCH 34.4 (H) 26.5 - 33.0 pg     MCHC 33.0 31.5 - 36.5 g/dL    RDW 11.9 10.0 - 15.0 %    Platelet Count 199 150 - 450 10e3/uL   Comprehensive metabolic panel (BMP + Alb, Alk Phos, ALT, AST, Total. Bili, TP)    Collection Time: 03/11/24 10:19 AM   Result Value Ref Range    Sodium 138 135 - 145 mmol/L    Potassium 5.1 3.4 - 5.3 mmol/L    Carbon Dioxide (CO2) 28 22 - 29 mmol/L    Anion Gap 8 7 - 15 mmol/L    Urea Nitrogen 24.3 (H) 8.0 - 23.0 mg/dL    Creatinine 1.61 (H) 0.67 - 1.17 mg/dL    GFR Estimate 43 (L) >60 mL/min/1.73m2    Calcium 9.3 8.8 - 10.2 mg/dL    Chloride 102 98 - 107 mmol/L    Glucose 156 (H) 70 - 99 mg/dL    Alkaline Phosphatase 89 40 - 150 U/L    AST 17 0 - 45 U/L    ALT 5 0 - 70 U/L    Protein Total 7.3 6.4 - 8.3 g/dL    Albumin 4.3 3.5 - 5.2 g/dL    Bilirubin Total 0.4 <=1.2 mg/dL   Glucose    Collection Time: 03/11/24 10:19 AM   Result Value Ref Range    Glucose 156 (H) 70 - 99 mg/dL    Patient Fasting > 8hrs? Unknown      CMP  Recent Labs   Lab Test 03/11/24  1019 01/17/24  1625 10/09/23  1113 09/11/23  1052 07/21/23  1149 06/12/23  1511 05/22/23  1417 09/24/21  1204 06/07/21  1607 10/16/20  1545 09/18/20  1417 02/04/20  1426 07/12/19  0911    144 139 140 138 136 136   < > 142 136 136.8 135.5  --    POTASSIUM 5.1 5.2 4.3 4.5 4.6 5.3 4.8   < > 4.5 4.3 3.6 4.2  --    CHLORIDE 102 103 105 101 98 98 102   < > 109 105 99.8 98.4  --    CO2 28 30 22 29 28 28 20*   < > 29 26 21.5 29.2  --    ANIONGAP 8 11 12 10 12 10 14   < > 4 6  --   --    < >   *  156* 179* 146* 150* 104* 121*  124* 112*   < > 139* 115* 169.5* 170.7*  --    BUN 24.3* 20 17.5 20.9 23.0 26.4* 39.2*   < > 24 22 34.5* 20.2  --    CR 1.61* 2.40* 1.09 1.27* 1.31* 1.56* 2.54*   < > 1.39* 1.45* 1.5* 1.2  --    GFRESTIMATED 43* 27* 69 58* 56* 45* 25*   < > 49* 47* 49.8* 63.6  --    GFRESTBLACK  --   --   --   --   --   --   --   --  56* 54* 60.3 76.9  --    WILIAM 9.3 10.0 9.2 10.0 9.5 9.7 9.1   < > 9.5 9.3 9.4 9.3  --    PHOS  --   --   --   --   --  3.5 4.5   --   --   --   --   --   --    PROTTOTAL 7.3  --  7.7 8.0 7.6  --  7.9   < >  --  7.5  --   --   --    ALBUMIN 4.3  --  4.4 4.6 4.2 4.6 4.4   < >  --  4.0  --   --   --    BILITOTAL 0.4  --  0.4 0.7 0.4  --  0.3   < >  --  0.8  --   --   --    ALKPHOS 89  --  101 103 113  --  101   < >  --  81  --   --   --    AST 17  --  20 18 25  --  21   < >  --  13  --   --   --    ALT 5  --  10 9 11  --  15   < >  --  19  --   --   --     < > = values in this interval not displayed.     CBC  Recent Labs   Lab Test 03/11/24  1019 09/11/23  1052 06/12/23  1511 05/22/23  1417   HGB 13.3 14.1 14.7 13.1*   WBC 6.4 4.8 5.7 4.8   RBC 3.87* 4.15* 4.27* 3.93*   HCT 40.3 41.0 44.3 41.8   * 99 104* 106*   MCH 34.4* 34.0* 34.4* 33.3*   MCHC 33.0 34.4 33.2 31.3*   RDW 11.9 11.1 11.9 11.9    206 186 229     INRNo lab results found.  ABGNo lab results found.   URINE STUDIES  Recent Labs   Lab Test 06/12/23  1518   COLOR Light Yellow   APPEARANCE Clear   URINEGLC Negative   URINEBILI Negative   URINEKETONE Negative   SG 1.011   UBLD Negative   URINEPH 6.0   PROTEIN Negative   NITRITE Negative   LEUKEST Large*   RBCU <1   WBCU 33*     No lab results found.    ASSESSMENT AND PLAN:   #ALAN of unknown etiology that has resolved after all his medications were held is likely hemodynamically mediated and secondary to polypharmacy    #CKD stage 3a  eGFR around 45 mL/min  At baseline with no evidence of proteinuria off ACE inhibitors  A  renal ultrasound shows unremarkable kidneys  The potential responsible factors include vascular disease, hypertension and normal decline related to age. No documented intake of NSAIDs or other nephrotoxic medications. The risk of progression is low provided there are no additional episodes of ALAN. The patient was instructed to monitor his blood pressure and his glycemia and to send the numbers.  The patient was also  instructed to keep the sodium intake around 2400 mg /day, follow a plant-based diet and to  avoid NSAIDs. In addition he was counseled about not fasting during the month of Ramadan     #HTN  Primary and secondary to CKD. Current BP in clinic is 123/83. Management as per above    #Type 2 DM   Hba1c 5.8 in May 2023 -> 6.5 today, pursue metformin 500 mg twice daily    #CVD/dyslipidemia  Given his history of CVA, he was restarted on his last visit on atorvastatin 40 mg daily     #Blood count  Hemoglobin 14.7 -> 14.1 -> 13.3 no acute issue    #Acid-base status  CO2 level 28 -> 29 -> 28 no need for sodium bicarbonate supplementation    #Electrolytes  Na  136-> 138  K 5.3 -> 4.5 -> 5.1     #BMD  Ca  9.7        P 3.5   alb 4.6  iPTH 96 Vitamin D level is 16   He was started on ergocalciferol 63492 units once a week     #Neuropathy/ chronic pain/recent fracture on duloxetine 20 mg daily and gabapentin 200 mg three times a day    #CKD journey/transplant: not a candidate at this point     The total time of this encounter amounted to 30 minutes on the day of the encounter. This time included time spent with the patient, reviewing records, ordering tests, and performing post visit documentation.   Labs ordered include: CBC with diff, Renal Panel, CMP, UA with microscopy, UPCR, UACR    The patient will return to follow up in 3 months    Taylor Carrizales MD  Division of Renal Disease and Hypertension

## 2024-03-11 NOTE — NURSING NOTE
Chief Complaint   Patient presents with    Follow Up     6 month       Vitals:    03/11/24 1042 03/11/24 1051 03/11/24 1052   BP: 128/85 121/83 123/83   Pulse: 82     Weight: 93.4 kg (206 lb)     Height: 1.829 m (6')         BP Readings from Last 3 Encounters:   03/11/24 123/83   01/17/24 125/85   10/09/23 131/86       /83   Pulse 82   Ht 1.829 m (6')   Wt 93.4 kg (206 lb)   BMI 27.94 kg/m       Francisca Knox MA

## 2024-03-11 NOTE — PATIENT INSTRUCTIONS
It was a pleasure taking care of you today.  I've included a brief summary of our discussion and care plan from today's visit below.  Please review this information with your primary care provider.  _______________________________________________________________________    My recommendations are summarized as follows:    -Keep a Blood Pressure diary by taking your blood pressure twice a day as instructed (also see instructions attached in that regard). Start immediately and take it for one week then send me the numbers via [employer]. Please make sure that you are using a validated blood pressure device by checking that it is the case at: https://www.validatebp.org/  -Please measure your blood sugars every day in the morning and send me the numbers  -Avoid all NSAID's. Examples include Ibuprofen (Advil, Motrin), naprosyn (Aleve), diclofenac (Voltaren), celebrex among others. Acetaminophen (Tylenol) is ok with maximum dose in 24 hours of 3200 mg.  -Healthy lifestyle measures will keep your kidney's functioning at their current best. This includes regular exercise, weight loss and smoking cessation if you smoke.   -For tips on eating healthy:  -[email or web address]/healthy-eating-pyramid/  -Do not exceed one alcoholic drink per day  -Please make sure that you are taking metformin 500 mg twice daily  -Please do labs in one week and in 3 months before your next appointment  -Please do a kidney ultrasound    To schedule imaging please call [phone]     To schedule your lab appointment at the [facility], please call       Return to Nephrology Clinic in 3 months to review your progress.    _______________________________________________________________________    Who do I call with any questions after my visit?    Please be in touch if there are any further questions that arise following today's visit.  There are multiple ways to contact your nephrology care team.       During business hours, you may reach your Nephrology Care Coordinator, at .      To schedule or reschedule an appointment, please call [phone].    You can always send a secure message through [employer].  [employer] messages are answered by your nurse or doctor typically within 24 hours.  Please allow extra time on weekends and holidays.      For urgent/emergent questions after business hours, you may reach the on-call Nephrology Fellow by contacting the [facility]  at [phone].     How will I get the results of any tests ordered?    You will receive all of your results.  If you have signed up for [employer], any tests ordered at your visit will be available to you after your physician reviews them.  Typically this takes 1-2 weeks.  If there are urgent results that require a change in your care plan, your physician or nurse will call you to discuss the next steps.      What is [employer]?  [employer] is a secure way for you to access all of your healthcare records from the [facility].  It is a web based computer program, so you can sign on to it from any location.  It also allows you to send secure messages to your care team.  I recommend signing up for [employer] access if you have not already done so and are comfortable with using a computer.      How do I schedule a follow-up visit?  If you did not schedule a follow-up visit today, please call [phone] to schedule a follow-up office visit.        Sincerely,      Dr. Taylor Carrizales  [facility]  Division of Nephrology and Hypertension

## 2024-03-11 NOTE — LETTER
3/11/2024       RE: Juli Rodriguez  [address]   [address]  [facility] [address]     Dear Colleague,    Thank you for referring your patient, Juli Rodriguez, to the [facility]PHROLOG[facility] [place] at [facility]. Please see a copy of my visit note below.    Nephrology Clinic    Juli Rodriguez MRN:[id number] YOB: 1945  Date of Service: 03/11/2024  Primary care provider: Ernie Arreola  Requesting physician: Ernie Arreola      REASON FOR CONSULT: ALAN vs CKD    HISTORY OF PRESENT ILLNESS:   Juli Rodriguez is a 79 year old male who  first presented  for evaluation of ALAN and CKD in June 2023.  The past medical history is significant for type 2 DM for more than 10 years, HTN for  10 years ,CVA in 2013 and CKD.  Regarding his type 2 DM, his glycemia is well controlled and his last Hba1c is 5.8 on 5/22/2023.  From a renal standpoint, his baseline creatinine level was around 1.4 mg/dL up to September 2021. He was thereafter lost to follow up in [place] as he moved to [place] and a kidney function was checked again in May 2023 and his creatinine level was noticed then to be 2.54 mg/dL. A repeat level on 6/12/2023 is 1.56 mg/dL and 1.27 mg/dL on 9/11/23. He reported that he had been following up regularly in [place] with a provider there up to January 2023 and that his renal function was stable. His kidney function normalized in October 2023 with creatinine level at 1.09 but deteriorated again in January 2024 as his creatinine level went up to 2.4 mg/dL in the setting of a bursitis for which he might have taken NSAIDs.  The UACR is negative for any proteinuria on 5/22/2023 , on 9/11/2023 and on 3/11/2024. A renal ultrasound done in June 2023 shows normal size kidneys.  On his last visit he was restarted on carvedilol 12.5 mg twice daily which was decreased later on 6.25 mg twice daily when he fell in July 2023 and then increased again to 12.5 mg  twice daily. For his diabetes he is supposed to be on metformin 500 mg twice daily but it is unclear if he is taking it.  Also  the patient was admitted to the hospital in July 2023 after he had a fall. A CT of the head that showed age-related and chronic small vessel disease but no hemorrhage or acute stroke. CT of the chest abdomen pelvis showed nondisplaced avulsion fractures of the left transverse process at L1 and L2.   The patient denies any dysuria, any pollakiuria, any nocturia, any LE edema, any dyspnea on exertion .  The following portions of the patient's history were reviewed and updated as appropriate: allergies, current medications, past family history, past medical history, past social history, past surgical history and problem list.    EXAMINATION: US RENAL COMPLETE NON-VASCULAR, 6/14/2023 1:02 PM      COMPARISON: None.     HISTORY: CKD (chronic kidney disease) stage 4, GFR 15-29 ml/min (H);  elevated creatinine level     TECHNIQUE: The kidneys and bladder were scanned in the standard  fashion with specialized ultrasound transducer(s) using both gray  scale and limited color/spectral Doppler techniques.     FINDINGS:     Right kidney: Measures 11.0 cm in length. Parenchyma is of normal  thickness and echogenicity. No focal mass. No hydronephrosis.     Left kidney: Measures 10.3 cm in length. Parenchyma is of normal  thickness and echogenicity. No focal mass. No hydronephrosis.      Bladder: Partially distended.                                                                      IMPRESSION: Unremarkable renal ultrasound.     I have personally reviewed the examination and initial interpretation  and I agree with the findings.     HARISH BENOIT MD   PAST MEDICAL HISTORY:  Past Medical History:   Diagnosis Date    Cerebral infarction (H) 08/2013    In [facility]    Diabetes mellitus, type 2 (H)     Stroke (H)      PAST SURGICAL HISTORY:  Past Surgical History:   Procedure Laterality Date    CATARACT  IOL, RT/LT Bilateral 2007 and 2015    HERNIA REPAIR      INJECT MAJOR JOINT / BURSA  9/18/2020    JOINT REPLACEMENT, HIP RT/LT Right      MEDICATIONS:  Prescription Medications as of 3/11/2024         Rx Number Disp Refills Start End Last Dispensed Date Next Fill Date Owning Pharmacy    acetaminophen (TYLENOL) 500 MG tablet  200 tablet 3 10/9/2023 --   [facility] #[id number] [facility] [address]    Sig: Take 2 tablets (1,000 mg) by mouth 3 times daily    Class: E-Prescribe    Route: Oral    aspirin 81 MG EC tablet  60 tablet 4 10/9/2023 --   [facility] #[id number] [facility] [address]    Sig: Take 1 tablet (81 mg) by mouth daily    Class: E-Prescribe    Route: Oral    atorvastatin (LIPITOR) 20 MG tablet  90 tablet 1 10/9/2023 --   [facility] #[id number] [facility] [address]    Sig: Take 1 tablet (20 mg) by mouth daily    Class: E-Prescribe    Route: Oral    blood glucose (NO BRAND SPECIFIED) lancets standard  100 each 3 10/9/2023 --   [facility] #[id number] [facility] [address]    Sig: Use to test blood sugar 2 times daily or as directed.    Class: E-Prescribe    blood glucose (NO BRAND SPECIFIED) test strip  100 strip 3 10/9/2023 --   [facility] #[id number] [facility] [address]    Sig: Use to test blood sugar 2 times daily or as directed.    Class: E-Prescribe    blood glucose monitoring (NO BRAND SPECIFIED) meter device kit  1 kit 0 10/9/2023 --   [facility] #[id number] [facility] [address]    Sig: Use to test blood sugar 2 times daily or as directed.    Class: E-Prescribe    carboxymethylcellulose PF (CARBOXYMETHYLCELLULOSE SODIUM) 0.5 % ophthalmic solution  400 each 11 2/19/2024 --   [facility] #[id number] [facility] [address]    Sig: Place 1 drop into both eyes 4 times daily Preservative free artificial tears, single use vials.    Class: E-Prescribe    Notes  to Pharmacy: Preservative free artificial tears, single use vials.    Route: Both Eyes    carvedilol (COREG) 12.5 MG tablet  180 tablet 3 10/9/2023 --   [facility] #[id number] [place] [address] [address]    Sig: Take 1 tablet (12.5 mg) by mouth 2 times daily (with meals)    Class: E-Prescribe    Route: Oral    diclofenac (VOLTAREN) 1 % topical gel  100 g 1 1/17/2024 --   [facility]ORE #[id number] [place] [address] [address]    Sig: Apply 4 grams to knees or 2 grams to hands four times daily using enclosed dosing card.    Class: E-Prescribe    DULoxetine (CYMBALTA) 20 MG capsule  90 capsule 1 1/17/2024 --   [facility]ORE #[id number] [place] [address] [address]    Sig: Take 1 capsule (20 mg) by mouth daily    Class: E-Prescribe    Route: Oral    Folic Acid-Vit B6-Vit B12 (B COMPLEX-FOLIC ACID PO)  -- --  --       Sig: Take 1 capsule by mouth daily    Class: Historical    Route: Oral    gabapentin (NEURONTIN) 800 MG tablet  180 tablet 1 10/10/2023 --   [facility] #[id number] [facility] [address]    Sig: Take 1 tablet (800 mg) by mouth 2 times daily    Class: E-Prescribe    Route: Oral    Lidocaine (LIDOCARE) 4 % Patch  -- --  --       Sig: Place 1 patch onto the skin every 24 hours To prevent lidocaine toxicity, patient should be patch free for 12 hrs daily.    Class: Historical    Route: Transdermal    lidocaine (LIDODERM) 5 % patch  30 patch 3 10/9/2023 --   [facility] #[id number] [facility] [address]    Sig: Place 1 patch onto the skin every 24 hours To prevent lidocaine toxicity, patient should be patch free for 12 hrs daily.    Class: E-Prescribe    Route: Transdermal    Prior authorization: Approved    loratadine (CLARITIN) 10 MG tablet  90 tablet 3 5/22/2023 --   [facility] #[id number] [facility] [address]    Sig: Take 1 tablet (10 mg) by mouth daily    Class: E-Prescribe    Route: Oral    melatonin 3 MG  tablet  -- --  --       Sig: Take 3 mg by mouth nightly as needed for sleep    Class: Historical    Route: Oral    order for DME  1 each 0 3/6/2020 --   [facility] - [place], [place] - [address]    Sig: Diabetic shoes One Pair    Class: Local Print    order for DME  1 each 1 2/26/2018 --   [facility] #[id number] - [place], [place] - [address]    Sig: Equipment being ordered: Diabetic Shoes    Class: Local Print    oxyCODONE (ROXICODONE) 5 MG tablet  30 tablet 0 7/27/2023 --   [facility] - [place], [place] - [address]    Sig: Take 1 tablet (5 mg) by mouth every 8 hours as needed for severe pain For severe pain    Class: E-Prescribe    Earliest Fill Date: 7/27/2023    Route: Oral    pantoprazole (PROTONIX) 40 MG EC tablet  90 tablet 1 10/9/2023 --   [facility] #[id number] [place], [place] - [address]    Sig: Take 1 tablet (40 mg) by mouth daily    Class: E-Prescribe    Route: Oral    polyethylene glycol (MIRALAX) 17 GM/Dose powder  510 g 1 10/9/2023 --   [facility] #[id number] [place], [place] - [address]    Sig: Take 17 g by mouth daily    Class: E-Prescribe    Route: Oral    senna-docusate (SENOKOT-S/PERICOLACE) 8.6-50 MG tablet  -- --  --       Sig: Take 2 tablets by mouth 2 times daily    Class: Historical    Route: Oral    vitamin D2 (ERGOCALCIFEROL) 45340 units (1250 mcg) capsule  -- -- 7/24/2023 --       Sig: Take 50,000 Units by mouth once a week    Class: Historical    Route: Oral    metFORMIN (GLUCOPHAGE XR) 500 MG 24 hr tablet  90 tablet 1 9/11/2023 3/9/2024   [facility] #[id number] [place], [place] - [address]    Sig: Take 1 tablet (500 mg) by mouth daily (with dinner) for 180 days    Class: E-Prescribe    Route: Oral           ALLERGIES:    Allergies   Allergen Reactions    Beef-Derived Products Other (See Comments)     Sneezing when eats beef    Eggs [Chicken-Derived Products (Egg)]      REVIEW OF  SYSTEMS:  Review Of Systems  Skin: negative for, pigmentation, acne, rash, scaling, itching, bruising, lumps or bumps  Eyes: negative for, visual blurring, double vision, glaucoma, cataracts, eye pain, color blindness, glasses, contacts  Ears/Nose/Throat: negative for, nasal congestion, purulent rhinorrhea, sneezing, postnasal drainage, hearing loss, deafness, tinnitus  Respiratory: No shortness of breath, dyspnea on exertion, cough, or hemoptysis  Cardiovascular: negative for, palpitations, tachycardia, irregular heart beat, chest pain, exertional chest pain or pressure, paroxysmal nocturnal dyspnea and dyspnea on exertion  Gastrointestinal: negative for, poor appetite, dysphagia, nausea, vomiting, heartburn, dyspepsia, reflux and hematemesis  Genitourinary: negative for, nocturia, dysuria, frequency, urgency, hesitancy, hematuria, retention, decreased urinary stream and incontinence  Musculoskeletal: negative for, fracture, back pain, neck pain, arthritis, joint pain, joint swelling, joint stiffness, gout and fibromyalgia  Neurologic: negative for, headaches, syncope, stroke, seizures, paralysis, local weakness, numbness or tingling of hands and numbness or tingling of feet    A comprehensive review of systems was performed and found to be negative except as described here or above.  SOCIAL HISTORY:   Social History     Socioeconomic History    Marital status:      Spouse name: Not on file    Number of children: Not on file    Years of education: Not on file    Highest education level: Not on file   Occupational History    Not on file   Tobacco Use    Smoking status: Never    Smokeless tobacco: Never   Vaping Use    Vaping Use: Never used   Substance and Sexual Activity    Alcohol use: No    Drug use: No    Sexual activity: Not Currently   Other Topics Concern    Not on file   Social History Narrative    Not on file     Social Determinants of Health     Financial Resource Strain: Not on file   Food  Insecurity: Not on file   Transportation Needs: Not on file   Physical Activity: Not on file   Stress: Not on file   Social Connections: Not on file   Interpersonal Safety: Low Risk  (10/9/2023)    Interpersonal Safety     Do you feel physically and emotionally safe where you currently live?: Yes     Within the past 12 months, have you been hit, slapped, kicked or otherwise physically hurt by someone?: No     Within the past 12 months, have you been humiliated or emotionally abused in other ways by your partner or ex-partner?: No   Housing Stability: Not on file     FAMILY MEDICAL HISTORY:   Family History   Problem Relation Age of Onset    Diabetes No family hx of     Coronary Artery Disease No family hx of     Hypertension No family hx of     Hyperlipidemia No family hx of     Cerebrovascular Disease No family hx of     Breast Cancer No family hx of     Colon Cancer No family hx of     Prostate Cancer No family hx of     Other Cancer No family hx of     Depression No family hx of     Anxiety Disorder No family hx of     Mental Illness No family hx of     Substance Abuse No family hx of     Anesthesia Reaction No family hx of     Asthma No family hx of     Osteoporosis No family hx of     Genetic Disorder No family hx of     Thyroid Disease No family hx of     Obesity No family hx of     Glaucoma No family hx of     Macular Degeneration No family hx of      PHYSICAL EXAM:   /83   Pulse 82   Ht 1.829 m (6')   Wt 93.4 kg (206 lb)   BMI 27.94 kg/m    GENERAL APPEARANCE: alert and no distress  EYES: nonicteric  HENT: mouth without ulcers or lesions  NECK: supple, no adenopathy  RESP: lungs clear to auscultation   CV: regular rhythm, normal rate, no rub  ABDOMEN: soft, nontender, normal bowel sounds, no HSM   Extremities: no clubbing, cyanosis, or edema  MS: no evidence of inflammation in joints, no muscle tenderness  SKIN: no rash  NEURO: mentation intact and speech normal  PSYCH: affect normal/bright   LABS:    Recent Results (from the past 672 hour(s))   CBC with platelets    Collection Time: 03/11/24 10:19 AM   Result Value Ref Range    WBC Count 6.4 4.0 - 11.0 10e3/uL    RBC Count 3.87 (L) 4.40 - 5.90 10e6/uL    Hemoglobin 13.3 13.3 - 17.7 g/dL    Hematocrit 40.3 40.0 - 53.0 %     (H) 78 - 100 fL    MCH 34.4 (H) 26.5 - 33.0 pg    MCHC 33.0 31.5 - 36.5 g/dL    RDW 11.9 10.0 - 15.0 %    Platelet Count 199 150 - 450 10e3/uL   Comprehensive metabolic panel (BMP + Alb, Alk Phos, ALT, AST, Total. Bili, TP)    Collection Time: 03/11/24 10:19 AM   Result Value Ref Range    Sodium 138 135 - 145 mmol/L    Potassium 5.1 3.4 - 5.3 mmol/L    Carbon Dioxide (CO2) 28 22 - 29 mmol/L    Anion Gap 8 7 - 15 mmol/L    Urea Nitrogen 24.3 (H) 8.0 - 23.0 mg/dL    Creatinine 1.61 (H) 0.67 - 1.17 mg/dL    GFR Estimate 43 (L) >60 mL/min/1.73m2    Calcium 9.3 8.8 - 10.2 mg/dL    Chloride 102 98 - 107 mmol/L    Glucose 156 (H) 70 - 99 mg/dL    Alkaline Phosphatase 89 40 - 150 U/L    AST 17 0 - 45 U/L    ALT 5 0 - 70 U/L    Protein Total 7.3 6.4 - 8.3 g/dL    Albumin 4.3 3.5 - 5.2 g/dL    Bilirubin Total 0.4 <=1.2 mg/dL   Glucose    Collection Time: 03/11/24 10:19 AM   Result Value Ref Range    Glucose 156 (H) 70 - 99 mg/dL    Patient Fasting > 8hrs? Unknown      CMP  Recent Labs   Lab Test 03/11/24  1019 01/17/24  1625 10/09/23  1113 09/11/23  1052 07/21/23  1149 06/12/23  1511 05/22/23  1417 09/24/21  1204 06/07/21  1607 10/16/20  1545 09/18/20  1417 02/04/20  1426 07/12/19  0911    144 139 140 138 136 136   < > 142 136 136.8 135.5  --    POTASSIUM 5.1 5.2 4.3 4.5 4.6 5.3 4.8   < > 4.5 4.3 3.6 4.2  --    CHLORIDE 102 103 105 101 98 98 102   < > 109 105 99.8 98.4  --    CO2 28 30 22 29 28 28 20*   < > 29 26 21.5 29.2  --    ANIONGAP 8 11 12 10 12 10 14   < > 4 6  --   --    < >   *  156* 179* 146* 150* 104* 121*  124* 112*   < > 139* 115* 169.5* 170.7*  --    BUN 24.3* 20 17.5 20.9 23.0 26.4* 39.2*   < > 24 22 34.5* 20.2   --    CR 1.61* 2.40* 1.09 1.27* 1.31* 1.56* 2.54*   < > 1.39* 1.45* 1.5* 1.2  --    GFRESTIMATED 43* 27* 69 58* 56* 45* 25*   < > 49* 47* 49.8* 63.6  --    GFRESTBLACK  --   --   --   --   --   --   --   --  56* 54* 60.3 76.9  --    WILIAM 9.3 10.0 9.2 10.0 9.5 9.7 9.1   < > 9.5 9.3 9.4 9.3  --    PHOS  --   --   --   --   --  3.5 4.5  --   --   --   --   --   --    PROTTOTAL 7.3  --  7.7 8.0 7.6  --  7.9   < >  --  7.5  --   --   --    ALBUMIN 4.3  --  4.4 4.6 4.2 4.6 4.4   < >  --  4.0  --   --   --    BILITOTAL 0.4  --  0.4 0.7 0.4  --  0.3   < >  --  0.8  --   --   --    ALKPHOS 89  --  101 103 113  --  101   < >  --  81  --   --   --    AST 17  --  20 18 25  --  21   < >  --  13  --   --   --    ALT 5  --  10 9 11  --  15   < >  --  19  --   --   --     < > = values in this interval not displayed.     CBC  Recent Labs   Lab Test 03/11/24  1019 09/11/23  1052 06/12/23  1511 05/22/23  1417   HGB 13.3 14.1 14.7 13.1*   WBC 6.4 4.8 5.7 4.8   RBC 3.87* 4.15* 4.27* 3.93*   HCT 40.3 41.0 44.3 41.8   * 99 104* 106*   MCH 34.4* 34.0* 34.4* 33.3*   MCHC 33.0 34.4 33.2 31.3*   RDW 11.9 11.1 11.9 11.9    206 186 229     INRNo lab results found.  ABGNo lab results found.   URINE STUDIES  Recent Labs   Lab Test 06/12/23  1518   COLOR Light Yellow   APPEARANCE Clear   URINEGLC Negative   URINEBILI Negative   URINEKETONE Negative   SG 1.011   UBLD Negative   URINEPH 6.0   PROTEIN Negative   NITRITE Negative   LEUKEST Large*   RBCU <1   WBCU 33*     No lab results found.    ASSESSMENT AND PLAN:   #ALAN of unknown etiology that has resolved after all his medications were held is likely hemodynamically mediated and secondary to polypharmacy    #CKD stage 3a  eGFR around 45 mL/min  At baseline with no evidence of proteinuria off ACE inhibitors  A  renal ultrasound shows unremarkable kidneys  The potential responsible factors include vascular disease, hypertension and normal decline related to age. No documented intake of  NSAIDs or other nephrotoxic medications. The risk of progression is low provided there are no additional episodes of ALAN. The patient was instructed to monitor his blood pressure and his glycemia and to send the numbers.  The patient was also  instructed to keep the sodium intake around 2400 mg /day, follow a plant-based diet and to avoid NSAIDs. In addition he was counseled about not fasting during the month of Ramadan     #HTN  Primary and secondary to CKD. Current BP in clinic is 123/83. Management as per above    #Type 2 DM   Hba1c 5.8 in May 2023 -> 6.5 today, pursue metformin 500 mg twice daily    #CVD/dyslipidemia  Given his history of CVA, he was restarted on his last visit on atorvastatin 40 mg daily     #Blood count  Hemoglobin 14.7 -> 14.1 -> 13.3 no acute issue    #Acid-base status  CO2 level 28 -> 29 -> 28 no need for sodium bicarbonate supplementation    #Electrolytes  Na  136-> 138  K 5.3 -> 4.5 -> 5.1     #BMD  Ca  9.7        P 3.5   alb 4.6  iPTH 96 Vitamin D level is 16   He was started on ergocalciferol 35210 units once a week     #Neuropathy/ chronic pain/recent fracture on duloxetine 20 mg daily and gabapentin 200 mg three times a day    #CKD journey/transplant: not a candidate at this point     The total time of this encounter amounted to 30 minutes on the day of the encounter. This time included time spent with the patient, reviewing records, ordering tests, and performing post visit documentation.   Labs ordered include: CBC with diff, Renal Panel, CMP, UA with microscopy, UPCR, UACR    The patient will return to follow up in 3 months    Taylor Carrizales MD  Division of Renal Disease and Hypertension

## 2024-03-12 LAB — BACTERIA UR CULT: ABNORMAL

## 2024-03-13 ENCOUNTER — TELEPHONE (OUTPATIENT)
Dept: NEPHROLOGY | Facility: CLINIC | Age: 79
End: 2024-03-13
Payer: COMMERCIAL

## 2024-03-13 NOTE — TELEPHONE ENCOUNTER
Left Voicemail (1st Attempt) for the patient to schedule:    Appointment type: Return Nephrology  Provider: Dr. Carrizales  Return date: Approx. 6/11  Phone number: [phone]  Add'l appt(s) needed: Lab appointment 1-hour prior to clinic visit

## 2024-03-15 ENCOUNTER — TELEPHONE (OUTPATIENT)
Dept: NEPHROLOGY | Facility: CLINIC | Age: 79
End: 2024-03-15
Payer: COMMERCIAL

## 2024-03-15 NOTE — TELEPHONE ENCOUNTER
Left Voicemail (2nd Attempt) & mailed letter for the patient to schedule:    Appointment type: Return Nephrology  Provider: Dr. Carrizales  Return date: Approx. 6/11  Phone number: [phone]  Add'l appt(s) needed: Lab appointment 1-hour prior to clinic visit

## 2024-03-18 ENCOUNTER — LAB (OUTPATIENT)
Dept: LAB | Facility: CLINIC | Age: 79
End: 2024-03-18
Payer: COMMERCIAL

## 2024-03-18 DIAGNOSIS — N18.30 TYPE 2 DIABETES MELLITUS WITH STAGE 3 CHRONIC KIDNEY DISEASE, WITHOUT LONG-TERM CURRENT USE OF INSULIN, UNSPECIFIED WHETHER STAGE 3A OR 3B CKD (H): ICD-10-CM

## 2024-03-18 DIAGNOSIS — E11.22 TYPE 2 DIABETES MELLITUS WITH STAGE 3 CHRONIC KIDNEY DISEASE, WITHOUT LONG-TERM CURRENT USE OF INSULIN, UNSPECIFIED WHETHER STAGE 3A OR 3B CKD (H): ICD-10-CM

## 2024-03-18 LAB
ALBUMIN SERPL BCG-MCNC: 4.2 G/DL (ref 3.5–5.2)
ALP SERPL-CCNC: 123 U/L (ref 40–150)
ALT SERPL W P-5'-P-CCNC: 16 U/L (ref 0–70)
ANION GAP SERPL CALCULATED.3IONS-SCNC: 10 MMOL/L (ref 7–15)
AST SERPL W P-5'-P-CCNC: 21 U/L (ref 0–45)
BILIRUB SERPL-MCNC: 0.4 MG/DL
BUN SERPL-MCNC: 23.2 MG/DL (ref 8–23)
CALCIUM SERPL-MCNC: 9.1 MG/DL (ref 8.8–10.2)
CHLORIDE SERPL-SCNC: 101 MMOL/L (ref 98–107)
CREAT SERPL-MCNC: 1.27 MG/DL (ref 0.67–1.17)
DEPRECATED HCO3 PLAS-SCNC: 25 MMOL/L (ref 22–29)
EGFRCR SERPLBLD CKD-EPI 2021: 57 ML/MIN/1.73M2
FASTING STATUS PATIENT QL REPORTED: YES
GLUCOSE SERPL-MCNC: 193 MG/DL (ref 70–99)
GLUCOSE SERPL-MCNC: 193 MG/DL (ref 70–99)
POTASSIUM SERPL-SCNC: 4.3 MMOL/L (ref 3.4–5.3)
PROT SERPL-MCNC: 7.4 G/DL (ref 6.4–8.3)
SODIUM SERPL-SCNC: 136 MMOL/L (ref 135–145)

## 2024-03-18 PROCEDURE — 36415 COLL VENOUS BLD VENIPUNCTURE: CPT | Performed by: PATHOLOGY

## 2024-03-18 PROCEDURE — 80053 COMPREHEN METABOLIC PANEL: CPT | Performed by: PATHOLOGY

## 2024-03-19 ENCOUNTER — OFFICE VISIT (OUTPATIENT)
Dept: FAMILY MEDICINE | Facility: CLINIC | Age: 79
End: 2024-03-19
Payer: COMMERCIAL

## 2024-03-19 ENCOUNTER — TELEPHONE (OUTPATIENT)
Dept: NEPHROLOGY | Facility: CLINIC | Age: 79
End: 2024-03-19

## 2024-03-19 VITALS
WEIGHT: 210.9 LBS | HEART RATE: 82 BPM | HEIGHT: 72 IN | BODY MASS INDEX: 28.56 KG/M2 | TEMPERATURE: 98.1 F | OXYGEN SATURATION: 98 % | SYSTOLIC BLOOD PRESSURE: 137 MMHG | DIASTOLIC BLOOD PRESSURE: 87 MMHG

## 2024-03-19 DIAGNOSIS — Z23 NEED FOR COVID-19 VACCINE: ICD-10-CM

## 2024-03-19 DIAGNOSIS — K59.01 SLOW TRANSIT CONSTIPATION: ICD-10-CM

## 2024-03-19 DIAGNOSIS — N18.31 STAGE 3A CHRONIC KIDNEY DISEASE (H): ICD-10-CM

## 2024-03-19 DIAGNOSIS — Z86.73 HISTORY OF CVA (CEREBROVASCULAR ACCIDENT): Primary | ICD-10-CM

## 2024-03-19 DIAGNOSIS — M79.2 NEUROPATHIC PAIN: ICD-10-CM

## 2024-03-19 DIAGNOSIS — Z29.11 NEED FOR VACCINATION AGAINST RESPIRATORY SYNCYTIAL VIRUS: ICD-10-CM

## 2024-03-19 DIAGNOSIS — Z23 HIGH PRIORITY FOR 2019-NCOV VACCINE: ICD-10-CM

## 2024-03-19 PROCEDURE — 90480 ADMN SARSCOV2 VAC 1/ONLY CMP: CPT | Performed by: FAMILY MEDICINE

## 2024-03-19 PROCEDURE — 91320 SARSCV2 VAC 30MCG TRS-SUC IM: CPT | Performed by: FAMILY MEDICINE

## 2024-03-19 PROCEDURE — 99214 OFFICE O/P EST MOD 30 MIN: CPT | Mod: 25 | Performed by: FAMILY MEDICINE

## 2024-03-19 RX ORDER — ASPIRIN 81 MG/1
81 TABLET ORAL DAILY
Qty: 60 TABLET | Refills: 4 | Status: SHIPPED | OUTPATIENT
Start: 2024-03-19 | End: 2024-06-25

## 2024-03-19 RX ORDER — GABAPENTIN 600 MG/1
600 TABLET ORAL 3 TIMES DAILY
Qty: 270 TABLET | Refills: 1 | Status: SHIPPED | OUTPATIENT
Start: 2024-03-19 | End: 2024-04-26

## 2024-03-19 RX ORDER — RESPIRATORY SYNCYTIAL VIRUS VACCINE 120MCG/0.5
0.5 KIT INTRAMUSCULAR ONCE
Qty: 1 EACH | Refills: 0 | Status: SHIPPED | OUTPATIENT
Start: 2024-03-19 | End: 2024-03-19

## 2024-03-19 RX ORDER — POLYETHYLENE GLYCOL 3350 17 G/17G
17 POWDER, FOR SOLUTION ORAL DAILY
Qty: 510 G | Refills: 1 | Status: SHIPPED | OUTPATIENT
Start: 2024-03-19 | End: 2024-09-05

## 2024-03-19 NOTE — PROGRESS NOTES
Assessment & Plan     History of CVA (cerebrovascular accident)  Continue aspirin  - aspirin 81 MG EC tablet; Take 1 tablet (81 mg) by mouth daily    Need for vaccination against respiratory syncytial virus  Advised to get this at a pharmacy  - respiratory syncytial virus vaccine, bivalent (ABRYSVO) injection; Inject 0.5 mLs into the muscle once for 1 dose    Slow transit constipation  Refill  - polyethylene glycol (MIRALAX) 17 GM/Dose powder; Take 17 g by mouth daily    Neuropathic pain  Will change the dose of gabapentin 600 mg 3 times daily instead of 800 twice daily.  - gabapentin (NEURONTIN) 600 MG tablet; Take 1 tablet (600 mg) by mouth 3 times daily    Stage 3a chronic kidney disease (H)  Because of patient's history of chronic kidney disease will start Jardiance 10 mg daily.  - empagliflozin (JARDIANCE) 10 MG TABS tablet; Take 1 tablet (10 mg) by mouth daily    Need for COVID-19 vaccine  - COVID-19 mRNA vaccine 12+y (COMIRNATY (PFIZER)) injection 30 mcg      I spent a total of 31 minutes on the day of the visit.   Time spent by me doing chart review, history and exam, documentation and further activities per the note    The longitudinal plan of care for the diagnosis(es)/condition(s) as documented were addressed during this visit. Due to the added complexity in care, I will continue to support Juli in the subsequent management and with ongoing continuity of care.    BMI  Estimated body mass index is 28.6 kg/m  as calculated from the following:    Height as of this encounter: 1.829 m (6').    Weight as of this encounter: 95.7 kg (210 lb 14.4 oz).             Return in about 2 months (around 5/19/2024).    Subjective   Juli is a 79 year old, presenting for the following health issues:  Pain (Pt is c/o right hip pain X1 month )        3/19/2024     2:51 PM   Additional Questions   Roomed by Wes         3/19/2024    Information    services provided? Yes   Language Afghan     name Dallas FISHER   Complains of Right hip pain goes from his hip to his toes.  Has numbness throughout his Right side  Taking gabapentin 800 mg -helps with the pain tough only lasts a few hours                   Objective    /87 (BP Location: Left arm, Patient Position: Sitting, Cuff Size: Adult Large)   Pulse 82   Temp 98.1  F (36.7  C) (Oral)   Ht 1.829 m (6')   Wt 95.7 kg (210 lb 14.4 oz)   SpO2 98%   BMI 28.60 kg/m    Body mass index is 28.6 kg/m .  Physical Exam  Vitals reviewed.   Constitutional:       General: He is not in acute distress.     Appearance: He is well-developed. He is not diaphoretic.   HENT:      Head: Normocephalic.   Eyes:      General: No scleral icterus.     Conjunctiva/sclera: Conjunctivae normal.   Neck:      Thyroid: No thyromegaly.   Cardiovascular:      Rate and Rhythm: Normal rate and regular rhythm.      Heart sounds: Normal heart sounds. No murmur heard.  Pulmonary:      Effort: Pulmonary effort is normal. No respiratory distress.      Breath sounds: Normal breath sounds. No wheezing.   Musculoskeletal:      Cervical back: Normal range of motion.      Right hip: Tenderness (over GT) present.      Left hip: No tenderness.      Left lower leg: No edema.   Skin:     General: Skin is warm and dry.   Neurological:      Mental Status: He is alert and oriented to person, place, and time.      Comments: Has weakness on the right side since his CVA   Psychiatric:         Behavior: Behavior normal.         Thought Content: Thought content normal.         Judgment: Judgment normal.                    Signed Electronically by: Ernie Arreola MD

## 2024-03-19 NOTE — PATIENT INSTRUCTIONS
Patient Education   Here is the plan from today's visit    1. History of CVA (cerebrovascular accident)    - aspirin 81 MG EC tablet; Take 1 tablet (81 mg) by mouth daily  Dispense: 60 tablet; Refill: 4    2. Need for vaccination against respiratory syncytial virus      3. Slow transit constipation    - polyethylene glycol (MIRALAX) 17 GM/Dose powder; Take 17 g by mouth daily  Dispense: 510 g; Refill: 1    4. Neuropathic pain    - gabapentin (NEURONTIN) 600 MG tablet; Take 1 tablet (600 mg) by mouth 3 times daily  Dispense: 270 tablet; Refill: 1    5. Stage 3a chronic kidney disease (H)    - empagliflozin (JARDIANCE) 10 MG TABS tablet; Take 1 tablet (10 mg) by mouth daily  Dispense: 90 tablet; Refill: 1          Please call or return to clinic if your symptoms don't go away.    Follow up plan  Return in about 2 months (around 5/19/2024).    Thank you for coming to [facility] today.  Lab Testing:  **If you had lab testing today and your results are reassuring or normal they will be mailed to you or sent through [employer] within 7 days.   **If the lab tests need quick action we will call you with the results.  **If you are having labs done on a different day, please call [phone] to schedule at [facility] or [phone] for other [facility] Outpatient Lab locations. Labs do not offer walk-in appointments.  The phone number we will call with results is # [phone] (home) . If this is not the best number please call our clinic and change the number.  Medication Refills:  If you need any refills please call your pharmacy and they will contact us.   If you need to  your refill at a new pharmacy, please contact the new pharmacy directly. The new pharmacy will help you get your medications transferred faster.   Scheduling:  If you have any concerns about today's visit or wish to schedule another appointment please call our office during normal business hours [phone] (8-5:00 M-F). If you can no  longer make a scheduled visit, please cancel via [employer] or call us to cancel.   If a referral was made to an [facility] specialty provider and you do not get a call from central scheduling, please refer to directions on your visit summary or call our office during normal business hours for assistance.   If a Mammogram was ordered for you at the [facility] call [phone] to schedule or change your appointment.  If you had an XRay/CT/Ultrasound/MRI ordered the number is [phone] to schedule or change your radiology appointment.   [facility] has limited ultrasound appointments available on Wednesdays, if you would like your ultrasound at [facility], please call [phone] to schedule.   Medical Concerns:  If you have urgent medical concerns please call [phone] at any time of the day.    Ernie Arreola MD

## 2024-03-19 NOTE — TELEPHONE ENCOUNTER
Patient confirmed scheduled appointment:  Date: 7/10/24  Time: 3 pm  Provider: Sofie  Location: [facility]  Testing: Lab  Notes: Pt requested mailed itinerary. Pt's street address was verified.

## 2024-04-17 ENCOUNTER — ANCILLARY PROCEDURE (OUTPATIENT)
Dept: ULTRASOUND IMAGING | Facility: CLINIC | Age: 79
End: 2024-04-17
Attending: INTERNAL MEDICINE
Payer: COMMERCIAL

## 2024-04-17 DIAGNOSIS — N18.30 TYPE 2 DIABETES MELLITUS WITH STAGE 3 CHRONIC KIDNEY DISEASE, WITHOUT LONG-TERM CURRENT USE OF INSULIN, UNSPECIFIED WHETHER STAGE 3A OR 3B CKD (H): ICD-10-CM

## 2024-04-17 DIAGNOSIS — N18.4 CKD (CHRONIC KIDNEY DISEASE) STAGE 4, GFR 15-29 ML/MIN (H): ICD-10-CM

## 2024-04-17 DIAGNOSIS — E11.22 TYPE 2 DIABETES MELLITUS WITH STAGE 3 CHRONIC KIDNEY DISEASE, WITHOUT LONG-TERM CURRENT USE OF INSULIN, UNSPECIFIED WHETHER STAGE 3A OR 3B CKD (H): ICD-10-CM

## 2024-04-17 PROCEDURE — 76770 US EXAM ABDO BACK WALL COMP: CPT | Performed by: RADIOLOGY

## 2024-04-26 ENCOUNTER — OFFICE VISIT (OUTPATIENT)
Dept: FAMILY MEDICINE | Facility: CLINIC | Age: 79
End: 2024-04-26
Payer: COMMERCIAL

## 2024-04-26 VITALS
BODY MASS INDEX: 27.66 KG/M2 | DIASTOLIC BLOOD PRESSURE: 78 MMHG | RESPIRATION RATE: 18 BRPM | OXYGEN SATURATION: 97 % | WEIGHT: 204.2 LBS | TEMPERATURE: 98.6 F | HEART RATE: 81 BPM | SYSTOLIC BLOOD PRESSURE: 115 MMHG | HEIGHT: 72 IN

## 2024-04-26 DIAGNOSIS — Z23 NEED FOR SHINGLES VACCINE: ICD-10-CM

## 2024-04-26 DIAGNOSIS — Z00.00 ENCOUNTER FOR MEDICARE ANNUAL WELLNESS EXAM: ICD-10-CM

## 2024-04-26 DIAGNOSIS — M79.2 NEUROPATHIC PAIN: ICD-10-CM

## 2024-04-26 DIAGNOSIS — Z29.11 NEED FOR VACCINATION AGAINST RESPIRATORY SYNCYTIAL VIRUS: ICD-10-CM

## 2024-04-26 DIAGNOSIS — Z86.73 HISTORY OF CVA (CEREBROVASCULAR ACCIDENT): ICD-10-CM

## 2024-04-26 DIAGNOSIS — N32.81 OAB (OVERACTIVE BLADDER): Primary | ICD-10-CM

## 2024-04-26 LAB
ALBUMIN UR-MCNC: ABNORMAL MG/DL
APPEARANCE UR: ABNORMAL
BACTERIA #/AREA URNS HPF: ABNORMAL /HPF
BILIRUB UR QL STRIP: NEGATIVE
COLOR UR AUTO: YELLOW
GLUCOSE UR STRIP-MCNC: >=1000 MG/DL
HGB UR QL STRIP: NEGATIVE
KETONES UR STRIP-MCNC: NEGATIVE MG/DL
LEUKOCYTE ESTERASE UR QL STRIP: NEGATIVE
NITRATE UR QL: POSITIVE
PH UR STRIP: 5.5 [PH] (ref 5–8)
RBC #/AREA URNS AUTO: ABNORMAL /HPF
SP GR UR STRIP: 1.02 (ref 1–1.03)
SQUAMOUS #/AREA URNS AUTO: ABNORMAL /LPF
UROBILINOGEN UR STRIP-ACNC: 0.2 E.U./DL
WBC #/AREA URNS AUTO: ABNORMAL /HPF

## 2024-04-26 PROCEDURE — 87186 SC STD MICRODIL/AGAR DIL: CPT | Performed by: FAMILY MEDICINE

## 2024-04-26 PROCEDURE — 99213 OFFICE O/P EST LOW 20 MIN: CPT | Mod: 25 | Performed by: FAMILY MEDICINE

## 2024-04-26 PROCEDURE — G0439 PPPS, SUBSEQ VISIT: HCPCS | Performed by: FAMILY MEDICINE

## 2024-04-26 PROCEDURE — 87086 URINE CULTURE/COLONY COUNT: CPT | Performed by: FAMILY MEDICINE

## 2024-04-26 PROCEDURE — 81001 URINALYSIS AUTO W/SCOPE: CPT | Performed by: FAMILY MEDICINE

## 2024-04-26 RX ORDER — GABAPENTIN 800 MG/1
800 TABLET ORAL 3 TIMES DAILY
Qty: 270 TABLET | Refills: 1 | Status: SHIPPED | OUTPATIENT
Start: 2024-04-26 | End: 2024-06-25

## 2024-04-26 RX ORDER — RESPIRATORY SYNCYTIAL VIRUS VACCINE 120MCG/0.5
0.5 KIT INTRAMUSCULAR ONCE
Qty: 1 EACH | Refills: 0 | Status: SHIPPED | OUTPATIENT
Start: 2024-04-26 | End: 2024-04-26

## 2024-04-26 SDOH — HEALTH STABILITY: PHYSICAL HEALTH: ON AVERAGE, HOW MANY DAYS PER WEEK DO YOU ENGAGE IN MODERATE TO STRENUOUS EXERCISE (LIKE A BRISK WALK)?: 0 DAYS

## 2024-04-26 SDOH — HEALTH STABILITY: PHYSICAL HEALTH: ON AVERAGE, HOW MANY MINUTES DO YOU ENGAGE IN EXERCISE AT THIS LEVEL?: 0 MIN

## 2024-04-26 ASSESSMENT — PAIN SCALES - GENERAL: PAINLEVEL: EXTREME PAIN (8)

## 2024-04-26 ASSESSMENT — SOCIAL DETERMINANTS OF HEALTH (SDOH): HOW OFTEN DO YOU GET TOGETHER WITH FRIENDS OR RELATIVES?: MORE THAN THREE TIMES A WEEK

## 2024-04-26 NOTE — PATIENT INSTRUCTIONS
Patient Education   Here is the plan from today's visit    1. Need for vaccination against respiratory syncytial virus    - respiratory syncytial virus vaccine, bivalent (ABRYSVO) injection; Inject 0.5 mLs into the muscle once for 1 dose  Dispense: 1 each; Refill: 0    2. OAB (overactive bladder)    - UA Macroscopic with reflex to Microscopic and Culture; Future    3. History of CVA (cerebrovascular accident)    - Adult Physical Medicine and Rehab  Referral; Future    4. Neuropathic pain    - gabapentin (NEURONTIN) 800 MG tablet; Take 1 tablet (800 mg) by mouth 3 times daily  Dispense: 270 tablet; Refill: 1    5. Need for shingles vaccine    - zoster vaccine recombinant adjuvanted (SHINGRIX) injection; Inject 0.5 mLs into the muscle once for 1 dose Pharmacist administered  Dispense: 0.5 mL; Refill: 0          Please call or return to clinic if your symptoms don't go away.    Follow up plan  Return in about 3 months (around 7/26/2024) for Diabetes Routine Check.    Thank you for coming to [facility] today.  Lab Testing:  **If you had lab testing today and your results are reassuring or normal they will be mailed to you or sent through [employer] within 7 days.   **If the lab tests need quick action we will call you with the results.  **If you are having labs done on a different day, please call [phone] to schedule at [facility] or [phone] for other [facility] Outpatient Lab locations. Labs do not offer walk-in appointments.  The phone number we will call with results is # [phone] (home) . If this is not the best number please call our clinic and change the number.  Medication Refills:  If you need any refills please call your pharmacy and they will contact us.   If you need to  your refill at a new pharmacy, please contact the new pharmacy directly. The new pharmacy will help you get your medications transferred faster.   Scheduling:  If you have any concerns about today's  visit or wish to schedule another appointment please call our office during normal business hours [phone] (8-5:00 M-F). If you can no longer make a scheduled visit, please cancel via [employer] or call us to cancel.   If a referral was made to an [facility] specialty provider and you do not get a call from central scheduling, please refer to directions on your visit summary or call our office during normal business hours for assistance.   If a Mammogram was ordered for you at the Breast Center call [phone] to schedule or change your appointment.  If you had an XRay/CT/Ultrasound/MRI ordered the number is [phone] to schedule or change your radiology appointment.   [facility] has limited ultrasound appointments available on Wednesdays, if you would like your ultrasound at [facility], please call [phone] to schedule.   Medical Concerns:  If you have urgent medical concerns please call [phone] at any time of the day.    Ernie Arreola MD      Preventive Care Advice   This is general advice given by our system to help you stay healthy. However, your care team may have specific advice just for you. Please talk to your care team about your preventive care needs.  Nutrition  Eat 5 or more servings of fruits and vegetables each day.  Try wheat bread, brown rice and whole grain pasta (instead of white bread, rice, and pasta).  Get enough calcium and vitamin D. Check the label on foods and aim for 100% of the RDA (recommended daily allowance).  Lifestyle  Exercise at least 150 minutes each week   (30 minutes a day, 5 days a week).  Do muscle strengthening activities 2 days a week. These help control your weight and prevent disease.  No smoking.  Wear sunscreen to prevent skin cancer.  Have a dental exam and cleaning every 6 months.  Yearly exams  See your health care team every year to talk about:  Any changes in your health.  Any medicines your care team has prescribed.  Preventive care,  family planning, and ways to prevent chronic diseases.  Shots (vaccines)   HPV shots (up to age 26), if you've never had them before.  Hepatitis B shots (up to age 59), if you've never had them before.  COVID-19 shot: Get this shot when it's due.  Flu shot: Get a flu shot every year.  Tetanus shot: Get a tetanus shot every 10 years.  Pneumococcal, hepatitis A, and RSV shots: Ask your care team if you need these based on your risk.  Shingles shot (for age 50 and up).  General health tests  Diabetes screening:  Starting at age 35, Get screened for diabetes at least every 3 years.  If you are younger than age 35, ask your care team if you should be screened for diabetes.  Cholesterol test: At age 39, start having a cholesterol test every 5 years, or more often if advised.  Bone density scan (DEXA): At age 50, ask your care team if you should have this scan for osteoporosis (brittle bones).  Hepatitis C: Get tested at least once in your life.  STIs (sexually transmitted infections)  Before age 24: Ask your care team if you should be screened for STIs.  After age 24: Get screened for STIs if you're at risk. You are at risk for STIs (including HIV) if:  You are sexually active with more than one person.  You don't use condoms every time.  You or a partner was diagnosed with a sexually transmitted infection.  If you are at risk for HIV, ask about PrEP medicine to prevent HIV.  Get tested for HIV at least once in your life, whether you are at risk for HIV or not.  Cancer screening tests  Cervical cancer screening: If you have a cervix, begin getting regular cervical cancer screening tests at age 21. Most people who have regular screenings with normal results can stop after age 65. Talk about this with your provider.  Breast cancer scan (mammogram): If you've ever had breasts, begin having regular mammograms starting at age 40. This is a scan to check for breast cancer.  Colon cancer screening: It is important to start  screening for colon cancer at age 45.  Have a colonoscopy test every 10 years (or more often if you're at risk) Or, ask your provider about stool tests like a FIT test every year or Cologuard test every 3 years.  To learn more about your testing options, visit: https://[email or web address]/384994.pdf.  For help making a decision, visit: https://bit.ly/bs38635.  Prostate cancer screening test: If you have a prostate and are age 55 to 69, ask your provider if you would benefit from a yearly prostate cancer screening test.  Lung cancer screening: If you are a current or former smoker age 50 to 80, ask your care team if ongoing lung cancer screenings are right for you.  For informational purposes only. Not to replace the advice of your health care provider. Copyright   2023 [place] [employer]. All rights reserved. Clinically reviewed by the [facility]. [employer] 157597 - REV 01/24.    Learning About Activities of Daily Living  What are activities of daily living?     Activities of daily living (ADLs) are the basic self-care tasks you do every day. These include eating, bathing, dressing, and moving around.  As you age, and if you have health problems, you may find that it's harder to do some of these tasks. If so, your doctor can suggest ideas that may help.  To measure what kind of help you may need, your doctor will ask how well you are able to do ADLs. Let your doctor know if there are any tasks that you are having trouble doing. This is an important first step to getting help. And when you have the help you need, you can stay as independent as possible.  How will a doctor assess your ADLs?  Asking about ADLs is part of a routine health checkup your doctor will likely do as you age. Your health check might be done in a doctor's office, in your home, or at a hospital. The goal is to find out if you are having any problems that could make it hard to care for yourself or that make it unsafe  for you to be on your own.  To measure your ADLs, your doctor will ask how hard it is for you to do routine tasks. Your doctor may also want to know if you have changed the way you do a task because of a health problem. Your doctor may watch how you:  Walk back and forth.  Keep your balance while you stand or walk.  Move from sitting to standing or from a bed to a chair.  Button or unbutton a shirt or sweater.  Remove and put on your shoes.  It's common to feel a little worried or anxious if you find you can't do all the things you used to be able to do. Talking with your doctor about ADLs is a way to make sure you're as safe as possible and able to care for yourself as well as you can. You may want to bring a caregiver, friend, or family member to your checkup. They can help you talk to your doctor.  Follow-up care is a key part of your treatment and safety. Be sure to make and go to all appointments, and call your doctor if you are having problems. It's also a good idea to know your test results and keep a list of the medicines you take.  Current as of: October 24, 2023               Content Version: 14.0    6157-1441 [employer].   Care instructions adapted under license by your healthcare professional. If you have questions about a medical condition or this instruction, always ask your healthcare professional. [employer] disclaims any warranty or liability for your use of this information.      Preventing Falls: Care Instructions  Injuries and health problems such as trouble walking or poor eyesight can increase your risk of falling. So can some medicines. But there are things you can do to help prevent falls. You can exercise to get stronger. You can also arrange your home to make it safer.    Talk to your doctor about the medicines you take. Ask if any of them increase the risk of falls and whether they can be changed or stopped.   Try to exercise regularly. It can help improve your  "strength and balance. This can help lower your risk of falling.     Practice fall safety and prevention.    Wear low-heeled shoes that fit well and give your feet good support. Talk to your doctor if you have foot problems that make this hard.  Carry a cellphone or wear a medical alert device that you can use to call for help.  Use stepladders instead of chairs to reach high objects. Don't climb if you're at risk for falls. Ask for help, if needed.  Wear the correct eyeglasses, if you need them.    Make your home safer.    Remove rugs, cords, clutter, and furniture from walkways.  Keep your house well lit. Use night-lights in hallways and bathrooms.  Install and use sturdy handrails on stairways.  Wear nonskid footwear, even inside. Don't walk barefoot or in socks without shoes.    Be safe outside.    Use handrails, curb cuts, and ramps whenever possible.  Keep your hands free by using a shoulder bag or backpack.  Try to walk in well-lit areas. Watch out for uneven ground, changes in pavement, and debris.  Be careful in the winter. Walk on the grass or gravel when sidewalks are slippery. Use de-icer on steps and walkways. Add non-slip devices to shoes.    Put grab bars and nonskid mats in your shower or tub and near the toilet. Try to use a shower chair or bath bench when bathing.   Get into a tub or shower by putting in your weaker leg first. Get out with your strong side first. Have a phone or medical alert device in the bathroom with you.   Where can you learn more?  Go to https://www.[employer].net/patiented  Enter G117 in the search box to learn more about \"Preventing Falls: Care Instructions.\"  Current as of: July 17, 2023               Content Version: 14.0    2641-3097 [employer].   Care instructions adapted under license by your healthcare professional. If you have questions about a medical condition or this instruction, always ask your healthcare professional. [employer] " disclaims any warranty or liability for your use of this information.      Learning About Sleeping Well  What does sleeping well mean?     Sleeping well means getting enough sleep to feel good and stay healthy. How much sleep is enough varies among people.  The number of hours you sleep and how you feel when you wake up are both important. If you do not feel refreshed, you probably need more sleep. Another sign of not getting enough sleep is feeling tired during the day.  Experts recommend that adults get at least 7 or more hours of sleep per day. Children and older adults need more sleep.  Why is getting enough sleep important?  Getting enough quality sleep is a basic part of good health. When your sleep suffers, your physical health, mood, and your thoughts can suffer too. You may find yourself feeling more grumpy or stressed. Not getting enough sleep also can lead to serious problems, including injury, accidents, anxiety, and depression.  What might cause poor sleeping?  Many things can cause sleep problems, including:  Changes to your sleep schedule.  Stress. Stress can be caused by fear about a single event, such as giving a speech. Or you may have ongoing stress, such as worry about work or school.  Depression, anxiety, and other mental or emotional conditions.  Changes in your sleep habits or surroundings. This includes changes that happen where you sleep, such as noise, light, or sleeping in a different bed. It also includes changes in your sleep pattern, such as having jet lag or working a late shift.  Health problems, such as pain, breathing problems, and restless legs syndrome.  Lack of regular exercise.  Using alcohol, nicotine, or caffeine before bed.  How can you help yourself?  Here are some tips that may help you sleep more soundly and wake up feeling more refreshed.  Your sleeping area   Use your bedroom only for sleeping and sex. A bit of light reading may help you fall asleep. But if it doesn't,  "do your reading elsewhere in the house. Try not to use your TV, computer, smartphone, or tablet while you are in bed.  Be sure your bed is big enough to stretch out comfortably, especially if you have a sleep partner.  Keep your bedroom quiet, dark, and cool. Use curtains, blinds, or a sleep mask to block out light. To block out noise, use earplugs, soothing music, or a \"white noise\" machine.  Your evening and bedtime routine   Create a relaxing bedtime routine. You might want to take a warm shower or bath, or listen to soothing music.  Go to bed at the same time every night. And get up at the same time every morning, even if you feel tired.  What to avoid   Limit caffeine (coffee, tea, caffeinated sodas) during the day, and don't have any for at least 6 hours before bedtime.  Avoid drinking alcohol before bedtime. Alcohol can cause you to wake up more often during the night.  Try not to smoke or use tobacco, especially in the evening. Nicotine can keep you awake.  Limit naps during the day, especially close to bedtime.  Avoid lying in bed awake for too long. If you can't fall asleep or if you wake up in the middle of the night and can't get back to sleep within about 20 minutes, get out of bed and go to another room until you feel sleepy.  Avoid taking medicine right before bed that may keep you awake or make you feel hyper or energized. Your doctor can tell you if your medicine may do this and if you can take it earlier in the day.  If you can't sleep   Imagine yourself in a peaceful, pleasant scene. Focus on the details and feelings of being in a place that is relaxing.  Get up and do a quiet or boring activity until you feel sleepy.  Avoid drinking any liquids before going to bed to help prevent waking up often to use the bathroom.  Where can you learn more?  Go to https://www.healthwise.net/patiented  Enter J942 in the search box to learn more about \"Learning About Sleeping Well.\"  Current as of: July 10, " 2023               Content Version: 14.0    3739-2761 [employer].   Care instructions adapted under license by your healthcare professional. If you have questions about a medical condition or this instruction, always ask your healthcare professional. [employer] disclaims any warranty or liability for your use of this information.

## 2024-04-26 NOTE — COMMUNITY RESOURCES LIST (ENGLISH)
April 26, 2024           YOUR PERSONALIZED LIST OF SERVICES & PROGRAMS           & SHELTER    Housing      [facility] - Hotline - Housing crisis  [address] [place] [address] (Distance: 0.6 miles)  Phone: [phone]) [phone]  Website: http://www.saintolaf.org/  Language: English  Fee: Free  Accessibility: Ada accessible      [facility] - Adult longterm [facility]  [address] [place] [address] (Distance: 0.6 miles)  Phone: [phone]  Website: http://www.saintolaf.org/  Language: English  Fee: Free  Accessibility: Ada accessible      [facility] - Housing Stabilization Services  Phone: [phone]) 786-9094  Website: https://EnhanceWorks/Housing-Stabilization.html  Language: English  Hours: Mon 9:00 AM - 5:00 PM Tue 9:00 AM - 5:00 PM Wed 9:00 AM - 5:00 PM Thu 9:00 AM - 5:00 PM Fri 9:00 AM - 5:00 PM  Fee: Insurance  Accessibility: Deaf or hard of hearing, Translation services    Case Management      [facility] With Services Independent  [address] [place] [address] (Distance: 3.2 miles)  Phone: [phone]  Website: https://www.[employer].WittyParrot/contact  Language: English, Azeri  Accessibility: Ada accessible, Blind accommodation, Deaf or hard of hearing, Translation services      Living - Housing Support-[facility] (HWS-I)  [address] [place] [address] (Distance: 0.3 miles)  Phone: [phone]  Website: https://cuaQea.RedZone Robotics  Language: Latvian, English, Samoan  Fee: Insurance      [employer] [facility] [facility] - Housing Stabilization Services  Phone: [phone]  Website: https://homebasemn.com/  Language: English  Hours: Mon 8:00 AM - 4:00 PM Tue 8:00 AM - 4:00 PM Wed 8:00 AM - 4:00 PM Thu 8:00 AM - 4:00 PM Fri 8:00 AM - 4:00 PM  Fee: Free  Accessibility: Blind accommodation, Deaf or hard of hearing  Transportation Options: Free transportation    Drop-In Services      [facility]  - Drop-in center or day shelter  [address] [place] [address] (Distance: 1.7 miles)  Phone: [phone]  Language: English  Fee: Free  Accessibility: Ada accessible, Translation services      [facility] - Drop-in center or day shelter  [address] [place] [place] [address] (Distance: 2.0 miles)  Phone: [phone]  Language: English  Fee: Free      LOVE - LAUNDRY LOVE  Website: http://www.laundrylove.org        & RECREATION    Sports      [facility] - Sports Program - Classes  [address] [place] [address] (Distance: 2.5 miles)  Phone: [phone]  Website: https://www.Learnmetrics.org/locations/[facility]/enrichment_programs/sports  Language: English      [facility] - Adult Enrichment  Phone: [phone]  Website: https://Eventable/adults-seniors/adult-enrichment/  Language: English  Hours: Mon 7:30 AM - 4:00 PM Tue 7:30 AM - 4:00 PM Wed 7:30 AM - 4:00 PM Thu 7:30 AM - 4:00 PM Fri 7:30 AM - 4:00 PM      LEAGUE - LITTLE LEAGUE BASEBALL AND SOFTBALL  Website: http://www.littleleague.org    Classes/Groups      in My Shoes - Mile [place] [address] (Distance: 1.2 miles)  Phone: [phone]  Website: http://www.[facility].mn/  Language: English  Fee: Free      [facility] - Personal training  [address] [place] [address] (Distance: 14.3 miles)  Phone: [phone]  Website: https://www.UXFLIP/  Language: English, Czech  Fee: Sliding scale, Self pay, Insurance      [facility] - Adult Enrichment  Phone: [phone]  Website: https://Eventable/NEHP-seniors/adult-enrichment/  Language: English  Hours: Mon 7:30 AM - 4:00 PM Tue 7:30 AM - 4:00 PM Wed 7:30 AM - 4:00 PM Thu 7:30 AM - 4:00 PM Fri 7:30 AM - 4:00 PM               IMPORTANT NUMBERS & WEBSITES        Emergency Services  911  .   [place] [address]  http://211unitedway.org  .   Poison Control  (985) 273-9386 http://[place]poison.org http://wisconsinpoison.org  .     Suicide and Crisis Lifeline  988 http://988lifeline.org  .   Childhelp [place] Child Abuse Hotline  730.903.2268 http://Childhelphotline.org   .   [place] Sexual Assault Hotline  (594) 299-4306 (HOPE) http://RegisterPatientn.org   .     [place] Runaway Safeline  (653) 531-3308 (RUNAWAY) http://SepatonruClover.Green & Grow  .   Pregnancy & Postpartum Support  Call/text [phone]  [place]: http://ppsupportmn.org  [place]: http://psichapters.com/wi  .   Substance Abuse National Helpline ([facility])  522-225-HELP (1418) http://Findtreatment.gov   .                DISCLAIMER: These resources have been generated via the Kidos Platform. Kidos does not endorse any service providers mentioned in this resource list. Kidos does not guarantee that the services mentioned in this resource list will be available to you or will improve your health or wellness.    [facility]

## 2024-04-26 NOTE — COMMUNITY RESOURCES LIST (PATIENT PREFERRED LANGUAGE)
April 26, 2024           VEDAGINGER MEGHANAFAY  EE ADEEGOTTODA IYO JOCELYNE           [facility] JOJO [facility] - Hotline - Housing [facility]  [address] [place] [address] ([place]: 0.6 miles)  Nory: [phone]  Mareegta: http://www.saintolaf.org/  Luuqad: Brant lee: Marques  Helitaanka: Ada la avery callum      [facility]  [address] [place] [address] ([place]: 0.6 miles)  Nory: [phone]  Mareegta: http://www.saintolaf.org/  Luuqad: Brant lee: Marques  Helitaanka: Ada la avery callum      [facility] - Housing Stabilization Services  Nory: [phone]  Mareegta: https://XE Corporation/Housing-Stabilization.html  Luuqad: Brant Mckay: Isn 9:00 subaxnimo - 5:00 galabnimo Leonardo 9:00 subaxnimo - 5:00 galabnimo Arb 9:00 subaxnimo - 5:00 galabnimo Luis 9:00 subaxnimo - 5:00 galabnimo Johnson 9:00 subaxnimo - 5:00 galabnimo  khidmad: Darren  Heladryanka: Dhegola ama maqal adag, adeegyada turjumaada    [facility] With Services Independent  [address] [place] [address] ([place]: 3.2 miles)  Nory: [phone]  Mareegta: https://www.[facility].net/contact  Luuqad: Diane Guo: Candy la avery callum, Byron la'aan, Dhegola' ama maqal adag, adeegyada turjumaada      Living - Housing Support-[facility] (HWS-I)  [address] [place] [address] ([place]: 0.3 miles)  Nory: [phone]  Wolfgang: https://www.THERAVECTYS  Mikaylaqad: Carabi, English, Scottish  khidmad: [facility] - Housing Stabilization Services  Nory: [phone]  Wolfgang: https://iSTAR Medical/  Qinguqad: Brant Mckay: Isn 8:00 subaxnimo - 4:00 galabnimo Leonardo 8:00 subaxnimo - 4:00 galabnimo Arb 8:00 subaxnimo - 4:00 galabnimo Luis 8:00 subaxnimo - 4:00 galabnimo Johnson 8:00  subaxnimo - 4:00 galabnimo  khidmad: Bilaash  Helitaanka: Byron corderoRodneyanabel, Skye giang maronna hag  Xulasharocio Segundo: Gaadiid bilaash barak Hauseritanilam GamezNeponsit [facility], [facility]. - Drop-in center or day shelter  [address] [place] [address] ([place]: 1.7 miles)  Nory: [phone]  Luuqad: Brant lee: Nishantaash  Helitaanka: Ada la avery callum, Adeegyada Turjumaada      Incorporated - Drop-in center or day shelter  [address] [place] [address] ([place]: 2.0 miles)  Nory: [phone]  Luuqad: Brant lee: Marques      LOVE - LAUNDRY LOVE  [place]: http://www.laundrylove.org        belkys Hinojosa/Rianna      [facility] - Sports Program - Classes  [address] [place] [address] ([place]: 2.5 miles)  Nory: [phone]  Mareegta: https://www.[facility]ork[facility].org/locations/Nunda_[facility]/enrichment_programs/sports  Luuqad: Brant      [facility] - Adult Enrichment  Nory: [phone]  Mareegta: https://VitAG Corporation/adults-seniors/adult-enrichment/  Luuqad: Brnat Mckay: Isn 7:30 subaxnimo - 4:00 galabnimo Leonardo 7:30 subaxnimo - 4:00 galabnimo Arb 7:30 subaxnimo - 4:00 galabnimo Luis 7:30 subaxnimo - 4:00 galabnimo Johnson 7:30 subaxnimo - 4:00 galabnimo      LEAGUE - LITTLE LEAGUE BASEBALL AND SOFTBALL  [place]: http://www.littleleague.org    Layliga      in My Shoes - [place] [address] ([place]: 1.2 miles)  Nory: [phone]  Mareegta: http://www.[facility].mn/  Luuqad: Brant lee: [facility]      [facility] - Personal training  [address] [place] [address] ([place]: 14.3 miles)  Nory: [phone]  Mareegta: https://www.Razer.com/  Luuqnitza: Diane Guo: [facility] - Adult  Enrichment  Nory: [phone]  Soumyagta: https://Vital Systems/adults-seniors/adult-enrichment/  Luuqad: Brant Mckay: Isn 7:30 subaxnimo - 4:00 galabnimo Leonardo 7:30 subaxnimo - 4:00 galabnimo Arb 7:30 subaxnimo - 4:00 galabnimo Luis 7:30 subaxnimo - 4:00 galabnimo Johnson 7:30 subaxnimo - 4:00 galabnimo               LAMBARKA ANTHONYHIANAYELI  iyo SHABEEBYADA        Adeegyada Degdegga   911  .   [facility]  211 http://[address] [place] [address].org  .   Corrie herron  [phone] http://mnpoison.org http://wisconsinpoison.org  .     Is-dilid natalyayo Ottoniel Walters  987 http://98[facility] Memorial Hospitalline.org  .   Ottoniel Cueto  [facility] Qaranka  [facility]  [phone] http://ChildOur Lady of Mercy Hospitalphotline.org   .   Ottoniel Swartz [facility] Xadgudubhomero Galmada  Qaranka  [phone] (RAJO) http://Rainn.org   .     Badbaadada carsantiagoa Machomero  [phone] (RUNAWAY) http://[address] [place] [address].org  .   Angelgeerada Joshua Bo  Wac/text [phone] [place]: http://ppsupportmn.org [place]: http://YFind Technologies.Apax Group/wi  .   Ottoniel Christine  Hiwot Vides ([facility])  161-849-HELP (1023) http://Findtreatment.gov   .                AFEEF: Roshni moreno laglucina [facility]. [facility] ellie lanier  jeredu shetaylor flor. Unite  lore richmond in [facility] kyree clemons.    [facility]

## 2024-04-26 NOTE — PROGRESS NOTES
"Preventive Care Visit  [facility] DORCAS Arreola MD, Family Medicine  Apr 26, 2024      Assessment & Plan     Encounter for Medicare annual wellness exam  reassuring    Need for vaccination against respiratory syncytial virus  Will go to pharmacy  - respiratory syncytial virus vaccine, bivalent (ABRYSVO) injection; Inject 0.5 mLs into the muscle once for 1 dose    OAB (overactive bladder)  Patient reports OAP symptoms 3x weekly will evaluate for UTI vs treament with medicatio  - UA Macroscopic with reflex to Microscopic and Culture; Future  - UA Macroscopic with reflex to Microscopic and Culture  - Urine Microscopic Exam  - Urine Culture    History of CVA (cerebrovascular accident)  Patient reports worsening function on his Right side post stroke and would liek to follow up with PMR   - Adult Physical Medicine and Rehab  Referral; Future    Neuropathic pain  Will increase Gabapentin for 600 to 800 mg   - gabapentin (NEURONTIN) 800 MG tablet; Take 1 tablet (800 mg) by mouth 3 times daily    Need for shingles vaccine  Will receive at pharmacy  - zoster vaccine recombinant adjuvanted (SHINGRIX) injection; Inject 0.5 mLs into the muscle once for 1 dose Pharmacist administered              BMI  Estimated body mass index is 27.36 kg/m  as calculated from the following:    Height as of this encounter: 1.84 m (6' 0.44\").    Weight as of this encounter: 92.6 kg (204 lb 3.2 oz).       Counseling  Appropriate preventive services were discussed with this patient, including applicable screening as appropriate for fall prevention, nutrition, physical activity, Tobacco-use cessation, weight loss and cognition.  Checklist reviewing preventive services available has been given to the patient.  Reviewed patient's diet, addressing concerns and/or questions.   The patient was instructed to see the dentist every 6 months.   Discussed possible causes of fatigue. Updated plan of care.  Patient reported " difficulty with activities of daily living were addressed today.        Return in about 3 months (around 7/26/2024) for Diabetes Routine Check.    Subjective   Juli is a 79 year old, presenting for the following:  Physical (Right side is weak - started 2 years ago.)  Post stroke      4/26/2024     3:05 PM   Additional Questions   Roomed by Macrina   Accompanied by self         4/26/2024   Forms   Any forms needing to be completed Yes         4/26/2024    Information    services provided? Yes   Language South Sudanese   Type of interpretation provided Face-to-face    name Paige    Agency [employer]       Health Care Directive  Patient does not have a Health Care Directive or Living Will: Advance Directive received and scanned. Click on Code in the patient header to view.    HPI  Having trouble walking Right sided hip pain is interfering with his function   Worries that he has urine leakage at times             4/26/2024   General Health   How would you rate your overall physical health? (!) POOR   Feel stress (tense, anxious, or unable to sleep) Not at all         4/26/2024   Nutrition   Diet: Low salt    Low fat/cholesterol    Diabetic         4/26/2024   Exercise   Days per week of moderate/strenous exercise 0 days   Average minutes spent exercising at this level 0 min   (!) EXERCISE CONCERN      4/26/2024   Social Factors   Frequency of gathering with friends or relatives More than three times a week   Worry food won't last until get money to buy more No   Food not last or not have enough money for food? No   Do you have housing?  No   Are you worried about losing your housing? No   Lack of transportation? No   Unable to get utilities (heat,electricity)? No   Want help with housing or utility concern? No   (!) HOUSING CONCERN PRESENT      4/26/2024   Fall Risk   Fallen 2 or more times in the past year? Yes   Trouble with walking or balance? Yes           4/26/2024   Activities of  Daily Living- Home Safety   Needs help with the following daily activites Transportation    Shopping    Preparing meals    Housework    Bathing    Laundry    Medication administration    Money management    Toileting    Feeding    Dressing   Safety concerns in the home None of the above         4/26/2024   Dental   Dentist two times every year? (!) NO         4/26/2024   Hearing Screening   Hearing concerns? None of the above         4/26/2024   Driving Risk Screening   Patient/family members have concerns about driving No         4/26/2024   General Alertness/Fatigue Screening   Have you been more tired than usual lately? (!) YES         4/26/2024   Urinary Incontinence Screening   Bothered by leaking urine in past 6 months No            Today's PHQ-2 Score:       4/26/2024     3:05 PM   PHQ-2 ( 1999 Pfizer)   Q1: Little interest or pleasure in doing things 0   Q2: Feeling down, depressed or hopeless 0   PHQ-2 Score 0   Q1: Little interest or pleasure in doing things Not at all   Q2: Feeling down, depressed or hopeless Not at all   PHQ-2 Score 0           4/26/2024   Substance Use   Alcohol more than 3/day or more than 7/wk Not Applicable   Do you have a current opioid prescription? No   How severe/bad is pain from 1 to 10? 8/10   Do you use any other substances recreationally? No     Social History     Tobacco Use    Smoking status: Never    Smokeless tobacco: Never   Vaping Use    Vaping status: Never Used   Substance Use Topics    Alcohol use: No    Drug use: No       ASCVD Risk   The ASCVD Risk score (Leighton WALLIS, et al., 2019) failed to calculate for the following reasons:    The patient has a prior MI or stroke diagnosis            Reviewed and updated as needed this visit by Provider   Tobacco  Allergies  Meds  Problems  Med Hx  Surg Hx  Fam Hx     Sexual Activity            Current providers sharing in care for this patient include:  Patient Care Team:  Ernie Arreola MD as PCP - General  "(Family Practice)  Leilani Gresham OD (Optometry)  Mo Aguilar, JEFFERSON as Clinic Care Coordinator  Khoi Garcia [facility] as Pharmacist (Pharmacist)  Ernie Arreola MD as Assigned PCP  Delicia Beebe MD as MD (Nephrology)  Taylor Carrizales MD as Assigned Nephrology Provider  Negra Palm Chi, OD as MD (Optometry)  Sneha Meek MD as Assigned Surgical Provider    The following health maintenance items are reviewed in Epic and correct as of today:  Health Maintenance   Topic Date Due    ZOSTER IMMUNIZATION (1 of 2) Never done    RSV VACCINE (Pregnancy & 60+) (1 - 1-dose 60+ series) Never done    A1C  06/11/2024    COVID-19 Vaccine (6 - 2023-24 season) 07/19/2024    CBC W/DIFFERENTIAL  09/11/2024    BMP  09/18/2024    LIPID  10/09/2024    DIABETIC FOOT EXAM  10/09/2024    EYE EXAM  02/22/2025    MICROALBUMIN  03/11/2025    HEMOGLOBIN  03/11/2025    MEDICARE ANNUAL WELLNESS VISIT  04/26/2025    FALL RISK ASSESSMENT  04/26/2025    ADVANCE CARE PLANNING  09/24/2026    DTAP/TDAP/TD IMMUNIZATION (2 - Td or Tdap) 05/17/2027    HEPATITIS C SCREENING  Completed    PHQ-2 (once per calendar year)  Completed    INFLUENZA VACCINE  Completed    Pneumococcal Vaccine: 65+ Years  Completed    URINALYSIS  Completed    IPV IMMUNIZATION  Aged Out    HPV IMMUNIZATION  Aged Out    MENINGITIS IMMUNIZATION  Aged Out    RSV MONOCLONAL ANTIBODY  Aged Out    COLORECTAL CANCER SCREENING  Discontinued            Objective    Exam  /78   Pulse 81   Temp 98.6  F (37  C) (Oral)   Resp 18   Ht 1.84 m (6' 0.44\")   Wt 92.6 kg (204 lb 3.2 oz)   SpO2 97%   BMI 27.36 kg/m     Estimated body mass index is 27.36 kg/m  as calculated from the following:    Height as of this encounter: 1.84 m (6' 0.44\").    Weight as of this encounter: 92.6 kg (204 lb 3.2 oz).    Physical Exam  Vitals reviewed.   Constitutional:       General: He is not in acute distress.     Appearance: He is well-developed. He is not " diaphoretic.   HENT:      Head: Normocephalic.   Eyes:      General: No scleral icterus.     Conjunctiva/sclera: Conjunctivae normal.   Neck:      Thyroid: No thyromegaly.   Cardiovascular:      Rate and Rhythm: Normal rate and regular rhythm.      Heart sounds: Normal heart sounds. No murmur heard.  Pulmonary:      Effort: Pulmonary effort is normal. No respiratory distress.      Breath sounds: Normal breath sounds. No wheezing.   Musculoskeletal:      Cervical back: Normal range of motion.      Right hip: Tenderness (over GT) present.      Left hip: No tenderness.      Left lower leg: No edema.   Skin:     General: Skin is warm and dry.   Neurological:      Mental Status: He is alert and oriented to person, place, and time.      Comments: Has weakness on the right side since his CVA   Psychiatric:         Behavior: Behavior normal.         Thought Content: Thought content normal.         Judgment: Judgment normal.               4/26/2024   Mini Cog   Clock Draw Score 0 Abnormal   3 Item Recall 3 objects recalled   Mini Cog Total Score 3         Vision Screen  Reason Vision Screen Not Completed: Parent/Patient declined - Had recent screening (pt has had eyes examined)      Signed Electronically by: Ernie Arreola MD

## 2024-04-27 LAB — BACTERIA UR CULT: ABNORMAL

## 2024-04-30 ENCOUNTER — APPOINTMENT (OUTPATIENT)
Dept: INTERPRETER SERVICES | Facility: CLINIC | Age: 79
End: 2024-04-30
Payer: COMMERCIAL

## 2024-04-30 ENCOUNTER — TELEPHONE (OUTPATIENT)
Dept: FAMILY MEDICINE | Facility: CLINIC | Age: 79
End: 2024-04-30
Payer: COMMERCIAL

## 2024-04-30 DIAGNOSIS — N30.00 ACUTE CYSTITIS WITHOUT HEMATURIA: Primary | ICD-10-CM

## 2024-04-30 RX ORDER — SULFAMETHOXAZOLE/TRIMETHOPRIM 800-160 MG
1 TABLET ORAL 2 TIMES DAILY
Qty: 6 TABLET | Refills: 0 | Status: SHIPPED | OUTPATIENT
Start: 2024-04-30 | End: 2024-05-03

## 2024-04-30 NOTE — TELEPHONE ENCOUNTER
Called patient with      Patient denied fever and reported he was still symptomatic with urgency  You have a UTI -and so you should take the following  Patient denied fever and reported he was still symptomatic with urgency  1. Acute cystitis without hematuria    - sulfamethoxazole-trimethoprim (BACTRIM DS) 800-160 MG tablet; Take 1 tablet by mouth 2 times daily for 3 days  Dispense: 6 tablet; Refill: 0  Patient expressed understanding

## 2024-05-10 ENCOUNTER — ANCILLARY PROCEDURE (OUTPATIENT)
Dept: GENERAL RADIOLOGY | Facility: CLINIC | Age: 79
End: 2024-05-10
Attending: FAMILY MEDICINE
Payer: COMMERCIAL

## 2024-05-10 ENCOUNTER — OFFICE VISIT (OUTPATIENT)
Dept: FAMILY MEDICINE | Facility: CLINIC | Age: 79
End: 2024-05-10
Payer: COMMERCIAL

## 2024-05-10 VITALS
WEIGHT: 199.8 LBS | TEMPERATURE: 98 F | BODY MASS INDEX: 27.06 KG/M2 | SYSTOLIC BLOOD PRESSURE: 120 MMHG | HEIGHT: 72 IN | DIASTOLIC BLOOD PRESSURE: 81 MMHG | OXYGEN SATURATION: 96 % | RESPIRATION RATE: 17 BRPM | HEART RATE: 83 BPM

## 2024-05-10 DIAGNOSIS — M70.61 GREATER TROCHANTERIC BURSITIS OF RIGHT HIP: ICD-10-CM

## 2024-05-10 DIAGNOSIS — M25.551 HIP PAIN, RIGHT: Primary | ICD-10-CM

## 2024-05-10 DIAGNOSIS — M25.551 HIP PAIN, RIGHT: ICD-10-CM

## 2024-05-10 DIAGNOSIS — M53.3 PAIN OF RIGHT SACROILIAC JOINT: ICD-10-CM

## 2024-05-10 DIAGNOSIS — E11.9 TYPE 2 DIABETES MELLITUS WITHOUT COMPLICATION, WITHOUT LONG-TERM CURRENT USE OF INSULIN (H): ICD-10-CM

## 2024-05-10 PROCEDURE — 99213 OFFICE O/P EST LOW 20 MIN: CPT | Performed by: FAMILY MEDICINE

## 2024-05-10 PROCEDURE — 73502 X-RAY EXAM HIP UNI 2-3 VIEWS: CPT | Mod: FY | Performed by: RADIOLOGY

## 2024-05-10 RX ORDER — RESPIRATORY SYNCYTIAL VIRUS VACCINE 120MCG/0.5
0.5 KIT INTRAMUSCULAR ONCE
Qty: 1 EACH | Refills: 0 | Status: CANCELLED | OUTPATIENT
Start: 2024-05-10 | End: 2024-05-10

## 2024-05-10 NOTE — PROGRESS NOTES
Assessment & Plan     Hip pain, right  Greater trochanteric bursitis of right hip  Pain of right sacroiliac joint  Clinically symptoms concerning for trochanteric bursitis, right sacroiliac joint arthropathy.  Recommend following up with sports medicine to consider injection for trochanteric bursitis.  Recommend starting physical therapy  Reviewed with patient  Follow-up in 4 to 6 weeks if symptoms do not improve    - Physical Therapy  Referral; Future  - XR Hip Right 2-3 Views; Future  - Sports Medicine Clinic - [facility] Referral; Future    Type 2 diabetes mellitus without complication, without long-term current use of insulin (H)  Reminded patient to follow-up with PCP next month for diabetes management    BMI  Estimated body mass index is 27.1 kg/m  as calculated from the following:    Height as of this encounter: 1.829 m (6').    Weight as of this encounter: 90.6 kg (199 lb 12.8 oz).     Return in about 4 weeks (around 6/7/2024) for Follow up.    Ana Bustos is a 79 year old, presenting for the following health issues:  Musculoskeletal Problem      5/10/2024     1:55 PM   Additional Questions   Roomed by Doua   Accompanied by Self         5/10/2024     1:55 PM   Patient Reported Additional Medications   Patient reports taking the following new medications n/a         5/10/2024    Information    services provided? Yes   Language Guamanian   Type of interpretation provided Face-to-face    name Delfina    Agency [employer]     HPI     Here for UTI follow up and to discuss hip pain.     He was told that he had urinary tract infection. No symptoms. Last dose was   4 days ago. Symptoms resolved    Hip pain: Ongoing for 3 yrs. Right hip. No history of trauma or injury. He reports burning pain. Pain worse with walking , improvement with rest. Has been taking gabapentin and tylenol for pain. Not done physical therapy, interested in pursuing. History of  right hip arthroplasty. Had surgery done at [facility].       Objective    /81 (BP Location: Left arm, Patient Position: Sitting, Cuff Size: Adult Large)   Pulse 83   Temp 98  F (36.7  C) (Oral)   Resp 17   Ht 1.829 m (6')   Wt 90.6 kg (199 lb 12.8 oz)   SpO2 96%   BMI 27.10 kg/m    Body mass index is 27.1 kg/m .  Physical Exam  Constitutional:       Appearance: Normal appearance.   HENT:      Head: Normocephalic and atraumatic.      Right Ear: External ear normal.      Left Ear: External ear normal.   Eyes:      Extraocular Movements: Extraocular movements intact.      Conjunctiva/sclera: Conjunctivae normal.   Cardiovascular:      Rate and Rhythm: Normal rate.   Pulmonary:      Effort: Pulmonary effort is normal.      Breath sounds: Normal breath sounds.   Musculoskeletal:      Cervical back: Normal range of motion.      Right hip: Tenderness present. No bony tenderness.        Legs:    Neurological:      General: No focal deficit present.      Mental Status: He is alert.   Psychiatric:         Mood and Affect: Mood normal.         Thought Content: Thought content normal.            Signed Electronically by: Taty Werner MD

## 2024-05-13 ENCOUNTER — TELEPHONE (OUTPATIENT)
Dept: NEPHROLOGY | Facility: CLINIC | Age: 79
End: 2024-05-13
Payer: COMMERCIAL

## 2024-05-13 ENCOUNTER — TELEPHONE (OUTPATIENT)
Dept: FAMILY MEDICINE | Facility: CLINIC | Age: 79
End: 2024-05-13
Payer: COMMERCIAL

## 2024-05-13 NOTE — TELEPHONE ENCOUNTER
Attempted to call pt with  to relay provider message below. No answer, lmtcb. No changes noted, pt has appt with PT on 5/16/24. Sending letter.    Liberty Albright RN      ----- Message from Taty Werner MD sent at 5/10/2024  4:02 PM CDT -----  Team - please call patient with results.    Xray did not show any changes to the hip hardware or any new disease process. Start physical therapy and see sports medicine as discussed during our clinic visit today.     Taty Werner MD

## 2024-05-16 ENCOUNTER — THERAPY VISIT (OUTPATIENT)
Dept: PHYSICAL THERAPY | Facility: CLINIC | Age: 79
End: 2024-05-16
Attending: FAMILY MEDICINE
Payer: COMMERCIAL

## 2024-05-16 DIAGNOSIS — M25.551 HIP PAIN, RIGHT: ICD-10-CM

## 2024-05-16 PROCEDURE — 97110 THERAPEUTIC EXERCISES: CPT | Mod: GP | Performed by: PHYSICAL THERAPIST

## 2024-05-16 PROCEDURE — 97161 PT EVAL LOW COMPLEX 20 MIN: CPT | Mod: GP | Performed by: PHYSICAL THERAPIST

## 2024-05-16 NOTE — PROGRESS NOTES
PHYSICAL THERAPY EVALUATION  Type of Visit: Evaluation    See electronic medical record for Abuse and Falls Screening details.    Subjective   Patient reports for right hip pain that has been ongoing for about three years.  He had a total hip arthroplasty(posterior approach) in 2016 and has had pain ever since.  He reports doing some PT for his hip in [place].  Pain is on his posterior buttock down the right leg, describes it as constant sharp pain.  Takes gabapentin that doesn't help very much.  Uses cane and walker, walker with more pain.        Presenting condition or subjective complaint: hip pain  Date of onset: 05/10/24 (provider referral date)    Relevant medical history: Diabetes; High blood pressure; Stroke   Dates & types of surgery: 8 years ago    Prior diagnostic imaging/testing results: X-ray     Prior therapy history for the same diagnosis, illness or injury: No      Prior Level of Function  Transfers: Independent  Ambulation: Independent  ADL: Independent  IADL: Driving, Finances, Housekeeping, Laundry, Meal preparation, Medication management, Yard work    Living Environment  Social support: Alone   Type of home: Apartment/condo   Stairs to enter the home: Yes   Is there a railing: No   Ramp: No   Stairs inside the home: Yes       Help at home: Home management tasks (cooking, cleaning); Medication and/or finances; Home and Yard maintenance tasks  Equipment owned: Straight Cane; Walker     Employment: No    Hobbies/Interests:      Patient goals for therapy: limited most of daily activities    Pain assessment: See objective evaluation for additional pain details     Objective   HIP EVALUATION  PAIN: Pain Level at Rest: 8/10  Pain Level with Use: 8/10  Pain Location: hip and right   Pain Quality: Aching and Sharp  Pain Frequency: constant  Pain is Worst: no change   Pain is Exacerbated By: nothing changes it   Pain is Relieved By: oxycodone, exercises make it worse   Pain Progression:  Worsened  INTEGUMENTARY (edema, incisions):  scar tissue at posterior hip arthroplasty surgical site   POSTURE: Standing Posture: Rounded shoulders, Forward head, Thoracic kyphosis increased  Sitting Posture: Unweights R side   GAIT:   Weightbearing Status: WBAT  Assistive Device(s): Walker (front wheeled)  Gait Deviations: Antalgic  Decreased dynamic control R, Trendelenberg R  BALANCE/PROPRIOCEPTION:   30 second sit to stand: 3 reps     WEIGHTBEARING ALIGNMENT:   NON-WEIGHTBEARING ALIGNMENT:    ROM:   (Degrees) Left AROM Left PROM  Right AROM Right PROM   Hip Flexion   < 90     Hip Extension       Hip Abduction   Mod limitation     Hip Adduction       Hip Internal Rotation       Hip External Rotation       Knee Flexion       Knee Extension       Lumbar Side glide WNL WNL   Lumbar Flexion 50% pain ++    Lumbar Extension 15% pain ++    Pain:   End feel:     PELVIC/SI SCREEN:   STRENGTH:   Pain: - none + mild ++ moderate +++ severe  Strength Scale: 0-5/5 Left Right   Hip Flexion 4 3, ++ (mod)   Hip Extension     Hip Abduction 3- 2, ++ (mod)   Hip Adduction 4 3+   Hip Internal Rotation 4+ 3+   Hip External Rotation     Knee Flexion     Knee Extension       LE FLEXIBILITY: Decreased hamstrings R  SPECIAL TESTS:    Left Right   JOHN     FADIR/Labrum/JO     Femoral Nerve     Vielka's     Piriformis     Quadrant Testing     SLR Negative  Negative    Slump Negative  Negative    Stork with Extension     [place]            FUNCTIONAL TESTS:   PALPATION:   + Tenderness At Location Left Right   Ischial Tuberosity - -   Greater Trochanter - +   IT Band - -   Hip Flexors - -   Piriformis - -   PSIS - -   ASIS - -   Adductors - -   Abductors - -   Iliac Crest - -   Glut Medius - +   Bursa - +   Pubis - -     JOINT MOBILITY: WNL for posterior approach RENATO     Assessment & Plan   CLINICAL IMPRESSIONS  Medical Diagnosis: Right hip pain    Treatment Diagnosis: Right hip pain   Impression/Assessment: Patient is a 79 year old male with  right hip complaints.  The following significant findings have been identified: Pain, Decreased ROM/flexibility, Decreased joint mobility, Decreased strength, Impaired gait, Impaired muscle performance, and Decreased activity tolerance. These impairments interfere with their ability to perform self care tasks, recreational activities, and household chores as compared to previous level of function.     Clinical Decision Making (Complexity):  Clinical Presentation: Stable/Uncomplicated  Clinical Presentation Rationale: based on medical and personal factors listed in PT evaluation  Clinical Decision Making (Complexity): Low complexity    PLAN OF CARE  Treatment Interventions:  Modalities: Cryotherapy  Interventions: Manual Therapy, Neuromuscular Re-education, Therapeutic Activity, Therapeutic Exercise, Self-Care/Home Management    Long Term Goals     PT Goal 1  Goal Identifier: Ambulation  Goal Description: Patient will report ability to ambulate four blocks with normalized gait, AD, and no increase in pain to promote a healthy and active lifestyle.  Rationale: to maximize safety and independence within the community  Target Date: 08/08/24      Frequency of Treatment: every 1-2 weeks  Duration of Treatment: 12 weeks    Recommended Referrals to Other Professionals:  No further referral needed   Education Assessment:   Learner/Method: Patient;No Barriers to Learning    Risks and benefits of evaluation/treatment have been explained.   Patient/Family/caregiver agrees with Plan of Care.     Evaluation Time:     PT Eval, Low Complexity Minutes (18409): 18   Present: Yes: Language: Irish, ID Number/Identifier:       Signing Clinician: Melissa Lewis PT      [facility]                                                                                   OUTPATIENT PHYSICAL THERAPY      PLAN OF TREATMENT FOR OUTPATIENT REHABILITATION   Patient's Last Name, First Name, Juli Villatoro   N YOB: 1945   Provider's Name   [facility]   Medical Record No.  [id number]     Onset Date: 05/10/24 (provider referral date)  Start of Care Date: 05/16/24     Medical Diagnosis:  Right hip pain      PT Treatment Diagnosis:  Right hip pain Plan of Treatment  Frequency/Duration: every 1-2 weeks/ 12 weeks    Certification date from 05/16/24 to 08/08/24         See note for plan of treatment details and functional goals     Melissa Lewis, PT                         I CERTIFY THE NEED FOR THESE SERVICES FURNISHED UNDER        THIS PLAN OF TREATMENT AND WHILE UNDER MY CARE     (Physician attestation of this document indicates review and certification of the therapy plan).              Referring Provider:  Taty Werner    Initial Assessment  See Epic Evaluation- Start of Care Date: 05/16/24

## 2024-05-20 PROBLEM — M25.551 HIP PAIN, RIGHT: Status: ACTIVE | Noted: 2024-05-20

## 2024-05-20 ASSESSMENT — ACTIVITIES OF DAILY LIVING (ADL)
GOING_DOWN_1_FLIGHT_OF_STAIRS: MODERATE DIFFICULTY
GOING DOWN 1 FLIGHT OF STAIRS: MODERATE DIFFICULTY
SPORTS_TOTAL_ITEM_SCORE: INCOMPLETE
SITTING FOR 15 MINUTES: SLIGHT DIFFICULTY
HEAVY_WORK: MODERATE DIFFICULTY
HOS_ADL_SCORE(%): 57.35
WALKING_DOWN_STEEP_HILLS: EXTREME DIFFICULTY
HEAVY_WORK: MODERATE DIFFICULTY
PUTTING_ON_SOCKS_AND_SHOES: MODERATE DIFFICULTY
LIGHT_TO_MODERATE_WORK: MODERATE DIFFICULTY
STANDING FOR 15 MINUTES: SLIGHT DIFFICULTY
RECREATIONAL ACTIVITIES: SLIGHT DIFFICULTY
RECREATIONAL_ACTIVITIES: SLIGHT DIFFICULTY
HOS_ADL_HIGHEST_POTENTIAL_SCORE: 68
GOING UP 1 FLIGHT OF STAIRS: MODERATE DIFFICULTY
PUTTING ON SOCKS AND SHOES: MODERATE DIFFICULTY
GOING_UP_1_FLIGHT_OF_STAIRS: MODERATE DIFFICULTY
STEPPING_UP_AND_DOWN_CURBS: MODERATE DIFFICULTY
TWISTING/PIVOTING ON INVOLVED LEG: MODERATE DIFFICULTY
HOS_ADL_ITEM_SCORE_TOTAL: 39
DEEP SQUATTING: EXTREME DIFFICULTY
SPORTS_COUNT: INCOMPLETE
WALKING_INITIALLY: SLIGHT DIFFICULTY
GETTING_INTO_AND_OUT_OF_A_BATHTUB: SLIGHT DIFFICULTY
WALKING_FOR_APPROXIMATELY_10_MINUTES: SLIGHT DIFFICULTY
ADL_COUNT: 17
STEPPING UP AND DOWN CURBS: MODERATE DIFFICULTY
ROLLING OVER IN BED: SLIGHT DIFFICULTY
GETTING_INTO_AND_OUT_OF_A_BATHTUB: SLIGHT DIFFICULTY
WALKING_INITIALLY: SLIGHT DIFFICULTY
ROLLING_OVER_IN_BED: SLIGHT DIFFICULTY
WALKING_15_MINUTES_OR_GREATER: SLIGHT DIFFICULTY
WALKING_UP_STEEP_HILLS: EXTREME DIFFICULTY
ADL_TOTAL_ITEM_SCORE: 0
STANDING_FOR_15_MINUTES: SLIGHT DIFFICULTY
ADL_HIGHEST_POTENTIAL_SCORE: 68
SPORTS_HIGHEST_POTENTIAL_SCORE: INCOMPLETE
DEEP_SQUATTING: EXTREME DIFFICULTY
WALKING_UP_STEEP_HILLS: EXTREME DIFFICULTY
GETTING_INTO_AND_OUT_OF_AN_AVERAGE_CAR: SLIGHT DIFFICULTY
ADL_SCORE(%): 0
TWISTING/PIVOTING_ON_INVOLVED_LEG: MODERATE DIFFICULTY
GETTING INTO AND OUT OF AN AVERAGE CAR: SLIGHT DIFFICULTY
PLEASE_INDICATE_YOR_PRIMARY_REASON_FOR_REFERRAL_TO_THERAPY:: HIP
LIGHT_TO_MODERATE_WORK: MODERATE DIFFICULTY
WALKING_DOWN_STEEP_HILLS: EXTREME DIFFICULTY
WALKING_15_MINUTES_OR_GREATER: SLIGHT DIFFICULTY
SITTING_FOR_15_MINUTES: SLIGHT DIFFICULTY
WALKING_APPROXIMATELY_10_MINUTES: SLIGHT DIFFICULTY
SPORTS_SCORE(%): INCOMPLETE

## 2024-05-22 ENCOUNTER — THERAPY VISIT (OUTPATIENT)
Dept: PHYSICAL THERAPY | Facility: CLINIC | Age: 79
End: 2024-05-22
Payer: COMMERCIAL

## 2024-05-22 DIAGNOSIS — M25.551 HIP PAIN, RIGHT: Primary | ICD-10-CM

## 2024-05-22 PROCEDURE — 97110 THERAPEUTIC EXERCISES: CPT | Mod: GP | Performed by: PHYSICAL THERAPIST

## 2024-05-23 ENCOUNTER — DOCUMENTATION ONLY (OUTPATIENT)
Dept: FAMILY MEDICINE | Facility: CLINIC | Age: 79
End: 2024-05-23
Payer: COMMERCIAL

## 2024-06-06 DIAGNOSIS — J30.1 SEASONAL ALLERGIC RHINITIS DUE TO POLLEN: ICD-10-CM

## 2024-06-06 RX ORDER — LORATADINE 10 MG/1
10 TABLET ORAL DAILY
Qty: 90 TABLET | Refills: 3 | Status: SHIPPED | OUTPATIENT
Start: 2024-06-06 | End: 2024-09-05

## 2024-06-06 NOTE — TELEPHONE ENCOUNTER
"Request for medication refill:  loratadine (CLARITIN) 10 MG tablet     Providers if patient needs an appointment and you are willing to give a one month supply please refill for one month and  send a letter/MyChart using \".SMILLIMITEDREFILL\" .smillimited and route chart to \"[facility] \" (Giving one month refill in non controlled medications is strongly recommended before denial)    If refill has been denied, meaning absolutely no refills without visit, please complete the smart phrase \".smirxrefuse\" and route it to the \"[facility] MED REFILLS\"  pool to inform the patient and the pharmacy.    David Dubose, [facility]      "

## 2024-06-09 NOTE — PROGRESS NOTES
Annual home visit completed today. His dgtr lives in the same [facility] as richie and she was present for the visit.   Completed HRA/LTCC, OBRA, POC and PCA assessment. Was recently in the hospital for c/o back pain.  States he still has the pain especially to his left side.  He takes pain meds as needed and goes for PT.  Recent eye exam and is due for dental check up.  He is able to check his BG daily.  He recently received diabetic shoes. Safe disposal of meds discussed and copy of sites given to client.  CC reviewed and received expressed/verbal approval by member of the care plan, including choice of services and providers, choices related to sharing the plan or portions of the plan with others, appeals rights, and data privacy information.  Current services include PCA, [facility] and he attends [facility] weekly. POC completed.  Revisit in six months

## 2024-06-17 PROBLEM — Z71.89 ADVANCED DIRECTIVES, COUNSELING/DISCUSSION: Status: RESOLVED | Noted: 2019-07-12 | Resolved: 2024-06-17

## 2024-06-18 ENCOUNTER — TELEPHONE (OUTPATIENT)
Dept: NEPHROLOGY | Facility: CLINIC | Age: 79
End: 2024-06-18

## 2024-06-18 NOTE — TELEPHONE ENCOUNTER
The  services were used to leave a voicemail for the PT reminding PT of his upcoming appointment and labs on Monday June 24, 2024    Srinivas Reina CMA on 6/18/2024 at 12:12 PM

## 2024-06-24 ENCOUNTER — VIRTUAL VISIT (OUTPATIENT)
Dept: INTERPRETER SERVICES | Facility: CLINIC | Age: 79
End: 2024-06-24

## 2024-06-24 ENCOUNTER — OFFICE VISIT (OUTPATIENT)
Dept: NEPHROLOGY | Facility: CLINIC | Age: 79
End: 2024-06-24
Attending: INTERNAL MEDICINE
Payer: COMMERCIAL

## 2024-06-24 ENCOUNTER — LAB (OUTPATIENT)
Dept: LAB | Facility: CLINIC | Age: 79
End: 2024-06-24
Attending: INTERNAL MEDICINE
Payer: COMMERCIAL

## 2024-06-24 VITALS
OXYGEN SATURATION: 95 % | TEMPERATURE: 98.5 F | BODY MASS INDEX: 26.99 KG/M2 | HEART RATE: 89 BPM | DIASTOLIC BLOOD PRESSURE: 86 MMHG | SYSTOLIC BLOOD PRESSURE: 125 MMHG | WEIGHT: 199 LBS

## 2024-06-24 DIAGNOSIS — E11.22 TYPE 2 DIABETES MELLITUS WITH STAGE 3 CHRONIC KIDNEY DISEASE, WITHOUT LONG-TERM CURRENT USE OF INSULIN, UNSPECIFIED WHETHER STAGE 3A OR 3B CKD (H): ICD-10-CM

## 2024-06-24 DIAGNOSIS — N18.4 CKD (CHRONIC KIDNEY DISEASE) STAGE 4, GFR 15-29 ML/MIN (H): ICD-10-CM

## 2024-06-24 DIAGNOSIS — N18.30 TYPE 2 DIABETES MELLITUS WITH STAGE 3 CHRONIC KIDNEY DISEASE, WITHOUT LONG-TERM CURRENT USE OF INSULIN, UNSPECIFIED WHETHER STAGE 3A OR 3B CKD (H): ICD-10-CM

## 2024-06-24 DIAGNOSIS — I10 BENIGN ESSENTIAL HYPERTENSION: ICD-10-CM

## 2024-06-24 DIAGNOSIS — E11.9 TYPE 2 DIABETES MELLITUS WITHOUT COMPLICATION, WITHOUT LONG-TERM CURRENT USE OF INSULIN (H): Primary | ICD-10-CM

## 2024-06-24 LAB
ALBUMIN MFR UR ELPH: 18.5 MG/DL
ALBUMIN SERPL BCG-MCNC: 4.6 G/DL (ref 3.5–5.2)
ALBUMIN UR-MCNC: NEGATIVE MG/DL
ALP SERPL-CCNC: 110 U/L (ref 40–150)
ALT SERPL W P-5'-P-CCNC: 11 U/L (ref 0–70)
ANION GAP SERPL CALCULATED.3IONS-SCNC: 12 MMOL/L (ref 7–15)
APPEARANCE UR: CLEAR
AST SERPL W P-5'-P-CCNC: 20 U/L (ref 0–45)
BILIRUB SERPL-MCNC: 0.3 MG/DL
BILIRUB UR QL STRIP: NEGATIVE
BUN SERPL-MCNC: 20.1 MG/DL (ref 8–23)
CALCIUM SERPL-MCNC: 9.5 MG/DL (ref 8.8–10.2)
CHLORIDE SERPL-SCNC: 103 MMOL/L (ref 98–107)
COLOR UR AUTO: ABNORMAL
CREAT SERPL-MCNC: 1.41 MG/DL (ref 0.67–1.17)
CREAT UR-MCNC: 138 MG/DL
CREAT UR-MCNC: 139 MG/DL
DEPRECATED HCO3 PLAS-SCNC: 24 MMOL/L (ref 22–29)
EGFRCR SERPLBLD CKD-EPI 2021: 51 ML/MIN/1.73M2
ERYTHROCYTE [DISTWIDTH] IN BLOOD BY AUTOMATED COUNT: 11.7 % (ref 10–15)
FASTING STATUS PATIENT QL REPORTED: ABNORMAL
FASTING STATUS PATIENT QL REPORTED: ABNORMAL
GLUCOSE SERPL-MCNC: 175 MG/DL (ref 70–99)
GLUCOSE SERPL-MCNC: 175 MG/DL (ref 70–99)
GLUCOSE UR STRIP-MCNC: >=1000 MG/DL
HBA1C MFR BLD: 6.7 %
HCT VFR BLD AUTO: 44.1 % (ref 40–53)
HGB BLD-MCNC: 15 G/DL (ref 13.3–17.7)
HGB UR QL STRIP: NEGATIVE
KETONES UR STRIP-MCNC: NEGATIVE MG/DL
LEUKOCYTE ESTERASE UR QL STRIP: ABNORMAL
MCH RBC QN AUTO: 34.6 PG (ref 26.5–33)
MCHC RBC AUTO-ENTMCNC: 34 G/DL (ref 31.5–36.5)
MCV RBC AUTO: 102 FL (ref 78–100)
MICROALBUMIN UR-MCNC: <12 MG/L
MICROALBUMIN/CREAT UR: NORMAL MG/G{CREAT}
NITRATE UR QL: POSITIVE
PH UR STRIP: 6 [PH] (ref 5–7)
PLATELET # BLD AUTO: 197 10E3/UL (ref 150–450)
POTASSIUM SERPL-SCNC: 3.7 MMOL/L (ref 3.4–5.3)
PROT SERPL-MCNC: 7.9 G/DL (ref 6.4–8.3)
PROT/CREAT 24H UR: 0.13 MG/MG CR (ref 0–0.2)
RBC # BLD AUTO: 4.34 10E6/UL (ref 4.4–5.9)
RBC URINE: 1 /HPF
SODIUM SERPL-SCNC: 139 MMOL/L (ref 135–145)
SP GR UR STRIP: 1.03 (ref 1–1.03)
SQUAMOUS EPITHELIAL: <1 /HPF
UROBILINOGEN UR STRIP-MCNC: NORMAL MG/DL
WBC # BLD AUTO: 7.1 10E3/UL (ref 4–11)
WBC URINE: 15 /HPF

## 2024-06-24 PROCEDURE — G0463 HOSPITAL OUTPT CLINIC VISIT: HCPCS | Performed by: INTERNAL MEDICINE

## 2024-06-24 PROCEDURE — 36415 COLL VENOUS BLD VENIPUNCTURE: CPT | Performed by: PATHOLOGY

## 2024-06-24 PROCEDURE — 84156 ASSAY OF PROTEIN URINE: CPT | Performed by: PATHOLOGY

## 2024-06-24 PROCEDURE — T1013 SIGN LANG/ORAL INTERPRETER: HCPCS | Mod: U4,TEL

## 2024-06-24 PROCEDURE — 81001 URINALYSIS AUTO W/SCOPE: CPT | Performed by: PATHOLOGY

## 2024-06-24 PROCEDURE — 99214 OFFICE O/P EST MOD 30 MIN: CPT | Performed by: INTERNAL MEDICINE

## 2024-06-24 PROCEDURE — 87086 URINE CULTURE/COLONY COUNT: CPT | Performed by: INTERNAL MEDICINE

## 2024-06-24 PROCEDURE — 82570 ASSAY OF URINE CREATININE: CPT | Performed by: INTERNAL MEDICINE

## 2024-06-24 PROCEDURE — 80053 COMPREHEN METABOLIC PANEL: CPT | Performed by: PATHOLOGY

## 2024-06-24 PROCEDURE — 85027 COMPLETE CBC AUTOMATED: CPT | Performed by: PATHOLOGY

## 2024-06-24 PROCEDURE — G2211 COMPLEX E/M VISIT ADD ON: HCPCS | Performed by: INTERNAL MEDICINE

## 2024-06-24 PROCEDURE — 99000 SPECIMEN HANDLING OFFICE-LAB: CPT | Performed by: PATHOLOGY

## 2024-06-24 PROCEDURE — 83036 HEMOGLOBIN GLYCOSYLATED A1C: CPT | Performed by: INTERNAL MEDICINE

## 2024-06-24 RX ORDER — CARVEDILOL 12.5 MG/1
25 TABLET ORAL 2 TIMES DAILY WITH MEALS
Qty: 180 TABLET | Refills: 3 | Status: SHIPPED | OUTPATIENT
Start: 2024-06-24 | End: 2024-09-05

## 2024-06-24 RX ORDER — AMLODIPINE BESYLATE 10 MG/1
TABLET ORAL DAILY
COMMUNITY
Start: 2024-05-18 | End: 2024-06-25

## 2024-06-24 RX ORDER — INSULIN ASPART 100 [IU]/ML
INJECTION, SOLUTION INTRAVENOUS; SUBCUTANEOUS
COMMUNITY
Start: 2023-07-07 | End: 2024-09-05

## 2024-06-24 RX ORDER — LANCETS 33 GAUGE
EACH MISCELLANEOUS
COMMUNITY
Start: 2023-10-10

## 2024-06-24 ASSESSMENT — PAIN SCALES - GENERAL: PAINLEVEL: EXTREME PAIN (8)

## 2024-06-24 NOTE — PROGRESS NOTES
Nephrology Clinic    Juli Rodriguez MRN:[id number] YOB: 1945  Date of Service: 06/24/2024  Primary care provider: Ernie Arreola  Requesting physician: Ernie Arreola      REASON FOR CONSULT: ALAN vs CKD    HISTORY OF PRESENT ILLNESS:   Juli Rodriguez is a 79 year old male who  first presented  for evaluation of ALAN and CKD in June 2023.  The past medical history is significant for type 2 DM for more than 10 years, HTN for  10 years ,CVA in 2013 and CKD.  Regarding his type 2 DM, his glycemia is well controlled and his last Hba1c is 5.8 on 5/22/2023.  From a renal standpoint, his baseline creatinine level was around 1.4 mg/dL up to September 2021. He was thereafter lost to follow up in [place] as he moved to [place] and a kidney function was checked again in May 2023 and his creatinine level was noticed then to be 2.54 mg/dL. A repeat level on 6/12/2023 is 1.56 mg/dL and 1.27 mg/dL on 9/11/23. He reported that he had been following up regularly in [place] with a provider there up to January 2023 and that his renal function was stable. His kidney function normalized in October 2023 with creatinine level at 1.09 but deteriorated again in January 2024 as his creatinine level went up to 2.4 mg/dL in the setting of a bursitis for which he might have taken NSAIDs.  The UACR is negative for any proteinuria on 5/22/2023 , on 9/11/2023 and on 3/11/2024. A renal ultrasound done in June 2023 shows normal size kidneys.  On his last visit he was restarted on carvedilol 12.5 mg twice daily which was decreased later on 6.25 mg twice daily when he fell in July 2023 and then increased again to 12.5 mg twice daily. For his diabetes he is supposed to be on metformin 500 mg twice daily but it is unclear if he is taking it.  Also  the patient was admitted to the hospital in July 2023 after he had a fall. A CT of the head that showed age-related and chronic small vessel disease but no hemorrhage or acute stroke. CT of the  chest abdomen pelvis showed nondisplaced avulsion fractures of the left transverse process at L1 and L2.     Follow up on 6/24/2024    The patient's kidney function is more or less stable with a creatinine level at 1.41 mg/dL. The latest Hba1c is 6.7 and he has no proteinuria on empagliflozin that was started 3 months ago. His blood pressure remains however suboptimal on amlodipine 10 mg daily and carvedilol 12.5 mg twice daily.    EXAMINATION: US RENAL COMPLETE NON-VASCULAR, 6/14/2023 1:02 PM      COMPARISON: None.     HISTORY: CKD (chronic kidney disease) stage 4, GFR 15-29 ml/min (H);  elevated creatinine level     TECHNIQUE: The kidneys and bladder were scanned in the standard  fashion with specialized ultrasound transducer(s) using both gray  scale and limited color/spectral Doppler techniques.     FINDINGS:     Right kidney: Measures 11.0 cm in length. Parenchyma is of normal  thickness and echogenicity. No focal mass. No hydronephrosis.     Left kidney: Measures 10.3 cm in length. Parenchyma is of normal  thickness and echogenicity. No focal mass. No hydronephrosis.      Bladder: Partially distended.                                                                      IMPRESSION: Unremarkable renal ultrasound.     I have personally reviewed the examination and initial interpretation  and I agree with the findings.     HARISH BENOIT MD   PAST MEDICAL HISTORY:  Past Medical History:   Diagnosis Date    Cerebral infarction (H) 08/2013    In [facility]    Diabetes mellitus, type 2 (H)     Stroke (H)      PAST SURGICAL HISTORY:  Past Surgical History:   Procedure Laterality Date    CATARACT IOL, RT/LT Bilateral 2007 and 2015    HERNIA REPAIR      INJECT MAJOR JOINT / BURSA  9/18/2020    JOINT REPLACEMENT, HIP RT/LT Right      MEDICATIONS:  Prescription Medications as of 6/24/2024         Rx Number Disp Refills Start End Last Dispensed Date Next Fill Date Owning Pharmacy    acetaminophen (TYLENOL) 500 MG tablet   200 tablet 3 10/9/2023 --   [id number] [address]    Sig: Take 2 tablets (1,000 mg) by mouth 3 times daily    Class: E-Prescribe    Route: Oral    aspirin 81 MG EC tablet  60 tablet 4 3/19/2024 --   [facility] ST[facility] #[id number] [facility] [address]    Sig: Take 1 tablet (81 mg) by mouth daily    Class: E-Prescribe    Route: Oral    atorvastatin (LIPITOR) 20 MG tablet  90 tablet 1 10/9/2023 --   [facility] STORE #[id number] [facility] [address]    Sig: Take 1 tablet (20 mg) by mouth daily    Class: E-Prescribe    Route: Oral    blood glucose (NO BRAND SPECIFIED) lancets standard  100 each 3 10/9/2023 --   [facility] #[id number] [place] [address] [address]    Sig: Use to test blood sugar 2 times daily or as directed.    Class: E-Prescribe    blood glucose (NO BRAND SPECIFIED) test strip  100 strip 3 10/9/2023 --   [facility] #[id number] [place] [address] [address]    Sig: Use to test blood sugar 2 times daily or as directed.    Class: E-Prescribe    blood glucose monitoring (NO BRAND SPECIFIED) meter device kit  1 kit 0 10/9/2023 --   [facility] #[id number] [place] [address] [address]    Sig: Use to test blood sugar 2 times daily or as directed.    Class: E-Prescribe    carboxymethylcellulose PF (CARBOXYMETHYLCELLULOSE SODIUM) 0.5 % ophthalmic solution  400 each 11 2/19/2024 --   [facility] #[id number] [place] [address] [address]    Sig: Place 1 drop into both eyes 4 times daily Preservative free artificial tears, single use vials.    Class: E-Prescribe    Notes to Pharmacy: Preservative free artificial tears, single use vials.    Route: Both Eyes    carvedilol (COREG) 12.5 MG tablet  180 tablet 3 10/9/2023 --   [facility] STORE #[id number] [place] [address] [address]    Sig: Take 1 tablet (12.5 mg) by mouth 2 times daily (with meals)    Class: E-Prescribe    Route: Oral     diclofenac (VOLTAREN) 1 % topical gel  100 g 1 1/17/2024 --   [id number] [place] [address] [address]    Sig: Apply 4 grams to knees or 2 grams to hands four times daily using enclosed dosing card.    Class: E-Prescribe    DULoxetine (CYMBALTA) 20 MG capsule  90 capsule 1 1/17/2024 --   [facility] #[id number] [place] [address]    Sig: Take 1 capsule (20 mg) by mouth daily    Class: E-Prescribe    Route: Oral    empagliflozin (JARDIANCE) 10 MG TABS tablet  90 tablet 1 3/19/2024 --   [facility] #[id number] [place] [address] [address]    Sig: Take 1 tablet (10 mg) by mouth daily    Class: E-Prescribe    Route: Oral    Folic Acid-Vit B6-Vit B12 (B COMPLEX-FOLIC ACID PO)  -- --  --       Sig: Take 1 capsule by mouth daily    Class: Historical    Route: Oral    gabapentin (NEURONTIN) 800 MG tablet  270 tablet 1 4/26/2024 --   [facility] #[id number] [facility] [address]    Sig: Take 1 tablet (800 mg) by mouth 3 times daily    Class: E-Prescribe    Route: Oral    Lidocaine (LIDOCARE) 4 % Patch  -- --  --       Sig: Place 1 patch onto the skin every 24 hours To prevent lidocaine toxicity, patient should be patch free for 12 hrs daily.    Class: Historical    Route: Transdermal    lidocaine (LIDODERM) 5 % patch  30 patch 3 10/9/2023 --   [id number] [address]    Sig: Place 1 patch onto the skin every 24 hours To prevent lidocaine toxicity, patient should be patch free for 12 hrs daily.    Class: E-Prescribe    Route: Transdermal    Prior authorization: Approved    loratadine (CLARITIN) 10 MG tablet  90 tablet 3 6/6/2024 --   [facility] #[id number] [place] [address] [address]    Sig: TAKE 1 TABLET(10 MG) BY MOUTH DAILY    Class: E-Prescribe    Route: Oral    melatonin 3 MG tablet  -- --  --       Sig: Take 3 mg by mouth nightly as needed for sleep    Class: Historical    Route: Oral     metFORMIN (GLUCOPHAGE XR) 500 MG 24 hr tablet  180 tablet 3 3/11/2024 3/6/2025   [facility] #[id number] [place], [place] - [address]    Sig: Take 1 tablet (500 mg) by mouth 2 times daily (with meals) for 360 days    Class: E-Prescribe    Route: Oral    order for DME  1 each 0 3/6/2020 --   [facility] [place], [place] - [address] [facility]    Sig: Diabetic shoes One Pair    Class: Local Print    order for DME  1 each 1 2/26/2018 --   [facility] #[id number] - [place], [place] - [address]    Sig: Equipment being ordered: Diabetic Shoes    Class: Local Print    pantoprazole (PROTONIX) 40 MG EC tablet  90 tablet 1 10/9/2023 --   [facility] #[id number] [place], [place] - [address]    Sig: Take 1 tablet (40 mg) by mouth daily    Class: E-Prescribe    Route: Oral    polyethylene glycol (MIRALAX) 17 GM/Dose powder  510 g 1 3/19/2024 --   [id number] [place], [place] - [address]    Sig: Take 17 g by mouth daily    Class: E-Prescribe    Route: Oral    senna-docusate (SENOKOT-S/PERICOLACE) 8.6-50 MG tablet  -- --  --       Sig: Take 2 tablets by mouth 2 times daily    Class: Historical    Route: Oral    vitamin D2 (ERGOCALCIFEROL) 16828 units (1250 mcg) capsule  -- -- 7/24/2023 --       Sig: Take 50,000 Units by mouth once a week    Class: Historical    Route: Oral           ALLERGIES:    Allergies   Allergen Reactions    Beef-Derived Products Other (See Comments)     Sneezing when eats beef    Eggs [Chicken-Derived Products (Egg)]      REVIEW OF SYSTEMS:  Review Of Systems  Skin: negative for, pigmentation, acne, rash, scaling, itching, bruising, lumps or bumps  Eyes: negative for, visual blurring, double vision, glaucoma, cataracts, eye pain, color blindness, glasses, contacts  Ears/Nose/Throat: negative for, nasal congestion, purulent rhinorrhea, sneezing, postnasal drainage, hearing loss, deafness, tinnitus  Respiratory: No shortness of breath, dyspnea on  exertion, cough, or hemoptysis  Cardiovascular: negative for, palpitations, tachycardia, irregular heart beat, chest pain, exertional chest pain or pressure, paroxysmal nocturnal dyspnea and dyspnea on exertion  Gastrointestinal: negative for, poor appetite, dysphagia, nausea, vomiting, heartburn, dyspepsia, reflux and hematemesis  Genitourinary: negative for, nocturia, dysuria, frequency, urgency, hesitancy, hematuria, retention, decreased urinary stream and incontinence  Musculoskeletal: negative for, fracture, back pain, neck pain, arthritis, joint pain, joint swelling, joint stiffness, gout and fibromyalgia  Neurologic: negative for, headaches, syncope, stroke, seizures, paralysis, local weakness, numbness or tingling of hands and numbness or tingling of feet    A comprehensive review of systems was performed and found to be negative except as described here or above.  SOCIAL HISTORY:   Social History     Socioeconomic History    Marital status:      Spouse name: Not on file    Number of children: Not on file    Years of education: Not on file    Highest education level: Not on file   Occupational History    Not on file   Tobacco Use    Smoking status: Never    Smokeless tobacco: Never   Vaping Use    Vaping status: Never Used   Substance and Sexual Activity    Alcohol use: No    Drug use: No    Sexual activity: Not Currently   Other Topics Concern    Not on file   Social History Narrative    Not on file     Social Determinants of Health     Financial Resource Strain: Low Risk  (4/26/2024)    Financial Resource Strain     Within the past 12 months, have you or your family members you live with been unable to get utilities (heat, electricity) when it was really needed?: No   Food Insecurity: Low Risk  (4/26/2024)    Food Insecurity     Within the past 12 months, did you worry that your food would run out before you got money to buy more?: No     Within the past 12 months, did the food you bought just not  last and you didn t have money to get more?: No   Transportation Needs: Low Risk  (4/26/2024)    Transportation Needs     Within the past 12 months, has lack of transportation kept you from medical appointments, getting your medicines, non-medical meetings or appointments, work, or from getting things that you need?: No   Physical Activity: Inactive (4/26/2024)    Exercise Vital Sign     Days of Exercise per Week: 0 days     Minutes of Exercise per Session: 0 min   Stress: No Stress Concern Present (4/26/2024)    [employer] - Occupational Stress Questionnaire     Feeling of Stress : Not at all   Social Connections: Unknown (4/26/2024)    Social Connection and Isolation Panel [NHANES]     Frequency of Communication with Friends and Family: Not on file     Frequency of Social Gatherings with Friends and Family: More than three times a week     Attends Druze Services: Not on file     Active Member of Clubs or Organizations: Not on file     Attends Club or Organization Meetings: Not on file     Marital Status: Not on file   Interpersonal Safety: Low Risk  (5/10/2024)    Interpersonal Safety     Do you feel physically and emotionally safe where you currently live?: Yes     Within the past 12 months, have you been hit, slapped, kicked or otherwise physically hurt by someone?: No     Within the past 12 months, have you been humiliated or emotionally abused in other ways by your partner or ex-partner?: No   Housing Stability: High Risk (4/26/2024)    Housing Stability     Do you have housing? : No     Are you worried about losing your housing?: No     FAMILY MEDICAL HISTORY:   Family History   Problem Relation Age of Onset    Diabetes No family hx of     Coronary Artery Disease No family hx of     Hypertension No family hx of     Hyperlipidemia No family hx of     Cerebrovascular Disease No family hx of     Breast Cancer No family hx of     Colon Cancer No family hx of     Prostate Cancer  No family hx of     Other Cancer No family hx of     Depression No family hx of     Anxiety Disorder No family hx of     Mental Illness No family hx of     Substance Abuse No family hx of     Anesthesia Reaction No family hx of     Asthma No family hx of     Osteoporosis No family hx of     Genetic Disorder No family hx of     Thyroid Disease No family hx of     Obesity No family hx of     Glaucoma No family hx of     Macular Degeneration No family hx of      PHYSICAL EXAM:   There were no vitals taken for this visit.  GENERAL APPEARANCE: alert and no distress  EYES: nonicteric  HENT: mouth without ulcers or lesions  NECK: supple, no adenopathy  RESP: lungs clear to auscultation   CV: regular rhythm, normal rate, no rub  ABDOMEN: soft, nontender, normal bowel sounds, no HSM   Extremities: no clubbing, cyanosis, or edema  MS: no evidence of inflammation in joints, no muscle tenderness  SKIN: no rash  NEURO: mentation intact and speech normal  PSYCH: affect normal/bright   LABS:   Recent Results (from the past 672 hour(s))   CBC with platelets    Collection Time: 06/24/24 11:47 AM   Result Value Ref Range    WBC Count 7.1 4.0 - 11.0 10e3/uL    RBC Count 4.34 (L) 4.40 - 5.90 10e6/uL    Hemoglobin 15.0 13.3 - 17.7 g/dL    Hematocrit 44.1 40.0 - 53.0 %     (H) 78 - 100 fL    MCH 34.6 (H) 26.5 - 33.0 pg    MCHC 34.0 31.5 - 36.5 g/dL    RDW 11.7 10.0 - 15.0 %    Platelet Count 197 150 - 450 10e3/uL     CMP  Recent Labs   Lab Test 03/18/24  1337 03/11/24  1019 01/17/24  1625 10/09/23  1113 09/11/23  1052 07/21/23  1149 06/12/23  1511 05/22/23  1417 09/24/21  1204 06/07/21  1607 10/16/20  1545 09/18/20  1417 02/04/20  1426 07/12/19  0911    138 144 139 140   < > 136 136   < > 142 136 136.8 135.5  --    POTASSIUM 4.3 5.1 5.2 4.3 4.5   < > 5.3 4.8   < > 4.5 4.3 3.6 4.2  --    CHLORIDE 101 102 103 105 101   < > 98 102   < > 109 105 99.8 98.4  --    CO2 25 28 30 22 29   < > 28 20*   < > 29 26 21.5 29.2  --     ANIONGAP 10 8 11 12 10   < > 10 14   < > 4 6  --   --    < >   *  193* 156*  156* 179* 146* 150*   < > 121*  124* 112*   < > 139* 115* 169.5* 170.7*  --    BUN 23.2* 24.3* 20 17.5 20.9   < > 26.4* 39.2*   < > 24 22 34.5* 20.2  --    CR 1.27* 1.61* 2.40* 1.09 1.27*   < > 1.56* 2.54*   < > 1.39* 1.45* 1.5* 1.2  --    GFRESTIMATED 57* 43* 27* 69 58*   < > 45* 25*   < > 49* 47* 49.8* 63.6  --    GFRESTBLACK  --   --   --   --   --   --   --   --   --  56* 54* 60.3 76.9  --    WILIAM 9.1 9.3 10.0 9.2 10.0   < > 9.7 9.1   < > 9.5 9.3 9.4 9.3  --    PHOS  --   --   --   --   --   --  3.5 4.5  --   --   --   --   --   --    PROTTOTAL 7.4 7.3  --  7.7 8.0   < >  --  7.9  --   --  7.5  --   --   --    ALBUMIN 4.2 4.3  --  4.4 4.6   < > 4.6 4.4   < >  --  4.0  --   --   --    BILITOTAL 0.4 0.4  --  0.4 0.7   < >  --  0.3  --   --  0.8  --   --   --    ALKPHOS 123 89  --  101 103   < >  --  101  --   --  81  --   --   --    AST 21 17  --  20 18   < >  --  21  --   --  13  --   --   --    ALT 16 5  --  10 9   < >  --  15  --   --  19  --   --   --     < > = values in this interval not displayed.     CBC  Recent Labs   Lab Test 06/24/24  1147 03/11/24  1019 09/11/23  1052 06/12/23  1511   HGB 15.0 13.3 14.1 14.7   WBC 7.1 6.4 4.8 5.7   RBC 4.34* 3.87* 4.15* 4.27*   HCT 44.1 40.3 41.0 44.3   * 104* 99 104*   MCH 34.6* 34.4* 34.0* 34.4*   MCHC 34.0 33.0 34.4 33.2   RDW 11.7 11.9 11.1 11.9    199 206 186     INRNo lab results found.  ABGNo lab results found.   URINE STUDIES  Recent Labs   Lab Test 04/26/24  1551 03/11/24  1042 06/12/23  1518   COLOR Yellow Light Yellow Light Yellow   APPEARANCE Slightly Cloudy* Clear Clear   URINEGLC >=1000* Negative Negative   URINEBILI Negative Negative Negative   URINEKETONE Negative Negative Negative   SG 1.020 1.017 1.011   UBLD Negative Negative Negative   URINEPH 5.5 5.5 6.0   PROTEIN Trace* Negative Negative   UROBILINOGEN 0.2  --   --    NITRITE Positive* Positive*  Negative   LEUKEST Negative Small* Large*   RBCU None Seen 1 <1   WBCU 10-25* 20* 33*     No lab results found.    ASSESSMENT AND PLAN:   #ALAN of unknown etiology that has resolved after all his medications were held is likely hemodynamically mediated and secondary to polypharmacy    #CKD stage 3a  eGFR around 45 mL/min  At baseline with no evidence of proteinuria off ACE inhibitors  A  renal ultrasound shows unremarkable kidneys  The potential responsible factors include vascular disease, hypertension and normal decline related to age. No documented intake of NSAIDs or other nephrotoxic medications. The risk of progression is low provided there are no additional episodes of ALAN. As his BP is suboptimal, I will increase carvedilol to 25 mg twice daily.  The patient was also  instructed to keep the sodium intake around 2400 mg /day, follow a plant-based diet and to avoid NSAIDs. In addition he was counseled about not fasting during the month of Ramadan     #HTN  Primary and secondary to CKD. Management as per above    #Type 2 DM   Hba1c 5.8 in May 2023 -> 6.5 -> 6.7  pursue metformin 500 mg twice daily and empagliflozin 10 mg daily    #CVD/dyslipidemia  Given his history of CVA, he was restarted on his last visit on atorvastatin 40 mg daily     #Blood count  Hemoglobin 14.7 -> 14.1 -> 13.3 -> 15 no acute issue    #Acid-base status  CO2 level 28 -> 29 -> 28 -> 24  no need for sodium bicarbonate supplementation    #Electrolytes  Na  136-> 138  K 5.3 -> 4.5 -> 5.1-> 3.7 improved     #BMD  Ca  9.7        P 3.5   alb 4.6  iPTH 96 Vitamin D level is 16   He was started on ergocalciferol 62354 units once a week     #Neuropathy/ chronic pain/recent fracture on duloxetine 20 mg daily and gabapentin 800 mg three times a day    #CKD journey/transplant: not a candidate at this point     The total time of this encounter amounted to 30 minutes on the day of the encounter. This time included time spent with the patient, reviewing  records, ordering tests, and performing post visit documentation.   The longitudinal plan of care for the diagnosis(es)/condition(s) as documented were addressed during this visit. Due to the added complexity in care, I will continue to support Juli in the subsequent management and with ongoing continuity of care.     The patient will return to follow up in 4 months    Taylor Carrizales MD  Division of Renal Disease and Hypertension

## 2024-06-24 NOTE — NURSING NOTE
Chief Complaint   Patient presents with    RECHECK     RETURN NEPHROLOGY - 4 month follow-up// rescheduled from 7.10.24 Please use iPad or phones [phone] to reach  and follow prompts         /86 (BP Location: Left arm, Patient Position: Sitting, Cuff Size: Adult Regular)   Pulse 89   Temp 98.5  F (36.9  C) (Oral)   Wt 90.3 kg (199 lb)   SpO2 95%   BMI 26.99 kg/m      Srinivas Reina CMA on 6/24/2024 at 1:22 PM

## 2024-06-24 NOTE — LETTER
6/24/2024       RE: Juli Rodriguez  [address]   [address]  [facility] [address]     Dear Colleague,    Thank you for referring your patient, Juli Rodriguez, to the [facility] NEPHROLOG[facility] [place] at [facility]. Please see a copy of my visit note below.    Nephrology Clinic    Juli Rodriguez MRN:[id number] YOB: 1945  Date of Service: 06/24/2024  Primary care provider: Ernie Arreola  Requesting physician: Ernie Arreola      REASON FOR CONSULT: ALAN vs CKD    HISTORY OF PRESENT ILLNESS:   Juli Rodriguez is a 79 year old male who  first presented  for evaluation of ALAN and CKD in June 2023.  The past medical history is significant for type 2 DM for more than 10 years, HTN for  10 years ,CVA in 2013 and CKD.  Regarding his type 2 DM, his glycemia is well controlled and his last Hba1c is 5.8 on 5/22/2023.  From a renal standpoint, his baseline creatinine level was around 1.4 mg/dL up to September 2021. He was thereafter lost to follow up in [place] as he moved to [place] and a kidney function was checked again in May 2023 and his creatinine level was noticed then to be 2.54 mg/dL. A repeat level on 6/12/2023 is 1.56 mg/dL and 1.27 mg/dL on 9/11/23. He reported that he had been following up regularly in [place] with a provider there up to January 2023 and that his renal function was stable. His kidney function normalized in October 2023 with creatinine level at 1.09 but deteriorated again in January 2024 as his creatinine level went up to 2.4 mg/dL in the setting of a bursitis for which he might have taken NSAIDs.  The UACR is negative for any proteinuria on 5/22/2023 , on 9/11/2023 and on 3/11/2024. A renal ultrasound done in June 2023 shows normal size kidneys.  On his last visit he was restarted on carvedilol 12.5 mg twice daily which was decreased later on 6.25 mg twice daily when he fell in July 2023 and then increased again to 12.5 mg  twice daily. For his diabetes he is supposed to be on metformin 500 mg twice daily but it is unclear if he is taking it.  Also  the patient was admitted to the hospital in July 2023 after he had a fall. A CT of the head that showed age-related and chronic small vessel disease but no hemorrhage or acute stroke. CT of the chest abdomen pelvis showed nondisplaced avulsion fractures of the left transverse process at L1 and L2.     Follow up on 6/24/2024    The patient's kidney function is more or less stable with a creatinine level at 1.41 mg/dL. The latest Hba1c is 6.7 and he has no proteinuria on empagliflozin that was started 3 months ago. His blood pressure remains however suboptimal on amlodipine 10 mg daily and carvedilol 12.5 mg twice daily.    EXAMINATION: US RENAL COMPLETE NON-VASCULAR, 6/14/2023 1:02 PM      COMPARISON: None.     HISTORY: CKD (chronic kidney disease) stage 4, GFR 15-29 ml/min (H);  elevated creatinine level     TECHNIQUE: The kidneys and bladder were scanned in the standard  fashion with specialized ultrasound transducer(s) using both gray  scale and limited color/spectral Doppler techniques.     FINDINGS:     Right kidney: Measures 11.0 cm in length. Parenchyma is of normal  thickness and echogenicity. No focal mass. No hydronephrosis.     Left kidney: Measures 10.3 cm in length. Parenchyma is of normal  thickness and echogenicity. No focal mass. No hydronephrosis.      Bladder: Partially distended.                                                                      IMPRESSION: Unremarkable renal ultrasound.     I have personally reviewed the examination and initial interpretation  and I agree with the findings.     HARISH BENOIT MD   PAST MEDICAL HISTORY:  Past Medical History:   Diagnosis Date     Cerebral infarction (H) 08/2013    In [facility]     Diabetes mellitus, type 2 (H)      Stroke (H)      PAST SURGICAL HISTORY:  Past Surgical History:   Procedure Laterality Date     CATARACT  IOL, RT/LT Bilateral 2007 and 2015     HERNIA REPAIR       INJECT MAJOR JOINT / BURSA  9/18/2020     JOINT REPLACEMENT, HIP RT/LT Right      MEDICATIONS:  Prescription Medications as of 6/24/2024         Rx Number Disp Refills Start End Last Dispensed Date Next Fill Date Owning Pharmacy    acetaminophen (TYLENOL) 500 MG tablet  200 tablet 3 10/9/2023 --   [facility] #[id number] [facility] [address]    Sig: Take 2 tablets (1,000 mg) by mouth 3 times daily    Class: E-Prescribe    Route: Oral    aspirin 81 MG EC tablet  60 tablet 4 3/19/2024 --   [id number] [facility] [address]    Sig: Take 1 tablet (81 mg) by mouth daily    Class: E-Prescribe    Route: Oral    atorvastatin (LIPITOR) 20 MG tablet  90 tablet 1 10/9/2023 --   [facility][id number] [facility] [address]    Sig: Take 1 tablet (20 mg) by mouth daily    Class: E-Prescribe    Route: Oral    blood glucose (NO BRAND SPECIFIED) lancets standard  100 each 3 10/9/2023 --   [id number] [facility] [address]    Sig: Use to test blood sugar 2 times daily or as directed.    Class: E-Prescribe    blood glucose (NO BRAND SPECIFIED) test strip  100 strip 3 10/9/2023 --   [id number] [facility] [address]    Sig: Use to test blood sugar 2 times daily or as directed.    Class: E-Prescribe    blood glucose monitoring (NO BRAND SPECIFIED) meter device kit  1 kit 0 10/9/2023 --   [id number] [place] [address] [address]    Sig: Use to test blood sugar 2 times daily or as directed.    Class: E-Prescribe    carboxymethylcellulose PF (CARBOXYMETHYLCELLULOSE SODIUM) 0.5 % ophthalmic solution  400 each 11 2/19/2024 --   [id number] [facility] [address]    Sig: Place 1 drop into both eyes 4 times daily Preservative free artificial tears, single use vials.    Class: E-Prescribe     Notes to Pharmacy: Preservative free artificial tears, single use vials.    Route: Both Eyes    carvedilol (COREG) 12.5 MG tablet  180 tablet 3 10/9/2023 --   [facility] #[id number] [place] [address] [address]    Sig: Take 1 tablet (12.5 mg) by mouth 2 times daily (with meals)    Class: E-Prescribe    Route: Oral    diclofenac (VOLTAREN) 1 % topical gel  100 g 1 1/17/2024 --   [facility] [address] [place] [address] [address]    Sig: Apply 4 grams to knees or 2 grams to hands four times daily using enclosed dosing card.    Class: E-Prescribe    DULoxetine (CYMBALTA) 20 MG capsule  90 capsule 1 1/17/2024 --   [facility] #[id number] [place] [address] [address]    Sig: Take 1 capsule (20 mg) by mouth daily    Class: E-Prescribe    Route: Oral    empagliflozin (JARDIANCE) 10 MG TABS tablet  90 tablet 1 3/19/2024 --   [facility] #[id number] [place] [address] [address]    Sig: Take 1 tablet (10 mg) by mouth daily    Class: E-Prescribe    Route: Oral    Folic Acid-Vit B6-Vit B12 (B COMPLEX-FOLIC ACID PO)  -- --  --       Sig: Take 1 capsule by mouth daily    Class: Historical    Route: Oral    gabapentin (NEURONTIN) 800 MG tablet  270 tablet 1 4/26/2024 --   [facility] [place] [address] [address]    Sig: Take 1 tablet (800 mg) by mouth 3 times daily    Class: E-Prescribe    Route: Oral    Lidocaine (LIDOCARE) 4 % Patch  -- --  --       Sig: Place 1 patch onto the skin every 24 hours To prevent lidocaine toxicity, patient should be patch free for 12 hrs daily.    Class: Historical    Route: Transdermal    lidocaine (LIDODERM) 5 % patch  30 patch 3 10/9/2023 --   [id number] [address]    Sig: Place 1 patch onto the skin every 24 hours To prevent lidocaine toxicity, patient should be patch free for 12 hrs daily.    Class: E-Prescribe    Route: Transdermal    Prior authorization: Approved     loratadine (CLARITIN) 10 MG tablet  90 tablet 3 6/6/2024 --   [facility] #[id number] [place], [place] - [address]    Sig: TAKE 1 TABLET(10 MG) BY MOUTH DAILY    Class: E-Prescribe    Route: Oral    melatonin 3 MG tablet  -- --  --       Sig: Take 3 mg by mouth nightly as needed for sleep    Class: Historical    Route: Oral    metFORMIN (GLUCOPHAGE XR) 500 MG 24 hr tablet  180 tablet 3 3/11/2024 3/6/2025   [facility] #[id number] [facility] [address]    Sig: Take 1 tablet (500 mg) by mouth 2 times daily (with meals) for 360 days    Class: E-Prescribe    Route: Oral    order for DME  1 each 0 3/6/2020 --   [facility] - [place], [place] - [address] [place] [address]    Sig: Diabetic shoes One Pair    Class: Local Print    order for DME  1 each 1 2/26/2018 --   [facility] #[id number] - [place], [place] - [address]    Sig: Equipment being ordered: Diabetic Shoes    Class: Local Print    pantoprazole (PROTONIX) 40 MG EC tablet  90 tablet 1 10/9/2023 --   [facility] #[id number] [facility] [address]    Sig: Take 1 tablet (40 mg) by mouth daily    Class: E-Prescribe    Route: Oral    polyethylene glycol (MIRALAX) 17 GM/Dose powder  510 g 1 3/19/2024 --   [facility] #[id number] [facility] [address]    Sig: Take 17 g by mouth daily    Class: E-Prescribe    Route: Oral    senna-docusate (SENOKOT-S/PERICOLACE) 8.6-50 MG tablet  -- --  --       Sig: Take 2 tablets by mouth 2 times daily    Class: Historical    Route: Oral    vitamin D2 (ERGOCALCIFEROL) 76135 units (1250 mcg) capsule  -- -- 7/24/2023 --       Sig: Take 50,000 Units by mouth once a week    Class: Historical    Route: Oral           ALLERGIES:    Allergies   Allergen Reactions     Beef-Derived Products Other (See Comments)     Sneezing when eats beef     Eggs [Chicken-Derived Products (Egg)]      REVIEW OF SYSTEMS:  Review Of Systems  Skin: negative for, pigmentation, acne, rash,  scaling, itching, bruising, lumps or bumps  Eyes: negative for, visual blurring, double vision, glaucoma, cataracts, eye pain, color blindness, glasses, contacts  Ears/Nose/Throat: negative for, nasal congestion, purulent rhinorrhea, sneezing, postnasal drainage, hearing loss, deafness, tinnitus  Respiratory: No shortness of breath, dyspnea on exertion, cough, or hemoptysis  Cardiovascular: negative for, palpitations, tachycardia, irregular heart beat, chest pain, exertional chest pain or pressure, paroxysmal nocturnal dyspnea and dyspnea on exertion  Gastrointestinal: negative for, poor appetite, dysphagia, nausea, vomiting, heartburn, dyspepsia, reflux and hematemesis  Genitourinary: negative for, nocturia, dysuria, frequency, urgency, hesitancy, hematuria, retention, decreased urinary stream and incontinence  Musculoskeletal: negative for, fracture, back pain, neck pain, arthritis, joint pain, joint swelling, joint stiffness, gout and fibromyalgia  Neurologic: negative for, headaches, syncope, stroke, seizures, paralysis, local weakness, numbness or tingling of hands and numbness or tingling of feet    A comprehensive review of systems was performed and found to be negative except as described here or above.  SOCIAL HISTORY:   Social History     Socioeconomic History     Marital status:      Spouse name: Not on file     Number of children: Not on file     Years of education: Not on file     Highest education level: Not on file   Occupational History     Not on file   Tobacco Use     Smoking status: Never     Smokeless tobacco: Never   Vaping Use     Vaping status: Never Used   Substance and Sexual Activity     Alcohol use: No     Drug use: No     Sexual activity: Not Currently   Other Topics Concern     Not on file   Social History Narrative     Not on file     Social Determinants of Health     Financial Resource Strain: Low Risk  (4/26/2024)    Financial Resource Strain      Within the past 12 months,  have you or your family members you live with been unable to get utilities (heat, electricity) when it was really needed?: No   Food Insecurity: Low Risk  (4/26/2024)    Food Insecurity      Within the past 12 months, did you worry that your food would run out before you got money to buy more?: No      Within the past 12 months, did the food you bought just not last and you didn t have money to get more?: No   Transportation Needs: Low Risk  (4/26/2024)    Transportation Needs      Within the past 12 months, has lack of transportation kept you from medical appointments, getting your medicines, non-medical meetings or appointments, work, or from getting things that you need?: No   Physical Activity: Inactive (4/26/2024)    Exercise Vital Sign      Days of Exercise per Week: 0 days      Minutes of Exercise per Session: 0 min   Stress: No Stress Concern Present (4/26/2024)    [employer] - Occupational Stress Questionnaire      Feeling of Stress : Not at all   Social Connections: Unknown (4/26/2024)    Social Connection and Isolation Panel [NHANES]      Frequency of Communication with Friends and Family: Not on file      Frequency of Social Gatherings with Friends and Family: More than three times a week      Attends Bahai Services: Not on file      Active Member of Clubs or Organizations: Not on file      Attends Club or Organization Meetings: Not on file      Marital Status: Not on file   Interpersonal Safety: Low Risk  (5/10/2024)    Interpersonal Safety      Do you feel physically and emotionally safe where you currently live?: Yes      Within the past 12 months, have you been hit, slapped, kicked or otherwise physically hurt by someone?: No      Within the past 12 months, have you been humiliated or emotionally abused in other ways by your partner or ex-partner?: No   Housing Stability: High Risk (4/26/2024)    Housing Stability      Do you have housing? : No      Are you worried  about losing your housing?: No     FAMILY MEDICAL HISTORY:   Family History   Problem Relation Age of Onset     Diabetes No family hx of      Coronary Artery Disease No family hx of      Hypertension No family hx of      Hyperlipidemia No family hx of      Cerebrovascular Disease No family hx of      Breast Cancer No family hx of      Colon Cancer No family hx of      Prostate Cancer No family hx of      Other Cancer No family hx of      Depression No family hx of      Anxiety Disorder No family hx of      Mental Illness No family hx of      Substance Abuse No family hx of      Anesthesia Reaction No family hx of      Asthma No family hx of      Osteoporosis No family hx of      Genetic Disorder No family hx of      Thyroid Disease No family hx of      Obesity No family hx of      Glaucoma No family hx of      Macular Degeneration No family hx of      PHYSICAL EXAM:   There were no vitals taken for this visit.  GENERAL APPEARANCE: alert and no distress  EYES: nonicteric  HENT: mouth without ulcers or lesions  NECK: supple, no adenopathy  RESP: lungs clear to auscultation   CV: regular rhythm, normal rate, no rub  ABDOMEN: soft, nontender, normal bowel sounds, no HSM   Extremities: no clubbing, cyanosis, or edema  MS: no evidence of inflammation in joints, no muscle tenderness  SKIN: no rash  NEURO: mentation intact and speech normal  PSYCH: affect normal/bright   LABS:   Recent Results (from the past 672 hour(s))   CBC with platelets    Collection Time: 06/24/24 11:47 AM   Result Value Ref Range    WBC Count 7.1 4.0 - 11.0 10e3/uL    RBC Count 4.34 (L) 4.40 - 5.90 10e6/uL    Hemoglobin 15.0 13.3 - 17.7 g/dL    Hematocrit 44.1 40.0 - 53.0 %     (H) 78 - 100 fL    MCH 34.6 (H) 26.5 - 33.0 pg    MCHC 34.0 31.5 - 36.5 g/dL    RDW 11.7 10.0 - 15.0 %    Platelet Count 197 150 - 450 10e3/uL     CMP  Recent Labs   Lab Test 03/18/24  1337 03/11/24  1019 01/17/24  1625 10/09/23  1113 09/11/23  1052 07/21/23  1149  06/12/23  1511 05/22/23  1417 09/24/21  1204 06/07/21  1607 10/16/20  1545 09/18/20  1417 02/04/20  1426 07/12/19  0911    138 144 139 140   < > 136 136   < > 142 136 136.8 135.5  --    POTASSIUM 4.3 5.1 5.2 4.3 4.5   < > 5.3 4.8   < > 4.5 4.3 3.6 4.2  --    CHLORIDE 101 102 103 105 101   < > 98 102   < > 109 105 99.8 98.4  --    CO2 25 28 30 22 29   < > 28 20*   < > 29 26 21.5 29.2  --    ANIONGAP 10 8 11 12 10   < > 10 14   < > 4 6  --   --    < >   *  193* 156*  156* 179* 146* 150*   < > 121*  124* 112*   < > 139* 115* 169.5* 170.7*  --    BUN 23.2* 24.3* 20 17.5 20.9   < > 26.4* 39.2*   < > 24 22 34.5* 20.2  --    CR 1.27* 1.61* 2.40* 1.09 1.27*   < > 1.56* 2.54*   < > 1.39* 1.45* 1.5* 1.2  --    GFRESTIMATED 57* 43* 27* 69 58*   < > 45* 25*   < > 49* 47* 49.8* 63.6  --    GFRESTBLACK  --   --   --   --   --   --   --   --   --  56* 54* 60.3 76.9  --    WILIAM 9.1 9.3 10.0 9.2 10.0   < > 9.7 9.1   < > 9.5 9.3 9.4 9.3  --    PHOS  --   --   --   --   --   --  3.5 4.5  --   --   --   --   --   --    PROTTOTAL 7.4 7.3  --  7.7 8.0   < >  --  7.9  --   --  7.5  --   --   --    ALBUMIN 4.2 4.3  --  4.4 4.6   < > 4.6 4.4   < >  --  4.0  --   --   --    BILITOTAL 0.4 0.4  --  0.4 0.7   < >  --  0.3  --   --  0.8  --   --   --    ALKPHOS 123 89  --  101 103   < >  --  101  --   --  81  --   --   --    AST 21 17  --  20 18   < >  --  21  --   --  13  --   --   --    ALT 16 5  --  10 9   < >  --  15  --   --  19  --   --   --     < > = values in this interval not displayed.     CBC  Recent Labs   Lab Test 06/24/24  1147 03/11/24  1019 09/11/23  1052 06/12/23  1511   HGB 15.0 13.3 14.1 14.7   WBC 7.1 6.4 4.8 5.7   RBC 4.34* 3.87* 4.15* 4.27*   HCT 44.1 40.3 41.0 44.3   * 104* 99 104*   MCH 34.6* 34.4* 34.0* 34.4*   MCHC 34.0 33.0 34.4 33.2   RDW 11.7 11.9 11.1 11.9    199 206 186     INRNo lab results found.  ABGNo lab results found.   URINE STUDIES  Recent Labs   Lab Test 04/26/24  0406  03/11/24  1042 06/12/23  1518   COLOR Yellow Light Yellow Light Yellow   APPEARANCE Slightly Cloudy* Clear Clear   URINEGLC >=1000* Negative Negative   URINEBILI Negative Negative Negative   URINEKETONE Negative Negative Negative   SG 1.020 1.017 1.011   UBLD Negative Negative Negative   URINEPH 5.5 5.5 6.0   PROTEIN Trace* Negative Negative   UROBILINOGEN 0.2  --   --    NITRITE Positive* Positive* Negative   LEUKEST Negative Small* Large*   RBCU None Seen 1 <1   WBCU 10-25* 20* 33*     No lab results found.    ASSESSMENT AND PLAN:   #ALAN of unknown etiology that has resolved after all his medications were held is likely hemodynamically mediated and secondary to polypharmacy    #CKD stage 3a  eGFR around 45 mL/min  At baseline with no evidence of proteinuria off ACE inhibitors  A  renal ultrasound shows unremarkable kidneys  The potential responsible factors include vascular disease, hypertension and normal decline related to age. No documented intake of NSAIDs or other nephrotoxic medications. The risk of progression is low provided there are no additional episodes of ALAN. As his BP is suboptimal, I will increase carvedilol to 25 mg twice daily.  The patient was also  instructed to keep the sodium intake around 2400 mg /day, follow a plant-based diet and to avoid NSAIDs. In addition he was counseled about not fasting during the month of Ramadan     #HTN  Primary and secondary to CKD. Management as per above    #Type 2 DM   Hba1c 5.8 in May 2023 -> 6.5 -> 6.7  pursue metformin 500 mg twice daily and empagliflozin 10 mg daily    #CVD/dyslipidemia  Given his history of CVA, he was restarted on his last visit on atorvastatin 40 mg daily     #Blood count  Hemoglobin 14.7 -> 14.1 -> 13.3 -> 15 no acute issue    #Acid-base status  CO2 level 28 -> 29 -> 28 -> 24  no need for sodium bicarbonate supplementation    #Electrolytes  Na  136-> 138  K 5.3 -> 4.5 -> 5.1-> 3.7 improved     #BMD  Ca  9.7        P 3.5   alb 4.6  iPTH  96 Vitamin D level is 16   He was started on ergocalciferol 53664 units once a week     #Neuropathy/ chronic pain/recent fracture on duloxetine 20 mg daily and gabapentin 800 mg three times a day    #CKD journey/transplant: not a candidate at this point     The total time of this encounter amounted to 30 minutes on the day of the encounter. This time included time spent with the patient, reviewing records, ordering tests, and performing post visit documentation.   The longitudinal plan of care for the diagnosis(es)/condition(s) as documented were addressed during this visit. Due to the added complexity in care, I will continue to support Juli in the subsequent management and with ongoing continuity of care.     The patient will return to follow up in 4 months    Taylor Carrizales MD  Division of Renal Disease and Hypertension

## 2024-06-24 NOTE — PATIENT INSTRUCTIONS
-Please increase carvedilol to 25 mg twice daily  -Please return to clinic in 4 months with repeat labs   no

## 2024-06-25 ENCOUNTER — OFFICE VISIT (OUTPATIENT)
Dept: FAMILY MEDICINE | Facility: CLINIC | Age: 79
End: 2024-06-25
Payer: COMMERCIAL

## 2024-06-25 VITALS
BODY MASS INDEX: 27.09 KG/M2 | SYSTOLIC BLOOD PRESSURE: 110 MMHG | RESPIRATION RATE: 17 BRPM | WEIGHT: 200 LBS | TEMPERATURE: 98.3 F | DIASTOLIC BLOOD PRESSURE: 76 MMHG | HEIGHT: 72 IN | HEART RATE: 83 BPM | OXYGEN SATURATION: 100 %

## 2024-06-25 DIAGNOSIS — N18.31 STAGE 3A CHRONIC KIDNEY DISEASE (H): ICD-10-CM

## 2024-06-25 DIAGNOSIS — Z29.11 NEED FOR VACCINATION AGAINST RESPIRATORY SYNCYTIAL VIRUS: ICD-10-CM

## 2024-06-25 DIAGNOSIS — Z86.73 HISTORY OF CVA (CEREBROVASCULAR ACCIDENT): ICD-10-CM

## 2024-06-25 DIAGNOSIS — E11.22 TYPE 2 DIABETES MELLITUS WITH STAGE 3 CHRONIC KIDNEY DISEASE, WITHOUT LONG-TERM CURRENT USE OF INSULIN, UNSPECIFIED WHETHER STAGE 3A OR 3B CKD (H): ICD-10-CM

## 2024-06-25 DIAGNOSIS — G89.29 CHRONIC RIGHT-SIDED LOW BACK PAIN WITH RIGHT-SIDED SCIATICA: Primary | ICD-10-CM

## 2024-06-25 DIAGNOSIS — M54.41 CHRONIC RIGHT-SIDED LOW BACK PAIN WITH RIGHT-SIDED SCIATICA: Primary | ICD-10-CM

## 2024-06-25 DIAGNOSIS — M79.2 NEUROPATHIC PAIN: ICD-10-CM

## 2024-06-25 DIAGNOSIS — N18.30 TYPE 2 DIABETES MELLITUS WITH STAGE 3 CHRONIC KIDNEY DISEASE, WITHOUT LONG-TERM CURRENT USE OF INSULIN, UNSPECIFIED WHETHER STAGE 3A OR 3B CKD (H): ICD-10-CM

## 2024-06-25 PROCEDURE — G2211 COMPLEX E/M VISIT ADD ON: HCPCS | Performed by: FAMILY MEDICINE

## 2024-06-25 PROCEDURE — 99214 OFFICE O/P EST MOD 30 MIN: CPT | Performed by: FAMILY MEDICINE

## 2024-06-25 RX ORDER — METFORMIN HCL 500 MG
500 TABLET, EXTENDED RELEASE 24 HR ORAL 2 TIMES DAILY WITH MEALS
Qty: 180 TABLET | Refills: 3 | Status: SHIPPED | OUTPATIENT
Start: 2024-06-25 | End: 2024-09-05

## 2024-06-25 RX ORDER — AMLODIPINE BESYLATE 10 MG/1
10 TABLET ORAL DAILY
Qty: 90 TABLET | Refills: 3 | Status: SHIPPED | OUTPATIENT
Start: 2024-06-25 | End: 2024-09-05

## 2024-06-25 RX ORDER — ATORVASTATIN CALCIUM 20 MG/1
20 TABLET, FILM COATED ORAL DAILY
Qty: 90 TABLET | Refills: 1 | Status: SHIPPED | OUTPATIENT
Start: 2024-06-25 | End: 2024-09-05

## 2024-06-25 RX ORDER — ASPIRIN 81 MG/1
81 TABLET ORAL DAILY
Qty: 60 TABLET | Refills: 4 | Status: SHIPPED | OUTPATIENT
Start: 2024-06-25 | End: 2024-09-05

## 2024-06-25 RX ORDER — DULOXETIN HYDROCHLORIDE 20 MG/1
20 CAPSULE, DELAYED RELEASE ORAL DAILY
Qty: 90 CAPSULE | Refills: 1 | Status: SHIPPED | OUTPATIENT
Start: 2024-06-25 | End: 2024-09-05

## 2024-06-25 RX ORDER — GABAPENTIN 800 MG/1
800 TABLET ORAL 3 TIMES DAILY
Qty: 270 TABLET | Refills: 1 | Status: SHIPPED | OUTPATIENT
Start: 2024-06-25 | End: 2024-09-05

## 2024-06-25 NOTE — PATIENT INSTRUCTIONS
Patient Education   Here is the plan from today's visit    1. Need for vaccination against respiratory syncytial virus      2. History of CVA (cerebrovascular accident)    - amLODIPine (NORVASC) 10 MG tablet; Take 1 tablet (10 mg) by mouth daily  Dispense: 90 tablet; Refill: 3  - aspirin 81 MG EC tablet; Take 1 tablet (81 mg) by mouth daily  Dispense: 60 tablet; Refill: 4  - atorvastatin (LIPITOR) 20 MG tablet; Take 1 tablet (20 mg) by mouth daily  Dispense: 90 tablet; Refill: 1    3. Neuropathic pain    - DULoxetine (CYMBALTA) 20 MG capsule; Take 1 capsule (20 mg) by mouth daily  Dispense: 90 capsule; Refill: 1  - gabapentin (NEURONTIN) 800 MG tablet; Take 1 tablet (800 mg) by mouth 3 times daily  Dispense: 270 tablet; Refill: 1    4. Stage 3a chronic kidney disease (H)    - empagliflozin (JARDIANCE) 10 MG TABS tablet; Take 1 tablet (10 mg) by mouth daily  Dispense: 90 tablet; Refill: 1    5. Type 2 diabetes mellitus with stage 3 chronic kidney disease, without long-term current use of insulin, unspecified whether stage 3a or 3b CKD (H)    - metFORMIN (GLUCOPHAGE XR) 500 MG 24 hr tablet; Take 1 tablet (500 mg) by mouth 2 times daily (with meals)  Dispense: 180 tablet; Refill: 3    6. Chronic right-sided low back pain with right-sided sciatica    - MR Lumbar Spine w/o Contrast; Future        Please call or return to clinic if your symptoms don't go away.    Follow up plan  Return in about 4 weeks (around 7/23/2024) for Chronic Pain.    Thank you for coming to [facility] today.  Lab Testing:  **If you had lab testing today and your results are reassuring or normal they will be mailed to you or sent through [employer] within 7 days.   **If the lab tests need quick action we will call you with the results.  **If you are having labs done on a different day, please call [phone] to schedule at [facility] or [phone] for other [facility] locations. Labs do not offer walk-in  appointments.  The phone number we will call with results is # [phone] (home) . If this is not the best number please call our clinic and change the number.  Medication Refills:  If you need any refills please call your pharmacy and they will contact us.   If you need to  your refill at a new pharmacy, please contact the new pharmacy directly. The new pharmacy will help you get your medications transferred faster.   Scheduling:  If you have any concerns about today's visit or wish to schedule another appointment please call our office during normal business hours [phone] (8-5:00 M-F). If you can no longer make a scheduled visit, please cancel via I-Shake or call us to cancel.   If a referral was made to an [facility] specialty provider and you do not get a call from central scheduling, please refer to directions on your visit summary or call our office during normal business hours for assistance.   If a Mammogram was ordered for you at the [facility] call [phone] to schedule or change your appointment.  If you had an XRay/CT/Ultrasound/MRI ordered the number is [phone] to schedule or change your radiology appointment.   [facility] has limited ultrasound appointments available on Wednesdays, if you would like your ultrasound at [facility], please call [phone] to schedule.   Medical Concerns:  If you have urgent medical concerns please call [phone] at any time of the day.    Ernie Arreola MD

## 2024-06-25 NOTE — PROGRESS NOTES
Assessment & Plan     Chronic right-sided low back pain with right-sided sciatica  Because this pain is getting worse is right-sided and is somewhat associated with urinary incontinence will get a low LS-spine MRI to assess.  - MR Lumbar Spine w/o Contrast; Future    Type 2 diabetes mellitus with stage 3 chronic kidney disease, without long-term current use of insulin, unspecified whether stage 3a or 3b CKD (H)  Well-controlled continue metformin and empagliflozin.  - metFORMIN (GLUCOPHAGE XR) 500 MG 24 hr tablet; Take 1 tablet (500 mg) by mouth 2 times daily (with meals)    History of CVA (cerebrovascular accident)  No further CVA since patient's's in 2011 blood pressure is well-controlled.  - amLODIPine (NORVASC) 10 MG tablet; Take 1 tablet (10 mg) by mouth daily  - aspirin 81 MG EC tablet; Take 1 tablet (81 mg) by mouth daily  - atorvastatin (LIPITOR) 20 MG tablet; Take 1 tablet (20 mg) by mouth daily    Neuropathic pain  Patient has chronic neuropathic pain is on duloxetine and Neurontin will continue to manage this.  - DULoxetine (CYMBALTA) 20 MG capsule; Take 1 capsule (20 mg) by mouth daily  - gabapentin (NEURONTIN) 800 MG tablet; Take 1 tablet (800 mg) by mouth 3 times daily    Stage 3a chronic kidney disease (H)  Patient seen by nephrology who is feeling that his CKD is stable and well-controlled.  - empagliflozin (JARDIANCE) 10 MG TABS tablet; Take 1 tablet (10 mg) by mouth daily    Need for vaccination against respiratory syncytial virus  We discussed this encouraged him to get it at a pharmacy.    Patient was noted to have a urine culture on the lab ordered by nephrology tried to reach patient and was unable to though did leave a prescription and instructions with our triage nurse    The longitudinal plan of care for the diagnosis(es)/condition(s) as documented were addressed during this visit. Due to the added complexity in care, I will continue to support Juli in the subsequent management and with  ongoing continuity of care.  I spent a total of 34 minutes on the day of the visit.   Time spent by me doing chart review, history and exam, documentation and further activities per the note      Return in about 4 weeks (around 7/23/2024) for Chronic Pain.    Ana Bustos is a 79 year old, presenting for the following health issues:  Follow Up (Nephrology follow up )      6/25/2024    11:17 AM   Additional Questions   Roomed by Lyrik   Accompanied by self         6/25/2024    Information    services provided? Yes   Language Maltese   Type of interpretation provided Face-to-face    name Sylvia FISHER   Right Hip and leg pain   Xray was negative .  Reports the pain has gotten worse he has more pain going down his right leg that feels electric he reports some weakness in the leg though that is hard to separate from the pain.  He also reports that when he has the pain he has difficulty controlling his urine and often does lose control.  This is been going on for some time reports the pain is getting slightly worse.  He has had imaging of his hip but no MRI of his low spine.  CKD   Patient was seen by nephrology on 6/24 at that visit a urine culture was done.  And today was noted that it came back with E. coli.  I realized that he has difficulty controlling his urine may be related to a cystitis and not necessarily completely caused by the back pain.  I did try to reach out to him and is as noted in a separate telephone encounter and I did prescribe Septra 1 tablet twice daily for 3 days.  Diabetes  Patient took an continues on a flozin and metformin he is well-controlled  Lab Results   Component Value Date    A1C 6.7 06/24/2024    A1C 6.5 03/11/2024    A1C 6.7 01/17/2024    A1C 6.9 10/09/2023    A1C 5.8 05/22/2023    A1C 6.2 06/07/2021    A1C 5.6 10/16/2020    A1C 6.0 07/30/2020    A1C 5.5 02/04/2020    A1C 5.5 11/06/2019   CVA  Patient continues to have some symptoms and pain  from his right-sided CVA which was in 2011 but still little unclear about the cause we will continue to work this up.                  Objective    /76   Pulse 83   Temp 98.3  F (36.8  C) (Oral)   Resp 17   Ht 1.829 m (6')   Wt 90.7 kg (200 lb)   SpO2 100%   BMI 27.12 kg/m    Body mass index is 27.12 kg/m .  Physical Exam  Vitals reviewed.   Constitutional:       General: He is not in acute distress.     Appearance: He is well-developed. He is not diaphoretic.   HENT:      Head: Normocephalic.   Eyes:      General: No scleral icterus.     Conjunctiva/sclera: Conjunctivae normal.   Neck:      Thyroid: No thyromegaly.   Cardiovascular:      Rate and Rhythm: Normal rate and regular rhythm.      Heart sounds: Normal heart sounds. No murmur heard.  Pulmonary:      Effort: Pulmonary effort is normal. No respiratory distress.      Breath sounds: Normal breath sounds. No wheezing.   Musculoskeletal:      Cervical back: Normal range of motion.      Right hip: Tenderness (over GT) present.      Left hip: No tenderness.      Left lower leg: No edema.   Skin:     General: Skin is warm and dry.   Neurological:      Mental Status: He is alert and oriented to person, place, and time.      Motor: Weakness (Right-sided) present.      Gait: Gait abnormal (Has difficulty especially on the right side difficulty going up to step onto the exam table).      Deep Tendon Reflexes:      Reflex Scores:       Patellar reflexes are 2+ on the right side and 2+ on the left side.       Achilles reflexes are 2+ on the right side and 2+ on the left side.     Comments: Has weakness on the right side since his CVA   Psychiatric:         Behavior: Behavior normal.         Thought Content: Thought content normal.         Judgment: Judgment normal.                  Results for orders placed or performed in visit on 06/24/24 (from the past 48 hour(s))   UA Macroscopic with reflex to Microscopic and Culture - Lab Collect    Specimen: Urine,  Midstream   Result Value Ref Range    Color Urine Light Yellow Colorless, Straw, Light Yellow, Yellow    Appearance Urine Clear Clear    Glucose Urine >=1000 (A) Negative mg/dL    Bilirubin Urine Negative Negative    Ketones Urine Negative Negative mg/dL    Specific Gravity Urine 1.031 1.003 - 1.035    Blood Urine Negative Negative    pH Urine 6.0 5.0 - 7.0    Protein Albumin Urine Negative Negative mg/dL    Urobilinogen Urine Normal Normal, 2.0 mg/dL    Nitrite Urine Positive (A) Negative    Leukocyte Esterase Urine Small (A) Negative    RBC Urine 1 <=2 /HPF    WBC Urine 15 (H) <=5 /HPF    Squamous Epithelials Urine <1 <=1 /HPF    Narrative    Urine Culture ordered based on laboratory criteria   Protein  random urine   Result Value Ref Range    Total Protein Urine mg/dL 18.5   mg/dL    Total Protein Urine mg/mg Creat 0.13 0.00 - 0.20 mg/mg Cr    Creatinine Urine mg/dL 139.0 mg/dL   Albumin Random Urine Quantitative with Creat Ratio   Result Value Ref Range    Creatinine Urine mg/dL 138.0 mg/dL    Albumin Urine mg/L <12.0 mg/L    Albumin Urine mg/g Cr     Urine Culture    Specimen: Urine, Midstream   Result Value Ref Range    Culture >100,000 CFU/mL Escherichia coli (A)        Susceptibility    Escherichia coli - DANIEL     Ampicillin >=32 Resistant ug/mL     Ampicillin/ Sulbactam >=32 Resistant ug/mL     Piperacillin/Tazobactam <=4 Susceptible ug/mL     Cefazolin* <=4 Susceptible ug/mL      * Cefazolin DANIEL breakpoints are for the treatment of uncomplicated urinary tract infections. For the treatment of systemic infections, please contact the laboratory for additional testing.     Cefoxitin <=4 Susceptible ug/mL     Ceftazidime <=1 Susceptible ug/mL     Ceftriaxone <=1 Susceptible ug/mL     Cefepime <=1 Susceptible ug/mL     Gentamicin <=1 Susceptible ug/mL     Tobramycin <=1 Susceptible ug/mL     Ciprofloxacin <=0.25 Susceptible ug/mL     Levofloxacin <=0.12 Susceptible ug/mL     Nitrofurantoin 64 Intermediate ug/mL      Trimethoprim/Sulfamethoxazole <=1/19 Susceptible ug/mL      Lab Results   Component Value Date    CHOL 116 10/09/2023    CHOL 83.4 07/30/2020     Lab Results   Component Value Date    HDL 43 10/09/2023    HDL 30.1 07/30/2020     Lab Results   Component Value Date    LDL 36 10/09/2023    LDL 41 07/30/2020     Lab Results   Component Value Date    TRIG 187 10/09/2023    TRIG 61.2 07/30/2020     Lab Results   Component Value Date    CHOLHDLRATIO 2.8 07/30/2020      Signed Electronically by: Ernie Arreola MD

## 2024-06-26 ENCOUNTER — TELEPHONE (OUTPATIENT)
Dept: FAMILY MEDICINE | Facility: CLINIC | Age: 79
End: 2024-06-26
Payer: COMMERCIAL

## 2024-06-26 DIAGNOSIS — N30.00 ACUTE CYSTITIS WITHOUT HEMATURIA: Primary | ICD-10-CM

## 2024-06-26 LAB — BACTERIA UR CULT: ABNORMAL

## 2024-06-26 RX ORDER — SULFAMETHOXAZOLE/TRIMETHOPRIM 800-160 MG
1 TABLET ORAL 2 TIMES DAILY
Qty: 6 TABLET | Refills: 0 | Status: SHIPPED | OUTPATIENT
Start: 2024-06-26 | End: 2024-06-29

## 2024-06-26 NOTE — TELEPHONE ENCOUNTER
Attempted to call pt to relay provider message below. No answer, unable to leave a message.      Liberty Albright RN

## 2024-06-26 NOTE — TELEPHONE ENCOUNTER
Patient had visit with nephrology on 6/24 at which time a urine culture was drawn.  Urine culture grew E. coli.  Patient reported on 625 that he had some increased difficulty with urine control though this was in the situation of back pain and he thought it was related to the back pain.  I am concerned that he may be having urgency and because of the positive urine culture I am leaning toward treating it we called the patient with an  left a message though we were unable to speak with him I will go ahead and write for Septra there is no evidence the patient is allergic to this and the identified E. coli is sensitive to Septra.   1. Acute cystitis without hematuria  Please and try to reach out and call the patient again we left a message though I just want ensure he gets  - sulfamethoxazole-trimethoprim (BACTRIM DS) 800-160 MG tablet; Take 1 tablet by mouth 2 times daily for 3 days  Dispense: 6 tablet; Refill: 0

## 2024-06-27 NOTE — TELEPHONE ENCOUNTER
Daughter in law called back, I relayed provider message below and she will  the script today.    Nerissa Love RN

## 2024-06-27 NOTE — TELEPHONE ENCOUNTER
to reach patient with [facility] , patient did not , and no voicemail. Also attempted to reach DIL listed as emergency contact, left message to call back.    Nerissa Love RN

## 2024-07-05 ENCOUNTER — APPOINTMENT (OUTPATIENT)
Dept: INTERPRETER SERVICES | Facility: CLINIC | Age: 79
End: 2024-07-05
Payer: COMMERCIAL

## 2024-07-05 ENCOUNTER — TELEPHONE (OUTPATIENT)
Dept: FAMILY MEDICINE | Facility: CLINIC | Age: 79
End: 2024-07-05
Payer: COMMERCIAL

## 2024-07-05 ENCOUNTER — TELEPHONE (OUTPATIENT)
Dept: NEPHROLOGY | Facility: CLINIC | Age: 79
End: 2024-07-05
Payer: COMMERCIAL

## 2024-07-05 NOTE — TELEPHONE ENCOUNTER
Left Voicemail (2nd Attempt) & letter for the patient to schedule:    Appointment type: Return Nephrology  Provider: Dr. Carrizales  Return date: Approx. 10/24/2024  Phone number: [phone]  Add'l appt(s) needed: Lab 1-hour prior to clinic visit (or 1-week if video visit)

## 2024-07-05 NOTE — TELEPHONE ENCOUNTER
Reason for call: Schedule appointment     Attempt to reach: 1st    Outcome:Left voicemail with     Detailed message left? Yes Please schedule patient with  for sports medicine.    Please return call to [facility]     Clinic phone number [phone]

## 2024-07-05 NOTE — TELEPHONE ENCOUNTER
Left Voicemail (1st Attempt) & letter for the patient to schedule:    Appointment type: Return Nephrology  Provider: Dr. Carrizales  Return date: Approx. 10/24/24  Phone number: [phone]  Add'l appt(s) needed: Lab 1-hour prior to clinic visit (or 1-week if video visit)

## 2024-07-08 ENCOUNTER — TELEPHONE (OUTPATIENT)
Dept: NEPHROLOGY | Facility: CLINIC | Age: 79
End: 2024-07-08
Payer: COMMERCIAL

## 2024-07-08 NOTE — TELEPHONE ENCOUNTER
MITULM to schedule follow up around 10.24.24 with Dr. Carrizales// 2nd attempt// letter already sent// 7.8.24 KET

## 2024-07-26 ENCOUNTER — HOSPITAL ENCOUNTER (OUTPATIENT)
Dept: MRI IMAGING | Facility: CLINIC | Age: 79
Discharge: HOME | End: 2024-07-26
Attending: FAMILY MEDICINE | Admitting: FAMILY MEDICINE
Payer: COMMERCIAL

## 2024-07-26 DIAGNOSIS — M54.41 CHRONIC RIGHT-SIDED LOW BACK PAIN WITH RIGHT-SIDED SCIATICA: ICD-10-CM

## 2024-07-26 DIAGNOSIS — G89.29 CHRONIC RIGHT-SIDED LOW BACK PAIN WITH RIGHT-SIDED SCIATICA: ICD-10-CM

## 2024-07-26 PROCEDURE — 72148 MRI LUMBAR SPINE W/O DYE: CPT | Mod: 26 | Performed by: RADIOLOGY

## 2024-07-26 PROCEDURE — 72148 MRI LUMBAR SPINE W/O DYE: CPT

## 2024-08-06 ENCOUNTER — OFFICE VISIT (OUTPATIENT)
Dept: FAMILY MEDICINE | Facility: CLINIC | Age: 79
End: 2024-08-06
Payer: COMMERCIAL

## 2024-08-06 VITALS
SYSTOLIC BLOOD PRESSURE: 111 MMHG | OXYGEN SATURATION: 100 % | TEMPERATURE: 98.1 F | DIASTOLIC BLOOD PRESSURE: 74 MMHG | HEART RATE: 85 BPM | HEIGHT: 72 IN | BODY MASS INDEX: 27.22 KG/M2 | RESPIRATION RATE: 16 BRPM | WEIGHT: 201 LBS

## 2024-08-06 DIAGNOSIS — Z86.73 HISTORY OF CVA (CEREBROVASCULAR ACCIDENT): ICD-10-CM

## 2024-08-06 DIAGNOSIS — M19.071 OSTEOARTHRITIS OF TOE JOINT, RIGHT: ICD-10-CM

## 2024-08-06 DIAGNOSIS — G89.29 CHRONIC HIP PAIN AFTER TOTAL REPLACEMENT OF RIGHT HIP JOINT: Primary | ICD-10-CM

## 2024-08-06 DIAGNOSIS — M47.817 SPONDYLOSIS OF LUMBOSACRAL REGION WITHOUT MYELOPATHY OR RADICULOPATHY: ICD-10-CM

## 2024-08-06 DIAGNOSIS — M25.551 HIP PAIN, RIGHT: ICD-10-CM

## 2024-08-06 DIAGNOSIS — Z96.641 CHRONIC HIP PAIN AFTER TOTAL REPLACEMENT OF RIGHT HIP JOINT: Primary | ICD-10-CM

## 2024-08-06 DIAGNOSIS — M25.551 CHRONIC HIP PAIN AFTER TOTAL REPLACEMENT OF RIGHT HIP JOINT: Primary | ICD-10-CM

## 2024-08-06 PROCEDURE — 99213 OFFICE O/P EST LOW 20 MIN: CPT | Performed by: FAMILY MEDICINE

## 2024-08-06 NOTE — PATIENT INSTRUCTIONS
Thank you for coming to [facility].  Lab Testing:  **If you had lab testing today and your results are reassuring or normal they will be mailed to you or sent through [employer] within 7 days.   **If the lab tests need quick action we will call you with the results.  The phone number we will call with results is # [phone] (home) . If this is not the best number please call our clinic and change the number.    Medication Refills:  If you need any refills please call your pharmacy and they will contact us.   If you need to  your refill at a new pharmacy, please contact the new pharmacy directly. The new pharmacy will help you get your medications transferred faster.     Scheduling:  If you have any concerns about today's visit or wish to schedule another appointment please call our office during normal business hours [phone] (8-5:00 M-F)    Medical Concerns:  If you have urgent medical concerns please call [phone] at any time of the day.  If you have a medical emergency please call 141.  Again thank you for choosing [facility] and please let us know how we can best partner with you to improve you and your family's health.     CC successfully faxed DME order for foot orthotics to [facility] (F:[phone])  Karmen Abbasi

## 2024-08-06 NOTE — PROGRESS NOTES
Assessment & Plan     History of CVA (cerebrovascular accident)  Right sided weakness s/p stroke, stroke Rehab   - Physical Therapy  Referral; Future    Hip pain, right    - Physical Therapy  Referral; Future    Chronic hip pain after total replacement of right hip joint  Hx of RENATO on right, weakness on right lower extremity due to cutting through muscles to replace hip and weakness chronic from stroke  - Physical Therapy  Referral; Future    Spondylosis of lumbosacral region without myelopathy or radiculopathy  - rehab required for right sided weakness    First great toe OA, right  Has a carbon plate that is needed for the right foot  Soft footwear and I think it's time to replace his shoes, possible stiffer sole might help so he can toe off    BMI  Estimated body mass index is 27.26 kg/m  as calculated from the following:    Height as of this encounter: 1.829 m (6').    Weight as of this encounter: 91.2 kg (201 lb).             No follow-ups on file.- 8 weeks    Subjective   Juli is a 79 year old, presenting for the following health issues:  Pain (Pain on right side. Requesting X ray. Pain scale 8 )      8/6/2024    11:20 AM   Additional Questions   Roomed by lloyd   Accompanied by self         8/6/2024    Information    services provided? Yes   Language Emirati   Type of interpretation provided Face-to-face    name Delfina    [employer]        HPI : 80 yo male with Emirati  reports he had pain on right leg and hip, entire right side.  Right sided weakness described.  Pt reports he had a stroke and right hip fracture.  Hardware in the right hip.  Taking Tylenol, Gabapentin, duloxetine, voltaren  Did some PT              Review of Systems  Constitutional, neuro, ENT, endocrine, pulmonary, cardiac, gastrointestinal, genitourinary, musculoskeletal, integument and psychiatric systems are negative, except as otherwise noted.       Objective    /74   Pulse 85   Temp 98.1  F (36.7  C) (Oral)   Resp 16   Ht 1.829 m (6')   Wt 91.2 kg (201 lb)   SpO2 100%   BMI 27.26 kg/m    Body mass index is 27.26 kg/m .  Physical Exam   Weakness right sided  Good quad and hamstring strength on left, weakness greater on right  Less  strength on right  Right buttock pain, no lateral hip pain and no groin pain    MRI lumbar- Mild multilevel lumbar spondlosis without high-grade spinal canal stenosis or significant neural foraminal narrowing.          Signed Electronically by: Shelbie Gilliam MD

## 2024-08-07 ENCOUNTER — APPOINTMENT (OUTPATIENT)
Dept: INTERPRETER SERVICES | Facility: CLINIC | Age: 79
End: 2024-08-07
Payer: COMMERCIAL

## 2024-08-12 DIAGNOSIS — H35.712 CSR (CENTRAL SEROUS RETINOPATHY), LEFT: Primary | ICD-10-CM

## 2024-08-26 ENCOUNTER — OFFICE VISIT (OUTPATIENT)
Dept: OPHTHALMOLOGY | Facility: CLINIC | Age: 79
End: 2024-08-26
Payer: COMMERCIAL

## 2024-08-26 DIAGNOSIS — H35.712 CSR (CENTRAL SEROUS RETINOPATHY), LEFT: ICD-10-CM

## 2024-08-26 PROCEDURE — G0463 HOSPITAL OUTPT CLINIC VISIT: HCPCS

## 2024-08-26 PROCEDURE — 92250 FUNDUS PHOTOGRAPHY W/I&R: CPT

## 2024-08-26 PROCEDURE — 99213 OFFICE O/P EST LOW 20 MIN: CPT | Mod: GC

## 2024-08-26 PROCEDURE — 92134 CPTRZ OPH DX IMG PST SGM RTA: CPT

## 2024-08-26 PROCEDURE — 99207 FUNDUS AUTOFLUORESCENCE IMAGE (FAF) OU (BOTH EYES): CPT | Mod: 26

## 2024-08-26 ASSESSMENT — REFRACTION_WEARINGRX
OS_SPHERE: -0.25
OS_ADD: +2.50
OS_CYLINDER: +0.50
OD_SPHERE: -0.50
SPECS_TYPE: PAL
OD_AXIS: 131
OD_ADD: +2.50
OS_AXIS: 092
OD_SPHERE: -0.50
OS_CYLINDER: SPHERE
OD_CYLINDER: +0.25
OS_ADD: +2.50
OD_ADD: +2.50
OD_AXIS: 160
OS_SPHERE: PLANO
OD_CYLINDER: +0.25
SPECS_TYPE: BIFOCAL

## 2024-08-26 ASSESSMENT — VISUAL ACUITY
CORRECTION_TYPE: GLASSES
METHOD: SNELLEN - LINEAR
OS_CC: 20/20
OD_CC: 20/20

## 2024-08-26 ASSESSMENT — CONF VISUAL FIELD
OD_SUPERIOR_TEMPORAL_RESTRICTION: 0
OD_NORMAL: 1
OD_INFERIOR_NASAL_RESTRICTION: 0
OD_SUPERIOR_NASAL_RESTRICTION: 0
OS_INFERIOR_TEMPORAL_RESTRICTION: 0
OS_SUPERIOR_NASAL_RESTRICTION: 0
OS_SUPERIOR_TEMPORAL_RESTRICTION: 0
OS_NORMAL: 1
OD_INFERIOR_TEMPORAL_RESTRICTION: 0
OS_INFERIOR_NASAL_RESTRICTION: 0

## 2024-08-26 ASSESSMENT — TONOMETRY
OS_IOP_MMHG: 16
IOP_METHOD: TONOPEN
OD_IOP_MMHG: 14

## 2024-08-26 NOTE — NURSING NOTE
Chief Complaints and History of Present Illnesses   Patient presents with    Central Serous Retinopathy Follow Up     6mo visit for       Chief Complaint(s) and History of Present Illness(es)       Central Serous Retinopathy Follow Up              Laterality: left eye    Associated symptoms: eye pain and foreign body sensation.  Negative for flashes and floaters    Pain scale: 0/10    Comments: 6mo visit for                Comments    Patient reports FBS in the right eye, all the time, like lashes are keep bothering.    Interpretation via phone.    Lab Results       Component                Value               Date                       A1C                      6.7                 06/24/2024                 A1C                      6.5                 03/11/2024                 A1C                      6.7                 01/17/2024                 A1C                      6.9                 10/09/2023                 A1C                      5.8                 05/22/2023                 A1C                      6.2                 06/07/2021                 A1C                      5.6                 10/16/2020                 A1C                      6.0                 07/30/2020                 A1C                      5.5                 02/04/2020                 A1C                      5.5                 11/06/2019              Ocular Meds: AT's as prescribed   Elvia LEUNG 2:58 PM August 26, 2024

## 2024-08-26 NOTE — PROGRESS NOTES
CC:  OS     HPI: 79 M presents for follow up  OS.   Last seen by Joseph De La Garza 2/22/24 as a referral by Arpita; not visually significant at that time. Found during normal screening exam for T2DM.      PMHx:   MALIHA Fair  ID No. [id number]     Imaging:    OCT: 08/26/2024  OD: normal contour; no IRF/SRF  OS: sm superonasal spot of outer retinal disruption at level of EZ/RPE; Bruchs intact underlying; otherwise normal contour; no IRF/SRF    FAF 08/26/24:   OD: few spots of hypoAF nasally otherwise wnl  OS: superonasal sm patch mixed hyper/hypoAF; fovea/perifovea wnl    Fundus Photos 08/26/24: Consistent with exam    Retina Laser procedures:  None    Intravitreal injections:  None    Assessment/ Plan: 08/26/2024       #  OS  - stable appearance  - monitor with annual diabetes exam    # T2DM without Retinopathy OU  - monitor annually    # Pseudophakia OU  - stable with mild PCO OS      Follow-up yearly OCT macula OU     Darell Vasquez MD  PGY-3, Ophthalmology  [facility]      Renee Leroy MD     Medical Retina  [facility]     Attending Physician Attestation:  Complete documentation of historical and exam elements from today's encounter can be found in the full encounter summary report (not reduplicated in this progress note).  I personally obtained the chief complaint(s) and history of present illness.  I confirmed and edited as necessary the review of systems, past medical/surgical history, family history, social history, and examination findings as documented by others; and I examined the patient myself.  I personally reviewed the relevant tests, images, and reports as documented above.  I formulated and edited as necessary the assessment and plan and discussed the findings and management plan with the patient and family. Renee Leroy MD    \

## 2024-08-28 DIAGNOSIS — K59.01 SLOW TRANSIT CONSTIPATION: ICD-10-CM

## 2024-08-29 RX ORDER — SENNOSIDES 8.6 MG
2 TABLET ORAL DAILY
Qty: 60 TABLET | Refills: 3 | Status: SHIPPED | OUTPATIENT
Start: 2024-08-29 | End: 2024-09-05

## 2024-08-29 NOTE — TELEPHONE ENCOUNTER
"Request for medication refill:    SENNA 8.6MG TABLETS  Awaiting authorization    Providers if patient needs an appointment and you are willing to give a one month supply please refill for one month and  send a letter/MyChart using \".SMILLIMITEDREFILL\" .smillimited and route chart to \"P SMI \" (Giving one month refill in non controlled medications is strongly recommended before denial)    If refill has been denied, meaning absolutely no refills without visit, please complete the smart phrase \".smirxrefuse\" and route it to the \"P [facility] MED REFILLS\"  pool to inform the patient and the pharmacy.    Brigida Alcantar MA      "

## 2024-09-05 ENCOUNTER — OFFICE VISIT (OUTPATIENT)
Dept: FAMILY MEDICINE | Facility: CLINIC | Age: 79
End: 2024-09-05
Payer: COMMERCIAL

## 2024-09-05 VITALS
OXYGEN SATURATION: 95 % | HEIGHT: 72 IN | SYSTOLIC BLOOD PRESSURE: 103 MMHG | DIASTOLIC BLOOD PRESSURE: 71 MMHG | RESPIRATION RATE: 16 BRPM | TEMPERATURE: 98 F | BODY MASS INDEX: 27.26 KG/M2 | HEART RATE: 77 BPM

## 2024-09-05 DIAGNOSIS — N18.31 STAGE 3A CHRONIC KIDNEY DISEASE (H): ICD-10-CM

## 2024-09-05 DIAGNOSIS — I10 BENIGN ESSENTIAL HYPERTENSION: ICD-10-CM

## 2024-09-05 DIAGNOSIS — R32 URINARY INCONTINENCE, UNSPECIFIED TYPE: Primary | ICD-10-CM

## 2024-09-05 DIAGNOSIS — K59.01 SLOW TRANSIT CONSTIPATION: ICD-10-CM

## 2024-09-05 DIAGNOSIS — H04.123 DRY EYE SYNDROME OF BOTH EYES: ICD-10-CM

## 2024-09-05 DIAGNOSIS — E11.22 TYPE 2 DIABETES MELLITUS WITH STAGE 3 CHRONIC KIDNEY DISEASE, WITHOUT LONG-TERM CURRENT USE OF INSULIN, UNSPECIFIED WHETHER STAGE 3A OR 3B CKD (H): ICD-10-CM

## 2024-09-05 DIAGNOSIS — Z23 NEED FOR VACCINATION: ICD-10-CM

## 2024-09-05 DIAGNOSIS — J30.1 SEASONAL ALLERGIC RHINITIS DUE TO POLLEN: ICD-10-CM

## 2024-09-05 DIAGNOSIS — Z29.11 NEED FOR VACCINATION AGAINST RESPIRATORY SYNCYTIAL VIRUS: ICD-10-CM

## 2024-09-05 DIAGNOSIS — M79.2 NEUROPATHIC PAIN: ICD-10-CM

## 2024-09-05 DIAGNOSIS — Z86.73 HISTORY OF CVA (CEREBROVASCULAR ACCIDENT): ICD-10-CM

## 2024-09-05 DIAGNOSIS — K21.9 GASTROESOPHAGEAL REFLUX DISEASE WITHOUT ESOPHAGITIS: ICD-10-CM

## 2024-09-05 DIAGNOSIS — N18.30 TYPE 2 DIABETES MELLITUS WITH STAGE 3 CHRONIC KIDNEY DISEASE, WITHOUT LONG-TERM CURRENT USE OF INSULIN, UNSPECIFIED WHETHER STAGE 3A OR 3B CKD (H): ICD-10-CM

## 2024-09-05 LAB
ALBUMIN UR-MCNC: NEGATIVE MG/DL
APPEARANCE UR: ABNORMAL
BACTERIA #/AREA URNS HPF: ABNORMAL /HPF
BILIRUB UR QL STRIP: NEGATIVE
COLOR UR AUTO: YELLOW
GLUCOSE UR STRIP-MCNC: >=1000 MG/DL
HGB UR QL STRIP: NEGATIVE
KETONES UR STRIP-MCNC: NEGATIVE MG/DL
LEUKOCYTE ESTERASE UR QL STRIP: ABNORMAL
NITRATE UR QL: NEGATIVE
PH UR STRIP: 5.5 [PH] (ref 5–8)
RBC #/AREA URNS AUTO: ABNORMAL /HPF
SP GR UR STRIP: 1.01 (ref 1–1.03)
SQUAMOUS #/AREA URNS AUTO: ABNORMAL /LPF
UROBILINOGEN UR STRIP-ACNC: 0.2 E.U./DL
WBC #/AREA URNS AUTO: ABNORMAL /HPF

## 2024-09-05 PROCEDURE — 99214 OFFICE O/P EST MOD 30 MIN: CPT | Mod: 25 | Performed by: FAMILY MEDICINE

## 2024-09-05 PROCEDURE — 87086 URINE CULTURE/COLONY COUNT: CPT | Performed by: FAMILY MEDICINE

## 2024-09-05 PROCEDURE — 87186 SC STD MICRODIL/AGAR DIL: CPT | Performed by: FAMILY MEDICINE

## 2024-09-05 PROCEDURE — 90662 IIV NO PRSV INCREASED AG IM: CPT | Performed by: FAMILY MEDICINE

## 2024-09-05 PROCEDURE — 81001 URINALYSIS AUTO W/SCOPE: CPT | Performed by: FAMILY MEDICINE

## 2024-09-05 PROCEDURE — G0008 ADMIN INFLUENZA VIRUS VAC: HCPCS | Performed by: FAMILY MEDICINE

## 2024-09-05 RX ORDER — PANTOPRAZOLE SODIUM 40 MG/1
40 TABLET, DELAYED RELEASE ORAL DAILY
Qty: 90 TABLET | Refills: 1 | Status: SHIPPED | OUTPATIENT
Start: 2024-09-05

## 2024-09-05 RX ORDER — CARBOXYMETHYLCELLULOSE SODIUM 5 MG/ML
1 SOLUTION/ DROPS OPHTHALMIC 4 TIMES DAILY
Qty: 400 EACH | Refills: 11 | Status: SHIPPED | OUTPATIENT
Start: 2024-09-05

## 2024-09-05 RX ORDER — ACETAMINOPHEN 500 MG
1000 TABLET ORAL 3 TIMES DAILY
Qty: 200 TABLET | Refills: 3 | Status: SHIPPED | OUTPATIENT
Start: 2024-09-05

## 2024-09-05 RX ORDER — AMLODIPINE BESYLATE 10 MG/1
10 TABLET ORAL DAILY
Qty: 90 TABLET | Refills: 3 | Status: SHIPPED | OUTPATIENT
Start: 2024-09-05

## 2024-09-05 RX ORDER — DULOXETIN HYDROCHLORIDE 20 MG/1
20 CAPSULE, DELAYED RELEASE ORAL DAILY
Qty: 90 CAPSULE | Refills: 1 | Status: SHIPPED | OUTPATIENT
Start: 2024-09-05

## 2024-09-05 RX ORDER — LORATADINE 10 MG/1
10 TABLET ORAL DAILY
Qty: 90 TABLET | Refills: 3 | Status: SHIPPED | OUTPATIENT
Start: 2024-09-05

## 2024-09-05 RX ORDER — SENNOSIDES 8.6 MG
2 TABLET ORAL DAILY
Qty: 60 TABLET | Refills: 3 | Status: SHIPPED | OUTPATIENT
Start: 2024-09-05

## 2024-09-05 RX ORDER — POLYETHYLENE GLYCOL 3350 17 G/17G
17 POWDER, FOR SOLUTION ORAL DAILY
Qty: 510 G | Refills: 1 | Status: SHIPPED | OUTPATIENT
Start: 2024-09-05

## 2024-09-05 RX ORDER — GABAPENTIN 800 MG/1
800 TABLET ORAL 3 TIMES DAILY
Qty: 270 TABLET | Refills: 1 | Status: SHIPPED | OUTPATIENT
Start: 2024-09-05

## 2024-09-05 RX ORDER — CARVEDILOL 12.5 MG/1
25 TABLET ORAL 2 TIMES DAILY WITH MEALS
Qty: 180 TABLET | Refills: 3 | Status: SHIPPED | OUTPATIENT
Start: 2024-09-05

## 2024-09-05 RX ORDER — ATORVASTATIN CALCIUM 20 MG/1
20 TABLET, FILM COATED ORAL DAILY
Qty: 90 TABLET | Refills: 1 | Status: SHIPPED | OUTPATIENT
Start: 2024-09-05

## 2024-09-05 RX ORDER — ASPIRIN 81 MG/1
81 TABLET ORAL DAILY
Qty: 60 TABLET | Refills: 4 | Status: SHIPPED | OUTPATIENT
Start: 2024-09-05

## 2024-09-05 RX ORDER — METFORMIN HCL 500 MG
500 TABLET, EXTENDED RELEASE 24 HR ORAL 2 TIMES DAILY WITH MEALS
Qty: 180 TABLET | Refills: 3 | Status: SHIPPED | OUTPATIENT
Start: 2024-09-05

## 2024-09-05 NOTE — PROGRESS NOTES
Assessment & Plan     (R32) Urinary incontinence, unspecified type  (primary encounter diagnosis)  Comment: Unclear whether it is overactive bladder or it is overflow incontinence  Plan: UA Macroscopic with reflex to Microscopic and         Culture, Urine Microscopic Exam, Urine Culture        Asked patient to follow-up with me in clinic for a postvoid residual with ultrasound and will evaluate what what treatment is needed at next age.    (J30.1) Seasonal allergic rhinitis due to pollen  Comment: Refill  Plan: loratadine (CLARITIN) 10 MG tablet         (Z86.73) History of CVA (cerebrovascular accident)  Comment: Refill  Plan: aspirin 81 MG EC tablet, amLODIPine (NORVASC)         10 MG tablet, atorvastatin (LIPITOR) 20 MG         tablet            (H04.123) Dry eye syndrome of both eyes  Comment: Refill  Plan: carboxymethylcellulose PF         (CARBOXYMETHYLCELLULOSE SODIUM) 0.5 %         ophthalmic solution            (M79.2) Neuropathic pain  Comment: Refill  Plan: acetaminophen (TYLENOL) 500 MG tablet,         diclofenac (VOLTAREN) 1 % topical gel,         DULoxetine (CYMBALTA) 20 MG capsule, gabapentin        (NEURONTIN) 800 MG tablet            (I10) Benign essential hypertension  Comment: Controlled refill  Plan: carvedilol (COREG) 12.5 MG tablet            (N18.31) Stage 3a chronic kidney disease (H)  Comment: Refill  Plan: empagliflozin (JARDIANCE) 10 MG TABS tablet            (E11.22,  N18.30) Type 2 diabetes mellitus with stage 3 chronic kidney disease, without long-term current use of insulin, unspecified whether stage 3a or 3b CKD (H)  Comment: Diabetes controlled  Plan: metFORMIN (GLUCOPHAGE XR) 500 MG 24 hr tablet        Refill    (K21.9) Gastroesophageal reflux disease without esophagitis  Comment: Refill  Plan: pantoprazole (PROTONIX) 40 MG EC tablet            (K59.01) Slow transit constipation  Comment: Refill  Plan: polyethylene glycol (MIRALAX) 17 GM/Dose         powder, sennosides (SENOKOT)  8.6 MG tablet            (Z23) Need for vaccination  Comment:   Plan: influenza Vac Split High-Dose (FLUZONE)         injection 0.5 mL          The longitudinal plan of care for the diagnosis(es)/condition(s) as documented were addressed during this visit. Due to the added complexity in care, I will continue to support Juli in the subsequent management and with ongoing continuity of care.  I spent a total of 33 minutes on the day of the visit.   Time spent by me doing chart review, history and exam, documentation and further activities per the note        BMI  Estimated body mass index is 27.26 kg/m  as calculated from the following:    Height as of this encounter: 1.829 m (6').    Weight as of 8/6/24: 91.2 kg (201 lb).             Return in about 4 weeks (around 10/3/2024) for Diabetes BG recheck.    Subjective   Juli is a 79 year old, presenting for the following health issues:  Follow Up      9/5/2024    10:45 AM   Additional Questions   Roomed by Meghan   Accompanied by self         9/5/2024   Declines Weight   Did patient decline having their weight taken? Yes          9/5/2024    Information    services provided? Yes   Language Sao Tomean   Type of interpretation provided Face-to-face    name Delfina        HPI   Right sided pain s/p   [facility] was advised to go to physical therapy does have a session scheduled next month and this was communicated to patient.  Urinary incontinence worsened with pain   Patient reports that he had worsening urinary incontinence which she feels is really worsened with his pain.  This it was unclear whether this is urge incontinence or overflow incontinence.                   Objective    /71   Pulse 77   Temp 98  F (36.7  C) (Oral)   Resp 16   Ht 1.829 m (6')   SpO2 95%   BMI 27.26 kg/m    Body mass index is 27.26 kg/m .  Physical Exam  Vitals reviewed.   Constitutional:       General: He is not in acute  distress.     Appearance: He is well-developed. He is not diaphoretic.   HENT:      Head: Normocephalic.   Eyes:      General: No scleral icterus.     Conjunctiva/sclera: Conjunctivae normal.   Neck:      Thyroid: No thyromegaly.   Cardiovascular:      Rate and Rhythm: Normal rate and regular rhythm.      Heart sounds: Normal heart sounds. No murmur heard.  Pulmonary:      Effort: Pulmonary effort is normal. No respiratory distress.      Breath sounds: Normal breath sounds. No wheezing.   Musculoskeletal:      Cervical back: Normal range of motion.      Right hip: Tenderness (over GT) present.      Left hip: No tenderness.      Left lower leg: No edema.   Skin:     General: Skin is warm and dry.   Neurological:      Mental Status: He is alert and oriented to person, place, and time.      Motor: Weakness (Right-sided) present.      Gait: Gait abnormal (Has difficulty especially on the right side difficulty going up to step onto the exam table).      Deep Tendon Reflexes:      Reflex Scores:       Patellar reflexes are 2+ on the right side and 2+ on the left side.       Achilles reflexes are 2+ on the right side and 2+ on the left side.     Comments: Has weakness on the right side since his CVA   Psychiatric:         Behavior: Behavior normal.         Thought Content: Thought content normal.         Judgment: Judgment normal.                    Signed Electronically by: Ernie Arreola MD

## 2024-09-08 ENCOUNTER — TELEPHONE (OUTPATIENT)
Dept: FAMILY MEDICINE | Facility: CLINIC | Age: 79
End: 2024-09-08
Payer: COMMERCIAL

## 2024-09-08 DIAGNOSIS — N30.00 ACUTE CYSTITIS WITHOUT HEMATURIA: Primary | ICD-10-CM

## 2024-09-08 LAB — BACTERIA UR CULT: ABNORMAL

## 2024-09-08 RX ORDER — SULFAMETHOXAZOLE/TRIMETHOPRIM 800-160 MG
1 TABLET ORAL 2 TIMES DAILY
Qty: 14 TABLET | Refills: 0 | Status: SHIPPED | OUTPATIENT
Start: 2024-09-08 | End: 2024-09-15

## 2024-09-08 NOTE — TELEPHONE ENCOUNTER
Called patient 3:58 PM  Used Language line   Positive Urine culture  1. Acute cystitis without hematuria  Advised patient to start the following medication ASAP-unable to leave message on first number   Attempted second number [phone]) spoke with relative who will relay message and get the medicine for him  - sulfamethoxazole-trimethoprim (BACTRIM DS) 800-160 MG tablet; Take 1 tablet by mouth 2 times daily for 7 days.  Dispense: 14 tablet; Refill: 0

## 2024-09-20 ENCOUNTER — THERAPY VISIT (OUTPATIENT)
Dept: PHYSICAL THERAPY | Facility: CLINIC | Age: 79
End: 2024-09-20
Attending: FAMILY MEDICINE
Payer: COMMERCIAL

## 2024-09-20 DIAGNOSIS — Z96.641 CHRONIC HIP PAIN AFTER TOTAL REPLACEMENT OF RIGHT HIP JOINT: ICD-10-CM

## 2024-09-20 DIAGNOSIS — M25.551 CHRONIC HIP PAIN AFTER TOTAL REPLACEMENT OF RIGHT HIP JOINT: ICD-10-CM

## 2024-09-20 DIAGNOSIS — M25.551 HIP PAIN, RIGHT: ICD-10-CM

## 2024-09-20 DIAGNOSIS — Z86.73 HISTORY OF CVA (CEREBROVASCULAR ACCIDENT): Primary | ICD-10-CM

## 2024-09-20 DIAGNOSIS — G89.29 CHRONIC HIP PAIN AFTER TOTAL REPLACEMENT OF RIGHT HIP JOINT: ICD-10-CM

## 2024-09-20 PROCEDURE — 97112 NEUROMUSCULAR REEDUCATION: CPT | Mod: GP

## 2024-09-20 PROCEDURE — 97110 THERAPEUTIC EXERCISES: CPT | Mod: GP

## 2024-09-20 PROCEDURE — 97161 PT EVAL LOW COMPLEX 20 MIN: CPT | Mod: GP

## 2024-09-20 PROCEDURE — T1013 SIGN LANG/ORAL INTERPRETER: HCPCS | Mod: U3

## 2024-09-20 NOTE — PROGRESS NOTES
PHYSICAL THERAPY EVALUATION  Type of Visit: Evaluation       Fall Risk Screen:  Fall screen completed by: PT  Have you fallen 2 or more times in the past year?: Yes  Have you fallen and had an injury in the past year?: Yes  Is patient a fall risk?: Yes  Fall screen comments: Pt has had several falls, one which caused broken ribs, and demonstrates unstable gait.    Subjective       Presenting condition or subjective complaint:   Pt is complaining or right-sided weakness and pain from shoulder down to right hip and leg.  The most painful area is from his right hip to his knee.  He reports that his arm and leg feel heavy and tight.  He had a CVA in 2013.  He has the most challenge with walking, navigating stairs, donning/doffing clothes.      Date of onset: 09/20/24 (chronic, years)    Relevant medical history:   CVA 2013, right RENATO (unknown date)  Dates & types of surgery:      Prior diagnostic imaging/testing results:     XR, MRI  Prior therapy history for the same diagnosis, illness or injury:        Prior Level of Function  Transfers: Independent, Assistive equipment  Ambulation: Independent, Assistive equipment  ADL: Independent, Assistive equipment  IADL: Driving, Housekeeping, Laundry, Meal preparation    Living Environment  Social support:     Type of home:   apartment  Stairs to enter the home:       elevator  Ramp:     Stairs inside the home:       no  Help at home:   lives alone  Equipment owned:   [facility] [place], [facility]    Employment:     no  Hobbies/Interests:   converse with friends    Patient goals for therapy:   To be able to walk without pain and be IND in the home.    Pain assessment: 9/10 at worst     Objective      Cognitive Status Examination  Orientation: Oriented to person, place and time   Level of Consciousness: Alert  Follows Commands and Answers Questions: 100% of the time  Personal Safety and Judgement: Intact  Memory: Intact    OBSERVATION: Pleasant male in NAD.  INTEGUMENTARY: Intact  POSTURE: Sitting  Posture: Rounded shoulders  PALPATION:     RANGE OF MOTION:   (Degrees) Left AROM Left PROM  Right AROM Right PROM   Hip Flexion  125  115   Hip Internal Rotation  15  15   Hip External Rotation  45  45   Knee Flexion  120  130   Knee Extension  0  0   Pain: end range right hip flexion  End feel: tissue stretch    STRENGTH:   Pain: - none + mild ++ moderate +++ severe  Strength Scale: 0-5/5 Left Right   Hip Flexion 5 4   Hip Abduction 4+ 2+   Hip Adduction 4+ 2+   Knee Flexion 5 2+   Knee Extension 5 4       BED MOBILITY: Min Assist    TRANSFERS: Independent, modifieid    GAIT:   Level of Allison: Independent  Assistive Device(s): Cane (single end)  Gait Deviations: Antalgic  Base of support increased  Stride length decreased  Laure decreased  Toe in R  Drop foot R  Gait Distance: 150 ft  Stairs: nt    BALANCE: Standing Balance (static):Poor  Standing Balance (dynamic):Poor    Assessment & Plan   CLINICAL IMPRESSIONS  Medical Diagnosis: Chronic hip pain after total replacement of right hip joint (M25.551, G89.29, Z96.641), History of CVA (cerebrovascular accident) (Z86.73), Hip pain, right (M25.551    Treatment Diagnosis: Right hip pain and weakness s/p CVA and right RENATO   Impression/Assessment: Patient is a 79 year old male with right sided pain and weakness complaints.  The following significant findings have been identified: Pain, Decreased ROM/flexibility, Decreased strength, Impaired balance, Impaired gait, Impaired muscle performance, Decreased activity tolerance, Impaired posture, and Instability. These impairments interfere with their ability to perform self care tasks, recreational activities, household chores, household mobility, and community mobility as compared to previous level of function.     Clinical Decision Making (Complexity):  Clinical Presentation: Stable/Uncomplicated  Clinical Presentation Rationale: based on medical and personal factors listed in PT evaluation  Clinical Decision  Making (Complexity): Low complexity    PLAN OF CARE  Treatment Interventions:  Interventions: Gait Training, Manual Therapy, Neuromuscular Re-education, Therapeutic Activity, Therapeutic Exercise    Long Term Goals     PT Goal 1  Goal Identifier: Ambulation  Goal Description: Pt will be able to walk for 20 min with LRAD to improve mobility and QOL.  Goal Progress: ongoing  Target Date: 12/18/24      Frequency of Treatment: 1x/week, taper to 2x/month  Duration of Treatment: 4 months    Recommended Referrals to Other Professionals:     Education Assessment:   Learner/Method: Patient  Education Comments: HEP, POC    Risks and benefits of evaluation/treatment have been explained.   Patient/Family/caregiver agrees with Plan of Care.     Evaluation Time:     PT Eval, Low Complexity Minutes (19905): 15   Present: Yes: Language: Cambodian, ID Number/Identifier:       Signing Clinician: Dk Kay PT        [facility]                                                                                   OUTPATIENT PHYSICAL THERAPY      PLAN OF TREATMENT FOR OUTPATIENT REHABILITATION   Patient's Last Name, First Name, Juli Villatoro  EVELIO YOB: 1945   Provider's Name   [facility]   Medical Record No.  [id number]     Onset Date: 09/20/24 (chronic, years)  Start of Care Date: 09/20/24     Medical Diagnosis:  Chronic hip pain after total replacement of right hip joint (M25.551, G89.29, Z96.641), History of CVA (cerebrovascular accident) (Z86.73), Hip pain, right (M25.551      PT Treatment Diagnosis:  Right hip pain and weakness s/p CVA and right RENATO Plan of Treatment  Frequency/Duration: 1x/week, taper to 2x/month/ 4 months    Certification date from 09/20/24 to 12/18/24         See note for plan of treatment details and functional goals     Dk Kay, PT                         I CERTIFY THE NEED FOR THESE SERVICES FURNISHED UNDER         THIS PLAN OF TREATMENT AND WHILE UNDER MY CARE     (Physician attestation of this document indicates review and certification of the therapy plan).              Referring Provider:  Shelbie Gilliam    Initial Assessment  See Epic Evaluation- Start of Care Date: 09/20/24

## 2024-09-27 ENCOUNTER — THERAPY VISIT (OUTPATIENT)
Dept: PHYSICAL THERAPY | Facility: CLINIC | Age: 79
End: 2024-09-27
Attending: FAMILY MEDICINE
Payer: COMMERCIAL

## 2024-09-27 DIAGNOSIS — M25.551 CHRONIC HIP PAIN AFTER TOTAL REPLACEMENT OF RIGHT HIP JOINT: Primary | ICD-10-CM

## 2024-09-27 DIAGNOSIS — G89.29 CHRONIC HIP PAIN AFTER TOTAL REPLACEMENT OF RIGHT HIP JOINT: Primary | ICD-10-CM

## 2024-09-27 DIAGNOSIS — M25.551 HIP PAIN, RIGHT: ICD-10-CM

## 2024-09-27 DIAGNOSIS — Z96.641 CHRONIC HIP PAIN AFTER TOTAL REPLACEMENT OF RIGHT HIP JOINT: Primary | ICD-10-CM

## 2024-09-27 DIAGNOSIS — Z86.73 HISTORY OF CVA (CEREBROVASCULAR ACCIDENT): ICD-10-CM

## 2024-09-27 PROCEDURE — 97110 THERAPEUTIC EXERCISES: CPT | Mod: GP

## 2024-10-04 ENCOUNTER — THERAPY VISIT (OUTPATIENT)
Dept: PHYSICAL THERAPY | Facility: CLINIC | Age: 79
End: 2024-10-04
Attending: FAMILY MEDICINE
Payer: COMMERCIAL

## 2024-10-04 DIAGNOSIS — Z96.641 CHRONIC HIP PAIN AFTER TOTAL REPLACEMENT OF RIGHT HIP JOINT: Primary | ICD-10-CM

## 2024-10-04 DIAGNOSIS — M25.551 CHRONIC HIP PAIN AFTER TOTAL REPLACEMENT OF RIGHT HIP JOINT: Primary | ICD-10-CM

## 2024-10-04 DIAGNOSIS — Z86.73 HISTORY OF CVA (CEREBROVASCULAR ACCIDENT): ICD-10-CM

## 2024-10-04 DIAGNOSIS — G89.29 CHRONIC HIP PAIN AFTER TOTAL REPLACEMENT OF RIGHT HIP JOINT: Primary | ICD-10-CM

## 2024-10-04 DIAGNOSIS — M25.551 HIP PAIN, RIGHT: ICD-10-CM

## 2024-10-04 PROCEDURE — 97112 NEUROMUSCULAR REEDUCATION: CPT | Mod: GP

## 2024-10-04 PROCEDURE — 97110 THERAPEUTIC EXERCISES: CPT | Mod: GP

## 2024-10-17 ENCOUNTER — LAB (OUTPATIENT)
Dept: LAB | Facility: CLINIC | Age: 79
End: 2024-10-17
Payer: COMMERCIAL

## 2024-10-17 ENCOUNTER — OFFICE VISIT (OUTPATIENT)
Dept: FAMILY MEDICINE | Facility: CLINIC | Age: 79
End: 2024-10-17
Payer: COMMERCIAL

## 2024-10-17 VITALS
OXYGEN SATURATION: 95 % | DIASTOLIC BLOOD PRESSURE: 74 MMHG | BODY MASS INDEX: 27.26 KG/M2 | HEART RATE: 70 BPM | SYSTOLIC BLOOD PRESSURE: 107 MMHG | RESPIRATION RATE: 17 BRPM | TEMPERATURE: 98.1 F | HEIGHT: 72 IN

## 2024-10-17 DIAGNOSIS — E11.42 TYPE 2 DIABETES MELLITUS WITH DIABETIC POLYNEUROPATHY, WITHOUT LONG-TERM CURRENT USE OF INSULIN (H): ICD-10-CM

## 2024-10-17 DIAGNOSIS — G89.29 CHRONIC PAIN OF BOTH KNEES: ICD-10-CM

## 2024-10-17 DIAGNOSIS — G89.29 CHRONIC HIP PAIN AFTER TOTAL REPLACEMENT OF RIGHT HIP JOINT: ICD-10-CM

## 2024-10-17 DIAGNOSIS — E11.42 TYPE 2 DIABETES MELLITUS WITH DIABETIC POLYNEUROPATHY, WITHOUT LONG-TERM CURRENT USE OF INSULIN (H): Primary | ICD-10-CM

## 2024-10-17 DIAGNOSIS — I10 BENIGN ESSENTIAL HYPERTENSION: ICD-10-CM

## 2024-10-17 DIAGNOSIS — Z96.641 CHRONIC HIP PAIN AFTER TOTAL REPLACEMENT OF RIGHT HIP JOINT: ICD-10-CM

## 2024-10-17 DIAGNOSIS — N18.31 STAGE 3A CHRONIC KIDNEY DISEASE (H): ICD-10-CM

## 2024-10-17 DIAGNOSIS — M25.561 CHRONIC PAIN OF BOTH KNEES: ICD-10-CM

## 2024-10-17 DIAGNOSIS — R32 URINARY INCONTINENCE, UNSPECIFIED TYPE: ICD-10-CM

## 2024-10-17 DIAGNOSIS — N41.1 CHRONIC PROSTATITIS: ICD-10-CM

## 2024-10-17 DIAGNOSIS — M25.562 CHRONIC PAIN OF BOTH KNEES: ICD-10-CM

## 2024-10-17 DIAGNOSIS — E11.9 TYPE 2 DIABETES MELLITUS WITHOUT COMPLICATION, WITHOUT LONG-TERM CURRENT USE OF INSULIN (H): ICD-10-CM

## 2024-10-17 DIAGNOSIS — M25.551 CHRONIC HIP PAIN AFTER TOTAL REPLACEMENT OF RIGHT HIP JOINT: ICD-10-CM

## 2024-10-17 LAB
ALBUMIN MFR UR ELPH: 15.6 MG/DL
ALBUMIN SERPL BCG-MCNC: 4.3 G/DL (ref 3.5–5.2)
ALBUMIN UR-MCNC: NEGATIVE MG/DL
ALP SERPL-CCNC: 93 U/L (ref 40–150)
ALT SERPL W P-5'-P-CCNC: 11 U/L (ref 0–70)
ANION GAP SERPL CALCULATED.3IONS-SCNC: 11 MMOL/L (ref 7–15)
APPEARANCE UR: CLEAR
AST SERPL W P-5'-P-CCNC: 21 U/L (ref 0–45)
BACTERIA #/AREA URNS HPF: ABNORMAL /HPF
BILIRUB SERPL-MCNC: 0.3 MG/DL
BILIRUB UR QL STRIP: NEGATIVE
BUN SERPL-MCNC: 23.5 MG/DL (ref 8–23)
CALCIUM SERPL-MCNC: 9.3 MG/DL (ref 8.8–10.4)
CHLORIDE SERPL-SCNC: 101 MMOL/L (ref 98–107)
CHOLEST SERPL-MCNC: 142 MG/DL
COLOR UR AUTO: ABNORMAL
CREAT SERPL-MCNC: 1.39 MG/DL (ref 0.67–1.17)
CREAT UR-MCNC: 96.5 MG/DL
CREAT UR-MCNC: 99.6 MG/DL
EGFRCR SERPLBLD CKD-EPI 2021: 52 ML/MIN/1.73M2
ERYTHROCYTE [DISTWIDTH] IN BLOOD BY AUTOMATED COUNT: 12.4 % (ref 10–15)
EST. AVERAGE GLUCOSE BLD GHB EST-MCNC: 123 MG/DL
FASTING STATUS PATIENT QL REPORTED: YES
GLUCOSE SERPL-MCNC: 125 MG/DL (ref 70–99)
GLUCOSE SERPL-MCNC: 125 MG/DL (ref 70–99)
GLUCOSE UR STRIP-MCNC: 1000 MG/DL
HBA1C MFR BLD: 5.9 %
HCO3 SERPL-SCNC: 25 MMOL/L (ref 22–29)
HCT VFR BLD AUTO: 43.6 % (ref 40–53)
HDLC SERPL-MCNC: 42 MG/DL
HGB BLD-MCNC: 14.6 G/DL (ref 13.3–17.7)
HGB UR QL STRIP: NEGATIVE
KETONES UR STRIP-MCNC: NEGATIVE MG/DL
LDLC SERPL CALC-MCNC: 65 MG/DL
LEUKOCYTE ESTERASE UR QL STRIP: ABNORMAL
MCH RBC QN AUTO: 35.1 PG (ref 26.5–33)
MCHC RBC AUTO-ENTMCNC: 33.5 G/DL (ref 31.5–36.5)
MCV RBC AUTO: 105 FL (ref 78–100)
MICROALBUMIN UR-MCNC: <12 MG/L
MICROALBUMIN/CREAT UR: NORMAL MG/G{CREAT}
MUCOUS THREADS #/AREA URNS LPF: PRESENT /LPF
NITRATE UR QL: POSITIVE
NONHDLC SERPL-MCNC: 100 MG/DL
PH UR STRIP: 6.5 [PH] (ref 5–7)
PLATELET # BLD AUTO: 224 10E3/UL (ref 150–450)
POTASSIUM SERPL-SCNC: 4.6 MMOL/L (ref 3.4–5.3)
PROT SERPL-MCNC: 7.4 G/DL (ref 6.4–8.3)
PROT/CREAT 24H UR: 0.16 MG/MG CR (ref 0–0.2)
RBC # BLD AUTO: 4.16 10E6/UL (ref 4.4–5.9)
RBC URINE: 1 /HPF
SODIUM SERPL-SCNC: 137 MMOL/L (ref 135–145)
SP GR UR STRIP: 1.02 (ref 1–1.03)
SQUAMOUS EPITHELIAL: <1 /HPF
TRIGL SERPL-MCNC: 176 MG/DL
UROBILINOGEN UR STRIP-MCNC: NORMAL MG/DL
WBC # BLD AUTO: 4.5 10E3/UL (ref 4–11)
WBC URINE: 54 /HPF

## 2024-10-17 PROCEDURE — 82043 UR ALBUMIN QUANTITATIVE: CPT | Performed by: INTERNAL MEDICINE

## 2024-10-17 PROCEDURE — 84156 ASSAY OF PROTEIN URINE: CPT | Performed by: PATHOLOGY

## 2024-10-17 PROCEDURE — 99000 SPECIMEN HANDLING OFFICE-LAB: CPT | Performed by: PATHOLOGY

## 2024-10-17 PROCEDURE — 36415 COLL VENOUS BLD VENIPUNCTURE: CPT | Performed by: PATHOLOGY

## 2024-10-17 PROCEDURE — 80061 LIPID PANEL: CPT | Performed by: PATHOLOGY

## 2024-10-17 PROCEDURE — 87186 SC STD MICRODIL/AGAR DIL: CPT | Performed by: INTERNAL MEDICINE

## 2024-10-17 PROCEDURE — 80053 COMPREHEN METABOLIC PANEL: CPT | Performed by: PATHOLOGY

## 2024-10-17 PROCEDURE — 85027 COMPLETE CBC AUTOMATED: CPT | Performed by: PATHOLOGY

## 2024-10-17 PROCEDURE — G2211 COMPLEX E/M VISIT ADD ON: HCPCS | Performed by: FAMILY MEDICINE

## 2024-10-17 PROCEDURE — 81001 URINALYSIS AUTO W/SCOPE: CPT | Performed by: PATHOLOGY

## 2024-10-17 PROCEDURE — 83036 HEMOGLOBIN GLYCOSYLATED A1C: CPT | Performed by: FAMILY MEDICINE

## 2024-10-17 PROCEDURE — 99215 OFFICE O/P EST HI 40 MIN: CPT | Performed by: FAMILY MEDICINE

## 2024-10-17 RX ORDER — SULFAMETHOXAZOLE AND TRIMETHOPRIM 800; 160 MG/1; MG/1
1 TABLET ORAL 2 TIMES DAILY
Qty: 60 TABLET | Refills: 0 | Status: SHIPPED | OUTPATIENT
Start: 2024-10-17

## 2024-10-17 RX ORDER — TAMSULOSIN HYDROCHLORIDE 0.4 MG/1
0.4 CAPSULE ORAL DAILY
Qty: 30 CAPSULE | Refills: 1 | Status: SHIPPED | OUTPATIENT
Start: 2024-10-17

## 2024-10-17 RX ORDER — LIDOCAINE 50 MG/G
1 PATCH TOPICAL EVERY 24 HOURS
Qty: 30 PATCH | Refills: 2 | Status: SHIPPED | OUTPATIENT
Start: 2024-10-17

## 2024-10-17 NOTE — PATIENT INSTRUCTIONS
Patient Education   Here is the plan from today's visit    1. Type 2 diabetes mellitus with diabetic polyneuropathy, without long-term current use of insulin (H)  Controlled     2. Urinary incontinence, unspecified type  Caused by nfection     3. Chronic hip pain after total replacement of right hip joint    - [facility] Medicine Clinic - [facility] Internal Referral; Future  - lidocaine (LIDODERM) 5 % patch; Place 1 patch over 12 hours onto the skin every 24 hours. To prevent lidocaine toxicity, patient should be patch free for 12 hrs daily.  Dispense: 30 patch; Refill: 2    4. Chronic pain of both knees  Sports medicine appointment   - lidocaine (LIDODERM) 5 % patch; Place 1 patch over 12 hours onto the skin every 24 hours. To prevent lidocaine toxicity, patient should be patch free for 12 hrs daily.  Dispense: 30 patch; Refill: 2    5. Chronic prostatitis    - sulfamethoxazole-trimethoprim (BACTRIM DS) 800-160 MG tablet; Take 1 tablet by mouth 2 times daily.  Dispense: 60 tablet; Refill: 0  - tamsulosin (FLOMAX) 0.4 MG capsule; Take 1 capsule (0.4 mg) by mouth daily.  Dispense: 30 capsule; Refill: 1          Please call or return to clinic if your symptoms don't go away.    Follow up plan  Return in about 4 weeks (around 11/14/2024) for pain check .    Thank you for coming to [facility] today.  Lab Testing:  **If you had lab testing today and your results are reassuring or normal they will be mailed to you or sent through [employer] within 7 days.   **If the lab tests need quick action we will call you with the results.  **If you are having labs done on a different day, please call [phone] to schedule at [facility] or [phone] for other [facility] locations. Labs do not offer walk-in appointments.  The phone number we will call with results is # [phone] (home) . If this is not the best number please call our clinic and change the number.  Medication Refills:  If you need any  refills please call your pharmacy and they will contact us.   If you need to  your refill at a new pharmacy, please contact the new pharmacy directly. The new pharmacy will help you get your medications transferred faster.   Scheduling:  If you have any concerns about today's visit or wish to schedule another appointment please call our office during normal business hours [phone] (8-5:00 M-F). If you can no longer make a scheduled visit, please cancel via We or call us to cancel.   If a referral was made to an [facility] specialty provider and you do not get a call from central scheduling, please refer to directions on your visit summary or call our office during normal business hours for assistance.   If a Mammogram was ordered for you at the [facility] Center call [phone] to schedule or change your appointment.  If you had an XRay/CT/Ultrasound/MRI ordered the number is [phone] to schedule or change your radiology appointment.   [facility] has limited ultrasound appointments available on Wednesdays, if you would like your ultrasound at [facility], please call [phone] to schedule.   Medical Concerns:  If you have urgent medical concerns please call [phone] at any time of the day.    Ernie Arreola MD

## 2024-10-17 NOTE — PROGRESS NOTES
Assessment & Plan     Type 2 diabetes mellitus with diabetic polyneuropathy, without long-term current use of insulin (H)  Labs done at [facility] today. Diabetes is improved on current regimen. A1c is 5.9 today.     Chronic prostatitis  Urinary incontinence, unspecified type  Patient still has urinary incontinence and urgency. He had a UA done earlier today which suggested  UTI (WBC and +nitrite), cultures still pending. Patient just had an ecoli UTI and finished a 7 day course of bactrim last week. Suspect that this is likely chronic prostatitis.  US Bladder done today was negative for significant post void residual. Will plan for a 1 month course of bactrim and flomax.   - sulfamethoxazole-trimethoprim (BACTRIM DS) 800-160 MG tablet; Take 1 tablet by mouth 2 times daily.  - tamsulosin (FLOMAX) 0.4 MG capsule; Take 1 capsule (0.4 mg) by mouth daily.  - US Bladder Pre and Post Void Residual     Chronic hip pain after total replacement of right hip joint  Chronic pain of both knees  Hx of RENATO on right, weakness on right lower extremity due to cutting through muscles to replace hip and weakness chronic from stroke. He uses gabapentin, diclofenac cream, and lidocaine patches for the pain. Today also endorses worsening bilateral knee pain. He is already seeing physical therapy for his hip pain which he has future appointments for. Plan to follow-up with Sports Medicine Clinic for knee pain.   - [facility] Clinic - [place] Internal Referral; Future  - lidocaine (LIDODERM) 5 % patch; Place 1 patch over 12 hours onto the skin every 24 hours. To prevent lidocaine toxicity, patient should be patch free for 12 hrs daily.    BMI  Estimated body mass index is 27.26 kg/m  as calculated from the following:    Height as of this encounter: 1.829 m (6').    Weight as of 8/6/24: 91.2 kg (201 lb).     Return in about 4 weeks (around 11/14/2024) for pain check .  The longitudinal plan of care for the diagnosis(es)/condition(s) as  documented were addressed during this visit. Due to the added complexity in care, I will continue to support Juli in the subsequent management and with ongoing continuity of care.  I spent a total of 46 minutes on the day of the visit.   Time spent by me doing chart review, history and exam, documentation and further activities per the note  Subjective   Juli is a 79 year old, presenting for the following health issues:  Follow Up (Blood work follow up + discuss right hip pain ) and Other (Renew handicap tag)      10/17/2024     2:59 PM   Additional Questions   Roomed by Wes   Accompanied by self         10/17/2024    Information    services provided? Yes   Language Syrian   Type of interpretation provided Face-to-face    name Fatou    Agency [employer]        HPI     Right hip pain and bilateral knee pain  Long time ago had a fracture and surgery and has right hip pain. Uses gabapentin, lidocaine patches and diclofenac cream to help with the pain. He has previously had injections in the hip which have helped somewhat.  Previously sent referral to PT, but finds it hard to get in. He has 4 appointments scheduled starting 11/15/24. Also notes that he has bilateral knee pain which makes it hard to walk.     Diabetes  - Got labs done at [facility] and would like to go over them.   - Discussed that A1c is much lower and other labs look stable.     Urinary incontinence  - Does not feel that it is coming from bladder, more coming from back pain. Often has urgency that is worse with his pain. Does not have any burning while urinating.   - Is Ok with bladder US today.         Objective    /74   Pulse 70   Temp 98.1  F (36.7  C) (Oral)   Resp 17   Ht 1.829 m (6')   SpO2 95%   BMI 27.26 kg/m    Body mass index is 27.26 kg/m .  Physical Exam   GENERAL: alert and no distress  RESP: lungs clear to auscultation - no rales, rhonchi or wheezes  CV: regular rate and rhythm,  normal S1 S2, no S3 or S4, no murmur, click or rub, no peripheral edema  ABDOMEN: soft, nontender, no hepatosplenomegaly, no masses and bowel sounds normal  MS: no gross musculoskeletal defects noted, no edema    Imaging:  US Bladder with Pre and Post Void Residual:  Pre-void:120 mL  Post-void: 10 mL     Patient seen and discussed with Attending Physician, Dr. Arreola.   Lily Parker, MS3  I was present with the medical student who participated in the service and in the documentation of this note. I have verified the history and personally performed the physical exam and medical decision making, and have verified the content of the note, which accurately reflects my assessment of the patient and the plan of care.   Ernie Arreola MD          Signed Electronically by: Ernie Arreola MD

## 2024-10-19 LAB — BACTERIA UR CULT: ABNORMAL

## 2024-10-22 ENCOUNTER — OFFICE VISIT (OUTPATIENT)
Dept: FAMILY MEDICINE | Facility: CLINIC | Age: 79
End: 2024-10-22
Payer: COMMERCIAL

## 2024-10-22 ENCOUNTER — ANCILLARY PROCEDURE (OUTPATIENT)
Dept: GENERAL RADIOLOGY | Facility: CLINIC | Age: 79
End: 2024-10-22
Attending: FAMILY MEDICINE
Payer: COMMERCIAL

## 2024-10-22 VITALS
HEART RATE: 88 BPM | BODY MASS INDEX: 27.2 KG/M2 | SYSTOLIC BLOOD PRESSURE: 125 MMHG | RESPIRATION RATE: 17 BRPM | HEIGHT: 72 IN | WEIGHT: 200.8 LBS | DIASTOLIC BLOOD PRESSURE: 83 MMHG | OXYGEN SATURATION: 98 % | TEMPERATURE: 97.4 F

## 2024-10-22 DIAGNOSIS — M25.561 BILATERAL CHRONIC KNEE PAIN: ICD-10-CM

## 2024-10-22 DIAGNOSIS — G89.29 BILATERAL CHRONIC KNEE PAIN: ICD-10-CM

## 2024-10-22 DIAGNOSIS — M25.562 BILATERAL CHRONIC KNEE PAIN: ICD-10-CM

## 2024-10-22 DIAGNOSIS — M17.11 PRIMARY OSTEOARTHRITIS OF RIGHT KNEE: Primary | ICD-10-CM

## 2024-10-22 DIAGNOSIS — M17.0 PRIMARY OSTEOARTHRITIS OF BOTH KNEES: ICD-10-CM

## 2024-10-22 PROCEDURE — 99214 OFFICE O/P EST MOD 30 MIN: CPT | Mod: 25 | Performed by: FAMILY MEDICINE

## 2024-10-22 PROCEDURE — 73562 X-RAY EXAM OF KNEE 3: CPT | Mod: FY | Performed by: RADIOLOGY

## 2024-10-22 PROCEDURE — 20610 DRAIN/INJ JOINT/BURSA W/O US: CPT | Performed by: FAMILY MEDICINE

## 2024-10-22 RX ORDER — TRIAMCINOLONE ACETONIDE 40 MG/ML
40 INJECTION, SUSPENSION INTRA-ARTICULAR; INTRAMUSCULAR ONCE
Status: COMPLETED | OUTPATIENT
Start: 2024-10-22 | End: 2024-10-22

## 2024-10-22 RX ADMIN — TRIAMCINOLONE ACETONIDE 40 MG: 40 INJECTION, SUSPENSION INTRA-ARTICULAR; INTRAMUSCULAR at 11:23

## 2024-10-22 NOTE — PROGRESS NOTES
Assessment & Plan     Bilateral chronic knee pain  PT for knee OA  - XR Knee Bilateral 3 Views; Future  - Physical Therapy  Referral; Future    Primary osteoarthritis of both knees    - Physical Therapy  Referral; Future    Primary osteoarthritis of right knee  CSI delivered today without complication  - Large Joint/Bursa injection and/or drainage - Unilateral (Shoulder, Knee) [20610]  - triamcinolone (KENALOG-40) injection 40 mg          BMI  Estimated body mass index is 27.23 kg/m  as calculated from the following:    Height as of this encounter: 1.829 m (6').    Weight as of this encounter: 91.1 kg (200 lb 12.8 oz).         Regular exercise  See Patient Instructions    No follow-ups on file.    Subjective   Juli is a 79 year old, presenting for the following health issues:  Knee Pain (Both knee)      10/22/2024     9:42 AM   Additional Questions   Roomed by Doua   Accompanied by Self         10/22/2024    Information    services provided? Yes   Language Dominican   Type of interpretation provided Face-to-face    name [facility]    Agency [employer]        HPI : Pt is a 78 yo Dominican male with bilateral knee pain.  Does some exercises for movement, mild toe movements, not going to PT  PT for the hips                Review of Systems  Constitutional, neuro, ENT, endocrine, pulmonary, cardiac, gastrointestinal, genitourinary, musculoskeletal, integument and psychiatric systems are negative, except as otherwise noted.      Objective    /83   Pulse 88   Temp 97.4  F (36.3  C) (Oral)   Resp 17   Ht 1.829 m (6')   Wt 91.1 kg (200 lb 12.8 oz)   SpO2 98%   BMI 27.23 kg/m    Body mass index is 27.23 kg/m .  Physical Exam   right Knee:   ROM: 0-130; Crepitus: mild   Effusion: none ; Swelling: mild posterior swelling   Strength: Full in flexion/ extension , slight decreased full extension on right  Tenderness: Patella - mild Medial joint line - ++;  Lateral joint line - neg; Quad tendon - neg; Patellar tendon- neg; Hamstring - neg.   Cruciates: anterior drawer - neg/posterior drawer -neg. Lachman - neg   Collaterals: varus -neg/valgus -neg.   Patella: patellar compression - neg; single leg bend- intact  Meniscus: Mariangel - neg; Thessaly - not performed  Maneuvers: Vielka - not performed      Xray - Reviewed and interpreted by me.  Bilateral knees with mild OA        Signed Electronically by: Shelbie Gilliam MD  PROCEDURE:  JOINT ASPIRATION/INJECTION    After a discussion of risks, benefits and side effects of procedure, informed patient consent was obtained.   Without US.    ASPIRATION: Not performed.    INJECTION:  Using 4 cc of 1 % lidocaine mixed with 40 mg of kenalog, the right knee was successfully injected without complication.  Patient did experience some pain relief following injection.    Invasive Procedure Safety Checklist completed by nurse? N/A

## 2024-11-01 ENCOUNTER — TRANSFERRED RECORDS (OUTPATIENT)
Dept: MULTI SPECIALTY CLINIC | Facility: CLINIC | Age: 79
End: 2024-11-01

## 2024-11-01 LAB — RETINOPATHY: NORMAL

## 2024-11-18 ENCOUNTER — THERAPY VISIT (OUTPATIENT)
Dept: PHYSICAL THERAPY | Facility: CLINIC | Age: 79
End: 2024-11-18
Payer: COMMERCIAL

## 2024-11-18 DIAGNOSIS — G89.29 CHRONIC HIP PAIN AFTER TOTAL REPLACEMENT OF RIGHT HIP JOINT: Primary | ICD-10-CM

## 2024-11-18 DIAGNOSIS — M25.551 HIP PAIN, RIGHT: ICD-10-CM

## 2024-11-18 DIAGNOSIS — Z86.73 HISTORY OF CVA (CEREBROVASCULAR ACCIDENT): ICD-10-CM

## 2024-11-18 DIAGNOSIS — M25.551 CHRONIC HIP PAIN AFTER TOTAL REPLACEMENT OF RIGHT HIP JOINT: Primary | ICD-10-CM

## 2024-11-18 DIAGNOSIS — Z96.641 CHRONIC HIP PAIN AFTER TOTAL REPLACEMENT OF RIGHT HIP JOINT: Primary | ICD-10-CM

## 2024-11-18 PROCEDURE — 97112 NEUROMUSCULAR REEDUCATION: CPT | Mod: GP

## 2024-11-18 PROCEDURE — 97110 THERAPEUTIC EXERCISES: CPT | Mod: GP

## 2024-11-21 PROBLEM — M25.551 HIP PAIN, RIGHT: Status: RESOLVED | Noted: 2024-05-20 | Resolved: 2024-11-21

## 2024-12-03 ENCOUNTER — APPOINTMENT (OUTPATIENT)
Dept: INTERPRETER SERVICES | Facility: CLINIC | Age: 79
End: 2024-12-03
Payer: COMMERCIAL

## 2024-12-05 ENCOUNTER — OFFICE VISIT (OUTPATIENT)
Dept: FAMILY MEDICINE | Facility: CLINIC | Age: 79
End: 2024-12-05
Payer: COMMERCIAL

## 2024-12-05 VITALS
BODY MASS INDEX: 27.63 KG/M2 | RESPIRATION RATE: 14 BRPM | WEIGHT: 204 LBS | HEART RATE: 82 BPM | OXYGEN SATURATION: 99 % | TEMPERATURE: 97.8 F | DIASTOLIC BLOOD PRESSURE: 78 MMHG | SYSTOLIC BLOOD PRESSURE: 110 MMHG | HEIGHT: 72 IN

## 2024-12-05 DIAGNOSIS — K21.9 GASTROESOPHAGEAL REFLUX DISEASE WITHOUT ESOPHAGITIS: ICD-10-CM

## 2024-12-05 DIAGNOSIS — Z23 NEED FOR SHINGLES VACCINE: ICD-10-CM

## 2024-12-05 DIAGNOSIS — N18.31 STAGE 3A CHRONIC KIDNEY DISEASE (H): ICD-10-CM

## 2024-12-05 DIAGNOSIS — M25.562 CHRONIC PAIN OF BOTH KNEES: ICD-10-CM

## 2024-12-05 DIAGNOSIS — M67.911 TENDINOPATHY OF RIGHT SHOULDER: ICD-10-CM

## 2024-12-05 DIAGNOSIS — Z86.73 HISTORY OF CVA (CEREBROVASCULAR ACCIDENT): ICD-10-CM

## 2024-12-05 DIAGNOSIS — Z96.641 CHRONIC HIP PAIN AFTER TOTAL REPLACEMENT OF RIGHT HIP JOINT: ICD-10-CM

## 2024-12-05 DIAGNOSIS — Z29.11 NEED FOR VACCINATION AGAINST RESPIRATORY SYNCYTIAL VIRUS: ICD-10-CM

## 2024-12-05 DIAGNOSIS — G89.29 CHRONIC HIP PAIN AFTER TOTAL REPLACEMENT OF RIGHT HIP JOINT: ICD-10-CM

## 2024-12-05 DIAGNOSIS — E11.22 TYPE 2 DIABETES MELLITUS WITH STAGE 3 CHRONIC KIDNEY DISEASE, WITHOUT LONG-TERM CURRENT USE OF INSULIN, UNSPECIFIED WHETHER STAGE 3A OR 3B CKD (H): ICD-10-CM

## 2024-12-05 DIAGNOSIS — N41.1 CHRONIC PROSTATITIS: ICD-10-CM

## 2024-12-05 DIAGNOSIS — I10 BENIGN ESSENTIAL HYPERTENSION: ICD-10-CM

## 2024-12-05 DIAGNOSIS — N18.30 TYPE 2 DIABETES MELLITUS WITH STAGE 3 CHRONIC KIDNEY DISEASE, WITHOUT LONG-TERM CURRENT USE OF INSULIN, UNSPECIFIED WHETHER STAGE 3A OR 3B CKD (H): ICD-10-CM

## 2024-12-05 DIAGNOSIS — Z00.00 HEALTHCARE MAINTENANCE: ICD-10-CM

## 2024-12-05 DIAGNOSIS — M25.551 CHRONIC HIP PAIN AFTER TOTAL REPLACEMENT OF RIGHT HIP JOINT: ICD-10-CM

## 2024-12-05 DIAGNOSIS — G89.29 CHRONIC PAIN OF BOTH KNEES: ICD-10-CM

## 2024-12-05 DIAGNOSIS — J30.1 SEASONAL ALLERGIC RHINITIS DUE TO POLLEN: ICD-10-CM

## 2024-12-05 DIAGNOSIS — M25.561 CHRONIC PAIN OF BOTH KNEES: ICD-10-CM

## 2024-12-05 DIAGNOSIS — M17.0 PRIMARY OSTEOARTHRITIS OF BOTH KNEES: Primary | ICD-10-CM

## 2024-12-05 DIAGNOSIS — M79.2 NEUROPATHIC PAIN: ICD-10-CM

## 2024-12-05 RX ORDER — AMLODIPINE BESYLATE 10 MG/1
10 TABLET ORAL DAILY
Qty: 90 TABLET | Refills: 3 | Status: SHIPPED | OUTPATIENT
Start: 2024-12-05

## 2024-12-05 RX ORDER — TAMSULOSIN HYDROCHLORIDE 0.4 MG/1
0.4 CAPSULE ORAL DAILY
Qty: 30 CAPSULE | Refills: 1 | Status: SHIPPED | OUTPATIENT
Start: 2024-12-05

## 2024-12-05 RX ORDER — DULOXETIN HYDROCHLORIDE 20 MG/1
20 CAPSULE, DELAYED RELEASE ORAL DAILY
Qty: 90 CAPSULE | Refills: 1 | Status: SHIPPED | OUTPATIENT
Start: 2024-12-05

## 2024-12-05 RX ORDER — PANTOPRAZOLE SODIUM 40 MG/1
40 TABLET, DELAYED RELEASE ORAL DAILY
Qty: 90 TABLET | Refills: 1 | Status: SHIPPED | OUTPATIENT
Start: 2024-12-05

## 2024-12-05 RX ORDER — LORATADINE 10 MG/1
10 TABLET ORAL DAILY
Qty: 90 TABLET | Refills: 3 | Status: SHIPPED | OUTPATIENT
Start: 2024-12-05

## 2024-12-05 RX ORDER — CYANOCOBALAMIN/FOLIC AC/VIT B6 0.2-.5-5MG
1 TABLET ORAL DAILY
Qty: 90 TABLET | Refills: 3 | Status: SHIPPED | OUTPATIENT
Start: 2024-12-05

## 2024-12-05 RX ORDER — LIDOCAINE 50 MG/G
1 PATCH TOPICAL EVERY 24 HOURS
Qty: 30 PATCH | Refills: 2 | Status: SHIPPED | OUTPATIENT
Start: 2024-12-05

## 2024-12-05 RX ORDER — FLUTICASONE PROPIONATE 50 MCG
1 SPRAY, SUSPENSION (ML) NASAL DAILY
Qty: 18.2 ML | Refills: 1 | Status: SHIPPED | OUTPATIENT
Start: 2024-12-05

## 2024-12-05 RX ORDER — ATORVASTATIN CALCIUM 20 MG/1
20 TABLET, FILM COATED ORAL DAILY
Qty: 90 TABLET | Refills: 1 | Status: SHIPPED | OUTPATIENT
Start: 2024-12-05

## 2024-12-05 RX ORDER — METFORMIN HYDROCHLORIDE 500 MG/1
500 TABLET, EXTENDED RELEASE ORAL 2 TIMES DAILY WITH MEALS
Qty: 180 TABLET | Refills: 3 | Status: SHIPPED | OUTPATIENT
Start: 2024-12-05

## 2024-12-05 RX ORDER — LORATADINE 10 MG/1
10 TABLET ORAL DAILY
Qty: 90 TABLET | Refills: 3 | Status: SHIPPED | OUTPATIENT
Start: 2024-12-05 | End: 2024-12-05

## 2024-12-05 RX ORDER — GABAPENTIN 800 MG/1
800 TABLET ORAL 3 TIMES DAILY
Qty: 270 TABLET | Refills: 1 | Status: SHIPPED | OUTPATIENT
Start: 2024-12-05

## 2024-12-05 RX ORDER — ACETAMINOPHEN 500 MG
1000 TABLET ORAL 3 TIMES DAILY
Qty: 200 TABLET | Refills: 3 | Status: SHIPPED | OUTPATIENT
Start: 2024-12-05

## 2024-12-05 RX ORDER — CARVEDILOL 12.5 MG/1
25 TABLET ORAL 2 TIMES DAILY WITH MEALS
Qty: 180 TABLET | Refills: 3 | Status: SHIPPED | OUTPATIENT
Start: 2024-12-05

## 2024-12-05 NOTE — PATIENT INSTRUCTIONS
Return in about 4 weeks (around 1/2/2025) for Diabetes Routine Check, also follow up with Sports med for injection.

## 2024-12-05 NOTE — PROGRESS NOTES
Assessment & Plan     Primary osteoarthritis of both knees  [facility] refer back to [facility] for Left knee injection and possible shoulder injection  - Sports Medicine Clinic - [facility] Internal Referral; Future    Chronic hip pain after total replacement of right hip joint  refill  - lidocaine (LIDODERM) 5 % patch; Place 1 patch over 12 hours onto the skin every 24 hours. To prevent lidocaine toxicity, patient should be patch free for 12 hrs daily.    Chronic pain of both knees    - lidocaine (LIDODERM) 5 % patch; Place 1 patch over 12 hours onto the skin every 24 hours. To prevent lidocaine toxicity, patient should be patch free for 12 hrs daily.    Neuropathic pain    - gabapentin (NEURONTIN) 800 MG tablet; Take 1 tablet (800 mg) by mouth 3 times daily.  - DULoxetine (CYMBALTA) 20 MG capsule; Take 1 capsule (20 mg) by mouth daily.  - acetaminophen (TYLENOL) 500 MG tablet; Take 2 tablets (1,000 mg) by mouth 3 times daily.    Stage 3a chronic kidney disease (H)    - empagliflozin (JARDIANCE) 10 MG TABS tablet; Take 1 tablet (10 mg) by mouth daily.    Benign essential hypertension    - carvedilol (COREG) 12.5 MG tablet; Take 2 tablets (25 mg) by mouth 2 times daily (with meals).    History of CVA (cerebrovascular accident)    - atorvastatin (LIPITOR) 20 MG tablet; Take 1 tablet (20 mg) by mouth daily.  - amLODIPine (NORVASC) 10 MG tablet; Take 1 tablet (10 mg) by mouth daily.    Type 2 diabetes mellitus with stage 3 chronic kidney disease, without long-term current use of insulin, unspecified whether stage 3a or 3b CKD (H)  controlled  - metFORMIN (GLUCOPHAGE XR) 500 MG 24 hr tablet; Take 1 tablet (500 mg) by mouth 2 times daily (with meals).    Seasonal allergic rhinitis due to pollen  Will increase intensity by adding flonase  - loratadine (CLARITIN) 10 MG tablet; Take 1 tablet (10 mg) by mouth daily.  - fluticasone (FLONASE) 50 MCG/ACT nasal spray; Spray 1 spray into both nostrils daily.    Chronic  prostatitis  stabl3  - tamsulosin (FLOMAX) 0.4 MG capsule; Take 1 capsule (0.4 mg) by mouth daily.    Gastroesophageal reflux disease without esophagitis    - pantoprazole (PROTONIX) 40 MG EC tablet; Take 1 tablet (40 mg) by mouth daily.    Tendinopathy of right shoulder    - Sports Medicine Clinic - [facility] Internal Referral; Future    Need for vaccination against respiratory syncytial virus  Will get at pharmacy    Need for shingles vaccine  Will get at pharmacy    Healthcare maintenance    - RSV vaccine, bivalent, ABRYSVO, injection; Inject 0.5 mLs into the muscle once for 1 dose. Pharmacist administered  - zoster vaccine recombinant adjuvanted (SHINGRIX) injection; Inject 0.5 mLs into the muscle once for 1 dose. Pharmacist administered  - Folic Acid-Vit B6-Vit B12 (B COMPLEX-FOLIC ACID) 500-5-200 MCG-MG-MCG TABS; Take 1 tablet by mouth daily.  - COVID-19 12+ (PFIZER)          BMI  Estimated body mass index is 27.67 kg/m  as calculated from the following:    Height as of this encounter: 1.829 m (6').    Weight as of this encounter: 92.5 kg (204 lb).         The longitudinal plan of care for the diagnosis(es)/condition(s) as documented were addressed during this visit. Due to the added complexity in care, I will continue to support Juli in the subsequent management and with ongoing continuity of care.  I spent a total of 32 minutes on the day of the visit.   Time spent by me today doing chart review, history and exam, documentation and further activities per the note      Return in about 4 weeks (around 1/2/2025) for Diabetes Routine Check, also follow up with Sports med for injection.    Subjective   Juli is a 79 year old, presenting for the following health issues:  Follow Up      12/5/2024    10:47 AM   Additional Questions   Roomed by Eduardo         12/5/2024    Information    services provided? Yes    name Delfina FISHER   Bilateral knee arthritis   Had inj on the Right  and would like it on the left   Also reports   Right sided shoulder pain  For some time limited ROM    Requests refills                  Objective    /78   Pulse 82   Temp 97.8  F (36.6  C) (Temporal)   Resp 14   Ht 1.829 m (6')   Wt 92.5 kg (204 lb)   SpO2 99%   BMI 27.67 kg/m    Body mass index is 27.67 kg/m .  Physical Exam  Musculoskeletal:      Right foot: No foot drop.   Feet:      Right foot:      Protective Sensation: 10 sites tested.  0 sites sensed.      Left foot:      Protective Sensation: 10 sites tested.  10 sites sensed.      Comments: Normal sensation on the left no sensation with monofilament on the right though able to sense finger palpation.        GENERAL: alert and no distress  NECK: no adenopathy, no asymmetry, masses, or scars  RESP: lungs clear to auscultation - no rales, rhonchi or wheezes  CV: regular rate and rhythm, normal S1 S2, no S3 or S4, no murmur, click or rub, no peripheral edema  ABDOMEN: soft, nontender, no hepatosplenomegaly, no masses and bowel sounds normal  MS: no gross musculoskeletal defects noted, no edema  Diabetic foot exam: normal DP and PT pulses, no trophic changes or ulcerative lesions, and normal monofilament exam, except for findings noted on diabetic foot graphical image.                      Signed Electronically by: Ernie Arreola MD

## 2024-12-10 ENCOUNTER — OFFICE VISIT (OUTPATIENT)
Dept: FAMILY MEDICINE | Facility: CLINIC | Age: 79
End: 2024-12-10
Payer: COMMERCIAL

## 2024-12-10 ENCOUNTER — ANCILLARY PROCEDURE (OUTPATIENT)
Dept: GENERAL RADIOLOGY | Facility: CLINIC | Age: 79
End: 2024-12-10
Attending: FAMILY MEDICINE
Payer: COMMERCIAL

## 2024-12-10 VITALS
TEMPERATURE: 98.3 F | BODY MASS INDEX: 28.04 KG/M2 | SYSTOLIC BLOOD PRESSURE: 112 MMHG | HEIGHT: 72 IN | OXYGEN SATURATION: 98 % | WEIGHT: 207 LBS | DIASTOLIC BLOOD PRESSURE: 71 MMHG | HEART RATE: 83 BPM | RESPIRATION RATE: 12 BRPM

## 2024-12-10 DIAGNOSIS — I10 BENIGN ESSENTIAL HYPERTENSION: ICD-10-CM

## 2024-12-10 DIAGNOSIS — Z29.11 NEED FOR VACCINATION AGAINST RESPIRATORY SYNCYTIAL VIRUS: ICD-10-CM

## 2024-12-10 DIAGNOSIS — Z13.820 SCREENING FOR OSTEOPOROSIS: Primary | ICD-10-CM

## 2024-12-10 DIAGNOSIS — G89.29 CHRONIC RIGHT SHOULDER PAIN: ICD-10-CM

## 2024-12-10 DIAGNOSIS — Z87.81 HISTORY OF FRACTURE OF RIGHT HIP: ICD-10-CM

## 2024-12-10 DIAGNOSIS — E55.9 VITAMIN D DEFICIENCY: ICD-10-CM

## 2024-12-10 DIAGNOSIS — M25.511 CHRONIC RIGHT SHOULDER PAIN: ICD-10-CM

## 2024-12-10 DIAGNOSIS — K21.9 GASTROESOPHAGEAL REFLUX DISEASE WITHOUT ESOPHAGITIS: ICD-10-CM

## 2024-12-10 DIAGNOSIS — M17.12 PRIMARY OSTEOARTHRITIS OF LEFT KNEE: ICD-10-CM

## 2024-12-10 DIAGNOSIS — M85.88 OTHER SPECIFIED DISORDERS OF BONE DENSITY AND STRUCTURE, OTHER SITE: ICD-10-CM

## 2024-12-10 LAB
MAGNESIUM SERPL-MCNC: 2.3 MG/DL (ref 1.7–2.3)
PHOSPHATE SERPL-MCNC: 2.9 MG/DL (ref 2.5–4.5)
PTH-INTACT SERPL-MCNC: 63 PG/ML (ref 15–65)
TSH SERPL DL<=0.005 MIU/L-ACNC: 2.9 UIU/ML (ref 0.3–4.2)
VIT D+METAB SERPL-MCNC: 11 NG/ML (ref 20–50)

## 2024-12-10 PROCEDURE — 73030 X-RAY EXAM OF SHOULDER: CPT | Mod: RT | Performed by: RADIOLOGY

## 2024-12-10 RX ORDER — TRIAMCINOLONE ACETONIDE 40 MG/ML
40 INJECTION, SUSPENSION INTRA-ARTICULAR; INTRAMUSCULAR ONCE
Status: COMPLETED | OUTPATIENT
Start: 2024-12-10 | End: 2024-12-10

## 2024-12-10 RX ORDER — OMEPRAZOLE 40 MG/1
40 CAPSULE, DELAYED RELEASE ORAL DAILY
Qty: 90 CAPSULE | Refills: 1 | Status: SHIPPED | OUTPATIENT
Start: 2024-12-10

## 2024-12-10 RX ADMIN — TRIAMCINOLONE ACETONIDE 40 MG: 40 INJECTION, SUSPENSION INTRA-ARTICULAR; INTRAMUSCULAR at 12:35

## 2024-12-10 NOTE — PROGRESS NOTES
Assessment & Plan     Need for vaccination against respiratory syncytial virus  Has Medicare- needs to be done at Pharmacy    History of fracture of right hip  Back in 2016, by definition hip fx with fall, checking DXA  - DX Bone Density; Future    Screening for osteoporosis  Fall from height and hip fracture = osteoporosis    - DX Bone Density; Future  - Vitamin D Level; Future  - Parathyroid Hormone Intact; Future  - TSH with free T4 reflex; Future  - Magnesium; Future  - Phosphorus; Future  - Vitamin D Level  - Parathyroid Hormone Intact  - TSH with free T4 reflex  - Magnesium  - Phosphorus    Other specified disorders of bone density and structure, other site    - DX Bone Density; Future    Vitamin D deficiency  Hx of def  - Vitamin D Level; Future  - Vitamin D Level    Gastroesophageal reflux disease without esophagitis  May affect bones  - omeprazole (PRILOSEC) 40 MG DR capsule; Take 1 capsule (40 mg) by mouth daily.    Benign essential hypertension    - Home Blood Pressure Monitor Order for DME - ONLY FOR DME    Chronic right shoulder pain  Hx of stroke and likely contributing to less use of right side/neglect  - XR Shoulder Right G/E 3 Views; Future  - Physical Therapy  Referral; Future    Primary osteoarthritis of left knee  Medial compartment loss bilateral knees, previous injection was for right knee  - Large Joint/Bursa injection and/or drainage - Unilateral (Shoulder, Knee) [20610]  - triamcinolone (KENALOG-40) injection 40 mg          BMI  Estimated body mass index is 28.07 kg/m  as calculated from the following:    Height as of this encounter: 1.829 m (6').    Weight as of this encounter: 93.9 kg (207 lb).         Regular exercise  See Patient Instructions    No follow-ups on file.    Ana Bustos is a 79 year old, presenting for the following health issues:  RECHECK and Refill Request (Gas medication)      12/10/2024    11:00 AM   Additional Questions   Roomed by bunny    Accompanied by self         12/10/2024    Information    services provided? Yes   Language Botswanan   Type of interpretation provided Face-to-face    name NEREIDA    Agency [employer]        HPI Pt is a 80 yo male with PMhx of Type 2 DM, syncope, neuropathic pain, bilateral knee osteoarthritis, CKD Stage 3a, hip fracture  in 2016- 7/4- hot weather, dizzy and passed out, osteoporosis presenting with   Right knee is good, left knee and right shoulder a problem today  No kidney stones, kidney function is decreased, +stroke, diabetes, no COPD, non-smoker, no alcohol, no MI, no cancer  GERD++, on meds  Mom- no OP, no hip fx, hx of maternal GMa- hip fracture    Calcium- sometimes dairy, leafy greens,   Exercises- reports he exercises, walks in the morning    Fracture hx- right hip    Right shoulder- hx of stroke, fell onto right side            Hip fracture in 2016    Review of Systems  Constitutional, neuro, ENT, endocrine, pulmonary, cardiac, gastrointestinal, genitourinary, musculoskeletal, integument and psychiatric systems are negative, except as otherwise noted.      Objective    /71   Pulse 83   Temp 98.3  F (36.8  C) (Oral)   Resp 12   Ht 1.829 m (6')   Wt 93.9 kg (207 lb)   SpO2 98%   BMI 28.07 kg/m    Body mass index is 28.07 kg/m .  Physical Exam   right Shoulder:   Inspection: asymmetry? Difficulty lifting right arm to full 180; dyskinesis? mild   ROM:   Active - forward flexion - 140/ abduction - 140/ int rot - symmetric/ ext rot - symmetric   Passive- forward flexion - full/ abduction - full   Strength: deltoid/supraspinatous - 5/5-; infraspinatous/ teres minor - 5/5; subscapularis - 5-/5   Maneuvers:   RTC - empty can - neg; painful arc - mild; lift off - difficulty getting into postion   Impingement - Nickerson -neg; Neer- neg   AC - cross arm - neg   Biceps - Speed's - neg; Yergason's - neg   Labrum - Ragsdale's - neg; Kirk shear - not  performed  Instability - Apprehension - neg   Palpation: AC - neg; Acromion/ supraspinatus - pos; scapula - neg; bicipital groove - neg     Left knee with some joint tenderness Medial> lateral    Xray - Reviewed and interpreted by me.  Normal x-ray of shoulder, loss of medial compartment on left knee        Signed Electronically by: Shelbie Gilliam MD    PROCEDURE:  JOINT ASPIRATION/INJECTION    After a discussion of risks, benefits and side effects of procedure, informed patient consent was obtained.  The left knee was prepped and draped in the usual clean fashion (sterile not required for this procedure).  Procedure was completed with landmarks.    ASPIRATION: Not performed.    INJECTION:  Using 4 cc of 1 % lidocaine mixed with 40 mg of kenalog, the left knee was successfully injected without complication.  Patient did experience some pain relief following injection.    Invasive Procedure Safety Checklist completed by nurse? N/A

## 2024-12-11 ENCOUNTER — PATIENT OUTREACH (OUTPATIENT)
Dept: CARE COORDINATION | Facility: CLINIC | Age: 79
End: 2024-12-11
Payer: COMMERCIAL

## 2024-12-13 ENCOUNTER — TELEPHONE (OUTPATIENT)
Dept: FAMILY MEDICINE | Facility: CLINIC | Age: 79
End: 2024-12-13
Payer: COMMERCIAL

## 2024-12-13 NOTE — TELEPHONE ENCOUNTER
Attempted to call pt to relay provider message below. No answer, lmtcb.      Liberty Albright RN      ----- Message from Shelbie Gilliam sent at 12/11/2024  7:25 AM CST -----  No mychart    Vit D is very low, start vit D 5000 international unit(s) daily x 3 months  This is available over the counter

## 2024-12-17 NOTE — TELEPHONE ENCOUNTER
Attempted to call pt to relay provider message below. No answer, lmtcb. Sending letter.        Liberty Albright RN

## 2025-01-02 ENCOUNTER — THERAPY VISIT (OUTPATIENT)
Dept: PHYSICAL THERAPY | Facility: CLINIC | Age: 80
End: 2025-01-02
Payer: COMMERCIAL

## 2025-01-02 DIAGNOSIS — Z86.73 HISTORY OF CVA (CEREBROVASCULAR ACCIDENT): ICD-10-CM

## 2025-01-02 DIAGNOSIS — M25.551 HIP PAIN, RIGHT: ICD-10-CM

## 2025-01-02 DIAGNOSIS — M25.551 CHRONIC HIP PAIN AFTER TOTAL REPLACEMENT OF RIGHT HIP JOINT: Primary | ICD-10-CM

## 2025-01-02 DIAGNOSIS — Z96.641 CHRONIC HIP PAIN AFTER TOTAL REPLACEMENT OF RIGHT HIP JOINT: Primary | ICD-10-CM

## 2025-01-02 DIAGNOSIS — G89.29 CHRONIC HIP PAIN AFTER TOTAL REPLACEMENT OF RIGHT HIP JOINT: Primary | ICD-10-CM

## 2025-01-10 ENCOUNTER — THERAPY VISIT (OUTPATIENT)
Dept: PHYSICAL THERAPY | Facility: CLINIC | Age: 80
End: 2025-01-10
Payer: COMMERCIAL

## 2025-01-10 DIAGNOSIS — Z96.641 CHRONIC HIP PAIN AFTER TOTAL REPLACEMENT OF RIGHT HIP JOINT: ICD-10-CM

## 2025-01-10 DIAGNOSIS — G89.29 CHRONIC HIP PAIN AFTER TOTAL REPLACEMENT OF RIGHT HIP JOINT: ICD-10-CM

## 2025-01-10 DIAGNOSIS — Z86.73 HISTORY OF CVA (CEREBROVASCULAR ACCIDENT): Primary | ICD-10-CM

## 2025-01-10 DIAGNOSIS — M25.551 CHRONIC HIP PAIN AFTER TOTAL REPLACEMENT OF RIGHT HIP JOINT: ICD-10-CM

## 2025-01-10 DIAGNOSIS — M25.551 HIP PAIN, RIGHT: ICD-10-CM

## 2025-01-10 PROCEDURE — 97110 THERAPEUTIC EXERCISES: CPT | Mod: GP

## 2025-01-10 PROCEDURE — 97140 MANUAL THERAPY 1/> REGIONS: CPT | Mod: GP

## 2025-01-20 DIAGNOSIS — E11.42 TYPE 2 DIABETES MELLITUS WITH DIABETIC POLYNEUROPATHY, WITHOUT LONG-TERM CURRENT USE OF INSULIN (H): Primary | ICD-10-CM

## 2025-01-21 ENCOUNTER — OFFICE VISIT (OUTPATIENT)
Dept: FAMILY MEDICINE | Facility: CLINIC | Age: 80
End: 2025-01-21
Payer: COMMERCIAL

## 2025-01-21 VITALS
HEART RATE: 87 BPM | SYSTOLIC BLOOD PRESSURE: 125 MMHG | HEIGHT: 72 IN | WEIGHT: 206.9 LBS | RESPIRATION RATE: 15 BRPM | BODY MASS INDEX: 28.02 KG/M2 | OXYGEN SATURATION: 95 % | DIASTOLIC BLOOD PRESSURE: 83 MMHG | TEMPERATURE: 98 F

## 2025-01-21 DIAGNOSIS — M25.562 CHRONIC PAIN OF BOTH KNEES: ICD-10-CM

## 2025-01-21 DIAGNOSIS — E11.22 TYPE 2 DIABETES MELLITUS WITH STAGE 3 CHRONIC KIDNEY DISEASE, WITHOUT LONG-TERM CURRENT USE OF INSULIN, UNSPECIFIED WHETHER STAGE 3A OR 3B CKD (H): ICD-10-CM

## 2025-01-21 DIAGNOSIS — I10 BENIGN ESSENTIAL HYPERTENSION: ICD-10-CM

## 2025-01-21 DIAGNOSIS — N13.9 URINARY OUTFLOW OBSTRUCTION: ICD-10-CM

## 2025-01-21 DIAGNOSIS — H04.123 DRY EYE SYNDROME OF BOTH EYES: ICD-10-CM

## 2025-01-21 DIAGNOSIS — N18.31 STAGE 3A CHRONIC KIDNEY DISEASE (H): ICD-10-CM

## 2025-01-21 DIAGNOSIS — K21.9 GASTROESOPHAGEAL REFLUX DISEASE WITHOUT ESOPHAGITIS: ICD-10-CM

## 2025-01-21 DIAGNOSIS — G89.29 CHRONIC HIP PAIN AFTER TOTAL REPLACEMENT OF RIGHT HIP JOINT: ICD-10-CM

## 2025-01-21 DIAGNOSIS — Z86.73 HISTORY OF CVA (CEREBROVASCULAR ACCIDENT): ICD-10-CM

## 2025-01-21 DIAGNOSIS — K59.01 SLOW TRANSIT CONSTIPATION: ICD-10-CM

## 2025-01-21 DIAGNOSIS — M79.2 NEUROPATHIC PAIN: ICD-10-CM

## 2025-01-21 DIAGNOSIS — M25.561 CHRONIC PAIN OF BOTH KNEES: ICD-10-CM

## 2025-01-21 DIAGNOSIS — E11.42 TYPE 2 DIABETES MELLITUS WITH DIABETIC POLYNEUROPATHY, WITHOUT LONG-TERM CURRENT USE OF INSULIN (H): Primary | ICD-10-CM

## 2025-01-21 DIAGNOSIS — Z00.00 HEALTHCARE MAINTENANCE: ICD-10-CM

## 2025-01-21 DIAGNOSIS — Z96.641 CHRONIC HIP PAIN AFTER TOTAL REPLACEMENT OF RIGHT HIP JOINT: ICD-10-CM

## 2025-01-21 DIAGNOSIS — J30.1 SEASONAL ALLERGIC RHINITIS DUE TO POLLEN: ICD-10-CM

## 2025-01-21 DIAGNOSIS — G89.29 CHRONIC PAIN OF BOTH KNEES: ICD-10-CM

## 2025-01-21 DIAGNOSIS — N18.30 TYPE 2 DIABETES MELLITUS WITH STAGE 3 CHRONIC KIDNEY DISEASE, WITHOUT LONG-TERM CURRENT USE OF INSULIN, UNSPECIFIED WHETHER STAGE 3A OR 3B CKD (H): ICD-10-CM

## 2025-01-21 DIAGNOSIS — M25.551 CHRONIC HIP PAIN AFTER TOTAL REPLACEMENT OF RIGHT HIP JOINT: ICD-10-CM

## 2025-01-21 LAB
EST. AVERAGE GLUCOSE BLD GHB EST-MCNC: 131 MG/DL
HBA1C MFR BLD: 6.2 % (ref 0–5.6)
HOLD SPECIMEN: NORMAL

## 2025-01-21 RX ORDER — DULOXETINE 40 MG/1
40 CAPSULE, DELAYED RELEASE ORAL DAILY
Qty: 90 CAPSULE | Refills: 3 | Status: SHIPPED | OUTPATIENT
Start: 2025-01-21

## 2025-01-21 RX ORDER — CARBOXYMETHYLCELLULOSE SODIUM 5 MG/ML
1 SOLUTION/ DROPS OPHTHALMIC 4 TIMES DAILY
Qty: 400 EACH | Refills: 11 | Status: SHIPPED | OUTPATIENT
Start: 2025-01-21

## 2025-01-21 RX ORDER — LIDOCAINE 50 MG/G
1 PATCH TOPICAL EVERY 24 HOURS
Qty: 30 PATCH | Refills: 2 | Status: SHIPPED | OUTPATIENT
Start: 2025-01-21

## 2025-01-21 RX ORDER — NEBULIZER AND COMPRESSOR
1 EACH MISCELLANEOUS DAILY
Qty: 1 KIT | Refills: 0 | Status: SHIPPED | OUTPATIENT
Start: 2025-01-21

## 2025-01-21 RX ORDER — ATORVASTATIN CALCIUM 20 MG/1
20 TABLET, FILM COATED ORAL DAILY
Qty: 90 TABLET | Refills: 1 | Status: SHIPPED | OUTPATIENT
Start: 2025-01-21

## 2025-01-21 RX ORDER — LORATADINE 10 MG/1
10 TABLET ORAL DAILY
Qty: 90 TABLET | Refills: 3 | Status: SHIPPED | OUTPATIENT
Start: 2025-01-21

## 2025-01-21 RX ORDER — ACETAMINOPHEN 500 MG
1000 TABLET ORAL 3 TIMES DAILY
Qty: 200 TABLET | Refills: 3 | Status: SHIPPED | OUTPATIENT
Start: 2025-01-21

## 2025-01-21 RX ORDER — SENNOSIDES 8.6 MG
2 TABLET ORAL DAILY
Qty: 60 TABLET | Refills: 3 | Status: SHIPPED | OUTPATIENT
Start: 2025-01-21

## 2025-01-21 RX ORDER — ASPIRIN 81 MG/1
81 TABLET ORAL DAILY
Qty: 60 TABLET | Refills: 4 | Status: SHIPPED | OUTPATIENT
Start: 2025-01-21

## 2025-01-21 RX ORDER — AMLODIPINE BESYLATE 10 MG/1
10 TABLET ORAL DAILY
Qty: 90 TABLET | Refills: 3 | Status: SHIPPED | OUTPATIENT
Start: 2025-01-21

## 2025-01-21 RX ORDER — METFORMIN HYDROCHLORIDE 500 MG/1
500 TABLET, EXTENDED RELEASE ORAL 2 TIMES DAILY WITH MEALS
Qty: 180 TABLET | Refills: 3 | Status: SHIPPED | OUTPATIENT
Start: 2025-01-21

## 2025-01-21 RX ORDER — FLUTICASONE PROPIONATE 50 MCG
1 SPRAY, SUSPENSION (ML) NASAL DAILY
Qty: 18.2 ML | Refills: 1 | Status: SHIPPED | OUTPATIENT
Start: 2025-01-21

## 2025-01-21 RX ORDER — GABAPENTIN 800 MG/1
800 TABLET ORAL 3 TIMES DAILY
Qty: 270 TABLET | Refills: 1 | Status: SHIPPED | OUTPATIENT
Start: 2025-01-21

## 2025-01-21 RX ORDER — CARVEDILOL 12.5 MG/1
25 TABLET ORAL 2 TIMES DAILY WITH MEALS
Qty: 180 TABLET | Refills: 3 | Status: SHIPPED | OUTPATIENT
Start: 2025-01-21

## 2025-01-21 RX ORDER — TAMSULOSIN HYDROCHLORIDE 0.4 MG/1
0.4 CAPSULE ORAL DAILY
Qty: 30 CAPSULE | Refills: 1 | Status: SHIPPED | OUTPATIENT
Start: 2025-01-21

## 2025-01-21 RX ORDER — PANTOPRAZOLE SODIUM 40 MG/1
40 TABLET, DELAYED RELEASE ORAL DAILY
Qty: 90 TABLET | Refills: 1 | Status: SHIPPED | OUTPATIENT
Start: 2025-01-21

## 2025-01-21 RX ORDER — POLYETHYLENE GLYCOL 3350 17 G/17G
17 POWDER, FOR SOLUTION ORAL DAILY
Qty: 510 G | Refills: 1 | Status: SHIPPED | OUTPATIENT
Start: 2025-01-21

## 2025-01-21 RX ORDER — CYANOCOBALAMIN/FOLIC AC/VIT B6 0.2-.5-5MG
1 TABLET ORAL DAILY
Qty: 90 TABLET | Refills: 3 | Status: SHIPPED | OUTPATIENT
Start: 2025-01-21

## 2025-01-21 NOTE — PATIENT INSTRUCTIONS
Patient Education   Here is the plan from today's visit    1. Type 2 diabetes mellitus with diabetic polyneuropathy, without long-term current use of insulin (H) (Primary)    - Hemoglobin A1c  - Extra Green Top Tube (LAB USE ONLY)    2. CKD (chronic kidney disease) stage 4, GFR 15-29 ml/min (H)    - Blood Pressure Monitoring (ADULT BLOOD PRESSURE CUFF LG) KIT; 1 each daily.  Dispense: 1 kit; Refill: 0    3. Neuropathic pain    - DULoxetine 40 MG CPEP; Take 40 mg by mouth daily.  Dispense: 90 capsule; Refill: 3  - acetaminophen (TYLENOL) 500 MG tablet; Take 2 tablets (1,000 mg) by mouth 3 times daily.  Dispense: 200 tablet; Refill: 3  - gabapentin (NEURONTIN) 800 MG tablet; Take 1 tablet (800 mg) by mouth 3 times daily.  Dispense: 270 tablet; Refill: 1    4. History of CVA (cerebrovascular accident)    - amLODIPine (NORVASC) 10 MG tablet; Take 1 tablet (10 mg) by mouth daily.  Dispense: 90 tablet; Refill: 3  - aspirin 81 MG EC tablet; Take 1 tablet (81 mg) by mouth daily.  Dispense: 60 tablet; Refill: 4  - atorvastatin (LIPITOR) 20 MG tablet; Take 1 tablet (20 mg) by mouth daily.  Dispense: 90 tablet; Refill: 1    5. Dry eye syndrome of both eyes    - carboxymethylcellulose PF (CARBOXYMETHYLCELLULOSE SODIUM) 0.5 % ophthalmic solution; Place 1 drop into both eyes 4 times daily. Preservative free artificial tears, single use vials.  Dispense: 400 each; Refill: 11    6. Benign essential hypertension    - carvedilol (COREG) 12.5 MG tablet; Take 2 tablets (25 mg) by mouth 2 times daily (with meals).  Dispense: 180 tablet; Refill: 3    7. Stage 3a chronic kidney disease (H)    - empagliflozin (JARDIANCE) 10 MG TABS tablet; Take 1 tablet (10 mg) by mouth daily.  Dispense: 90 tablet; Refill: 1    8. Seasonal allergic rhinitis due to pollen    - fluticasone (FLONASE) 50 MCG/ACT nasal spray; Spray 1 spray into both nostrils daily.  Dispense: 18.2 mL; Refill: 1  - loratadine (CLARITIN) 10 MG tablet; Take 1 tablet (10 mg) by  mouth daily.  Dispense: 90 tablet; Refill: 3    9. Healthcare maintenance    - Folic Acid-Vit B6-Vit B12 (B COMPLEX-FOLIC ACID) 500-5-200 MCG-MG-MCG TABS; Take 1 tablet by mouth daily.  Dispense: 90 tablet; Refill: 3    10. Chronic hip pain after total replacement of right hip joint    - lidocaine (LIDODERM) 5 % patch; Place 1 patch over 12 hours onto the skin every 24 hours. To prevent lidocaine toxicity, patient should be patch free for 12 hrs daily.  Dispense: 30 patch; Refill: 2    11. Chronic pain of both knees    - lidocaine (LIDODERM) 5 % patch; Place 1 patch over 12 hours onto the skin every 24 hours. To prevent lidocaine toxicity, patient should be patch free for 12 hrs daily.  Dispense: 30 patch; Refill: 2    12. Type 2 diabetes mellitus with stage 3 chronic kidney disease, without long-term current use of insulin, unspecified whether stage 3a or 3b CKD (H)    - metFORMIN (GLUCOPHAGE XR) 500 MG 24 hr tablet; Take 1 tablet (500 mg) by mouth 2 times daily (with meals).  Dispense: 180 tablet; Refill: 3    13. Gastroesophageal reflux disease without esophagitis    - pantoprazole (PROTONIX) 40 MG EC tablet; Take 1 tablet (40 mg) by mouth daily.  Dispense: 90 tablet; Refill: 1    14. Slow transit constipation    - polyethylene glycol (MIRALAX) 17 GM/Dose powder; Take 17 g by mouth daily.  Dispense: 510 g; Refill: 1  - sennosides (SENOKOT) 8.6 MG tablet; Take 2 tablets by mouth daily.  Dispense: 60 tablet; Refill: 3    15. Chronic prostatitis    - tamsulosin (FLOMAX) 0.4 MG capsule; Take 1 capsule (0.4 mg) by mouth daily.  Dispense: 30 capsule; Refill: 1          Please call or return to clinic if your symptoms don't go away.    Follow up plan  Return in about 3 months (around 4/21/2025) for Diabetes Routine Check.    Thank you for coming to [facility] today.  Lab Testing:  **If you had lab testing today and your results are reassuring or normal they will be mailed to you or sent through [employer] within 7  days.   **If the lab tests need quick action we will call you with the results.  **If you are having labs done on a different day, please call [phone] to schedule at [facility] or [phone] for other [facility] Outpatient Lab locations. Labs do not offer walk-in appointments.  The phone number we will call with results is # [phone] (home) . If this is not the best number please call our clinic and change the number.  Medication Refills:  If you need any refills please call your pharmacy and they will contact us.   If you need to  your refill at a new pharmacy, please contact the new pharmacy directly. The new pharmacy will help you get your medications transferred faster.   Scheduling:  If you have any concerns about today's visit or wish to schedule another appointment please call our office during normal business hours [phone] (8-5:00 M-F). If you can no longer make a scheduled visit, please cancel via [employer] or call us to cancel.   If a referral was made to an [facility] specialty provider and you do not get a call from central scheduling, please refer to directions on your visit summary or call our office during normal business hours for assistance.   If a Mammogram was ordered for you at the [facility] call [phone] to schedule or change your appointment.  If you had an XRay/CT/Ultrasound/MRI ordered the number is [phone] to schedule or change your radiology appointment.   [facility] has limited ultrasound appointments available on Wednesdays, if you would like your ultrasound at [facility], please call [phone] to schedule.   Medical Concerns:  If you have urgent medical concerns please call [phone] at any time of the day.    Ernie Arreola MD

## 2025-01-21 NOTE — PROGRESS NOTES
Assessment & Plan     Type 2 diabetes mellitus with diabetic polyneuropathy, without long-term current use of insulin (H)  Type 2 diabetes mellitus with stage 3 chronic kidney disease, without long-term current use of insulin, unspecified whether stage 3a or 3b CKD (H)  Diabetes is well-controlled  - metFORMIN (GLUCOPHAGE XR) 500 MG 24 hr tablet; Take 1 tablet (500 mg) by mouth 2 times daily (with meals).    - Hemoglobin A1c  - Extra Green Top Tube (LAB USE ONLY)    Neuropathic pain  Pain is not well-controlled so we will increase duloxetine from 20 mg to 40 mg  - DULoxetine 40 MG CPEP; Take 40 mg by mouth daily.  - acetaminophen (TYLENOL) 500 MG tablet; Take 2 tablets (1,000 mg) by mouth 3 times daily.  - gabapentin (NEURONTIN) 800 MG tablet; Take 1 tablet (800 mg) by mouth 3 times daily.    History of CVA (cerebrovascular accident)  Recommended continue taking aspirin because of previous CVA  - Blood Pressure Monitoring (ADULT BLOOD PRESSURE CUFF LG) KIT; 1 each daily.  - amLODIPine (NORVASC) 10 MG tablet; Take 1 tablet (10 mg) by mouth daily.  - aspirin 81 MG EC tablet; Take 1 tablet (81 mg) by mouth daily.  - atorvastatin (LIPITOR) 20 MG tablet; Take 1 tablet (20 mg) by mouth daily.    Dry eye syndrome of both eyes    - carboxymethylcellulose PF (CARBOXYMETHYLCELLULOSE SODIUM) 0.5 % ophthalmic solution; Place 1 drop into both eyes 4 times daily. Preservative free artificial tears, single use vials.    Benign essential hypertension  Refill well-controlled  - carvedilol (COREG) 12.5 MG tablet; Take 2 tablets (25 mg) by mouth 2 times daily (with meals).    Stage 3a chronic kidney disease (H)  Kidney disease is currently stable  - Blood Pressure Monitoring (ADULT BLOOD PRESSURE CUFF LG) KIT; 1 each daily.  - empagliflozin (JARDIANCE) 10 MG TABS tablet; Take 1 tablet (10 mg) by mouth daily.    Seasonal allergic rhinitis due to pollen  Refill  - fluticasone (FLONASE) 50 MCG/ACT nasal spray; Spray 1 spray into both  nostrils daily.  - loratadine (CLARITIN) 10 MG tablet; Take 1 tablet (10 mg) by mouth daily.    Healthcare maintenance  Refill  - Folic Acid-Vit B6-Vit B12 (B COMPLEX-FOLIC ACID) 500-5-200 MCG-MG-MCG TABS; Take 1 tablet by mouth daily.    Chronic hip pain after total replacement of right hip joint  Refill  - lidocaine (LIDODERM) 5 % patch; Place 1 patch over 12 hours onto the skin every 24 hours. To prevent lidocaine toxicity, patient should be patch free for 12 hrs daily.    Chronic pain of both knees  Refill  - lidocaine (LIDODERM) 5 % patch; Place 1 patch over 12 hours onto the skin every 24 hours. To prevent lidocaine toxicity, patient should be patch free for 12 hrs daily.      Gastroesophageal reflux disease without esophagitis  Refill  - pantoprazole (PROTONIX) 40 MG EC tablet; Take 1 tablet (40 mg) by mouth daily.    Slow transit constipation  Refill may take 2 capfuls every other day to assist with constipation  - polyethylene glycol (MIRALAX) 17 GM/Dose powder; Take 17 g by mouth daily.  - sennosides (SENOKOT) 8.6 MG tablet; Take 2 tablets by mouth daily.    Urinary outflow obstruction  Refill  - tamsulosin (FLOMAX) 0.4 MG capsule; Take 1 capsule (0.4 mg) by mouth daily.      The longitudinal plan of care for the diagnosis(es)/condition(s) as documented were addressed during this visit. Due to the added complexity in care, I will continue to support Juli in the subsequent management and with ongoing continuity of care.  I spent a total of 36 minutes on the day of the visit.   Time spent by me today doing chart review, history and exam, documentation and further activities per the note    BMI  Estimated body mass index is 28.06 kg/m  as calculated from the following:    Height as of this encounter: 1.829 m (6').    Weight as of this encounter: 93.8 kg (206 lb 14.4 oz).     Return in about 3 months (around 4/21/2025) for Diabetes Routine Check.    Ana Bustos is a 80 year old, presenting for the  following health issues:  Follow Up (Diabetes follow up + right sided pain )        1/21/2025    11:08 AM   Additional Questions   Roomed by Lyrik   Accompanied by self         1/21/2025    Information    services provided? Yes   Language [place]   Type of interpretation provided Face-to-face    name flo    Agency [facility]  Services     HPI     Diabetes Follow-up    How often are you checking your blood sugar? Not at all  What concerns do you have today about your diabetes? None   Do you have any of these symptoms? (Select all that apply)  No numbness or tingling in feet.  No redness, sores or blisters on feet.  No complaints of excessive thirst.  No reports of blurry vision.  No significant changes to weight.          Hyperlipidemia Follow-Up    Are you regularly taking any medication or supplement to lower your cholesterol?   Yes- atorvastatin  Are you having muscle aches or other side effects that you think could be caused by your cholesterol lowering medication?  No    Hypertension Follow-up    Do you check your blood pressure regularly outside of the clinic? No   Are you following a low salt diet? Yes  Are your blood pressures ever more than 140 on the top number (systolic) OR more   than 90 on the bottom number (diastolic), for example 140/90? N/A    BP Readings from Last 2 Encounters:   01/21/25 125/83   12/10/24 112/71     Hemoglobin A1C (%)   Date Value   01/21/2025 6.2 (H)   10/17/2024 5.9 (H)   06/07/2021 6.2 (H)   10/16/2020 5.6     LDL Cholesterol Calculated (mg/dL)   Date Value   10/17/2024 65   10/09/2023 36   07/30/2020 41   07/12/2019 26         Chronic Kidney Disease Follow-up    Do you take any over the counter pain medicine?: No            Objective    /83   Pulse 87   Temp 98  F (36.7  C) (Oral)   Resp 15   Ht 1.829 m (6')   Wt 93.8 kg (206 lb 14.4 oz)   SpO2 95%   BMI 28.06 kg/m    Body mass index is 28.06  kg/m .  Physical Exam  Vitals reviewed.   Constitutional:       General: He is not in acute distress.     Appearance: He is well-developed. He is not diaphoretic.   HENT:      Head: Normocephalic.   Eyes:      General: No scleral icterus.     Conjunctiva/sclera: Conjunctivae normal.   Neck:      Thyroid: No thyromegaly.   Cardiovascular:      Rate and Rhythm: Normal rate and regular rhythm.      Heart sounds: Normal heart sounds. No murmur heard.  Pulmonary:      Effort: Pulmonary effort is normal. No respiratory distress.      Breath sounds: Normal breath sounds. No wheezing.   Musculoskeletal:      Cervical back: Normal range of motion.      Left lower leg: No edema.   Skin:     General: Skin is warm and dry.   Neurological:      Mental Status: He is alert and oriented to person, place, and time. Mental status is at baseline.   Psychiatric:         Behavior: Behavior normal.         Thought Content: Thought content normal.         Judgment: Judgment normal.                    Signed Electronically by: Ernie Arreola MD

## 2025-01-24 ENCOUNTER — THERAPY VISIT (OUTPATIENT)
Dept: PHYSICAL THERAPY | Facility: CLINIC | Age: 80
End: 2025-01-24
Payer: COMMERCIAL

## 2025-01-24 DIAGNOSIS — Z96.641 CHRONIC HIP PAIN AFTER TOTAL REPLACEMENT OF RIGHT HIP JOINT: ICD-10-CM

## 2025-01-24 DIAGNOSIS — Z86.73 HISTORY OF CVA (CEREBROVASCULAR ACCIDENT): Primary | ICD-10-CM

## 2025-01-24 DIAGNOSIS — G89.29 CHRONIC HIP PAIN AFTER TOTAL REPLACEMENT OF RIGHT HIP JOINT: ICD-10-CM

## 2025-01-24 DIAGNOSIS — M25.551 CHRONIC HIP PAIN AFTER TOTAL REPLACEMENT OF RIGHT HIP JOINT: ICD-10-CM

## 2025-01-24 DIAGNOSIS — M25.551 HIP PAIN, RIGHT: ICD-10-CM

## 2025-01-24 PROCEDURE — 97110 THERAPEUTIC EXERCISES: CPT | Mod: GP

## 2025-01-24 PROCEDURE — 97140 MANUAL THERAPY 1/> REGIONS: CPT | Mod: GP

## 2025-03-06 ENCOUNTER — TELEPHONE (OUTPATIENT)
Dept: PHARMACY | Facility: OTHER | Age: 80
End: 2025-03-06
Payer: COMMERCIAL

## 2025-03-06 NOTE — TELEPHONE ENCOUNTER
MTM Recruitment: [facility] insurance     Referral outreach attempt #1 on March 6, 2025      Outcome: call attempted, could not leave voicemail     RUSS Gustafson

## 2025-03-12 ENCOUNTER — OFFICE VISIT (OUTPATIENT)
Dept: FAMILY MEDICINE | Facility: CLINIC | Age: 80
End: 2025-03-12
Payer: COMMERCIAL

## 2025-03-12 ENCOUNTER — MEDICAL CORRESPONDENCE (OUTPATIENT)
Dept: HEALTH INFORMATION MANAGEMENT | Facility: CLINIC | Age: 80
End: 2025-03-12

## 2025-03-12 VITALS
OXYGEN SATURATION: 98 % | HEIGHT: 72 IN | TEMPERATURE: 97.5 F | HEART RATE: 81 BPM | BODY MASS INDEX: 27.9 KG/M2 | WEIGHT: 206 LBS | SYSTOLIC BLOOD PRESSURE: 126 MMHG | DIASTOLIC BLOOD PRESSURE: 85 MMHG | RESPIRATION RATE: 14 BRPM

## 2025-03-12 DIAGNOSIS — Z96.641 CHRONIC HIP PAIN AFTER TOTAL REPLACEMENT OF RIGHT HIP JOINT: ICD-10-CM

## 2025-03-12 DIAGNOSIS — Z02.89 ENCOUNTER FOR COMPLETION OF FORM WITH PATIENT: Primary | ICD-10-CM

## 2025-03-12 DIAGNOSIS — M25.562 CHRONIC PAIN OF BOTH KNEES: ICD-10-CM

## 2025-03-12 DIAGNOSIS — G89.29 CHRONIC PAIN OF BOTH KNEES: ICD-10-CM

## 2025-03-12 DIAGNOSIS — G89.29 CHRONIC HIP PAIN AFTER TOTAL REPLACEMENT OF RIGHT HIP JOINT: ICD-10-CM

## 2025-03-12 DIAGNOSIS — M25.551 CHRONIC HIP PAIN AFTER TOTAL REPLACEMENT OF RIGHT HIP JOINT: ICD-10-CM

## 2025-03-12 DIAGNOSIS — M25.561 CHRONIC PAIN OF BOTH KNEES: ICD-10-CM

## 2025-03-12 PROCEDURE — 3079F DIAST BP 80-89 MM HG: CPT

## 2025-03-12 PROCEDURE — 99214 OFFICE O/P EST MOD 30 MIN: CPT | Mod: GC

## 2025-03-12 PROCEDURE — 3074F SYST BP LT 130 MM HG: CPT

## 2025-03-12 RX ORDER — LIDOCAINE 50 MG/G
1 PATCH TOPICAL EVERY 24 HOURS
Qty: 30 PATCH | Refills: 2 | Status: SHIPPED | OUTPATIENT
Start: 2025-03-12

## 2025-03-12 NOTE — PROGRESS NOTES
Preceptor Attestation:   Patient seen, evaluated and discussed with the resident. I have verified the content of the note, which accurately reflects my assessment of the patient and the plan of care.   Supervising Physician:  Ovidio Nguyen MD

## 2025-03-12 NOTE — PATIENT INSTRUCTIONS
Patient Education   Here is the plan from today's visit          Please call or return to clinic if your symptoms don't go away.    Follow up plan  No follow-ups on file.    Thank you for coming to [facility] today.  Lab Testing:  **If you had lab testing today and your results are reassuring or normal they will be mailed to you or sent through [employer] within 7 days.   **If the lab tests need quick action we will call you with the results.  **If you are having labs done on a different day, please call [phone] to schedule at [facility] or [phone] for other [facility] Outpatient Lab locations. Labs do not offer walk-in appointments.  The phone number we will call with results is # [phone] (home) . If this is not the best number please call our clinic and change the number.  Medication Refills:  If you need any refills please call your pharmacy and they will contact us.   If you need to  your refill at a new pharmacy, please contact the new pharmacy directly. The new pharmacy will help you get your medications transferred faster.   Scheduling:  If you have any concerns about today's visit or wish to schedule another appointment please call our office during normal business hours [phone] (8-5:00 M-F). If you can no longer make a scheduled visit, please cancel via [employer] or call us to cancel.   If a referral was made to an [facility] specialty provider and you do not get a call from central scheduling, please refer to directions on your visit summary or call our office during normal business hours for assistance.   If a Mammogram was ordered for you at the Breast Center call [phone] to schedule or change your appointment.  If you had an XRay/CT/Ultrasound/MRI ordered the number is [phone] to schedule or change your radiology appointment.   [facility] has limited ultrasound appointments available on Wednesdays, if you would like your ultrasound at [facility]  clinic, please call [phone] to schedule.   Medical Concerns:  If you have urgent medical concerns please call [phone] at any time of the day.    Buddy Mckee MD

## 2025-03-12 NOTE — PROGRESS NOTES
Assessment & Plan     Encounter for completion of form with patient  Juli recently started going to a new adult care.Per patient's request, completed the adult  medical form with the patient. PCS to scan and upload a copy of the form into the chart.     Chronic hip pain after total replacement of right hip joint  Chronic pain of both knees  Patient has had intermittent right hip pain radiating down the right leg and foot since his first right hip surgery in 2017, sometimes making it difficult/painful to wear shoes. He also reports chronic radiation to the right shoulder and head. Denies any new changes in pain characteristics.  Per chart review, chronic pain managed with Voltaren gel, Tylenol, gabapentin (dose increased in January 2025), and Duloxetine.  He was also taking ibuprofen, strongly advised pt to stop taking Ibuprofen due to CKD IIIa. Lidocaine patch is listed on his medication list but he was unaware of it; refilled for additional pain relief.  He recently resumed PT after stopping during the winter and was encouraged to continue both at home and through activities at adult . Given the chronicity of pain and recent PT resumption no major adjustments to pain management were made at this time.  If pain persists despite PT and current pain management plan including the lidocaine patch, advised pt to follow-up with his PCP to reassess the pain management plan  - lidocaine (LIDODERM) 5 % patch  Dispense: 30 patch; Refill: 2      Subjective   Juli is a 80 year old, presenting for the following health issues:  Pain (Right hip pain radiating down his leg) and Forms (Adult day care )        3/12/2025     9:16 AM   Additional Questions   Roomed by diane   Accompanied by autumn         3/12/2025    Information    services provided? No     HPI     Hx: CVA (2013) w R-sided weakness? deficit, DM2 (well-controlled w/ metformin), HTN, CKD IIIa, syncope    Here with nephewJuli.      #Right hip pain radiating down R leg  - Started after he had his first hip surgery in 2017  - Pain has been intermittent since then   - Radiates down the right leg and foot, sometimes it's painful to wear shoes  - Also radiates to right shoulder and right side of the head  - Takes tylenol & Ibuprofen - suggested that he stops taking ibuprofen  - This has been a chronic issue, saw sports med here and has been seeing his PCP as well regarding this  - Stopped PT in winter but resumed last week now that the weather is getting better    # Adult day care forms  - Started going to a new adult day care. They would like medical forms filled out  - Filled out form together with the patient + made a copy    Objective    /85   Pulse 81   Temp 97.5  F (36.4  C) (Temporal)   Resp 14   Ht 1.829 m (6')   Wt 93.4 kg (206 lb)   SpO2 98%   BMI 27.94 kg/m      Physical Exam   GENERAL: alert and no distress  RESP: lungs clear to auscultation - no rales, rhonchi or wheezes  CV: regular rate and rhythm, normal S1 S2, no S3 or S4, no murmur, click or rub, no peripheral edema  MS: no edema      Signed Electronically by: Buddy Mckee MD

## 2025-03-26 ENCOUNTER — OFFICE VISIT (OUTPATIENT)
Dept: FAMILY MEDICINE | Facility: CLINIC | Age: 80
End: 2025-03-26
Payer: COMMERCIAL

## 2025-03-26 VITALS
DIASTOLIC BLOOD PRESSURE: 67 MMHG | OXYGEN SATURATION: 98 % | HEART RATE: 72 BPM | BODY MASS INDEX: 27.94 KG/M2 | TEMPERATURE: 98.1 F | RESPIRATION RATE: 16 BRPM | SYSTOLIC BLOOD PRESSURE: 104 MMHG | HEIGHT: 72 IN

## 2025-03-26 DIAGNOSIS — Z79.899 MEDICATION MANAGEMENT: Primary | ICD-10-CM

## 2025-03-26 DIAGNOSIS — N18.31 STAGE 3A CHRONIC KIDNEY DISEASE (H): ICD-10-CM

## 2025-03-26 DIAGNOSIS — M79.2 NEUROPATHIC PAIN: ICD-10-CM

## 2025-03-26 DIAGNOSIS — D64.9 ANEMIA, UNSPECIFIED TYPE: ICD-10-CM

## 2025-03-26 DIAGNOSIS — E11.42 TYPE 2 DIABETES MELLITUS WITH DIABETIC POLYNEUROPATHY, WITHOUT LONG-TERM CURRENT USE OF INSULIN (H): ICD-10-CM

## 2025-03-26 LAB
ANION GAP SERPL CALCULATED.3IONS-SCNC: 12 MMOL/L (ref 7–15)
BASOPHILS # BLD AUTO: 0.1 10E3/UL (ref 0–0.2)
BASOPHILS NFR BLD AUTO: 1 %
BUN SERPL-MCNC: 14.3 MG/DL (ref 8–23)
CALCIUM SERPL-MCNC: 9.6 MG/DL (ref 8.8–10.4)
CHLORIDE SERPL-SCNC: 99 MMOL/L (ref 98–107)
CREAT SERPL-MCNC: 1.26 MG/DL (ref 0.67–1.17)
EGFRCR SERPLBLD CKD-EPI 2021: 58 ML/MIN/1.73M2
EOSINOPHIL # BLD AUTO: 0.1 10E3/UL (ref 0–0.7)
EOSINOPHIL NFR BLD AUTO: 2 %
ERYTHROCYTE [DISTWIDTH] IN BLOOD BY AUTOMATED COUNT: 12 % (ref 10–15)
GLUCOSE SERPL-MCNC: 172 MG/DL (ref 70–99)
HCO3 SERPL-SCNC: 25 MMOL/L (ref 22–29)
HCT VFR BLD AUTO: 47.7 % (ref 40–53)
HGB BLD-MCNC: 15.8 G/DL (ref 13.3–17.7)
IMM GRANULOCYTES # BLD: 0 10E3/UL
IMM GRANULOCYTES NFR BLD: 0 %
LYMPHOCYTES # BLD AUTO: 1.7 10E3/UL (ref 0.8–5.3)
LYMPHOCYTES NFR BLD AUTO: 22 %
MCH RBC QN AUTO: 34.6 PG (ref 26.5–33)
MCHC RBC AUTO-ENTMCNC: 33.1 G/DL (ref 31.5–36.5)
MCV RBC AUTO: 105 FL (ref 78–100)
MONOCYTES # BLD AUTO: 0.6 10E3/UL (ref 0–1.3)
MONOCYTES NFR BLD AUTO: 8 %
NEUTROPHILS # BLD AUTO: 5 10E3/UL (ref 1.6–8.3)
NEUTROPHILS NFR BLD AUTO: 66 %
NRBC # BLD AUTO: 0 10E3/UL
NRBC BLD AUTO-RTO: 0 /100
PLATELET # BLD AUTO: 324 10E3/UL (ref 150–450)
POTASSIUM SERPL-SCNC: 4.7 MMOL/L (ref 3.4–5.3)
RBC # BLD AUTO: 4.56 10E6/UL (ref 4.4–5.9)
SODIUM SERPL-SCNC: 136 MMOL/L (ref 135–145)
WBC # BLD AUTO: 7.5 10E3/UL (ref 4–11)

## 2025-03-26 PROCEDURE — 80048 BASIC METABOLIC PNL TOTAL CA: CPT

## 2025-03-26 PROCEDURE — 3074F SYST BP LT 130 MM HG: CPT

## 2025-03-26 PROCEDURE — 36415 COLL VENOUS BLD VENIPUNCTURE: CPT

## 2025-03-26 PROCEDURE — 3078F DIAST BP <80 MM HG: CPT

## 2025-03-26 PROCEDURE — 85025 COMPLETE CBC W/AUTO DIFF WBC: CPT

## 2025-03-26 PROCEDURE — 99214 OFFICE O/P EST MOD 30 MIN: CPT | Mod: GC

## 2025-03-26 RX ORDER — DULOXETIN HYDROCHLORIDE 20 MG/1
20 CAPSULE, DELAYED RELEASE ORAL 2 TIMES DAILY
Qty: 120 CAPSULE | Refills: 3 | Status: SHIPPED | OUTPATIENT
Start: 2025-03-26 | End: 2025-03-26

## 2025-03-26 RX ORDER — DULOXETIN HYDROCHLORIDE 20 MG/1
20 CAPSULE, DELAYED RELEASE ORAL 2 TIMES DAILY
Qty: 120 CAPSULE | Refills: 3 | Status: SHIPPED | OUTPATIENT
Start: 2025-03-26

## 2025-03-26 NOTE — PROGRESS NOTES
Assessment & Plan     Medication management  Neuropathic pain  Juli has chronic pain of both knees and chronic right hip pain after first right hip surgery in 2017, and chronic right shoulder pain. He says that pharmacy declined dispensing one of his pain medications recently. Called pharmacy during the visit and they informed me that pt's insurance is not able to cover Duloxetine because of the 40mg dose. Per chart review, Duloxetine was increased from 20mg to 40mg in Jan 2025.   Prescribed Duloxetine 20mg BID at [facility] pharmacy. If insurance issues and pain persist, may need to consider increasing the dose to 60mg. Encouraged patient to continue going to PT and at-home exercises as well.   - DULoxetine (CYMBALTA) 20 MG capsule  Dispense: 120 capsule; Refill: 3    Stage 3a chronic kidney disease (H)  Per care gaps, due for BMP to monitor kidney function. BMP stable, no acute changes.   - BASIC METABOLIC PANEL    Anemia  Per care gaps, due for CBC to monitor Hgb. Per , the initial WBC scattergram was abnormal due to potential abnormal lymphocytes, so the specimen had to be sent to a different facility for thorough evaluation. Final CBC report did not show any abnormal WBC count, cell types or differential.   - CBC with Platelets & Differential    Subjective   Juli is a 80 year old, presenting for the following health issues:  RECHECK and Recheck Medication        3/26/2025    10:00 AM   Additional Questions   Roomed by Ohn   Accompanied by Ninfa         3/26/2025   Declines Weight   Did patient decline having their weight taken? Yes         3/26/2025    Information    services provided? No     Via the Health Maintenance questionnaire, the patient has reported the following services have been completed -Eye Exam: [facility] 2024-11-01, this information has been sent to the abstraction team.    HPI     # Medication issue follow-up  - Pt says his pharmacy declined  one of his medications. Can't recall the name.   - We called [facility] pharmacy during the visit. Per pharmacist, Duloxetine isn't covered by pt's insurance because of 40mg dose. Pharmacist recommended doing 20mg x2 to see if that helps. If not, might have to do PA.   - Hasn't tried the lidocaine patch for chronic pain yet. Confirmed with pharmacy that it is covered by his insurance. Pt will pick it up later.     #Care gaps  - Due for BMP & CBC    Objective    /67   Pulse 72   Temp 98.1  F (36.7  C) (Oral)   Resp 16   Ht 1.829 m (6')   SpO2 98%   BMI 27.94 kg/m      Physical Exam   GENERAL: alert and no distress  RESP: lungs clear to auscultation - no rales, rhonchi or wheezes  CV: regular rate and rhythm, normal S1 S2, no S3 or S4, no murmur, click or rub      Signed Electronically by: Buddy Mckee MD

## 2025-03-26 NOTE — LETTER
March 27, 2025      Juli Rodriguez  [address]   [place] [address]        Dear ,    We are writing to inform you of your test results.    Your test results fall within the expected range(s) or remain unchanged from previous results.  Please continue with current treatment plan.    Resulted Orders   BASIC METABOLIC PANEL   Result Value Ref Range    Sodium 136 135 - 145 mmol/L    Potassium 4.7 3.4 - 5.3 mmol/L    Chloride 99 98 - 107 mmol/L    Carbon Dioxide (CO2) 25 22 - 29 mmol/L    Anion Gap 12 7 - 15 mmol/L    Urea Nitrogen 14.3 8.0 - 23.0 mg/dL    Creatinine 1.26 (H) 0.67 - 1.17 mg/dL    GFR Estimate 58 (L) >60 mL/min/1.73m2      Comment:      eGFR calculated using 2021 CKD-EPI equation.    Calcium 9.6 8.8 - 10.4 mg/dL    Glucose 172 (H) 70 - 99 mg/dL   CBC with platelets and differential   Result Value Ref Range    WBC Count 7.5 4.0 - 11.0 10e3/uL    RBC Count 4.56 4.40 - 5.90 10e6/uL    Hemoglobin 15.8 13.3 - 17.7 g/dL    Hematocrit 47.7 40.0 - 53.0 %     (H) 78 - 100 fL    MCH 34.6 (H) 26.5 - 33.0 pg    MCHC 33.1 31.5 - 36.5 g/dL    RDW 12.0 10.0 - 15.0 %    Platelet Count 324 150 - 450 10e3/uL    % Neutrophils 66 %    % Lymphocytes 22 %    % Monocytes 8 %    % Eosinophils 2 %    % Basophils 1 %    % Immature Granulocytes 0 %    NRBCs per 100 WBC 0 <1 /100    Absolute Neutrophils 5.0 1.6 - 8.3 10e3/uL    Absolute Lymphocytes 1.7 0.8 - 5.3 10e3/uL    Absolute Monocytes 0.6 0.0 - 1.3 10e3/uL    Absolute Eosinophils 0.1 0.0 - 0.7 10e3/uL    Absolute Basophils 0.1 0.0 - 0.2 10e3/uL    Absolute Immature Granulocytes 0.0 <=0.4 10e3/uL    Absolute NRBCs 0.0 10e3/uL    Narrative    Tech Comments  Name of Jamey Schwartz,     Laboratory Phone [phone]  What is Abnormal WBC Abn Scattergram, Blasts/Abn Lympho?  Provider Follow Up Needed na  If Yes, Provider Contact Name na  If Yes, Provider Phone/Pager na             If you have any questions or concerns, please call the  clinic at the number listed above.       Sincerely,      Buddy Mckee MD    Electronically signed

## 2025-03-27 NOTE — PATIENT INSTRUCTIONS
Patient Education   Here is the plan from today's visit    1. Medication management (Primary)      2. Neuropathic pain    - DULoxetine (CYMBALTA) 20 MG capsule; Take 1 capsule (20 mg) by mouth 2 times daily.  Dispense: 120 capsule; Refill: 3    3. Type 2 diabetes mellitus with diabetic polyneuropathy, without long-term current use of insulin (H)    - DULoxetine (CYMBALTA) 20 MG capsule; Take 1 capsule (20 mg) by mouth 2 times daily.  Dispense: 120 capsule; Refill: 3    4. Stage 3a chronic kidney disease (H)    - BASIC METABOLIC PANEL; Future  - BASIC METABOLIC PANEL    5. Anemia, unspecified type    - CBC with Platelets & Differential; Future  - CBC with Platelets & Differential          Please call or return to clinic if your symptoms don't go away.    Follow up plan  No follow-ups on file.    Thank you for coming to [facility] today.  Lab Testing:  **If you had lab testing today and your results are reassuring or normal they will be mailed to you or sent through [employer] within 7 days.   **If the lab tests need quick action we will call you with the results.  **If you are having labs done on a different day, please call [phone] to schedule at [facility] or [phone] for other [facility] Outpatient Lab locations. Labs do not offer walk-in appointments.  The phone number we will call with results is # [phone] (home) . If this is not the best number please call our clinic and change the number.  Medication Refills:  If you need any refills please call your pharmacy and they will contact us.   If you need to  your refill at a new pharmacy, please contact the new pharmacy directly. The new pharmacy will help you get your medications transferred faster.   Scheduling:  If you have any concerns about today's visit or wish to schedule another appointment please call our office during normal business hours [phone] (8-5:00 M-F). If you can no longer make a scheduled visit, please cancel  via [employer] or call us to cancel.   If a referral was made to an [facility] specialty provider and you do not get a call from central scheduling, please refer to directions on your visit summary or call our office during normal business hours for assistance.   If a Mammogram was ordered for you at the Breast Center call [phone] to schedule or change your appointment.  If you had an XRay/CT/Ultrasound/MRI ordered the number is [phone] to schedule or change your radiology appointment.   [facility] has limited ultrasound appointments available on Wednesdays, if you would like your ultrasound at [facility], please call [phone] to schedule.   Medical Concerns:  If you have urgent medical concerns please call [phone] at any time of the day.    Buddy Mckee MD

## 2025-04-07 ENCOUNTER — OFFICE VISIT (OUTPATIENT)
Dept: FAMILY MEDICINE | Facility: CLINIC | Age: 80
End: 2025-04-07
Payer: COMMERCIAL

## 2025-04-07 VITALS
SYSTOLIC BLOOD PRESSURE: 92 MMHG | HEART RATE: 82 BPM | DIASTOLIC BLOOD PRESSURE: 62 MMHG | RESPIRATION RATE: 14 BRPM | OXYGEN SATURATION: 97 % | TEMPERATURE: 97.7 F | HEIGHT: 72 IN | WEIGHT: 198 LBS | BODY MASS INDEX: 26.82 KG/M2

## 2025-04-07 DIAGNOSIS — G89.29 CHRONIC HIP PAIN AFTER TOTAL REPLACEMENT OF RIGHT HIP JOINT: ICD-10-CM

## 2025-04-07 DIAGNOSIS — M25.551 CHRONIC HIP PAIN AFTER TOTAL REPLACEMENT OF RIGHT HIP JOINT: ICD-10-CM

## 2025-04-07 DIAGNOSIS — M70.61 GREATER TROCHANTERIC BURSITIS, RIGHT: ICD-10-CM

## 2025-04-07 DIAGNOSIS — Z96.641 CHRONIC HIP PAIN AFTER TOTAL REPLACEMENT OF RIGHT HIP JOINT: ICD-10-CM

## 2025-04-07 DIAGNOSIS — E11.42 TYPE 2 DIABETES MELLITUS WITH DIABETIC POLYNEUROPATHY, WITHOUT LONG-TERM CURRENT USE OF INSULIN (H): Primary | ICD-10-CM

## 2025-04-07 DIAGNOSIS — Z29.11 NEED FOR RSV VACCINATION: ICD-10-CM

## 2025-04-07 DIAGNOSIS — N52.9 ERECTILE DYSFUNCTION, UNSPECIFIED ERECTILE DYSFUNCTION TYPE: ICD-10-CM

## 2025-04-07 DIAGNOSIS — Z79.899 MEDICATION MANAGEMENT: ICD-10-CM

## 2025-04-07 RX ORDER — SILDENAFIL CITRATE 20 MG/1
20-100 TABLET ORAL DAILY PRN
Qty: 50 TABLET | Refills: 3 | Status: SHIPPED | OUTPATIENT
Start: 2025-04-07

## 2025-04-07 RX ORDER — SENNOSIDES 8.6 MG
650 CAPSULE ORAL EVERY 8 HOURS PRN
Qty: 200 TABLET | Refills: 3 | Status: SHIPPED | OUTPATIENT
Start: 2025-04-07

## 2025-04-07 RX ORDER — TRIAMCINOLONE ACETONIDE 40 MG/ML
40 INJECTION, SUSPENSION INTRA-ARTICULAR; INTRAMUSCULAR ONCE
Status: COMPLETED | OUTPATIENT
Start: 2025-04-07 | End: 2025-04-07

## 2025-04-07 RX ADMIN — TRIAMCINOLONE ACETONIDE 40 MG: 40 INJECTION, SUSPENSION INTRA-ARTICULAR; INTRAMUSCULAR at 10:09

## 2025-04-07 ASSESSMENT — ENCOUNTER SYMPTOMS: HEADACHES: 1

## 2025-04-07 NOTE — PATIENT INSTRUCTIONS
Patient Education   Juli was seen today for pain, headache and medication question.    Diagnoses and all orders for this visit:    Medication management    Type 2 diabetes mellitus with diabetic polyneuropathy, without long-term current use of insulin (H)  -     Med Therapy Management Referral  -     Orthotics, Mastectomy and Custom Compression Orders Type: Orthotic; Orthotic Type Requested: Diabetic Shoe(s)/Insert(s); X-Depth Shoe(s): Yes; Insert(s) Quantity: 3 pair; Req Documentation: Progress note must support the need for shoes/orthotics. ...    Chronic hip pain after total replacement of right hip joint  -     acetaminophen (TYLENOL) 650 MG CR tablet; Take 1 tablet (650 mg) by mouth every 8 hours as needed for mild pain or fever.    Need for RSV vaccination  -     RSV vaccine, bivalent, ABRYSVO, injection; Inject 0.5 mLs into the muscle once for 1 dose. Pharmacist administered    Erectile dysfunction, unspecified erectile dysfunction type  -     sildenafil (REVATIO) 20 MG tablet; Take 1-5 tablets ( mg) by mouth daily as needed (erectile dysfunction).         Based on our discussion, I have outlined the following instructions for you:      - Your duloxetine dose is now 40 mg instead of 20 mg. This is to help with pain and depression.  - Take gabapentin three times a day to help with nerve pain.  - Switch to a stronger version of Tylenol. Take two tablets twice a day.  - Keep taking duloxetine and gabapentin as your doctor has instructed.  - Come back in about a month to check your blood sugar levels and kidney function.  - You will receive a hip injection during your visit.    Thank you again for your visit, and we look forward to supporting you in your journey to better health.         Please call or return to clinic if your symptoms don't go away.    Follow up plan  Return in about 4 weeks (around 5/5/2025) for Diabetes Routine Check.    Thank you for coming to [facility] today.  Lab  Testing:  **If you had lab testing today and your results are reassuring or normal they will be mailed to you or sent through [employer] within 7 days.   **If the lab tests need quick action we will call you with the results.  **If you are having labs done on a different day, please call [phone] to schedule at [facility] or [phone] for other [facility] Outpatient Lab locations. Labs do not offer walk-in appointments.  The phone number we will call with results is # [phone] (home) . If this is not the best number please call our clinic and change the number.  Medication Refills:  If you need any refills please call your pharmacy and they will contact us.   If you need to  your refill at a new pharmacy, please contact the new pharmacy directly. The new pharmacy will help you get your medications transferred faster.   Scheduling:  If you have any concerns about today's visit or wish to schedule another appointment please call our office during normal business hours [phone] (8-5:00 M-F). If you can no longer make a scheduled visit, please cancel via [employer] or call us to cancel.   If a referral was made to an [facility] specialty provider and you do not get a call from central scheduling, please refer to directions on your visit summary or call our office during normal business hours for assistance.   If a Mammogram was ordered for you at the Breast Center call [phone] to schedule or change your appointment.  If you had an XRay/CT/Ultrasound/MRI ordered the number is [phone] to schedule or change your radiology appointment.   [facility] has limited ultrasound appointments available on Wednesdays, if you would like your ultrasound at [facility], please call [phone] to schedule.   Medical Concerns:  If you have urgent medical concerns please call [phone] at any time of the day.    Ernie Arreola MD

## 2025-04-07 NOTE — PROGRESS NOTES
Assessment & Plan     Medication management:  - Current medications include duloxetine, gabapentin, and Tylenol. Duloxetine dosage increased from 20 mg to 40 mg for pain and depression. Gabapentin used three times a day for nerve pain.  - Change to a stronger version of Tylenol, two tablets twice a day. Continue duloxetine and gabapentin as prescribed.    Type 2 diabetes mellitus with diabetic polyneuropathy, without long-term current use of insulin (H):  - Diabetes well-controlled with last A1c at 6.2. Kidney function improved from 9 months ago.  - Return in about a month for A1c and kidney function check.    Chronic hip pain after total replacement of right hip joint:  - Chronic hip pain with burning sensation, likely nerve-related. Previous hip injections were helpful.  - Administer hip injection during the visit.    Consent was obtained from the patient to use an AI documentation tool in the creation of this note.    [facility]   Injection Note    Juli Rodriguez is a patient of Ernie Carey here for injection for greater trochanteric bursa of the right .    Consent: Affirmation of informed consent was signed and scanned into the medical record. Risks, benefits and alternatives were discussed. Patient's questions were elicited and answered.   Procedure safety checklist was completed:  Yes  Time Out (Pause for the Cause) completed: Yes    Preoperative Diagnosis: Greater trochanteric bursitis  Postoperative Diagnosis: same       The right skin over the greater trochanteric bursa was prepped  in the usual sterile fashion INJECTION:  Using 4 mL of 1% lidocaine mixed with 40 mg of kenalog, the   was successfully injected without complication.  Patient did experience some pain relief following injection.    Technique:   Skin prep Technicare  Anesthesia 1% lidocaine  Complications:  No  Tolerance:  Pt tolerated procedure well and was in stable condition.     Follow up: Patient was instructed to use  ice on the affected area tonight for at least 30 minutes and  that there will be a return to pain in a couple hours followed by relief in the next several days to a week. Patient was advised to call if increasing redness and swelling.     Follow up in 1- 2 months.    Faculty: Ernie Arreola MD present for and supervised this entire procedure.     Juil was seen today for pain, headache and medication question.    Diagnoses and all orders for this visit:    Type 2 diabetes mellitus with diabetic polyneuropathy, without long-term current use of insulin (H)  -     Med Therapy Management Referral  -     Orthotics, Mastectomy and Custom Compression Orders Type: Orthotic; Orthotic Type Requested: Diabetic Shoe(s)/Insert(s); X-Depth Shoe(s): Yes; Insert(s) Quantity: 3 pair; Req Documentation: Progress note must support the need for shoes/orthotics. ...    Chronic hip pain after total replacement of right hip joint  -     acetaminophen (TYLENOL) 650 MG CR tablet; Take 1 tablet (650 mg) by mouth every 8 hours as needed for mild pain or fever.  -     DRAIN/INJECT LARGE JOINT/BURSA    Medication management    Need for RSV vaccination  -     RSV vaccine, bivalent, ABRYSVO, injection; Inject 0.5 mLs into the muscle once for 1 dose. Pharmacist administered    Erectile dysfunction, unspecified erectile dysfunction type  -     sildenafil (REVATIO) 20 MG tablet; Take 1-5 tablets ( mg) by mouth daily as needed (erectile dysfunction).    Greater trochanteric bursitis, right  -     DRAIN/INJECT LARGE JOINT/BURSA  -     triamcinolone (KENALOG-40) injection 40 mg       The longitudinal plan of care for the diagnosis(es)/condition(s) as documented were addressed during this visit. Due to the added complexity in care, I will continue to support Juli in the subsequent management and with ongoing continuity of care.  I spent a total of 38 minutes on the day of the visit.   Time spent by me today doing chart review, history and  exam, documentation and further activities per the note           Return in about 4 weeks (around 5/5/2025) for Diabetes Routine Check.    Subjective   Juli is a 80 year old, presenting for the following health issues:  Pain (Shoulder, neck and leg. Tylenol not helping with the pain ), Headache, and Medication Question (Recent increases )      4/7/2025    11:33 AM   Additional Questions   Roomed by Eduardo   Accompanied by Nephew         4/7/2025    Information    services provided? Yes   Language [facility]   Type of interpretation provided Face-to-face    name Delfina     Musculoskeletal Problem  Associated symptoms include headaches.   Headache      Juli Rodriguez, 80 years    Hip and Leg Pain  - Reports burning type of pain from hip down to feet  - Currently using Tylenol, duloxetine, and gabapentin for pain management  - Duloxetine dosage was increased from 20 mg to 40 mg, taken twice a day  - Pain is described as nerve-related  - Has received   injections into the greater trochanteric bursa on the right in the past, last done in January 2024, which were helpful    Kidney Concerns  - Mentioned kidney problems as a reason for avoiding ibuprofen    Diabetes Management  - Last diabetes check showed good control  - A1c was 6.2 during the last check  - Kidney function was worse 9 months ago but improved in the last check 2 weeks ago    Social and Personal Context  - Recently , spouse resides in [facility]  - Plans to visit [facility] at the end of April for 3 months                  Objective    BP 92/62   Pulse 82   Temp 97.7  F (36.5  C) (Temporal)   Resp 14   Ht 1.829 m (6')   Wt 89.8 kg (198 lb)   SpO2 97%   BMI 26.85 kg/m    Body mass index is 26.85 kg/m .  Physical Exam  Vitals reviewed.   Constitutional:       General: He is not in acute distress.     Appearance: He is well-developed. He is not diaphoretic.   HENT:      Head: Normocephalic.   Eyes:      General: No scleral  icterus.     Conjunctiva/sclera: Conjunctivae normal.   Neck:      Thyroid: No thyromegaly.   Cardiovascular:      Rate and Rhythm: Normal rate and regular rhythm.      Heart sounds: Normal heart sounds. No murmur heard.  Pulmonary:      Effort: Pulmonary effort is normal. No respiratory distress.      Breath sounds: Normal breath sounds. No wheezing.   Musculoskeletal:      Cervical back: Normal range of motion.      Left lower leg: No edema.        Legs:    Feet:      Right foot:      Protective Sensation: 10 sites tested.  8 sites sensed.      Skin integrity: Callus (Over third toe) present.      Left foot:      Protective Sensation:   9 sites sensed.      Skin integrity: Callus (Over second toe) present.   Skin:     General: Skin is warm and dry.   Neurological:      Mental Status: He is alert and oriented to person, place, and time. Mental status is at baseline.   Psychiatric:         Behavior: Behavior normal.         Thought Content: Thought content normal.         Judgment: Judgment normal.                    Signed Electronically by: Ernie Arreola MD  DME (Durable Medical Equipment) Orders and Documentation  Orders Placed This Encounter   Procedures    Orthotics, Mastectomy and Custom Compression Orders Type: Orthotic; Orthotic Type Requested: Diabetic Shoe(s)/Insert(s); X-Depth Shoe(s): Yes; Insert(s) Quantity: 3 pair; Req Documentation: Progress note must support the need for shoes/orthotics. ...        The patient was assessed and it was determined the patient is in need of the following listed DME Supplies/Equipment. Please complete supporting documentation below to demonstrate medical necessity.

## 2025-05-06 ENCOUNTER — DOCUMENTATION ONLY (OUTPATIENT)
Dept: FAMILY MEDICINE | Facility: CLINIC | Age: 80
End: 2025-05-06
Payer: COMMERCIAL

## 2025-05-25 NOTE — TELEPHONE ENCOUNTER
[facility] Lab Note     Provider: ALVINA Bright CNP  Facility: [facility]  Facility Type:  TCU    Allergies   Allergen Reactions     Beef-Derived Products Other (See Comments)     Sneezing when eats beef     Eggs [Chicken-Derived Products (Egg)]        Labs Reviewed by provider: Vitamin D---from 7/21/23.     Verbal Order/Direction given by Provider: Start Cholecalciferol 50,000 units PO q week. Recommend recheck in 8 weeks with PCP      Provider giving Order:  ALVINA Bright CNP    Verbal Order given to: Chao Goldstein RN   Other medical problems/Weakness

## 2025-06-09 DIAGNOSIS — K21.9 GASTROESOPHAGEAL REFLUX DISEASE WITHOUT ESOPHAGITIS: ICD-10-CM

## 2025-06-09 RX ORDER — OMEPRAZOLE 40 MG/1
40 CAPSULE, DELAYED RELEASE ORAL DAILY
Qty: 90 CAPSULE | Refills: 1 | OUTPATIENT
Start: 2025-06-09

## 2025-06-09 NOTE — TELEPHONE ENCOUNTER
Omeprazole was discontinued on 1/21/25, patient was prescribed pantoprazole on same date. Called pharmacy they have the pantoprazole and it is being processed to refill. Denied script for omeprazole      Nerissa Love RN